# Patient Record
Sex: FEMALE | Race: WHITE | NOT HISPANIC OR LATINO | Employment: OTHER | URBAN - METROPOLITAN AREA
[De-identification: names, ages, dates, MRNs, and addresses within clinical notes are randomized per-mention and may not be internally consistent; named-entity substitution may affect disease eponyms.]

---

## 2017-01-16 ENCOUNTER — LAB VISIT (OUTPATIENT)
Dept: LAB | Facility: HOSPITAL | Age: 82
End: 2017-01-16
Attending: FAMILY MEDICINE
Payer: MEDICARE

## 2017-01-16 DIAGNOSIS — E78.5 HYPERLIPIDEMIA, UNSPECIFIED HYPERLIPIDEMIA TYPE: ICD-10-CM

## 2017-01-16 LAB
ALBUMIN SERPL BCP-MCNC: 3.2 G/DL
ALP SERPL-CCNC: 64 U/L
ALT SERPL W/O P-5'-P-CCNC: 10 U/L
ANION GAP SERPL CALC-SCNC: 5 MMOL/L
AST SERPL-CCNC: 20 U/L
BILIRUB SERPL-MCNC: 0.4 MG/DL
BUN SERPL-MCNC: 18 MG/DL
CALCIUM SERPL-MCNC: 8.3 MG/DL
CHLORIDE SERPL-SCNC: 108 MMOL/L
CHOLEST/HDLC SERPL: 3.4 {RATIO}
CO2 SERPL-SCNC: 28 MMOL/L
CREAT SERPL-MCNC: 1 MG/DL
EST. GFR  (AFRICAN AMERICAN): 58.5 ML/MIN/1.73 M^2
EST. GFR  (NON AFRICAN AMERICAN): 50.8 ML/MIN/1.73 M^2
GLUCOSE SERPL-MCNC: 92 MG/DL
HDL/CHOLESTEROL RATIO: 29.5 %
HDLC SERPL-MCNC: 193 MG/DL
HDLC SERPL-MCNC: 57 MG/DL
LDLC SERPL CALC-MCNC: 117.2 MG/DL
NONHDLC SERPL-MCNC: 136 MG/DL
POTASSIUM SERPL-SCNC: 4.2 MMOL/L
PROT SERPL-MCNC: 6.4 G/DL
SODIUM SERPL-SCNC: 141 MMOL/L
TRIGL SERPL-MCNC: 94 MG/DL
TSH SERPL DL<=0.005 MIU/L-ACNC: 2.26 UIU/ML

## 2017-01-16 PROCEDURE — 80061 LIPID PANEL: CPT

## 2017-01-16 PROCEDURE — 36415 COLL VENOUS BLD VENIPUNCTURE: CPT | Mod: PO

## 2017-01-16 PROCEDURE — 84443 ASSAY THYROID STIM HORMONE: CPT

## 2017-01-16 PROCEDURE — 80053 COMPREHEN METABOLIC PANEL: CPT

## 2017-01-23 ENCOUNTER — CLINICAL SUPPORT (OUTPATIENT)
Dept: CARDIOLOGY | Facility: CLINIC | Age: 82
End: 2017-01-23
Payer: MEDICARE

## 2017-01-23 ENCOUNTER — OFFICE VISIT (OUTPATIENT)
Dept: FAMILY MEDICINE | Facility: CLINIC | Age: 82
End: 2017-01-23
Payer: MEDICARE

## 2017-01-23 VITALS
BODY MASS INDEX: 26.61 KG/M2 | HEART RATE: 78 BPM | TEMPERATURE: 98 F | SYSTOLIC BLOOD PRESSURE: 160 MMHG | HEIGHT: 62 IN | WEIGHT: 144.63 LBS | DIASTOLIC BLOOD PRESSURE: 70 MMHG

## 2017-01-23 DIAGNOSIS — R01.1 HEART MURMUR: ICD-10-CM

## 2017-01-23 DIAGNOSIS — E03.4 HYPOTHYROIDISM DUE TO ACQUIRED ATROPHY OF THYROID: Primary | ICD-10-CM

## 2017-01-23 PROCEDURE — 1157F ADVNC CARE PLAN IN RCRD: CPT | Mod: S$GLB,,, | Performed by: FAMILY MEDICINE

## 2017-01-23 PROCEDURE — 90715 TDAP VACCINE 7 YRS/> IM: CPT | Mod: S$GLB,,, | Performed by: FAMILY MEDICINE

## 2017-01-23 PROCEDURE — 90471 IMMUNIZATION ADMIN: CPT | Mod: S$GLB,,, | Performed by: FAMILY MEDICINE

## 2017-01-23 PROCEDURE — 99499 UNLISTED E&M SERVICE: CPT | Mod: S$GLB,,, | Performed by: FAMILY MEDICINE

## 2017-01-23 PROCEDURE — 1160F RVW MEDS BY RX/DR IN RCRD: CPT | Mod: S$GLB,,, | Performed by: FAMILY MEDICINE

## 2017-01-23 PROCEDURE — 99213 OFFICE O/P EST LOW 20 MIN: CPT | Mod: S$GLB,,, | Performed by: FAMILY MEDICINE

## 2017-01-23 PROCEDURE — 1159F MED LIST DOCD IN RCRD: CPT | Mod: S$GLB,,, | Performed by: FAMILY MEDICINE

## 2017-01-23 PROCEDURE — 93306 TTE W/DOPPLER COMPLETE: CPT | Mod: S$GLB,,, | Performed by: INTERNAL MEDICINE

## 2017-01-23 PROCEDURE — 99999 PR PBB SHADOW E&M-EST. PATIENT-LVL III: CPT | Mod: PBBFAC,,, | Performed by: FAMILY MEDICINE

## 2017-01-23 PROCEDURE — 1125F AMNT PAIN NOTED PAIN PRSNT: CPT | Mod: S$GLB,,, | Performed by: FAMILY MEDICINE

## 2017-01-23 NOTE — PROGRESS NOTES
Subjective:       Patient ID: Michela Calvert is a 87 y.o. female.    Chief Complaint: Hyperlipidemia (Follow up with labs prior: T dap today) and Hypothyroidism    HPI Comments: Here to f/u on chronic health issues.  She is overall feeling well  Lumbar DDD- stable on gabapentin  Hypothyroidism - tolerating Synthroid 75mcg daily    Past Medical History:    DDD (degenerative disc disease), lumbar                       DDD (degenerative disc disease), lumbar                       HLD (hyperlipidemia)                                          Hypothyroidism                                                Lumbar spondylosis            Urinary incontinence - using pads to manage this                                    Past Surgical History:    HYSTERECTOMY                                                   No Known Allergies    Social History    Marital status:              Spouse name:                       Years of education:                 Number of children:               Social History Main Topics    Smoking status: Never Smoker                                                                Smokeless status: Never Used                        Alcohol use: Not on file     Drug use: Not on file     Sexual activity: Not on file          Current Outpatient Prescriptions on File Prior to Visit:  gabapentin (NEURONTIN) 300 MG capsule, Take one capsule (300mg) by mouth in the morning and afternoon, and two capsules (600mg) at night, Disp: 360 capsule, Rfl: 1  hydrocortisone 2.5 % cream, , Disp: , Rfl: 4  lactobacillus rhamnosus GG (CULTURELLE) 10 billion cell capsule, Take 1 capsule by mouth once daily., Disp: , Rfl:   levothyroxine (SYNTHROID) 75 MCG tablet, Take 1 tablet (75 mcg total) by mouth once daily., Disp: 90 tablet, Rfl: 3  multivitamin-minerals-lutein Tab, Take 1 tablet by mouth once daily.  , Disp: , Rfl:   polyethylene glycol (GLYCOLAX) 17 gram/dose powder, Take 17 g by mouth once daily., Disp: 1530 g,  "Rfl: 11  PRAMOXINE HCL/MENTHOL (GOLD BOND MEDICATED ANTI-ITCH TOP), Apply topically., Disp: , Rfl:   alprazolam (XANAX) 1 MG tablet, Take 1 tablet (1 mg total) by mouth On call Procedure for Anxiety. Take 30min prior to procedure, Disp: 1 tablet, Rfl: 0  NYSTOP powder, , Disp: , Rfl:   pneumoc 13-lexus conj-dip cr,PF, (PREVNAR 13, PF,) 0.5 mL Syrg, Inject 0.5 mLs into the muscle once daily., Disp: 1 Syringe, Rfl: 0    No current facility-administered medications on file prior to visit.     No family history on file.          Hyperlipidemia   Pertinent negatives include no chest pain or shortness of breath.     Review of Systems   Constitutional: Negative for appetite change, chills, fever and unexpected weight change.   HENT: Negative for sore throat and trouble swallowing.    Eyes: Negative for pain and visual disturbance.   Respiratory: Negative for cough, shortness of breath and wheezing.    Cardiovascular: Negative for chest pain and palpitations.   Gastrointestinal: Negative for abdominal pain, blood in stool, diarrhea, nausea and vomiting.   Genitourinary: Negative for difficulty urinating, dysuria and hematuria.   Musculoskeletal: Positive for back pain. Negative for arthralgias, gait problem and neck pain.   Skin: Negative for rash and wound.   Neurological: Negative for dizziness, weakness, numbness and headaches.   Hematological: Negative for adenopathy. Bruises/bleeds easily.   Psychiatric/Behavioral: Negative for dysphoric mood.       Objective:       Visit Vitals    BP (!) 160/70 (BP Location: Right arm, Patient Position: Sitting, BP Method: Manual)    Pulse 78    Temp 97.6 °F (36.4 °C) (Oral)    Ht 5' 2" (1.575 m)    Wt 65.6 kg (144 lb 10 oz)    BMI 26.45 kg/m2       Physical Exam   Constitutional: She is oriented to person, place, and time. She appears well-developed and well-nourished.   HENT:   Head: Normocephalic.   Mouth/Throat: Oropharynx is clear and moist. No oropharyngeal exudate or " posterior oropharyngeal erythema.   Eyes: Conjunctivae and EOM are normal. Pupils are equal, round, and reactive to light.   Neck: Normal range of motion. Neck supple. No thyromegaly present.   Cardiovascular: Normal rate, regular rhythm, S1 normal, S2 normal and intact distal pulses.  Exam reveals no gallop and no friction rub.    Murmur heard.   Systolic murmur is present with a grade of 3/6   Pulmonary/Chest: Effort normal and breath sounds normal. She has no wheezes. She has no rales.   Abdominal: Normal appearance.   Musculoskeletal:        Right lower leg: She exhibits no edema.        Left lower leg: She exhibits no edema.   Lymphadenopathy:     She has no cervical adenopathy.   Neurological: She is alert and oriented to person, place, and time. No cranial nerve deficit. Gait normal.   Skin: Skin is warm and intact. No rash noted.   Psychiatric: She has a normal mood and affect.       Results for orders placed or performed in visit on 01/16/17   Comprehensive metabolic panel   Result Value Ref Range    Sodium 141 136 - 145 mmol/L    Potassium 4.2 3.5 - 5.1 mmol/L    Chloride 108 95 - 110 mmol/L    CO2 28 23 - 29 mmol/L    Glucose 92 70 - 110 mg/dL    BUN, Bld 18 8 - 23 mg/dL    Creatinine 1.0 0.5 - 1.4 mg/dL    Calcium 8.3 (L) 8.7 - 10.5 mg/dL    Total Protein 6.4 6.0 - 8.4 g/dL    Albumin 3.2 (L) 3.5 - 5.2 g/dL    Total Bilirubin 0.4 0.1 - 1.0 mg/dL    Alkaline Phosphatase 64 55 - 135 U/L    AST 20 10 - 40 U/L    ALT 10 10 - 44 U/L    Anion Gap 5 (L) 8 - 16 mmol/L    eGFR if African American 58.5 (A) >60 mL/min/1.73 m^2    eGFR if non African American 50.8 (A) >60 mL/min/1.73 m^2   Lipid panel   Result Value Ref Range    Cholesterol 193 120 - 199 mg/dL    Triglycerides 94 30 - 150 mg/dL    HDL 57 40 - 75 mg/dL    LDL Cholesterol 117.2 63.0 - 159.0 mg/dL    HDL/Chol Ratio 29.5 20.0 - 50.0 %    Total Cholesterol/HDL Ratio 3.4 2.0 - 5.0    Non-HDL Cholesterol 136 mg/dL   TSH   Result Value Ref Range    TSH  2.260 0.400 - 4.000 uIU/mL     Assessment:       1. Hypothyroidism due to acquired atrophy of thyroid    2. Heart murmur        Plan:       Hypothyroidism due to acquired atrophy of thyroid  -     Tdap Vaccine (Adult)    Heart murmur  -     2D Echo w/ Color Flow Doppler; Future        Counseled on regular exercise, maintenance of a healthy weight, balanced diet rich in fruits/vegetables and lean protein, and avoidance of unhealthy habits like smoking and excessive alcohol intake.  Continue present meds  Will get echo due to heart murmur. If any signs of LVH may initiate low dose ace inhibitor.  F/u 1 year or PRN

## 2017-01-23 NOTE — MR AVS SNAPSHOT
[facility]  [address] [facility] [address]  [place] [address]  Phone: [phone]  Fax: [phone]                  Michela Calvert   2017 9:20 AM   Office Visit    Description:  Female : 11/3/1929   Provider:  Enzo Nieto MD   Department:  [facility]           Reason for Visit     Hyperlipidemia     Hypothyroidism           Diagnoses this Visit        Comments    Hypothyroidism due to acquired atrophy of thyroid    -  Primary     Heart murmur                To Do List           Future Appointments        Provider Department Dept Phone    2017 2:30 PM ECHO, [facility] [phone]      Goals (5 Years of Data)     None      Follow-Up and Disposition     Return in about 1 year (around 2018).      [facility] On Call     [facility] On Call Nurse [facility] Line - 24/7 Assistance  Registered nurses in the [facility] provide clinical advisement, health education, appointment booking, and other advisory services.  Call for this free service at [phone].             Medications           Message regarding Medications     Verify the changes and/or additions to your medication regime listed below are the same as discussed with your clinician today.  If any of these changes or additions are incorrect, please notify your healthcare provider.        STOP taking these medications     alprazolam (XANAX) 1 MG tablet Take 1 tablet (1 mg total) by mouth On call Procedure for Anxiety. Take 30min prior to procedure    cefUROXime (CEFTIN) 500 MG tablet Take 1 tablet (500 mg total) by mouth 2 (two) times daily.    FLUZONE HIGH-DOSE -, PF, 180 mcg/0.5 mL Syrg ADM 0.5ML IM UTD           Verify that the below list of medications is an accurate representation of the medications you are currently taking.  If none reported, the list may be blank. If incorrect, please contact your healthcare provider. Carry this list with you in case of emergency.     "       Current Medications     clobetasol (TEMOVATE) 0.05 % cream     gabapentin (NEURONTIN) 300 MG capsule TAKE ONE CAPSULE BY MOUTH EVERY MORNING AND EVERY AFTERNOON AND TWO CAPSULES BY MOUTH EVERY NIGHT    hydrocortisone 2.5 % cream     lactobacillus rhamnosus GG (CULTURELLE) 10 billion cell capsule Take 1 capsule by mouth once daily.    levothyroxine (SYNTHROID) 75 MCG tablet Take 1 tablet (75 mcg total) by mouth once daily.    multivitamin-minerals-lutein Tab Take 1 tablet by mouth once daily.      polyethylene glycol (GLYCOLAX) 17 gram/dose powder Take 17 g by mouth once daily.    NYSTOP powder     PRAMOXINE HCL/MENTHOL (GOLD BOND MEDICATED ANTI-ITCH TOP) Apply topically.           Clinical Reference Information           Vital Signs - Last Recorded  Most recent update: 1/23/2017  9:18 AM by Majo Larry MA    BP Pulse Temp Ht Wt BMI    (!) 160/70 (BP Location: Right arm, Patient Position: Sitting, BP Method: Manual) 78 97.6 °F (36.4 °C) (Oral) 5' 2" (1.575 m) 65.6 kg (144 lb 10 oz) 26.45 kg/m2      Blood Pressure          Most Recent Value    BP  (!)  160/70      Allergies as of 1/23/2017     No Known Allergies      Immunizations Administered on Date of Encounter - 1/23/2017     Name Date Dose VIS Date Route    TDAP 1/23/2017 0.5 mL 2/24/2015 Intramuscular      Orders Placed During Today's Visit      Normal Orders This Visit    Tdap Vaccine (Adult)     Future Labs/Procedures Expected by Expires    2D Echo w/ Color Flow Doppler  As directed 1/23/2018      [facility] Sign-Up     Activating your [facility] account is as easy as 1-2-3!     1) Visit [email or web address], select Sign Up Now, enter this activation code and your date of birth, then select Next.  [email or web address]  Expires: 3/9/2017  9:59 AM      2) Create a username and password to use when you visit [facility] in the future and select a security question in case you lose your password and select Next.    3) Enter your e-mail address and click Sign " Up!    Additional Information  If you have questions, please e-mail [email or web address] or call [phone] to talk to our [facility] staff. Remember, [facility] is NOT to be used for urgent needs. For medical emergencies, dial 911.

## 2017-01-24 LAB
DIASTOLIC DYSFUNCTION: YES
ESTIMATED PA SYSTOLIC PRESSURE: 38.52
MITRAL VALVE REGURGITATION: ABNORMAL
MITRAL VALVE STENOSIS: ABNORMAL
RETIRED EF AND QEF - SEE NOTES: 75 (ref 55–65)
TRICUSPID VALVE REGURGITATION: ABNORMAL

## 2017-01-25 ENCOUNTER — TELEPHONE (OUTPATIENT)
Dept: FAMILY MEDICINE | Facility: CLINIC | Age: 82
End: 2017-01-25

## 2017-01-25 DIAGNOSIS — I05.0 MITRAL VALVE STENOSIS, UNSPECIFIED ETIOLOGY: ICD-10-CM

## 2017-01-25 DIAGNOSIS — I51.7 ATRIAL ENLARGEMENT, LEFT: Primary | ICD-10-CM

## 2017-01-25 NOTE — TELEPHONE ENCOUNTER
Echo showed some valve stiffness in mitral valve and some left atrial chamber enlargement.  This may just be related to normal aging, but I do want her to see cardiology. referral entered

## 2017-01-25 NOTE — TELEPHONE ENCOUNTER
Patient advised, voices understanding, request appointment with Dr Bryant.  As I was in the process of scheduling consult with Dr Bryant, phone call was dropped/disconnected.  Attempted x2 to call pt back, no answer.

## 2017-01-25 NOTE — TELEPHONE ENCOUNTER
----- Message from RT Curt sent at 1/25/2017 10:12 AM CST -----  Contact: pt    pt ,requesting a call back soon with results from her Echo Cardiogram, thanks.

## 2017-01-26 ENCOUNTER — OFFICE VISIT (OUTPATIENT)
Dept: CARDIOLOGY | Facility: CLINIC | Age: 82
End: 2017-01-26
Payer: MEDICARE

## 2017-01-26 VITALS
SYSTOLIC BLOOD PRESSURE: 149 MMHG | DIASTOLIC BLOOD PRESSURE: 82 MMHG | BODY MASS INDEX: 26.94 KG/M2 | WEIGHT: 146.38 LBS | HEIGHT: 62 IN | HEART RATE: 66 BPM

## 2017-01-26 DIAGNOSIS — E78.5 HYPERLIPIDEMIA, UNSPECIFIED HYPERLIPIDEMIA TYPE: Primary | ICD-10-CM

## 2017-01-26 DIAGNOSIS — I34.0 CARDIAC MURMUR DUE TO MITRAL VALVE DISORDER: ICD-10-CM

## 2017-01-26 PROCEDURE — 99999 PR PBB SHADOW E&M-EST. PATIENT-LVL III: CPT | Mod: PBBFAC,,, | Performed by: INTERNAL MEDICINE

## 2017-01-26 PROCEDURE — 1159F MED LIST DOCD IN RCRD: CPT | Mod: S$GLB,,, | Performed by: INTERNAL MEDICINE

## 2017-01-26 PROCEDURE — 1157F ADVNC CARE PLAN IN RCRD: CPT | Mod: S$GLB,,, | Performed by: INTERNAL MEDICINE

## 2017-01-26 PROCEDURE — 1160F RVW MEDS BY RX/DR IN RCRD: CPT | Mod: S$GLB,,, | Performed by: INTERNAL MEDICINE

## 2017-01-26 PROCEDURE — 1126F AMNT PAIN NOTED NONE PRSNT: CPT | Mod: S$GLB,,, | Performed by: INTERNAL MEDICINE

## 2017-01-26 PROCEDURE — 99204 OFFICE O/P NEW MOD 45 MIN: CPT | Mod: S$GLB,,, | Performed by: INTERNAL MEDICINE

## 2017-01-26 PROCEDURE — 99499 UNLISTED E&M SERVICE: CPT | Mod: S$GLB,,, | Performed by: INTERNAL MEDICINE

## 2017-01-26 NOTE — PROGRESS NOTES
Subjective:    Patient ID:  Michela Calvert is a 87 y.o. female who presents for evaluation of Results (new pt- ref from Dr. Nieto) and Hyperlipidemia      HPI 88 yo WF asymptomatic with cardiac murmur and echo as below. Denies chest pain, SOB, or edema Denies palpitations, weak spells, and syncope Patient looks younger than stated age.    CONCLUSIONS     1 - Hyperdynamic left ventricular systolic function (EF >70%).     2 - Concentric hypertrophy.     3 - Severe left atrial enlargement.     4 - Left ventricular diastolic dysfunction.     5 - Mild mitral stenosis, MVA = 1.73 cm2.     6 - Mild mitral regurgitation.     7 - Mild tricuspid regurgitation.     8 - The estimated PA systolic pressure is 39 mmHg.             This document has been electronically    SIGNED BY: Sylvester Loredo MD On: 01/24/2017 14:17  Review of Systems   Constitution: Negative for decreased appetite, fever, weakness, malaise/fatigue, weight gain and weight loss.   HENT: Negative for headaches, hearing loss and nosebleeds.    Eyes: Negative for visual disturbance.   Cardiovascular: Negative for chest pain, claudication, cyanosis, dyspnea on exertion, irregular heartbeat, leg swelling, near-syncope, orthopnea, palpitations, paroxysmal nocturnal dyspnea and syncope.   Respiratory: Negative for cough, hemoptysis, shortness of breath, sleep disturbances due to breathing, snoring and wheezing.    Endocrine: Negative for cold intolerance, heat intolerance, polydipsia and polyuria.   Hematologic/Lymphatic: Negative for adenopathy and bleeding problem. Does not bruise/bleed easily.   Skin: Negative for color change, itching, poor wound healing, rash and suspicious lesions.   Musculoskeletal: Positive for arthritis, back pain and joint pain. Negative for falls, joint swelling, muscle cramps, muscle weakness and myalgias.   Gastrointestinal: Negative for bloating, abdominal pain, change in bowel habit, constipation, flatus, heartburn, hematemesis,  "hematochezia, hemorrhoids, jaundice, melena, nausea and vomiting.   Genitourinary: Negative for bladder incontinence, decreased libido, frequency, hematuria, hesitancy and urgency.   Neurological: Negative for brief paralysis, difficulty with concentration, excessive daytime sleepiness, dizziness, focal weakness, light-headedness, loss of balance, numbness and vertigo.   Psychiatric/Behavioral: Negative for altered mental status, depression and memory loss. The patient does not have insomnia and is not nervous/anxious.    Allergic/Immunologic: Negative for environmental allergies, hives and persistent infections.        Objective:    Physical Exam   Constitutional: She is oriented to person, place, and time. She appears well-developed and well-nourished.   BP (!) 149/82  Pulse 66  Ht 5' 2" (1.575 m)  Wt 66.4 kg (146 lb 6.2 oz)  BMI 26.77 kg/m2     HENT:   Head: Normocephalic and atraumatic.   Right Ear: External ear normal.   Left Ear: External ear normal.   Nose: Nose normal.   Mouth/Throat: Oropharynx is clear and moist.   Eyes: Conjunctivae, EOM and lids are normal. Pupils are equal, round, and reactive to light. Right eye exhibits no discharge. Left eye exhibits no discharge. Right conjunctiva has no hemorrhage. No scleral icterus.   Neck: Normal range of motion. Neck supple. No JVD present. No tracheal deviation present. No thyromegaly present.   Cardiovascular: Normal rate, regular rhythm, intact distal pulses and normal pulses.  Exam reveals no gallop and no friction rub.    Murmur heard.   Harsh early systolic murmur is present with a grade of 2/6   Pulmonary/Chest: Effort normal and breath sounds normal. No respiratory distress. She has no wheezes. She has no rales. She exhibits no tenderness. Breasts are symmetrical.   Abdominal: Soft. Bowel sounds are normal. She exhibits no distension and no mass. There is no hepatosplenomegaly or hepatomegaly. There is no tenderness. There is no rebound and no guarding. "   Musculoskeletal: Normal range of motion. She exhibits no edema or tenderness.   Lymphadenopathy:     She has no cervical adenopathy.   Neurological: She is alert and oriented to person, place, and time. She displays normal reflexes. No cranial nerve deficit. Coordination normal.   Skin: Skin is warm and dry. No rash noted. No erythema. No pallor.   Psychiatric: She has a normal mood and affect. Her behavior is normal. Judgment and thought content normal.   Nursing note and vitals reviewed.        Assessment:       1. Hyperlipidemia, unspecified hyperlipidemia type    2. Cardiac murmur due to mitral valve disorder         Plan:     Mild mitral valve stenosis/ Finding in 88 yo asymptomatic patient clinically insignificant      As long as asymptomatic no further testing indicated    No orders of the defined types were placed in this encounter.    Return if symptoms worsen or fail to improve.

## 2017-01-26 NOTE — LETTER
January 26, 2017      Enzo Nieto MD  [address] [facility] [address] [place] [address]           [facility]  [address] [facility] [address] [place] [address]  Phone: [phone]          Patient: Michela Calvert   MR Number: [id number]   YOB: 1929   Date of Visit: 1/26/2017       Dear Dr. Enzo Nieto:    Thank you for referring Michela Calvert to me for evaluation. Attached you will find relevant portions of my assessment and plan of care.    If you have questions, please do not hesitate to call me. I look forward to following Michela Calvert along with you.    Sincerely,    Bryon Puentes Jr., MD    Enclosure  CC:  No Recipients    If you would like to receive this communication electronically, please contact [email or web address] or [phone] to request more information on [employer] Link access.    For providers and/or their staff who would like to refer a patient to [facility], please contact us through our one-stop-shop provider referral line, [facility] [place], at [phone].    If you feel you have received this communication in error or would no longer like to receive these types of communications, please e-mail [email or web address]

## 2017-03-20 RX ORDER — GABAPENTIN 300 MG/1
CAPSULE ORAL
Qty: 360 CAPSULE | Refills: 0 | Status: SHIPPED | OUTPATIENT
Start: 2017-03-20 | End: 2017-06-19 | Stop reason: SDUPTHER

## 2017-04-05 ENCOUNTER — TELEPHONE (OUTPATIENT)
Dept: PAIN MEDICINE | Facility: CLINIC | Age: 82
End: 2017-04-05

## 2017-04-05 NOTE — TELEPHONE ENCOUNTER
----- Message from Janki Fortune sent at 4/5/2017  2:19 PM CDT -----  Patient is requesting a call back form [facility] regarding a possible prescription for a walkerty patient at [phone].    Thank you

## 2017-04-06 RX ORDER — POLYETHYLENE GLYCOL 3350 17 G/17G
POWDER, FOR SOLUTION ORAL
Qty: 1581 G | Refills: 3 | Status: SHIPPED | OUTPATIENT
Start: 2017-04-06 | End: 2018-05-02 | Stop reason: SDUPTHER

## 2017-04-06 NOTE — TELEPHONE ENCOUNTER
Spoke with the patient's  and was unable to get the walker through [employer] and was referred to a place in [place] and they did not deliver. The patient not able to drive that distance contacted [facility]# [phone] and [employer]# [phone]. After speaking with the company, they needed clinical notes why the patient needed the walker. The last office visit was 9/2016. Advised the  that we would need to see the patient to document the need for the walker and then send this over to [place]. Mr. Calvert stated that this would mean a co-pay. He did not want to do that at this time. Advised to contact the office back if he would like to schedule a follow up appointment.

## 2017-04-06 NOTE — TELEPHONE ENCOUNTER
----- Message from Tabitha Garcia RT sent at 4/6/2017 11:00 AM CDT -----  Contact: pt    pt , requesting a call back to, she would like to discuss getting a four marshall walker, thanks.

## 2017-04-19 ENCOUNTER — OFFICE VISIT (OUTPATIENT)
Dept: FAMILY MEDICINE | Facility: CLINIC | Age: 82
End: 2017-04-19
Payer: MEDICARE

## 2017-04-19 VITALS
WEIGHT: 144.38 LBS | SYSTOLIC BLOOD PRESSURE: 144 MMHG | RESPIRATION RATE: 18 BRPM | DIASTOLIC BLOOD PRESSURE: 82 MMHG | HEART RATE: 74 BPM | BODY MASS INDEX: 26.57 KG/M2 | HEIGHT: 62 IN | TEMPERATURE: 98 F | OXYGEN SATURATION: 100 %

## 2017-04-19 DIAGNOSIS — N39.0 URINARY TRACT INFECTION WITHOUT HEMATURIA, SITE UNSPECIFIED: Primary | ICD-10-CM

## 2017-04-19 DIAGNOSIS — R31.9 HEMATURIA: ICD-10-CM

## 2017-04-19 LAB
BACTERIA #/AREA URNS HPF: ABNORMAL /HPF
BILIRUB SERPL-MCNC: NEGATIVE MG/DL
BILIRUB UR QL STRIP: NEGATIVE
BLOOD URINE, POC: 250
CLARITY UR: CLEAR
COLOR UR: YELLOW
COLOR, POC UA: ABNORMAL
GLUCOSE UR QL STRIP: NEGATIVE
GLUCOSE UR QL STRIP: NORMAL
HGB UR QL STRIP: ABNORMAL
KETONES UR QL STRIP: NEGATIVE
KETONES UR QL STRIP: NEGATIVE
LEUKOCYTE ESTERASE UR QL STRIP: ABNORMAL
LEUKOCYTE ESTERASE URINE, POC: ABNORMAL
MICROSCOPIC COMMENT: ABNORMAL
NITRITE UR QL STRIP: NEGATIVE
NITRITE, POC UA: NEGATIVE
PH UR STRIP: 7 [PH] (ref 5–8)
PH, POC UA: 7
PROT UR QL STRIP: NEGATIVE
PROTEIN, POC: ABNORMAL
RBC #/AREA URNS HPF: 10 /HPF (ref 0–4)
SP GR UR STRIP: <=1.005 (ref 1–1.03)
SPECIFIC GRAVITY, POC UA: 1
URN SPEC COLLECT METH UR: ABNORMAL
UROBILINOGEN, POC UA: NORMAL
WBC #/AREA URNS HPF: >100 /HPF (ref 0–5)

## 2017-04-19 PROCEDURE — 1125F AMNT PAIN NOTED PAIN PRSNT: CPT | Mod: S$GLB,,, | Performed by: INTERNAL MEDICINE

## 2017-04-19 PROCEDURE — 99999 PR PBB SHADOW E&M-EST. PATIENT-LVL III: CPT | Mod: PBBFAC,,, | Performed by: INTERNAL MEDICINE

## 2017-04-19 PROCEDURE — 81000 URINALYSIS NONAUTO W/SCOPE: CPT | Mod: PO

## 2017-04-19 PROCEDURE — 1157F ADVNC CARE PLAN IN RCRD: CPT | Mod: S$GLB,,, | Performed by: INTERNAL MEDICINE

## 2017-04-19 PROCEDURE — 1159F MED LIST DOCD IN RCRD: CPT | Mod: S$GLB,,, | Performed by: INTERNAL MEDICINE

## 2017-04-19 PROCEDURE — 1160F RVW MEDS BY RX/DR IN RCRD: CPT | Mod: S$GLB,,, | Performed by: INTERNAL MEDICINE

## 2017-04-19 PROCEDURE — 81001 URINALYSIS AUTO W/SCOPE: CPT | Mod: S$GLB,,, | Performed by: INTERNAL MEDICINE

## 2017-04-19 PROCEDURE — 99214 OFFICE O/P EST MOD 30 MIN: CPT | Mod: 25,S$GLB,, | Performed by: INTERNAL MEDICINE

## 2017-04-19 RX ORDER — PHENAZOPYRIDINE HYDROCHLORIDE 200 MG/1
200 TABLET, FILM COATED ORAL 3 TIMES DAILY PRN
Qty: 9 TABLET | Refills: 0 | Status: SHIPPED | OUTPATIENT
Start: 2017-04-19 | End: 2017-04-22

## 2017-04-19 RX ORDER — FERROUS SULFATE, DRIED 160(50) MG
1 TABLET, EXTENDED RELEASE ORAL DAILY
COMMUNITY
End: 2018-07-19

## 2017-04-19 RX ORDER — SULFAMETHOXAZOLE AND TRIMETHOPRIM 800; 160 MG/1; MG/1
1 TABLET ORAL 2 TIMES DAILY
Qty: 14 TABLET | Refills: 0 | Status: SHIPPED | OUTPATIENT
Start: 2017-04-19 | End: 2017-04-26

## 2017-04-19 NOTE — MR AVS SNAPSHOT
[facility]  [address] [facility] [place] [address]  Phone: [phone]  Fax: [phone]                  Michela Calvert   2017 8:30 AM   Office Visit    Description:  Female : 11/3/1929   Provider:  Ruben Collazo MD   Department:  [facility]           Reason for Visit     Urinary Tract Infection     Abdominal Pain           Diagnoses this Visit        Comments    Urinary tract infection without hematuria, site unspecified    -  Primary     Hematuria                To Do List           Future Appointments        Provider Department Dept Phone    2017 10:50 AM LAB, [place] [facility] [phone]    2017 11:00 AM Bakari Muro MD [facility] [phone]      Goals (5 Years of Data)     None      Follow-Up and Disposition     Follow-up and Disposition History       These Medications        Disp Refills Start End    sulfamethoxazole-trimethoprim 800-160mg (BACTRIM DS) 800-160 mg Tab 14 tablet 0 2017    Take 1 tablet by mouth 2 (two) times daily. - Oral    Pharmacy: [facility] [place] - [address] [facility] Ph #: [phone]       phenazopyridine (PYRIDIUM) 200 MG tablet 9 tablet 0 2017    Take 1 tablet (200 mg total) by mouth 3 (three) times daily as needed for Pain. - Oral    Pharmacy: [facility] [place] - [place] - [address] [facility] Ph #: [phone]         [facility]     [facility] Nurse Care Line -  Assistance  Unless otherwise directed by your provider, please contact [facility], our nurse care line that is available for  assistance.     Registered nurses in the [facility] provide: appointment scheduling, clinical advisement, health education, and other advisory services.  Call: [phone] (toll free)               Medications           Message regarding Medications     Verify the changes and/or  additions to your medication regime listed below are the same as discussed with your clinician today.  If any of these changes or additions are incorrect, please notify your healthcare provider.        START taking these NEW medications        Refills    sulfamethoxazole-trimethoprim 800-160mg (BACTRIM DS) 800-160 mg Tab 0    Sig: Take 1 tablet by mouth 2 (two) times daily.    Class: Normal    Route: Oral    phenazopyridine (PYRIDIUM) 200 MG tablet 0    Sig: Take 1 tablet (200 mg total) by mouth 3 (three) times daily as needed for Pain.    Class: Normal    Route: Oral      STOP taking these medications     NYSTOP powder            Verify that the below list of medications is an accurate representation of the medications you are currently taking.  If none reported, the list may be blank. If incorrect, please contact your healthcare provider. Carry this list with you in case of emergency.           Current Medications     calcium-vitamin D3 (CALCIUM 500 + D) 500 mg(1,250mg) -200 unit per tablet Take 1 tablet by mouth 2 (two) times daily with meals.    clobetasol (TEMOVATE) 0.05 % cream     gabapentin (NEURONTIN) 300 MG capsule TAKE ONE CAPSULE BY MOUTH EVERY MORNING AND EVERY AFTERNOON AND TWO CAPSULES BY MOUTH EVERY NIGHT    hydrocortisone 2.5 % cream     lactobacillus rhamnosus GG (CULTURELLE) 10 billion cell capsule Take 1 capsule by mouth once daily.    levothyroxine (SYNTHROID) 75 MCG tablet Take 1 tablet (75 mcg total) by mouth once daily.    multivitamin-minerals-lutein Tab Take 1 tablet by mouth once daily.      polyethylene glycol (GLYCOLAX) 17 gram/dose powder MIX 17GRAMS AS DIRECTED AND TAKE BY MOUTH ONCE DAILY    PRAMOXINE HCL/MENTHOL (GOLD BOND MEDICATED ANTI-ITCH TOP) Apply topically.    phenazopyridine (PYRIDIUM) 200 MG tablet Take 1 tablet (200 mg total) by mouth 3 (three) times daily as needed for Pain.    sulfamethoxazole-trimethoprim 800-160mg (BACTRIM DS) 800-160 mg Tab Take 1 tablet by mouth 2  "(two) times daily.           Clinical Reference Information           Your Vitals Were     BP Pulse Temp Resp Height Weight    144/82 (BP Location: Right arm, Patient Position: Sitting, BP Method: Manual) 74 98 °F (36.7 °C) 18 5' 2" (1.575 m) 65.5 kg (144 lb 6.4 oz)    SpO2 BMI             100% 26.41 kg/m2         Blood Pressure          Most Recent Value    BP  (!)  144/82      Allergies as of 4/19/2017     Ciprofloxacin      Immunizations Administered on Date of Encounter - 4/19/2017     None      Orders Placed During Today's Visit      Normal Orders This Visit    POCT urinalysis, dipstick or tablet reag     Future Labs/Procedures Expected by Expires    Urinalysis  4/19/2017 7/18/2017    Urinalysis  4/19/2017 6/18/2018    Urinalysis  5/3/2017 6/18/2018      [facility] Sign-Up     Activating your [facility] account is as easy as 1-2-3!     1) Visit [email or web address], select Sign Up Now, enter this activation code and your date of birth, then select Next.  [email or web address]  Expires: 6/3/2017  8:45 AM      2) Create a username and password to use when you visit [facility] in the future and select a security question in case you lose your password and select Next.    3) Enter your e-mail address and click Sign Up!    Additional Information  If you have questions, please e-mail [email or web address] or call [phone] to talk to our [facility] staff. Remember, [facility] is NOT to be used for urgent needs. For medical emergencies, dial 911.         Language Assistance Services     ATTENTION: Language assistance services are available, free of charge. Please call [phone].      ATENCIÓN: Si habla español, tiene a delacruz disposición servicios gratuitos de asistencia lingüística. Llame al [phone].     CHÚ Ý: N?u b?n nói Ti?ng Vi?t, có các d?ch v? h? tr? ngôn ng? mi?n phí dành cho b?n. G?i s? [phone].         [facility] complies with applicable Federal civil rights laws and does not discriminate " on the basis of race, color, national origin, age, disability, or sex.

## 2017-04-19 NOTE — PROGRESS NOTES
"Subjective:       Patient ID: Michela Calvert is a 87 y.o. female.    Chief Complaint: Urinary Tract Infection and Abdominal Pain ("pressure")    HPI Comments: Complains of moderate burning with urination for 3 days associated with increased urinary frequency and lower abdominal pressure.  No fever, n/v.  She does feel run down.      Urinary Tract Infection    Associated symptoms include frequency and urgency. Pertinent negatives include no flank pain, nausea, vomiting or rash.   Abdominal Pain   Associated symptoms include dysuria and frequency. Pertinent negatives include no arthralgias, fever, nausea or vomiting.     Review of Systems   Constitutional: Negative for appetite change and fever.   HENT: Negative for nosebleeds and trouble swallowing.    Eyes: Negative for discharge and visual disturbance.   Respiratory: Negative for choking and shortness of breath.    Cardiovascular: Negative for chest pain and palpitations.   Gastrointestinal: Positive for abdominal pain. Negative for nausea and vomiting.   Genitourinary: Positive for difficulty urinating, dysuria, frequency and urgency. Negative for flank pain.   Musculoskeletal: Negative for arthralgias and joint swelling.   Skin: Negative for rash and wound.   Neurological: Negative for dizziness and syncope.   Psychiatric/Behavioral: Negative for dysphoric mood and sleep disturbance.       Objective:      Vitals:    04/19/17 0815   BP: (!) 144/82   Pulse: 74   Resp: 18   Temp: 98 °F (36.7 °C)     Physical Exam   Constitutional: She appears well-nourished.   Eyes: Conjunctivae and EOM are normal.   Neck: Normal range of motion.   Cardiovascular: Normal rate and regular rhythm.    Pulmonary/Chest: Effort normal and breath sounds normal.   Abdominal: Soft. Bowel sounds are normal. There is no tenderness.   Musculoskeletal:   Normal ROM bilateral    Neurological: No cranial nerve deficit (grossly intact).   Skin: Skin is warm and dry.   Psychiatric: She has a " "normal mood and affect.   Alert and orientated   Vitals reviewed.        Assessment:       1. Urinary tract infection without hematuria, site unspecified        Plan:       Urinary tract infection without hematuria, site unspecified  -     Urinalysis; Future; Expected date: 4/19/17  -     Urinalysis; Future; Expected date: 4/19/17  -     POCT urinalysis, dipstick or tablet reag  -     sulfamethoxazole-trimethoprim 800-160mg (BACTRIM DS) 800-160 mg Tab; Take 1 tablet by mouth 2 (two) times daily.  Dispense: 14 tablet; Refill: 0  -     phenazopyridine (PYRIDIUM) 200 MG tablet; Take 1 tablet (200 mg total) by mouth 3 (three) times daily as needed for Pain.  Dispense: 9 tablet; Refill: 0            Counseled on regular exercise, maintenance of a healthy weight, balanced diet rich in fruits/vegetables and lean protein, and avoidance of unhealthy habits like smoking and excessive alcohol intake.   Also, counseled on importance of being compliant with medication, health appointments, diet and exercise.     No Follow-up on file.    "This note will not be shared with the patient."  "

## 2017-05-01 ENCOUNTER — LAB VISIT (OUTPATIENT)
Dept: LAB | Facility: HOSPITAL | Age: 82
End: 2017-05-01
Attending: INTERNAL MEDICINE
Payer: MEDICARE

## 2017-05-01 DIAGNOSIS — R31.9 HEMATURIA: ICD-10-CM

## 2017-05-01 LAB
BILIRUB UR QL STRIP: NEGATIVE
CLARITY UR: CLEAR
COLOR UR: YELLOW
GLUCOSE UR QL STRIP: NEGATIVE
HGB UR QL STRIP: NEGATIVE
KETONES UR QL STRIP: NEGATIVE
LEUKOCYTE ESTERASE UR QL STRIP: NEGATIVE
NITRITE UR QL STRIP: NEGATIVE
PH UR STRIP: 6 [PH] (ref 5–8)
PROT UR QL STRIP: NEGATIVE
SP GR UR STRIP: 1.01 (ref 1–1.03)
URN SPEC COLLECT METH UR: NORMAL

## 2017-05-01 PROCEDURE — 81003 URINALYSIS AUTO W/O SCOPE: CPT | Mod: PO

## 2017-05-31 ENCOUNTER — TELEPHONE (OUTPATIENT)
Dept: FAMILY MEDICINE | Facility: CLINIC | Age: 82
End: 2017-05-31

## 2017-05-31 NOTE — TELEPHONE ENCOUNTER
Spoke with patient who complained of cough and chest congestion, denies fever. States she went to urgent care and got a steroid shot. States she doesn't feel as  Though she is improving, Advised patient to take mucinex to thin mucous and use the tessalon perles as needed for cough. Advised patient to monitor for fever and report with condition on Friday.

## 2017-06-05 ENCOUNTER — OFFICE VISIT (OUTPATIENT)
Dept: FAMILY MEDICINE | Facility: CLINIC | Age: 82
End: 2017-06-05
Payer: MEDICARE

## 2017-06-05 VITALS
HEIGHT: 62 IN | DIASTOLIC BLOOD PRESSURE: 82 MMHG | OXYGEN SATURATION: 97 % | RESPIRATION RATE: 18 BRPM | BODY MASS INDEX: 25.84 KG/M2 | HEART RATE: 70 BPM | WEIGHT: 140.44 LBS | SYSTOLIC BLOOD PRESSURE: 138 MMHG | TEMPERATURE: 98 F

## 2017-06-05 DIAGNOSIS — H61.23 IMPACTED CERUMEN OF BOTH EARS: ICD-10-CM

## 2017-06-05 DIAGNOSIS — J18.9 PNEUMONIA OF RIGHT LOWER LOBE DUE TO INFECTIOUS ORGANISM: Primary | ICD-10-CM

## 2017-06-05 PROCEDURE — 99999 PR PBB SHADOW E&M-EST. PATIENT-LVL III: CPT | Mod: PBBFAC,,, | Performed by: FAMILY MEDICINE

## 2017-06-05 PROCEDURE — 1126F AMNT PAIN NOTED NONE PRSNT: CPT | Mod: S$GLB,,, | Performed by: FAMILY MEDICINE

## 2017-06-05 PROCEDURE — 99213 OFFICE O/P EST LOW 20 MIN: CPT | Mod: S$GLB,,, | Performed by: FAMILY MEDICINE

## 2017-06-05 PROCEDURE — 1159F MED LIST DOCD IN RCRD: CPT | Mod: S$GLB,,, | Performed by: FAMILY MEDICINE

## 2017-06-05 RX ORDER — DOXYCYCLINE 100 MG/1
100 CAPSULE ORAL 2 TIMES DAILY
Qty: 20 CAPSULE | Refills: 0 | Status: SHIPPED | OUTPATIENT
Start: 2017-06-05 | End: 2017-06-06 | Stop reason: ALTCHOICE

## 2017-06-05 NOTE — PROGRESS NOTES
Subjective:       Patient ID: Michela Calvert is a 87 y.o. female    Chief Complaint: Bronchitis and Cough (previously treated with Tessalon 5/26/17. Taking OTC Mucinex )    Cough   This is a new problem. The current episode started 1 to 4 weeks ago. The problem has been unchanged. The problem occurs constantly. The cough is productive of sputum (clear sputum, heavy production). Associated symptoms include shortness of breath (mild). Pertinent negatives include no chest pain, fever, hemoptysis, nasal congestion or postnasal drip. She has tried OTC cough suppressant and prescription cough suppressant (received steroid injection at urgent care facility) for the symptoms. The treatment provided moderate relief.       Review of Systems   Constitutional: Negative for fever.   HENT: Negative for postnasal drip.    Respiratory: Positive for cough and shortness of breath (mild). Negative for hemoptysis.    Cardiovascular: Negative for chest pain.         Objective:   Physical Exam   Constitutional: She appears well-developed and well-nourished.   HENT:   Head: Normocephalic and atraumatic.   Bilateral impacted cerumen   Eyes: Conjunctivae and EOM are normal. Pupils are equal, round, and reactive to light. No scleral icterus.   Neck: Normal range of motion. Neck supple. No thyromegaly present.   Cardiovascular: Normal rate, regular rhythm and normal heart sounds.  Exam reveals no gallop and no friction rub.    No murmur heard.  Pulmonary/Chest: Effort normal. No respiratory distress. She has no wheezes. She has rales (RLL).   Lymphadenopathy:     She has no cervical adenopathy.   Vitals reviewed.        Assessment:       1. Pneumonia of right lower lobe due to infectious organism  doxycycline (VIBRAMYCIN) 100 MG Cap   2. Impacted cerumen of both ears  Ambulatory referral to ENT         Plan:       Pneumonia of right lower lobe due to infectious organism  -     doxycycline (VIBRAMYCIN) 100 MG Cap; Take 1 capsule (100 mg  total) by mouth 2 (two) times daily.  Dispense: 20 capsule; Refill: 0  - Continue Mucinex  - Return if persists    Impacted cerumen of both ears  -     Ambulatory referral to ENT

## 2017-06-06 ENCOUNTER — TELEPHONE (OUTPATIENT)
Dept: FAMILY MEDICINE | Facility: CLINIC | Age: 82
End: 2017-06-06

## 2017-06-06 RX ORDER — AZITHROMYCIN 250 MG/1
250 TABLET, FILM COATED ORAL DAILY
Qty: 10 TABLET | Refills: 0 | Status: SHIPPED | OUTPATIENT
Start: 2017-06-06 | End: 2017-06-16

## 2017-06-06 NOTE — TELEPHONE ENCOUNTER
----- Message from Hemalatha Harris sent at 6/6/2017  8:15 AM CDT -----  Patient is calling to speak to office because she came in yesterday but the medicine that office prescribed is too strong. It made her nauseous all night. She thinks it is from the medicine. Please call back for details at [phone].

## 2017-06-06 NOTE — TELEPHONE ENCOUNTER
----- Message from Lucia Zavaleta sent at 6/6/2017  2:39 PM CDT -----  Contact: self   Patient has left a previous message for Ce regarding a change in medication  The medication she is on that is making her ill      Please call her at  before at the end of day     Thanks

## 2017-06-06 NOTE — TELEPHONE ENCOUNTER
Called pt, she stated the antibiotic that was prescribed yesterday was too string and up set her stomach and she was nauseated all night last night. She is wanting to know if there is something else she can take that wont be so hard on her. Please advise.

## 2017-06-06 NOTE — TELEPHONE ENCOUNTER
----- Message from Hemalatha Harris sent at 6/6/2017 11:30 AM CDT -----  Patient just talked to Ce. She wanted her to know that patient is allergic to antibiotics. They make her nauseated. Please call patient to find out what she would like instead at [phone].

## 2017-06-19 RX ORDER — GABAPENTIN 300 MG/1
CAPSULE ORAL
Qty: 360 CAPSULE | Refills: 0 | Status: SHIPPED | OUTPATIENT
Start: 2017-06-19 | End: 2017-08-08 | Stop reason: SDUPTHER

## 2017-06-20 ENCOUNTER — CLINICAL SUPPORT (OUTPATIENT)
Dept: AUDIOLOGY | Facility: CLINIC | Age: 82
End: 2017-06-20
Payer: MEDICARE

## 2017-06-20 ENCOUNTER — OFFICE VISIT (OUTPATIENT)
Dept: OTOLARYNGOLOGY | Facility: CLINIC | Age: 82
End: 2017-06-20
Payer: MEDICARE

## 2017-06-20 VITALS
HEIGHT: 62 IN | DIASTOLIC BLOOD PRESSURE: 67 MMHG | BODY MASS INDEX: 26.25 KG/M2 | WEIGHT: 142.63 LBS | HEART RATE: 78 BPM | SYSTOLIC BLOOD PRESSURE: 128 MMHG

## 2017-06-20 DIAGNOSIS — H91.93 HEARING DIFFICULTY, BILATERAL: Primary | ICD-10-CM

## 2017-06-20 DIAGNOSIS — H90.3 BILATERAL SENSORINEURAL HEARING LOSS: Primary | ICD-10-CM

## 2017-06-20 DIAGNOSIS — Z71.89 ENCOUNTER FOR HEARING AID CONSULTATION: Primary | ICD-10-CM

## 2017-06-20 DIAGNOSIS — H90.3 SENSORINEURAL HEARING LOSS (SNHL) OF BOTH EARS: Primary | ICD-10-CM

## 2017-06-20 DIAGNOSIS — H93.293 IMPAIRED AUDITORY DISCRIMINATION, BILATERAL: ICD-10-CM

## 2017-06-20 PROCEDURE — 1159F MED LIST DOCD IN RCRD: CPT | Mod: S$GLB,,, | Performed by: NURSE PRACTITIONER

## 2017-06-20 PROCEDURE — 1126F AMNT PAIN NOTED NONE PRSNT: CPT | Mod: S$GLB,,, | Performed by: NURSE PRACTITIONER

## 2017-06-20 PROCEDURE — 99999 PR PBB SHADOW E&M-EST. PATIENT-LVL III: CPT | Mod: PBBFAC,,, | Performed by: NURSE PRACTITIONER

## 2017-06-20 PROCEDURE — 92567 TYMPANOMETRY: CPT | Mod: S$GLB,,, | Performed by: AUDIOLOGIST

## 2017-06-20 PROCEDURE — 92557 COMPREHENSIVE HEARING TEST: CPT | Mod: S$GLB,,, | Performed by: AUDIOLOGIST

## 2017-06-20 PROCEDURE — 99203 OFFICE O/P NEW LOW 30 MIN: CPT | Mod: S$GLB,,, | Performed by: NURSE PRACTITIONER

## 2017-06-20 NOTE — PROGRESS NOTES
Subjective:       Patient ID: Michela Calvert is a 87 y.o. female.    Chief Complaint: No chief complaint on file.    HPI   Patient states her hearing is declining. She misses words of conversations. She states she is not interested in hearing aids as her  has worn hearing aids for several years and has had been frustrated at times with them.     Review of Systems   Constitutional: Negative.    HENT: Positive for hearing loss.    Eyes: Negative.    Respiratory: Negative.    Cardiovascular: Negative.    Gastrointestinal: Negative.    Musculoskeletal: Negative.    Skin: Negative.    Neurological: Negative.    Hematological: Negative.    Psychiatric/Behavioral: Negative.        Objective:      Physical Exam   Constitutional: She is oriented to person, place, and time. Vital signs are normal. She appears well-developed and well-nourished. She is cooperative. She does not appear ill. No distress.   HENT:   Head: Normocephalic and atraumatic.   Right Ear: Hearing, tympanic membrane, external ear and ear canal normal. Tympanic membrane is not erythematous. No middle ear effusion.   Left Ear: Hearing, tympanic membrane, external ear and ear canal normal. Tympanic membrane is not erythematous.  No middle ear effusion.   Nose: Nose normal. No mucosal edema or rhinorrhea. Right sinus exhibits no maxillary sinus tenderness and no frontal sinus tenderness. Left sinus exhibits no maxillary sinus tenderness and no frontal sinus tenderness.   Mouth/Throat: Uvula is midline, oropharynx is clear and moist and mucous membranes are normal. Mucous membranes are not pale, not dry and not cyanotic. No oral lesions. No oropharyngeal exudate, posterior oropharyngeal edema or posterior oropharyngeal erythema.   Eyes: Conjunctivae, EOM and lids are normal. Pupils are equal, round, and reactive to light. Right eye exhibits no discharge. Left eye exhibits no discharge. No scleral icterus.   Neck: Trachea normal and normal range of  "motion. Neck supple. No tracheal deviation present. No thyroid mass and no thyromegaly present.   Cardiovascular: Normal rate.    Pulmonary/Chest: Effort normal. No stridor. No respiratory distress. She has no wheezes.   Musculoskeletal: Normal range of motion.   Lymphadenopathy:        Head (right side): No submental, no submandibular, no tonsillar, no preauricular and no posterior auricular adenopathy present.        Head (left side): No submental, no submandibular, no tonsillar, no preauricular and no posterior auricular adenopathy present.     She has no cervical adenopathy.        Right cervical: No superficial cervical and no posterior cervical adenopathy present.       Left cervical: No superficial cervical and no posterior cervical adenopathy present.   Neurological: She is alert and oriented to person, place, and time. She has normal strength. Coordination and gait normal.   Skin: Skin is warm, dry and intact. No lesion and no rash noted. She is not diaphoretic. No cyanosis. No pallor.   Psychiatric: She has a normal mood and affect. Her speech is normal and behavior is normal. Judgment and thought content normal. Cognition and memory are normal.   Nursing note and vitals reviewed.        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   Today's audiogram reveals the patient is a candidate for amplification. Audiogram is reviewed in detail with the patient. The audiologist's recommendation that the patient have amplification/hearing aids is discussed and questions answered. Patient has been given information by the audiologist on how to schedule a hearing aid consultation. Patient is encouraged to wear ear protection in loud noise and return annually for hearing test. " Return to clinic as needed for further ENT concerns.

## 2017-06-20 NOTE — LETTER
June 20, 2017      Rajat Reyes MD  [address] [facility] [place] [address]           [facility]  [address] [place] [address]  Phone: [phone]  Fax: [phone]          Patient: Michela Calvert   MR Number: [id number]   YOB: 1929   Date of Visit: 6/20/2017       Dear Dr. Rajat Reyes:    Thank you for referring Michela Calvert to me for evaluation. Attached you will find relevant portions of my assessment and plan of care.    If you have questions, please do not hesitate to call me. I look forward to following Michela Calvert along with you.    Sincerely,    Lynette Desai, NP    Enclosure  CC:  No Recipients    If you would like to receive this communication electronically, please contact [email or web address] or [phone] to request more information on [facility] Link access.    For providers and/or their staff who would like to refer a patient to [facility], please contact us through our one-stop-shop provider referral line, [facility], at [phone].    If you feel you have received this communication in error or would no longer like to receive these types of communications, please e-mail [email or web address]

## 2017-06-21 NOTE — PROGRESS NOTES
Michela Calvert was seen on 06/21/2017 for a hearing aid consult with her . She is unable to hear her  when she speaks with him. We discussed the different sizes and levels of technology and decided based on the patients lifestyle that the best choice would be Phonak Audeo B?-R in color P5 (champagne) with size 1xS receivers AU. The price was quoted for each tech level. She will talk it over with her  and make a decision about the purchase. They have [employer] insurance and will call to determine their HARRELL benefit. Pt was informed that the hearing test would be valid for 6 months for the purchase of hearing aids. Pt will call the clinic PRN.

## 2017-07-31 ENCOUNTER — TELEPHONE (OUTPATIENT)
Dept: PAIN MEDICINE | Facility: CLINIC | Age: 82
End: 2017-07-31

## 2017-07-31 NOTE — TELEPHONE ENCOUNTER
----- Message from Jamaal Gutierrez sent at 7/31/2017  8:30 AM CDT -----  Contact: pt  Pt is requesting a callback, she is still having a lot of pain  Call Back#[phone]  Thanks

## 2017-07-31 NOTE — TELEPHONE ENCOUNTER
Spoke with patient. She stated she is having leg pain and buttock. Patient is scheduled for a follow up on 8/8 at 2:30pm.

## 2017-08-01 ENCOUNTER — TELEPHONE (OUTPATIENT)
Dept: PAIN MEDICINE | Facility: CLINIC | Age: 82
End: 2017-08-01

## 2017-08-01 NOTE — TELEPHONE ENCOUNTER
----- Message from Cheri Rojoan sent at 7/31/2017  4:30 PM CDT -----  Patient states that she is in a lot of pain and need to speak to you as soon as possible.  Call [phone].

## 2017-08-01 NOTE — TELEPHONE ENCOUNTER
Spoke with patient. Yesterday she had severe pain in lower back/buttocks and back of thigh. She stated this has subsided today. Patient is asking if she can take Aleve in the morning to help with pain along with her regular scheduled Gabapentin. Patient is asking if it is safe to have a gin and tonic at 5:00 pm every night and still take her gabapentin at 9:00 pm.

## 2017-08-01 NOTE — TELEPHONE ENCOUNTER
Please let the patient know that it is not recommended to drink alcohol while taking gabapentin.  As far as the Aleve in the mornings, I would ask her to clear this with primary care due to her age.  NSAIDs have an increased risk of MI, CVA, gastric ulcer and renal insufficiency.  It looks like she has an appointment with me in a week and at that time, I can examine her and we can discuss other options to help with her pain if it is getting worse.

## 2017-08-06 DIAGNOSIS — E03.4 HYPOTHYROIDISM DUE TO ACQUIRED ATROPHY OF THYROID: ICD-10-CM

## 2017-08-06 RX ORDER — LEVOTHYROXINE SODIUM 75 UG/1
TABLET ORAL
Qty: 90 TABLET | Refills: 3 | Status: SHIPPED | OUTPATIENT
Start: 2017-08-06 | End: 2017-10-31 | Stop reason: SDUPTHER

## 2017-08-08 ENCOUNTER — OFFICE VISIT (OUTPATIENT)
Dept: PAIN MEDICINE | Facility: CLINIC | Age: 82
End: 2017-08-08
Payer: MEDICARE

## 2017-08-08 VITALS
SYSTOLIC BLOOD PRESSURE: 130 MMHG | BODY MASS INDEX: 26.39 KG/M2 | TEMPERATURE: 98 F | DIASTOLIC BLOOD PRESSURE: 70 MMHG | HEART RATE: 78 BPM | WEIGHT: 144.31 LBS | RESPIRATION RATE: 18 BRPM

## 2017-08-08 DIAGNOSIS — M54.16 LUMBAR RADICULOPATHY: ICD-10-CM

## 2017-08-08 DIAGNOSIS — M51.36 DDD (DEGENERATIVE DISC DISEASE), LUMBAR: ICD-10-CM

## 2017-08-08 DIAGNOSIS — M43.16 SPONDYLOLISTHESIS OF LUMBAR REGION: ICD-10-CM

## 2017-08-08 DIAGNOSIS — M48.061 LUMBAR STENOSIS: Primary | ICD-10-CM

## 2017-08-08 PROCEDURE — 99999 PR PBB SHADOW E&M-EST. PATIENT-LVL V: CPT | Mod: PBBFAC,,, | Performed by: PHYSICIAN ASSISTANT

## 2017-08-08 PROCEDURE — 99214 OFFICE O/P EST MOD 30 MIN: CPT | Mod: S$GLB,,, | Performed by: PHYSICIAN ASSISTANT

## 2017-08-08 PROCEDURE — 1125F AMNT PAIN NOTED PAIN PRSNT: CPT | Mod: S$GLB,,, | Performed by: PHYSICIAN ASSISTANT

## 2017-08-08 PROCEDURE — 1159F MED LIST DOCD IN RCRD: CPT | Mod: S$GLB,,, | Performed by: PHYSICIAN ASSISTANT

## 2017-08-08 RX ORDER — ALPRAZOLAM 0.5 MG/1
1 TABLET, ORALLY DISINTEGRATING ORAL ONCE AS NEEDED
Status: CANCELLED | OUTPATIENT
Start: 2017-08-30 | End: 2017-08-30

## 2017-08-08 RX ORDER — GABAPENTIN 300 MG/1
CAPSULE ORAL
Qty: 360 CAPSULE | Refills: 2 | Status: SHIPPED | OUTPATIENT
Start: 2017-08-08 | End: 2017-10-31 | Stop reason: SDUPTHER

## 2017-08-09 NOTE — PROGRESS NOTES
CC:Back pain    HPI: The patient is an 87-year-old woman with a history of lumbar degenerative disc disease and spondylosis who presents in self-referral for low back pain.  She returns in follow-up today with back and leg pain.  She states that her low back pain is mild but the most severe pain is in the lateral hips, lateral thighs and lateral shins.  She describes the pain as sharp, pins and needles, worse with standing and improved with sitting.  She reports intermittent numbness in her legs.  She denies weakness, bladder or bowel incontinence.  She states that she was interested in going to a chiropractor and [place] because of an add that she saw in the newspaper.  However, she reports she would have to pay out of pocket for this because her insurance will not cover the treatment.    Intervention history: She had undergone a couple epidural steroid injections 15 years ago which had given her relief until A few years ago.  By Dr De Jesus she underwent epidural steroid injections, facet injections, RFA is, SI joint injections without any major relief.    ROS:She reports itching, rhinorrhea, easy bruising and back pain.  Balance of review of systems is negative.    Medical, surgical, family and social history reviewed elsewhere in record.    Medications/Allergies: See med card    Vitals:    08/08/17 1419   BP: 130/70   Pulse: 78   Resp: 18   Temp: 98 °F (36.7 °C)   TempSrc: Oral   Weight: 65.5 kg (144 lb 4.8 oz)   PainSc:   8   PainLoc: Hip         Physical exam:  Gen: A and O x3, pleasant, well-groomed  Skin: No rashes or obvious lesions  HEENT: PERRLA, no obvious deformities on ears or in canals.   CVS: Regular rate and rhythm, normal S1 and S2, no murmurs.  Resp: Clear to auscultation bilaterally, no wheezes or rales.  Abdomen: Soft, NT/ND, normal bowel sounds present.  Musculoskeletal: Able to heel walk, toe walk. No antalgic gait.      Neuro:  Lower extremities: 5/5 strength bilaterally  Reflexes: Patellar  2+, Achilles 2+ bilaterally.  Sensory:  Intact and symmetrical to light touch and pinprick in L2-S1 dermatomes bilaterally.    Lumbar spine:  Lumbar spine: Range of motion is moderately reduced with extension with increased pain in the low back.  Bryon's test causes no increased pain on either side.    Supine straight leg raise is negative bilaterally.    Internal and external rotation of the hip causes no increased pain on either side.  Myofascial exam: No tenderness to palpation to the lumbar paraspinous muscles.    Imaging:  MRI lumbar spine 8/22/13  T11/12: There is annular disk bulging which combines with some degenerative facet changes to produce mild canal and foraminal distortion.  T12/L1: There is annular disk bulging and there is mild resultant central canal stenosis. Ligamentous hypertrophy is present.  L1/2: Annular disk bulge and osteophyte formation produce moderate canal stenosis. There is right left foraminal stenosis. Degenerative facet changes are noted bilaterally.  L2/3: Annular disk bulge and osteophyte formation combined with facet and ligamentous hypertrophic changes to produce moderate foraminal narrowing and mild central canal stenosis.  L3/4: There is annular disk bulging which combines with facet and ligamentous hypertrophy to produce moderate canal and foraminal stenosis.  L4/5: Prominent degenerative facet changes are present and there is a central disk extrusion which extends craniad to the disk level. This combines with facet and ligamentous hypertrophy and the spondylolisthesis to produce prominent canal stenosis. The central canal is narrowed to approximately 6 mm. There is prominent foraminal narrowing bilaterally as well.  L5/S1: Prominent degenerative facet changes are present and there is annular disk bulging with moderate canal and foraminal stenosis.     MRI lumbar spine 1/28/16  The lumbar vertebral bodies show no evidence of acute compression fracture or a pathologic marrow  replacement process. There is degenerative disc desiccation, disc space narrowing, and marginal osteophyte formation at every lumbar level with prominent degenerative endplate changes noted at T12-L1 through L3-L4. There is a grade I retrolisthesis of T12 on L1, L1 on L2, L2 on L3, and L3 on L4. There is a grade I anterolisthesis of L4 on L5 and L5 on S1. The conus medullaris terminates at L1. The visualized retroperitoneal/abdominal soft tissue structures reveal cholelithiasis (series 5 image 7).  T11-T12: There is a broad disc bulge with a minimal superimposed broad central disc protrusion.  T12-L1: There is a grade I retrolisthesis of T12 on L1. There is ligamentum flavum thickening and facet arthropathy. There is uncovering of the intervertebral disc and a superimposed disc bulge. No central canal or neuroforaminal stenosis. No disc protrusion or extrusion.  L1-L2: There is a grade I retrolisthesis of L1 on L2. There is associated uncovering of the intervertebral disc. There is ligamentum flavum thickening and facet arthropathy. There is moderate left and right neuroforaminal stenosis. No overall central canal stenosis.  L2-L3: There is a grade I retrolisthesis of L2 on L3 with uncovering of the intervertebral disc. There is a broad disc bulge which extends into both neural foramina there is ligamentum flavum thickening and facet arthropathy. There is moderate central canal stenosis. There is moderate bilateral neuroforaminal stenosis.  L3-L4: There is a grade I retrolisthesis of L3 on L4. There is uncovering of the intervertebral disc and a superimposed disc bulge. There is ligamentum flavum thickening and facet arthropathy. There is moderate-severe central canal stenosis. There is mild-moderate left and moderate right neuroforaminal stenosis.  L4-L5: There is a grade I anterolisthesis of L4 on L5 with associated uncovering of the intervertebral disc and a superimposed disc bulge which extends into both neural  foramina. There is left ligamentum flavum thickening and severe bilateral facet arthropathy with a left facet joint effusion appreciated. There is severe central canal stenosis, moderate left, and mild right neuroforaminal stenosis  L5-S1: There is a minimal grade I anterolisthesis of L5 on S1 with associated uncovering of the intervertebral disc. There is severe bilateral facet arthropathy and ligamentum flavum. There is moderate central canal stenosis. There is mild-moderate right neuroforaminal stenosis.      Assessment:  The patient is an 87-year-old woman with a history of lumbar degenerative disc disease and spondylosis who presents in self-referral for low back pain.     1. Lumbar stenosis  Ambulatory Referral to Physical/Occupational Therapy   2. Spondylolisthesis of lumbar region  Ambulatory Referral to Physical/Occupational Therapy   3. Lumbar radiculopathy  Ambulatory Referral to Physical/Occupational Therapy    Vital signs    Activity as tolerated    Verify informed consent    Notify physician     Notify physician     Notify physician (specify)    Diet NPO    Case Request Operating Room: INJECTION-STEROID-EPIDURAL-LUMBAR L5/S1    Place in Outpatient    alprazolam ODT dissolvable tablet 1 mg   4. DDD (degenerative disc disease), lumbar  Ambulatory Referral to Physical/Occupational Therapy     Plan:  1.  We discussed chiropractic care and I have suggested trying gentle physical therapy instead at [facility].  I completed orders for this.    2.  I will schedule an L5/S1 interlaminar epidural steroid injection.  She has canal stenosis at L2-3 through L5-S1.  3.  She will continue to take gabapentin 300 mg in the morning, 300 mg in the afternoon and 600 mg at night.  3.  Follow-up in 4 weeks post procedure sooner as needed.    Greater than 50% of this 25 minute visit was spent on counseling the patient.

## 2017-08-23 ENCOUNTER — TELEPHONE (OUTPATIENT)
Dept: PAIN MEDICINE | Facility: CLINIC | Age: 82
End: 2017-08-23

## 2017-08-23 DIAGNOSIS — M51.36 DDD (DEGENERATIVE DISC DISEASE), LUMBAR: Primary | ICD-10-CM

## 2017-08-23 NOTE — TELEPHONE ENCOUNTER
Spoke with patient. Patient is complaining of right hip pain in the morning for about 2 hours. Patient also stated she has pins and needles with numbness in lower legs. Patient is asking if this is normal. Please advise.

## 2017-08-23 NOTE — TELEPHONE ENCOUNTER
----- Message from Elmo Leong sent at 8/22/2017  9:00 AM CDT -----  Contact: pt   States she's calling rg having some questions and will be in for a procedure and can be reached at [phone]//[email or web address]   Is requesting the provider call back

## 2017-08-23 NOTE — TELEPHONE ENCOUNTER
Yes, this is normal with lumbar stenosis.  Hopefully the injection that she has scheduled on 8/30 will help with the symptoms.

## 2017-08-30 ENCOUNTER — SURGERY (OUTPATIENT)
Age: 82
End: 2017-08-30

## 2017-08-30 ENCOUNTER — HOSPITAL ENCOUNTER (OUTPATIENT)
Facility: HOSPITAL | Age: 82
Discharge: HOME | End: 2017-08-30
Attending: ANESTHESIOLOGY | Admitting: ANESTHESIOLOGY
Payer: MEDICARE

## 2017-08-30 ENCOUNTER — HOSPITAL ENCOUNTER (OUTPATIENT)
Dept: RADIOLOGY | Facility: HOSPITAL | Age: 82
Discharge: HOME | End: 2017-08-30
Attending: ANESTHESIOLOGY
Payer: MEDICARE

## 2017-08-30 DIAGNOSIS — M54.16 LUMBAR RADICULOPATHY: Primary | ICD-10-CM

## 2017-08-30 DIAGNOSIS — M51.36 DDD (DEGENERATIVE DISC DISEASE), LUMBAR: ICD-10-CM

## 2017-08-30 PROCEDURE — 25000003 PHARM REV CODE 250: Mod: PO | Performed by: ANESTHESIOLOGY

## 2017-08-30 PROCEDURE — 25500020 PHARM REV CODE 255: Mod: PO | Performed by: ANESTHESIOLOGY

## 2017-08-30 PROCEDURE — 62323 NJX INTERLAMINAR LMBR/SAC: CPT | Mod: PO | Performed by: ANESTHESIOLOGY

## 2017-08-30 PROCEDURE — A4216 STERILE WATER/SALINE, 10 ML: HCPCS | Mod: PO | Performed by: ANESTHESIOLOGY

## 2017-08-30 PROCEDURE — 62323 NJX INTERLAMINAR LMBR/SAC: CPT | Mod: ,,, | Performed by: ANESTHESIOLOGY

## 2017-08-30 PROCEDURE — 76000 FLUOROSCOPY <1 HR PHYS/QHP: CPT | Mod: TC,PO

## 2017-08-30 PROCEDURE — 63600175 PHARM REV CODE 636 W HCPCS: Mod: PO | Performed by: ANESTHESIOLOGY

## 2017-08-30 RX ORDER — ALPRAZOLAM 0.5 MG/1
1 TABLET, ORALLY DISINTEGRATING ORAL ONCE AS NEEDED
Status: COMPLETED | OUTPATIENT
Start: 2017-08-30 | End: 2017-08-30

## 2017-08-30 RX ORDER — SODIUM CHLORIDE 9 MG/ML
INJECTION, SOLUTION INTRAMUSCULAR; INTRAVENOUS; SUBCUTANEOUS
Status: DISCONTINUED | OUTPATIENT
Start: 2017-08-30 | End: 2017-08-30 | Stop reason: HOSPADM

## 2017-08-30 RX ORDER — LIDOCAINE HYDROCHLORIDE 10 MG/ML
INJECTION, SOLUTION EPIDURAL; INFILTRATION; INTRACAUDAL; PERINEURAL
Status: DISCONTINUED | OUTPATIENT
Start: 2017-08-30 | End: 2017-08-30 | Stop reason: HOSPADM

## 2017-08-30 RX ORDER — METHYLPREDNISOLONE ACETATE 80 MG/ML
INJECTION, SUSPENSION INTRA-ARTICULAR; INTRALESIONAL; INTRAMUSCULAR; SOFT TISSUE
Status: DISCONTINUED | OUTPATIENT
Start: 2017-08-30 | End: 2017-08-30 | Stop reason: HOSPADM

## 2017-08-30 RX ADMIN — SODIUM CHLORIDE 4 ML: 9 INJECTION INTRAMUSCULAR; INTRAVENOUS; SUBCUTANEOUS at 04:08

## 2017-08-30 RX ADMIN — IOHEXOL 3 ML: 300 INJECTION, SOLUTION INTRAVENOUS at 04:08

## 2017-08-30 RX ADMIN — LIDOCAINE HYDROCHLORIDE 10 ML: 10 INJECTION, SOLUTION EPIDURAL; INFILTRATION; INTRACAUDAL; PERINEURAL at 04:08

## 2017-08-30 RX ADMIN — ALPRAZOLAM 0.5 MG: 0.5 TABLET, ORALLY DISINTEGRATING ORAL at 02:08

## 2017-08-30 RX ADMIN — METHYLPREDNISOLONE ACETATE 80 MG: 80 INJECTION, SUSPENSION INTRA-ARTICULAR; INTRALESIONAL; INTRAMUSCULAR; SOFT TISSUE at 04:08

## 2017-08-30 NOTE — DISCHARGE SUMMARY
[facility]  Discharge Note  Short Stay    Admit Date: 8/30/2017    Discharge Date: 8/30/2017    Attending Physician: Derek Daily MD     Discharge Provider: Derek Daily    Diagnoses:  Active Hospital Problems    Diagnosis  POA    *Lumbar radiculopathy [M54.16]  Yes      Resolved Hospital Problems    Diagnosis Date Resolved POA   No resolved problems to display.       Discharged Condition: good    Final Diagnoses: Lumbar radiculopathy [M54.16]    Disposition: Home or Self Care    Hospital Course: no complications, uneventful    Outcome of Hospitalization, Treatment, Procedure, or Surgery:  Patient was admitted for outpatient procedure. The patient underwent procedure without complications and are discharged home    Follow up/Patient Instructions:  Follow up as scheduled/Patient has received instructions and follow up date    Medications:  Continue previous medications      Discharge Procedure Orders  Diet general     Activity as tolerated     Call MD for:  temperature >100.4     Call MD for:  severe uncontrolled pain     Call MD for:  redness, tenderness, or signs of infection (pain, swelling, redness, odor or green/yellow discharge around incision site)     Call MD for:  severe persistent headache     No dressing needed           Discharge Procedure Orders (must include Diet, Follow-up, Activity):    Discharge Procedure Orders (must include Diet, Follow-up, Activity)  Diet general     Activity as tolerated     Call MD for:  temperature >100.4     Call MD for:  severe uncontrolled pain     Call MD for:  redness, tenderness, or signs of infection (pain, swelling, redness, odor or green/yellow discharge around incision site)     Call MD for:  severe persistent headache     No dressing needed

## 2017-08-30 NOTE — H&P (VIEW-ONLY)
CC:Back pain    HPI: The patient is an 87-year-old woman with a history of lumbar degenerative disc disease and spondylosis who presents in self-referral for low back pain.  She returns in follow-up today with back and leg pain.  She states that her low back pain is mild but the most severe pain is in the lateral hips, lateral thighs and lateral shins.  She describes the pain as sharp, pins and needles, worse with standing and improved with sitting.  She reports intermittent numbness in her legs.  She denies weakness, bladder or bowel incontinence.  She states that she was interested in going to a chiropractor and [employer] because of an add that she saw in the newspaper.  However, she reports she would have to pay out of pocket for this because her insurance will not cover the treatment.    Intervention history: She had undergone a couple epidural steroid injections 15 years ago which had given her relief until A few years ago.  By Dr De Jesus she underwent epidural steroid injections, facet injections, RFA is, SI joint injections without any major relief.    ROS:She reports itching, rhinorrhea, easy bruising and back pain.  Balance of review of systems is negative.    Medical, surgical, family and social history reviewed elsewhere in record.    Medications/Allergies: See med card    Vitals:    08/08/17 1419   BP: 130/70   Pulse: 78   Resp: 18   Temp: 98 °F (36.7 °C)   TempSrc: Oral   Weight: 65.5 kg (144 lb 4.8 oz)   PainSc:   8   PainLoc: Hip         Physical exam:  Gen: A and O x3, pleasant, well-groomed  Skin: No rashes or obvious lesions  HEENT: PERRLA, no obvious deformities on ears or in canals.   CVS: Regular rate and rhythm, normal S1 and S2, no murmurs.  Resp: Clear to auscultation bilaterally, no wheezes or rales.  Abdomen: Soft, NT/ND, normal bowel sounds present.  Musculoskeletal: Able to heel walk, toe walk. No antalgic gait.      Neuro:  Lower extremities: 5/5 strength bilaterally  Reflexes: Patellar  2+, Achilles 2+ bilaterally.  Sensory:  Intact and symmetrical to light touch and pinprick in L2-S1 dermatomes bilaterally.    Lumbar spine:  Lumbar spine: Range of motion is moderately reduced with extension with increased pain in the low back.  Bryon's test causes no increased pain on either side.    Supine straight leg raise is negative bilaterally.    Internal and external rotation of the hip causes no increased pain on either side.  Myofascial exam: No tenderness to palpation to the lumbar paraspinous muscles.    Imaging:  MRI lumbar spine 8/22/13  T11/12: There is annular disk bulging which combines with some degenerative facet changes to produce mild canal and foraminal distortion.  T12/L1: There is annular disk bulging and there is mild resultant central canal stenosis. Ligamentous hypertrophy is present.  L1/2: Annular disk bulge and osteophyte formation produce moderate canal stenosis. There is right left foraminal stenosis. Degenerative facet changes are noted bilaterally.  L2/3: Annular disk bulge and osteophyte formation combined with facet and ligamentous hypertrophic changes to produce moderate foraminal narrowing and mild central canal stenosis.  L3/4: There is annular disk bulging which combines with facet and ligamentous hypertrophy to produce moderate canal and foraminal stenosis.  L4/5: Prominent degenerative facet changes are present and there is a central disk extrusion which extends craniad to the disk level. This combines with facet and ligamentous hypertrophy and the spondylolisthesis to produce prominent canal stenosis. The central canal is narrowed to approximately 6 mm. There is prominent foraminal narrowing bilaterally as well.  L5/S1: Prominent degenerative facet changes are present and there is annular disk bulging with moderate canal and foraminal stenosis.     MRI lumbar spine 1/28/16  The lumbar vertebral bodies show no evidence of acute compression fracture or a pathologic marrow  replacement process. There is degenerative disc desiccation, disc space narrowing, and marginal osteophyte formation at every lumbar level with prominent degenerative endplate changes noted at T12-L1 through L3-L4. There is a grade I retrolisthesis of T12 on L1, L1 on L2, L2 on L3, and L3 on L4. There is a grade I anterolisthesis of L4 on L5 and L5 on S1. The conus medullaris terminates at L1. The visualized retroperitoneal/abdominal soft tissue structures reveal cholelithiasis (series 5 image 7).  T11-T12: There is a broad disc bulge with a minimal superimposed broad central disc protrusion.  T12-L1: There is a grade I retrolisthesis of T12 on L1. There is ligamentum flavum thickening and facet arthropathy. There is uncovering of the intervertebral disc and a superimposed disc bulge. No central canal or neuroforaminal stenosis. No disc protrusion or extrusion.  L1-L2: There is a grade I retrolisthesis of L1 on L2. There is associated uncovering of the intervertebral disc. There is ligamentum flavum thickening and facet arthropathy. There is moderate left and right neuroforaminal stenosis. No overall central canal stenosis.  L2-L3: There is a grade I retrolisthesis of L2 on L3 with uncovering of the intervertebral disc. There is a broad disc bulge which extends into both neural foramina there is ligamentum flavum thickening and facet arthropathy. There is moderate central canal stenosis. There is moderate bilateral neuroforaminal stenosis.  L3-L4: There is a grade I retrolisthesis of L3 on L4. There is uncovering of the intervertebral disc and a superimposed disc bulge. There is ligamentum flavum thickening and facet arthropathy. There is moderate-severe central canal stenosis. There is mild-moderate left and moderate right neuroforaminal stenosis.  L4-L5: There is a grade I anterolisthesis of L4 on L5 with associated uncovering of the intervertebral disc and a superimposed disc bulge which extends into both neural  foramina. There is left ligamentum flavum thickening and severe bilateral facet arthropathy with a left facet joint effusion appreciated. There is severe central canal stenosis, moderate left, and mild right neuroforaminal stenosis  L5-S1: There is a minimal grade I anterolisthesis of L5 on S1 with associated uncovering of the intervertebral disc. There is severe bilateral facet arthropathy and ligamentum flavum. There is moderate central canal stenosis. There is mild-moderate right neuroforaminal stenosis.      Assessment:  The patient is an 87-year-old woman with a history of lumbar degenerative disc disease and spondylosis who presents in self-referral for low back pain.     1. Lumbar stenosis  Ambulatory Referral to Physical/Occupational Therapy   2. Spondylolisthesis of lumbar region  Ambulatory Referral to Physical/Occupational Therapy   3. Lumbar radiculopathy  Ambulatory Referral to Physical/Occupational Therapy    Vital signs    Activity as tolerated    Verify informed consent    Notify physician     Notify physician     Notify physician (specify)    Diet NPO    Case Request Operating Room: INJECTION-STEROID-EPIDURAL-LUMBAR L5/S1    Place in Outpatient    alprazolam ODT dissolvable tablet 1 mg   4. DDD (degenerative disc disease), lumbar  Ambulatory Referral to Physical/Occupational Therapy     Plan:  1.  We discussed chiropractic care and I have suggested trying gentle physical therapy instead at [facility].  I completed orders for this.    2.  I will schedule an L5/S1 interlaminar epidural steroid injection.  She has canal stenosis at L2-3 through L5-S1.  3.  She will continue to take gabapentin 300 mg in the morning, 300 mg in the afternoon and 600 mg at night.  3.  Follow-up in 4 weeks post procedure sooner as needed.    Greater than 50% of this 25 minute visit was spent on counseling the patient.

## 2017-08-30 NOTE — OP NOTE

## 2017-08-31 VITALS
BODY MASS INDEX: 26.5 KG/M2 | SYSTOLIC BLOOD PRESSURE: 150 MMHG | HEIGHT: 62 IN | WEIGHT: 144 LBS | RESPIRATION RATE: 18 BRPM | DIASTOLIC BLOOD PRESSURE: 78 MMHG | HEART RATE: 62 BPM | OXYGEN SATURATION: 98 % | TEMPERATURE: 97 F

## 2017-09-06 ENCOUNTER — TELEPHONE (OUTPATIENT)
Dept: PAIN MEDICINE | Facility: CLINIC | Age: 82
End: 2017-09-06

## 2017-09-06 NOTE — TELEPHONE ENCOUNTER
Patient stated she is taking the Gabapentin as noted in message. Patient advised to try tylenol in the morning and to call if symptoms are not improving.

## 2017-09-06 NOTE — TELEPHONE ENCOUNTER
Is she still taking gabapentin 300 mg in the morning, 300 mg in afternoon and 600 mg at night?  Has she tried taking Tylenol when she gets up in the morning?  Let her know that the injection that she had may take up to 2 weeks to take full effect.

## 2017-09-06 NOTE — TELEPHONE ENCOUNTER
Spoke with patient she is experiencing pain the in Buttocks and thighs. Patient stated the other day she had pins and needles with burning in her left leg. Patient is asking for something for her pain when she wakes up in the morning, when her pain in worse. Please advise.

## 2017-09-06 NOTE — TELEPHONE ENCOUNTER
----- Message from Jonathon Lloyd sent at 9/5/2017  2:18 PM CDT -----  Contact: self  [phone]   Patient received an injection for leg, back pain. Patient says she still have leg,back pain. Thanks!

## 2017-09-06 NOTE — TELEPHONE ENCOUNTER
----- Message from Charley Wei sent at 9/6/2017  9:40 AM CDT -----  Please call patient in regards to her pain being worse after injection & requesting a Rx, [phone] (home)

## 2017-09-11 ENCOUNTER — TELEPHONE (OUTPATIENT)
Dept: PAIN MEDICINE | Facility: CLINIC | Age: 82
End: 2017-09-11

## 2017-09-11 NOTE — TELEPHONE ENCOUNTER
----- Message from Lucia Zavaleta sent at 9/11/2017  8:35 AM CDT -----  Contact: self  Patient called stating she is still in pain  He back is still hurting   Where does she go from here?   Please call her at  to advise.    Thanks

## 2017-09-11 NOTE — TELEPHONE ENCOUNTER
Since one Tylenol is helping for a little while in the mornings, she can take this 3 times a day if she feels she needs some relief throughout the day as well.  Consideration can be made for spinal cord stimulation and this can be discussed at her visit on Wednesday.

## 2017-09-11 NOTE — TELEPHONE ENCOUNTER
Spoke with patient. Patient stated she is still having severe pain since injection. Patient stated she has been taking one tylenol when she wakes up in the morning and it seems to help for a little while. Please advise.  Follow up made with Dr. Daily 9/13 at 10:45.

## 2017-09-13 ENCOUNTER — OFFICE VISIT (OUTPATIENT)
Dept: PAIN MEDICINE | Facility: CLINIC | Age: 82
End: 2017-09-13
Payer: MEDICARE

## 2017-09-13 VITALS
WEIGHT: 143.06 LBS | SYSTOLIC BLOOD PRESSURE: 120 MMHG | BODY MASS INDEX: 26.17 KG/M2 | TEMPERATURE: 98 F | DIASTOLIC BLOOD PRESSURE: 70 MMHG | HEART RATE: 74 BPM | RESPIRATION RATE: 18 BRPM

## 2017-09-13 DIAGNOSIS — M51.36 DDD (DEGENERATIVE DISC DISEASE), LUMBAR: ICD-10-CM

## 2017-09-13 DIAGNOSIS — M48.061 LUMBAR STENOSIS: Primary | ICD-10-CM

## 2017-09-13 DIAGNOSIS — M43.16 SPONDYLOLISTHESIS OF LUMBAR REGION: ICD-10-CM

## 2017-09-13 DIAGNOSIS — M54.16 LUMBAR RADICULOPATHY: ICD-10-CM

## 2017-09-13 PROCEDURE — 1125F AMNT PAIN NOTED PAIN PRSNT: CPT | Mod: S$GLB,,, | Performed by: ANESTHESIOLOGY

## 2017-09-13 PROCEDURE — 1159F MED LIST DOCD IN RCRD: CPT | Mod: S$GLB,,, | Performed by: ANESTHESIOLOGY

## 2017-09-13 PROCEDURE — 99999 PR PBB SHADOW E&M-EST. PATIENT-LVL III: CPT | Mod: PBBFAC,,, | Performed by: ANESTHESIOLOGY

## 2017-09-13 PROCEDURE — 3008F BODY MASS INDEX DOCD: CPT | Mod: S$GLB,,, | Performed by: ANESTHESIOLOGY

## 2017-09-13 PROCEDURE — 99213 OFFICE O/P EST LOW 20 MIN: CPT | Mod: S$GLB,,, | Performed by: ANESTHESIOLOGY

## 2017-09-13 RX ORDER — GABAPENTIN 300 MG/1
600 CAPSULE ORAL 3 TIMES DAILY
Qty: 180 CAPSULE | Refills: 5 | Status: SHIPPED | OUTPATIENT
Start: 2017-09-13 | End: 2018-03-05 | Stop reason: SDUPTHER

## 2017-09-13 NOTE — PROGRESS NOTES
CC:Back pain    HPI: The patient is an 87-year-old woman with a history of lumbar degenerative disc disease and spondylosis who presents in self-referral for low back pain.  The patient is status post L5/S1 TIFFANIE on 8/30/17 with no relief.  She reports that her pain is present when she gets out of bed in the morning, worse with standing and walking, totally improved with sitting down.  She feels it in the bilateral buttocks, posterior thighs and lower legs with numbness and tingling, denies any weakness, no bowel or bladder incontinence.  She has been taking gabapentin 300 mg in the morning, 300 at noon and 600 mg at night and it provides mild relief.  She occasionally takes Aleve with mild relief.    Intervention history: She had undergone a couple epidural steroid injections 15 years ago which had given her relief until A few years ago.  By Dr De Jesus she underwent epidural steroid injections, facet injections, RFA is, SI joint injections without any major relief.  The patient is status post L5/S1 TIFFANIE on 8/30/17 with no relief.    ROS:She reports itching, rhinorrhea, easy bruising and back pain.  Balance of review of systems is negative.    Medical, surgical, family and social history reviewed elsewhere in record.    Medications/Allergies: See med card    Vitals:    09/13/17 1108   BP: 120/70   Pulse: 74   Resp: 18   Temp: 98.2 °F (36.8 °C)   TempSrc: Oral   Weight: 64.9 kg (143 lb 1.3 oz)   PainSc:   6   PainLoc: Buttocks         Physical exam:  Gen: A and O x3, pleasant, well-groomed  Skin: No rashes or obvious lesions  HEENT: PERRLA, no obvious deformities on ears or in canals.   CVS: Regular rate and rhythm, normal S1 and S2, no murmurs.  Resp: Clear to auscultation bilaterally, no wheezes or rales.  Abdomen: Soft, NT/ND, normal bowel sounds present.  Musculoskeletal: Able to heel walk, toe walk. No antalgic gait.      Neuro:  Lower extremities: 5/5 strength bilaterally  Reflexes: Patellar 2+, Achilles 2+  bilaterally.  Sensory:  Intact and symmetrical to light touch and pinprick in L2-S1 dermatomes bilaterally.    Lumbar spine:  Lumbar spine: Range of motion is moderately reduced with extension with increased pain in the low back.  Bryon's test causes no increased pain on either side.    Supine straight leg raise is negative bilaterally.    Internal and external rotation of the hip causes no increased pain on either side.  Myofascial exam: No tenderness to palpation to the lumbar paraspinous muscles.    Imaging:  MRI lumbar spine 8/22/13  T11/12: There is annular disk bulging which combines with some degenerative facet changes to produce mild canal and foraminal distortion.  T12/L1: There is annular disk bulging and there is mild resultant central canal stenosis. Ligamentous hypertrophy is present.  L1/2: Annular disk bulge and osteophyte formation produce moderate canal stenosis. There is right left foraminal stenosis. Degenerative facet changes are noted bilaterally.  L2/3: Annular disk bulge and osteophyte formation combined with facet and ligamentous hypertrophic changes to produce moderate foraminal narrowing and mild central canal stenosis.  L3/4: There is annular disk bulging which combines with facet and ligamentous hypertrophy to produce moderate canal and foraminal stenosis.  L4/5: Prominent degenerative facet changes are present and there is a central disk extrusion which extends craniad to the disk level. This combines with facet and ligamentous hypertrophy and the spondylolisthesis to produce prominent canal stenosis. The central canal is narrowed to approximately 6 mm. There is prominent foraminal narrowing bilaterally as well.  L5/S1: Prominent degenerative facet changes are present and there is annular disk bulging with moderate canal and foraminal stenosis.     MRI lumbar spine 1/28/16  The lumbar vertebral bodies show no evidence of acute compression fracture or a pathologic marrow replacement  process. There is degenerative disc desiccation, disc space narrowing, and marginal osteophyte formation at every lumbar level with prominent degenerative endplate changes noted at T12-L1 through L3-L4. There is a grade I retrolisthesis of T12 on L1, L1 on L2, L2 on L3, and L3 on L4. There is a grade I anterolisthesis of L4 on L5 and L5 on S1. The conus medullaris terminates at L1. The visualized retroperitoneal/abdominal soft tissue structures reveal cholelithiasis (series 5 image 7).  T11-T12: There is a broad disc bulge with a minimal superimposed broad central disc protrusion.  T12-L1: There is a grade I retrolisthesis of T12 on L1. There is ligamentum flavum thickening and facet arthropathy. There is uncovering of the intervertebral disc and a superimposed disc bulge. No central canal or neuroforaminal stenosis. No disc protrusion or extrusion.  L1-L2: There is a grade I retrolisthesis of L1 on L2. There is associated uncovering of the intervertebral disc. There is ligamentum flavum thickening and facet arthropathy. There is moderate left and right neuroforaminal stenosis. No overall central canal stenosis.  L2-L3: There is a grade I retrolisthesis of L2 on L3 with uncovering of the intervertebral disc. There is a broad disc bulge which extends into both neural foramina there is ligamentum flavum thickening and facet arthropathy. There is moderate central canal stenosis. There is moderate bilateral neuroforaminal stenosis.  L3-L4: There is a grade I retrolisthesis of L3 on L4. There is uncovering of the intervertebral disc and a superimposed disc bulge. There is ligamentum flavum thickening and facet arthropathy. There is moderate-severe central canal stenosis. There is mild-moderate left and moderate right neuroforaminal stenosis.  L4-L5: There is a grade I anterolisthesis of L4 on L5 with associated uncovering of the intervertebral disc and a superimposed disc bulge which extends into both neural foramina.  There is left ligamentum flavum thickening and severe bilateral facet arthropathy with a left facet joint effusion appreciated. There is severe central canal stenosis, moderate left, and mild right neuroforaminal stenosis  L5-S1: There is a minimal grade I anterolisthesis of L5 on S1 with associated uncovering of the intervertebral disc. There is severe bilateral facet arthropathy and ligamentum flavum. There is moderate central canal stenosis. There is mild-moderate right neuroforaminal stenosis.      Assessment:  The patient is an 87-year-old woman with a history of lumbar degenerative disc disease and spondylosis who presents in self-referral for low back pain.     1. Lumbar stenosis     2. Spondylolisthesis of lumbar region     3. DDD (degenerative disc disease), lumbar     4. Lumbar radiculopathy       Plan:  1.  We discussed that unfortunately I would not recommend any further interventional procedures for her lumbar stenosis and radicular pain, we discussed working with medications.  I will have her increase her gabapentin up to 600 mg 3 times a day as tolerated.  She asked about taking tumeric I told her to try this if she would like.  2.  I will have her follow-up in several months or sooner as needed.

## 2017-09-21 ENCOUNTER — TELEPHONE (OUTPATIENT)
Dept: PAIN MEDICINE | Facility: CLINIC | Age: 82
End: 2017-09-21

## 2017-09-21 DIAGNOSIS — M51.36 DDD (DEGENERATIVE DISC DISEASE), LUMBAR: ICD-10-CM

## 2017-09-21 DIAGNOSIS — M48.061 LUMBAR STENOSIS: Primary | ICD-10-CM

## 2017-09-21 DIAGNOSIS — R26.81 GAIT INSTABILITY: ICD-10-CM

## 2017-09-21 NOTE — TELEPHONE ENCOUNTER
----- Message from Lori Morales sent at 9/21/2017 10:15 AM CDT -----  Contact: granddaughter  Pt granddaughter-Angella Sagastume states got pt with a different physical therapist and would like for the office to give her a call back at [phone]

## 2017-09-21 NOTE — TELEPHONE ENCOUNTER
Spoke with patient's grand daughter. She stated patient would like to go to [facility] and she will need new orders dated for today for insurance purposes. Please advise.

## 2017-09-29 ENCOUNTER — TELEPHONE (OUTPATIENT)
Dept: FAMILY MEDICINE | Facility: CLINIC | Age: 82
End: 2017-09-29

## 2017-09-29 DIAGNOSIS — E03.4 HYPOTHYROIDISM DUE TO ACQUIRED ATROPHY OF THYROID: Primary | ICD-10-CM

## 2017-09-29 NOTE — TELEPHONE ENCOUNTER
----- Message from Janki Fortune sent at 9/29/2017  3:39 PM CDT -----  Patient is schedule for a physical on 10/04/17, she is requesting labs prior, contact patient at [phone].    Thank you

## 2017-10-03 NOTE — TELEPHONE ENCOUNTER
----- Message from Charley Wei sent at 10/3/2017  2:51 PM CDT -----  Please call patient in regards to a missed call for earlier appt [phone] (home)

## 2017-10-09 ENCOUNTER — LAB VISIT (OUTPATIENT)
Dept: LAB | Facility: HOSPITAL | Age: 82
End: 2017-10-09
Attending: FAMILY MEDICINE
Payer: MEDICARE

## 2017-10-09 DIAGNOSIS — E03.4 HYPOTHYROIDISM DUE TO ACQUIRED ATROPHY OF THYROID: ICD-10-CM

## 2017-10-09 LAB
ALBUMIN SERPL BCP-MCNC: 3.4 G/DL
ALP SERPL-CCNC: 67 U/L
ALT SERPL W/O P-5'-P-CCNC: 15 U/L
ANION GAP SERPL CALC-SCNC: 5 MMOL/L
AST SERPL-CCNC: 25 U/L
BILIRUB SERPL-MCNC: 0.4 MG/DL
BUN SERPL-MCNC: 22 MG/DL
CALCIUM SERPL-MCNC: 8.7 MG/DL
CHLORIDE SERPL-SCNC: 107 MMOL/L
CO2 SERPL-SCNC: 29 MMOL/L
CREAT SERPL-MCNC: 0.9 MG/DL
EST. GFR  (AFRICAN AMERICAN): >60 ML/MIN/1.73 M^2
EST. GFR  (NON AFRICAN AMERICAN): 57.7 ML/MIN/1.73 M^2
GLUCOSE SERPL-MCNC: 92 MG/DL
POTASSIUM SERPL-SCNC: 4.1 MMOL/L
PROT SERPL-MCNC: 6.4 G/DL
SODIUM SERPL-SCNC: 141 MMOL/L
TSH SERPL DL<=0.005 MIU/L-ACNC: 1.8 UIU/ML

## 2017-10-09 PROCEDURE — 80053 COMPREHEN METABOLIC PANEL: CPT

## 2017-10-09 PROCEDURE — 36415 COLL VENOUS BLD VENIPUNCTURE: CPT | Mod: PO

## 2017-10-09 PROCEDURE — 84443 ASSAY THYROID STIM HORMONE: CPT

## 2017-10-31 ENCOUNTER — OFFICE VISIT (OUTPATIENT)
Dept: FAMILY MEDICINE | Facility: CLINIC | Age: 82
End: 2017-10-31
Payer: MEDICARE

## 2017-10-31 VITALS
OXYGEN SATURATION: 96 % | HEIGHT: 62 IN | WEIGHT: 142.19 LBS | SYSTOLIC BLOOD PRESSURE: 122 MMHG | HEART RATE: 84 BPM | DIASTOLIC BLOOD PRESSURE: 80 MMHG | BODY MASS INDEX: 26.17 KG/M2

## 2017-10-31 DIAGNOSIS — Z00.00 PREVENTATIVE HEALTH CARE: Primary | ICD-10-CM

## 2017-10-31 DIAGNOSIS — E03.4 HYPOTHYROIDISM DUE TO ACQUIRED ATROPHY OF THYROID: ICD-10-CM

## 2017-10-31 PROCEDURE — 99397 PER PM REEVAL EST PAT 65+ YR: CPT | Mod: S$GLB,,, | Performed by: FAMILY MEDICINE

## 2017-10-31 PROCEDURE — 99999 PR PBB SHADOW E&M-EST. PATIENT-LVL III: CPT | Mod: PBBFAC,,, | Performed by: FAMILY MEDICINE

## 2017-10-31 PROCEDURE — 86580 TB INTRADERMAL TEST: CPT | Mod: S$GLB,,, | Performed by: FAMILY MEDICINE

## 2017-10-31 PROCEDURE — 99499 UNLISTED E&M SERVICE: CPT | Mod: S$GLB,,, | Performed by: FAMILY MEDICINE

## 2017-10-31 RX ORDER — LEVOTHYROXINE SODIUM 75 UG/1
75 TABLET ORAL DAILY
Qty: 90 TABLET | Refills: 3 | Status: SHIPPED | OUTPATIENT
Start: 2017-10-31 | End: 2018-08-16

## 2017-10-31 NOTE — PROGRESS NOTES
Subjective:       Patient ID: Michela Calvert is a 87 y.o. female.    Chief Complaint: Preventative health care    Here to f/u on chronic health issues.  She is overall feeling well  Hypothyroidism - tolerating Synthroid 75mcg daily  Lumbar DDD - taking gabapentin and following with pain management and has a f/u appt  She is currently applying to Assisted living at the [facility].    Past Medical History:    DDD (degenerative disc disease), lumbar                       DDD (degenerative disc disease), lumbar                       HLD (hyperlipidemia)                                          Hypothyroidism                                                Lumbar spondylosis            Urinary incontinence - using pads to manage this                                    Past Surgical History:    HYSTERECTOMY                                                   No Known Allergies    Social History    Marital status:              Spouse name:                       Years of education:                 Number of children:               Social History Main Topics    Smoking status: Never Smoker                                                                Smokeless status: Never Used                        Alcohol use: Not on file     Drug use: Not on file     Sexual activity: Not on file          Current Outpatient Prescriptions on File Prior to Visit:  calcium-vitamin D3 (CALCIUM 500 + D) 500 mg(1,250mg) -200 unit per tablet, Take 1 tablet by mouth 2 (two) times daily with meals., Disp: , Rfl:   clobetasol (TEMOVATE) 0.05 % cream, , Disp: , Rfl:   gabapentin (NEURONTIN) 300 MG capsule, Take 2 capsules (600 mg total) by mouth 3 (three) times daily., Disp: 180 capsule, Rfl: 5  hydrocortisone 2.5 % cream, , Disp: , Rfl: 4  lactobacillus rhamnosus GG (CULTURELLE) 10 billion cell capsule, Take 1 capsule by mouth once daily., Disp: , Rfl:   levothyroxine (SYNTHROID) 75 MCG tablet, Take 1 tablet (75 mcg total) by mouth once  "daily., Disp: 90 tablet, Rfl: 3  multivitamin-minerals-lutein Tab, Take 1 tablet by mouth once daily.  , Disp: , Rfl:   polyethylene glycol (GLYCOLAX) 17 gram/dose powder, MIX 17GRAMS AS DIRECTED AND TAKE BY MOUTH ONCE DAILY, Disp: 1581 g, Rfl: 3  PRAMOXINE HCL/MENTHOL (GOLD BOND MEDICATED ANTI-ITCH TOP), Apply topically., Disp: , Rfl:   (DISCONTINUED) gabapentin (NEURONTIN) 300 MG capsule, TAKE ONE CAPSULE BY MOUTH EVERY MORNING AND EVERY AFTERNOON AND TWO CAPSULES BY MOUTH EVERY NIGHT, Disp: 360 capsule, Rfl: 2  (DISCONTINUED) levothyroxine (SYNTHROID) 75 MCG tablet, TAKE ONE TABLET BY MOUTH DAILY, Disp: 90 tablet, Rfl: 3    No current facility-administered medications on file prior to visit.               Hyperlipidemia   Pertinent negatives include no chest pain or shortness of breath.     Review of Systems   Constitutional: Negative for appetite change, chills, fever and unexpected weight change.   HENT: Negative for sore throat and trouble swallowing.    Eyes: Negative for pain and visual disturbance.   Respiratory: Negative for cough, shortness of breath and wheezing.    Cardiovascular: Negative for chest pain and palpitations.   Gastrointestinal: Negative for abdominal pain, blood in stool, diarrhea, nausea and vomiting.   Genitourinary: Negative for difficulty urinating, dysuria and hematuria.   Musculoskeletal: Positive for back pain. Negative for arthralgias, gait problem and neck pain.   Skin: Negative for rash and wound.   Neurological: Negative for dizziness, weakness, numbness and headaches.   Hematological: Negative for adenopathy. Bruises/bleeds easily.   Psychiatric/Behavioral: Negative for dysphoric mood.       Objective:       /80   Pulse 84   Ht 5' 2" (1.575 m)   Wt 64.5 kg (142 lb 3.2 oz)   SpO2 96%   BMI 26.01 kg/m²     Physical Exam   Constitutional: She is oriented to person, place, and time. She appears well-developed and well-nourished.   HENT:   Head: Normocephalic. "   Mouth/Throat: Oropharynx is clear and moist. No oropharyngeal exudate or posterior oropharyngeal erythema.   Eyes: Conjunctivae and EOM are normal. Pupils are equal, round, and reactive to light.   Neck: Normal range of motion. Neck supple. No thyromegaly present.   Cardiovascular: Normal rate, regular rhythm, S1 normal, S2 normal and intact distal pulses.  Exam reveals no gallop and no friction rub.    Murmur heard.   Systolic murmur is present with a grade of 3/6   Pulmonary/Chest: Effort normal and breath sounds normal. She has no wheezes. She has no rales.   Abdominal: Normal appearance.   Musculoskeletal:        Right lower leg: She exhibits no edema.        Left lower leg: She exhibits no edema.   Lymphadenopathy:     She has no cervical adenopathy.   Neurological: She is alert and oriented to person, place, and time. No cranial nerve deficit. Gait normal.   Skin: Skin is warm and intact. No rash noted.   Psychiatric: She has a normal mood and affect.       Results for orders placed or performed in visit on 10/09/17   TSH   Result Value Ref Range    TSH 1.805 0.400 - 4.000 uIU/mL   Comprehensive metabolic panel   Result Value Ref Range    Sodium 141 136 - 145 mmol/L    Potassium 4.1 3.5 - 5.1 mmol/L    Chloride 107 95 - 110 mmol/L    CO2 29 23 - 29 mmol/L    Glucose 92 70 - 110 mg/dL    BUN, Bld 22 8 - 23 mg/dL    Creatinine 0.9 0.5 - 1.4 mg/dL    Calcium 8.7 8.7 - 10.5 mg/dL    Total Protein 6.4 6.0 - 8.4 g/dL    Albumin 3.4 (L) 3.5 - 5.2 g/dL    Total Bilirubin 0.4 0.1 - 1.0 mg/dL    Alkaline Phosphatase 67 55 - 135 U/L    AST 25 10 - 40 U/L    ALT 15 10 - 44 U/L    Anion Gap 5 (L) 8 - 16 mmol/L    eGFR if African American >60.0 >60 mL/min/1.73 m^2    eGFR if non  57.7 (A) >60 mL/min/1.73 m^2     Assessment:       1. Preventative health care    2. Hypothyroidism due to acquired atrophy of thyroid        Plan:       Preventative health care  -     POCT TB Skin Test    Hypothyroidism due to  acquired atrophy of thyroid  -     levothyroxine (SYNTHROID) 75 MCG tablet; Take 1 tablet (75 mcg total) by mouth once daily.  Dispense: 90 tablet; Refill: 3          Will complete forms for admission to assisted living center  Counseled on regular exercise, maintenance of a healthy weight, balanced diet rich in fruits/vegetables and lean protein, and avoidance of unhealthy habits like smoking and excessive alcohol intake.  Continue present meds  F/u 1 year or PRN

## 2017-11-02 ENCOUNTER — CLINICAL SUPPORT (OUTPATIENT)
Dept: FAMILY MEDICINE | Facility: CLINIC | Age: 82
End: 2017-11-02
Payer: MEDICARE

## 2017-11-02 DIAGNOSIS — Z11.1 ENCOUNTER FOR PPD SKIN TEST READING: Primary | ICD-10-CM

## 2017-11-02 LAB
TB INDURATION - 48 HR READ: 0 MM
TB INDURATION - 72 HR READ: NORMAL MM
TB SKIN TEST - 48 HR READ: NEGATIVE
TB SKIN TEST - 72 HR READ: NORMAL

## 2017-11-02 PROCEDURE — 99499 UNLISTED E&M SERVICE: CPT | Mod: S$GLB,,, | Performed by: FAMILY MEDICINE

## 2017-11-02 NOTE — PROGRESS NOTES
Patient presented to clinic for ppd skin test reading. Injection site was clear. The final reading was negative. Informed patient she will be called when the paperwork is ready for pickup so that she and her  can go into an assisted living facility.

## 2017-11-30 ENCOUNTER — TELEPHONE (OUTPATIENT)
Dept: NEUROSURGERY | Facility: CLINIC | Age: 82
End: 2017-11-30

## 2017-11-30 DIAGNOSIS — M51.36 DDD (DEGENERATIVE DISC DISEASE), LUMBAR: Primary | ICD-10-CM

## 2017-11-30 DIAGNOSIS — M47.816 LUMBAR SPONDYLOSIS: ICD-10-CM

## 2017-11-30 DIAGNOSIS — M54.16 LUMBAR RADICULOPATHY: ICD-10-CM

## 2017-11-30 NOTE — TELEPHONE ENCOUNTER
Pt's son is requesting to have referral sent over for pt to see Dr. Rosado for low back pain as seen in ED. Also requesting that MRI be scheduled for pt as well. Please call son and schedule with him for pt, May at [phone]. Thank you!

## 2017-11-30 NOTE — TELEPHONE ENCOUNTER
----- Message from Hemalatha Harris sent at 11/29/2017  3:26 PM CST -----  Son/May is calling to see about scheduling an appointment for patient. He states that office should have received the referral from Dr. Angel Pimentel this morning. He says that his mother is in a lot of pain. Please call back to schedule at [phone].

## 2017-11-30 NOTE — TELEPHONE ENCOUNTER
Referral entered to Dr. Rosado. She has MRI already arrange by her pain management physician, Dr. Pimentel.

## 2017-11-30 NOTE — TELEPHONE ENCOUNTER
Spoke to May, patient's son. Informed him the referral has been entered. He will call to schedule.

## 2017-11-30 NOTE — TELEPHONE ENCOUNTER
Called patient's son May to schedule MRI. HE wants patient to see Dr. Rosado because patient is having terrible pain at this time and seeing pain management is not helping. Patient went to ER for pain and was recommended to see a neurosurgeon. pended referral.

## 2017-12-01 ENCOUNTER — TELEPHONE (OUTPATIENT)
Dept: NEUROSURGERY | Facility: CLINIC | Age: 82
End: 2017-12-01

## 2017-12-01 NOTE — TELEPHONE ENCOUNTER
----- Message from Tabitha Garcia RT sent at 11/30/2017  1:41 PM CST -----  Contact: May (Son) [phone]   May (Son) [phone], requesting an appt for the pt much sooner than the first available of 02/2018, referral is under tab, thanks.

## 2017-12-01 NOTE — TELEPHONE ENCOUNTER
Spoke with son May and set up appt for pt with Dr. Rosado from referral. Pt has MRI scheduled. Date, time, and location verified. Son verbalized understanding.

## 2018-01-03 ENCOUNTER — TELEPHONE (OUTPATIENT)
Dept: NEUROSURGERY | Facility: CLINIC | Age: 83
End: 2018-01-03

## 2018-01-03 NOTE — TELEPHONE ENCOUNTER
----- Message from Bridget Paredes sent at 1/3/2018  8:16 AM CST -----  Contact: Son Radha   Son states patient is in a lot of pain and wants to see if there is any opening sooner than the 10th of Jan for her appt     Please call back [phone]

## 2018-01-03 NOTE — TELEPHONE ENCOUNTER
Attempted to call sabas Olvera back to inform that no sooner appts were available. Unable to leave voice message.

## 2018-01-09 ENCOUNTER — TELEPHONE (OUTPATIENT)
Dept: FAMILY MEDICINE | Facility: CLINIC | Age: 83
End: 2018-01-09

## 2018-01-09 RX ORDER — ONDANSETRON 4 MG/1
4 TABLET, FILM COATED ORAL EVERY 6 HOURS
Qty: 30 TABLET | Refills: 0 | Status: SHIPPED | OUTPATIENT
Start: 2018-01-09 | End: 2018-02-01

## 2018-01-09 RX ORDER — OSELTAMIVIR PHOSPHATE 75 MG/1
75 CAPSULE ORAL 2 TIMES DAILY
Qty: 10 CAPSULE | Refills: 0 | Status: SHIPPED | OUTPATIENT
Start: 2018-01-09 | End: 2018-01-15

## 2018-01-09 NOTE — TELEPHONE ENCOUNTER
----- Message from RT Curt sent at 1/9/2018 12:10 PM CST -----  Contact: gilberto (son) [phone]   gilberto (son) [phone], requesting prescription for nicolás flu (pt was at [facility] on 01/08/2018, diagnosed with the flu, and needs medication refill on Zofran, please call to confirm, thanks.

## 2018-01-09 NOTE — TELEPHONE ENCOUNTER
Patient son calling patient was in ER diagnosed with Flu no meds were given  patient is also having some Vomiting please advise.

## 2018-01-10 ENCOUNTER — TELEPHONE (OUTPATIENT)
Dept: NEUROSURGERY | Facility: CLINIC | Age: 83
End: 2018-01-10

## 2018-01-10 ENCOUNTER — TELEPHONE (OUTPATIENT)
Dept: FAMILY MEDICINE | Facility: CLINIC | Age: 83
End: 2018-01-10

## 2018-01-10 PROBLEM — I48.91 ATRIAL FIBRILLATION WITH RVR: Status: ACTIVE | Noted: 2018-01-10

## 2018-01-10 PROBLEM — J10.1 INFLUENZA A: Status: ACTIVE | Noted: 2018-01-10

## 2018-01-10 NOTE — TELEPHONE ENCOUNTER
Spoke with son and he stated that his mom is not doing good. Stated that she is lethargic, not eating and just in general not doing good. Advised to take patient back to ER. Son verbalized understanding.

## 2018-01-10 NOTE — TELEPHONE ENCOUNTER
----- Message from Jonathon Lloyd sent at 1/10/2018  8:39 AM CST -----  Contact: Son- May [phone]  Patient is sick, the son asking to reschedule appointment. Thanks!

## 2018-01-10 NOTE — TELEPHONE ENCOUNTER
----- Message from Charley Calhoun sent at 1/10/2018  9:56 AM CST -----  Contact: May Calvert   Son called regarding the patient just diagnosed with the flu on Monday at the ER . Wanted to bring patient in for an appt but wanted to discuss first. Please contact [phone]

## 2018-01-10 NOTE — TELEPHONE ENCOUNTER
Spoke with son, May, who stated pt has the flu. Rescheduled appt for next Thursday, 1/18/18. He stated he will let us know how she is feeling by then to know if we need to keep that appt or reschedule at that time. Date, location, and time verified. Son verbalized understanding.

## 2018-01-15 ENCOUNTER — OFFICE VISIT (OUTPATIENT)
Dept: FAMILY MEDICINE | Facility: CLINIC | Age: 83
End: 2018-01-15
Payer: MEDICARE

## 2018-01-15 VITALS
HEART RATE: 129 BPM | OXYGEN SATURATION: 95 % | HEIGHT: 62 IN | SYSTOLIC BLOOD PRESSURE: 118 MMHG | RESPIRATION RATE: 18 BRPM | BODY MASS INDEX: 24.54 KG/M2 | TEMPERATURE: 98 F | WEIGHT: 133.38 LBS | DIASTOLIC BLOOD PRESSURE: 70 MMHG

## 2018-01-15 DIAGNOSIS — M48.061 SPINAL STENOSIS OF LUMBAR REGION, UNSPECIFIED WHETHER NEUROGENIC CLAUDICATION PRESENT: ICD-10-CM

## 2018-01-15 DIAGNOSIS — E03.4 HYPOTHYROIDISM DUE TO ACQUIRED ATROPHY OF THYROID: ICD-10-CM

## 2018-01-15 DIAGNOSIS — I48.91 ATRIAL FIBRILLATION WITH RVR: Primary | ICD-10-CM

## 2018-01-15 DIAGNOSIS — Z74.09 MOBILITY IMPAIRED: ICD-10-CM

## 2018-01-15 DIAGNOSIS — R53.1 WEAKNESS: ICD-10-CM

## 2018-01-15 DIAGNOSIS — J10.1 INFLUENZA A: ICD-10-CM

## 2018-01-15 DIAGNOSIS — R26.89 IMBALANCE: ICD-10-CM

## 2018-01-15 DIAGNOSIS — J18.9 PNEUMONIA OF RIGHT LOWER LOBE DUE TO INFECTIOUS ORGANISM: ICD-10-CM

## 2018-01-15 PROCEDURE — 93005 ELECTROCARDIOGRAM TRACING: CPT | Mod: S$GLB,,, | Performed by: FAMILY MEDICINE

## 2018-01-15 PROCEDURE — 99999 PR PBB SHADOW E&M-EST. PATIENT-LVL III: CPT | Mod: PBBFAC,,, | Performed by: FAMILY MEDICINE

## 2018-01-15 PROCEDURE — 99499 UNLISTED E&M SERVICE: CPT | Mod: S$GLB,,, | Performed by: FAMILY MEDICINE

## 2018-01-15 PROCEDURE — 93010 ELECTROCARDIOGRAM REPORT: CPT | Mod: S$GLB,,, | Performed by: INTERNAL MEDICINE

## 2018-01-15 PROCEDURE — 99214 OFFICE O/P EST MOD 30 MIN: CPT | Mod: S$GLB,,, | Performed by: FAMILY MEDICINE

## 2018-01-15 RX ORDER — DILTIAZEM HYDROCHLORIDE 120 MG/1
240 CAPSULE, COATED, EXTENDED RELEASE ORAL DAILY
Qty: 90 CAPSULE | Refills: 0 | Status: ON HOLD
Start: 2018-01-15 | End: 2018-03-01 | Stop reason: HOSPADM

## 2018-01-15 RX ORDER — CHOLECALCIFEROL (VITAMIN D3) 25 MCG
1000 TABLET ORAL DAILY
COMMUNITY
End: 2018-07-19

## 2018-01-15 NOTE — PROGRESS NOTES
Subjective:       Patient ID: Michela Calvert is a 88 y.o. female.    Chief Complaint: Follow-up (Hospital ) and Fatigue (still not feeling well, cough, cant get around like she used to)    Admission Date: 1/10/2018  Hospital Length of Stay: 0 days  Discharge Date and Time:  01/12/2018 1:09 PM  Attending Physician: Tasneem att. providers found   Discharging Provider: Kathleen Gutierrez MD  Primary Care Provider: Enzo Nieto MD        HPI:   Patient is an 88 year old female with past medical history of hypothyroidism, dyslipidemia presents with complaints of generalized weakness. Patient was seen in the ED a couple of days prior to admission where she was diagnosed with Influenza A. She was discharged home and was started on Tamiflu. Patient states that she has had a nonproductive cough. She states that she does not have any chest pain or shortness of breath. She denies any fever or chills. Patient states that she does not have any palpitations. Patient presents to the free standing ED in [place] where she was founds to be in atrial fibrillation with RVR. Patient was given cardizem. She was transferred to the hospital medicine service as a direct admit.      * No surgery found *       Hospital Course:   The patient was admitted to the hospital medicine service with atrial fibrillation with RVR. Patient was started on a cardizem drip for rate controll. Cardiology was consulted and appreciate Dr. Meyers and associates assistance. The patient was also found to be FLU A positive and she was maintained on tamiflu and droplet precautions. She was initially started on full dose lovenox given her newly dx Afib. The patient has a hx of mechanical falls at home. Cardiology and [facility] discussed the benefits vs risks of home po full dose anticoagulation with the patient and she was able to articulate understanding of the risk/benefit to such medicaitons. She is not interested in anticoagulation at this time. The patient was able  to be weaned off of the cardizem gtt and has been placed on po cardizem with continued adequate rate control. She has been started on an aspirin as well. She had a TTE done which showed mild AV stenosis and a preserved Ef. From a cardiac and pulmonary perspective the patient is stable for discharge to home.      She is back at the [place].  Still feels weak.   Using walker and wheelchair for ambulation,  Son is staying to help her presently.  Sitter is being arranged.  Appetite down.  No Bm in the last week.  Still with cough which seems to be improving.  She did have temp last night.  She has appt with cardiology on 1/17.    Hypothyroidism - tolerating Synthroid 75mcg daily  Lumbar DDD - taking gabapentin and following with pain management and has a f/u appt               Past Medical History:   Diagnosis Date    Cataracts, bilateral     DDD (degenerative disc disease), lumbar     DDD (degenerative disc disease), lumbar     Heart murmur 01/2017    HLD (hyperlipidemia)     no medication taken    Hypothyroidism     Lichen sclerosus et atrophicus     Rectal/Vaginal areas    Lumbar spinal stenosis     Lumbar spondylosis     Mitral valve disorder 01/2017    Asymptomatic    Osteopenia        Past Surgical History:   Procedure Laterality Date    CATARACT EXTRACTION EXTRACAPSULAR W/ INTRAOCULAR LENS IMPLANTATION Bilateral     HYSTERECTOMY      spinal ablation  12/2017    TONSILLECTOMY         Review of patient's allergies indicates:   Allergen Reactions    Nitrofuran analogues Nausea Only    Ciprofloxacin Nausea And Vomiting       Social History     Social History    Marital status:      Spouse name: N/A    Number of children: N/A    Years of education: N/A     Occupational History    Not on file.     Social History Main Topics    Smoking status: Never Smoker    Smokeless tobacco: Never Used    Alcohol use Yes      Comment: 1 gin & tonic daily @ 5pm    Drug use: No    Sexual activity: No      Other Topics Concern    Not on file     Social History Narrative    No narrative on file       Current Outpatient Prescriptions on File Prior to Visit   Medication Sig Dispense Refill    calcium-vitamin D3 (CALCIUM 500 + D) 500 mg(1,250mg) -200 unit per tablet Take 1 tablet by mouth once daily.       clobetasol (TEMOVATE) 0.05 % cream Apply topically daily as needed. To vaginal and anal areas      gabapentin (NEURONTIN) 300 MG capsule Take 2 capsules (600 mg total) by mouth 3 (three) times daily. (Patient taking differently: Take 300 mg by mouth 3 (three) times daily. ) 180 capsule 5    hydrocortisone 2.5 % cream Apply 1 application topically daily as needed. To vaginal and anal areas for itching  4    lactobacillus rhamnosus GG (CULTURELLE) 10 billion cell capsule Take 1 capsule by mouth once daily.      levothyroxine (SYNTHROID) 75 MCG tablet Take 1 tablet (75 mcg total) by mouth once daily. 90 tablet 3    meloxicam (MOBIC) 7.5 MG tablet Take 1 tablet (7.5 mg total) by mouth once daily. 30 tablet 0    multivitamin-minerals-lutein Tab Take 1 tablet by mouth once daily.        polyethylene glycol (GLYCOLAX) 17 gram/dose powder MIX 17GRAMS AS DIRECTED AND TAKE BY MOUTH ONCE DAILY (Patient taking differently: MIX 17GRAMS AS DIRECTED AND TAKE BY MOUTH ONCE DAILY PRN) 1581 g 3     No current facility-administered medications on file prior to visit.        No family history on file.    Review of Systems   Constitutional: Positive for fatigue. Negative for appetite change, chills, fever and unexpected weight change.   HENT: Negative for sore throat and trouble swallowing.    Eyes: Negative for pain and visual disturbance.   Respiratory: Positive for cough. Negative for shortness of breath and wheezing.    Cardiovascular: Positive for palpitations. Negative for chest pain.   Gastrointestinal: Negative for abdominal pain, blood in stool, diarrhea, nausea and vomiting.   Genitourinary: Negative for difficulty  "urinating, dysuria and hematuria.   Musculoskeletal: Positive for gait problem. Negative for arthralgias and neck pain.   Skin: Negative for rash and wound.   Neurological: Positive for weakness. Negative for dizziness, numbness and headaches.   Hematological: Negative for adenopathy.   Psychiatric/Behavioral: Negative for dysphoric mood.       Objective:      /70   Pulse (!) 129   Temp 98.3 °F (36.8 °C) (Oral)   Resp 18   Ht 5' 2" (1.575 m)   Wt 60.5 kg (133 lb 6.1 oz)   SpO2 95%   BMI 24.40 kg/m²   Physical Exam   Constitutional: She is oriented to person, place, and time. She appears well-developed and well-nourished.   HENT:   Head: Normocephalic.   Mouth/Throat: Oropharynx is clear and moist. No oropharyngeal exudate or posterior oropharyngeal erythema.   Eyes: Conjunctivae and EOM are normal. Pupils are equal, round, and reactive to light.   Neck: Normal range of motion. Neck supple. No thyromegaly present.   Cardiovascular: Normal rate, regular rhythm, S1 normal, S2 normal, normal heart sounds and intact distal pulses.  Exam reveals no gallop and no friction rub.    No murmur heard.  Pulmonary/Chest: Effort normal and breath sounds normal. She has no wheezes. She has no rales.   Abdominal: Normal appearance.   Musculoskeletal:        Right lower leg: She exhibits no edema.        Left lower leg: She exhibits no edema.   Lymphadenopathy:     She has no cervical adenopathy.   Neurological: She is alert and oriented to person, place, and time. No cranial nerve deficit. Gait normal.   Skin: Skin is warm and intact. No rash noted.   Psychiatric: She has a normal mood and affect.       Hospital records reviewed  EKG: afib at 104 bpm    Assessment:       1. Atrial fibrillation with RVR    2. Influenza A    3. Hypothyroidism due to acquired atrophy of thyroid    4. Pneumonia of right lower lobe due to infectious organism    5. Mobility impaired    6. Spinal stenosis of lumbar region, unspecified whether " neurogenic claudication present    7. Weakness    8. Imbalance        Plan:       Atrial fibrillation with RVR  -     IN OFFICE EKG 12-LEAD (to [facility])    Influenza A    Hypothyroidism due to acquired atrophy of thyroid    Pneumonia of right lower lobe due to infectious organism    Mobility impaired    Spinal stenosis of lumbar region, unspecified whether neurogenic claudication present    Weakness    Imbalance    Other orders  -     diltiaZEM (CARDIZEM CD) 120 MG Cp24; Take 2 capsules (240 mg total) by mouth once daily. (Patient taking differently: Take 240 mg by mouth 2 (two) times daily. )  Dispense: 90 capsule; Refill: 0        Pneumonia and flu resolved  Some residual post-viral cough  HH as planned with Gustavo  Increase diltiazem to 240mg daily  Continue other present medications  F/u with cardiology in 2 days  ER precautions  Rest and only essential activities advised. Hold PT in HH until afib stabilized    Mobility Evaluation:  Patient with spinal stenosis with weakness and paresthesia in distal bilateral LE.  This is exacerbated with ambulation and increases fall risk with walking with walker.  She cannot self-propel a wheelchair also due to her LE symptoms.  Increased weakness and fall risk warrants the use PMD.  She can safely transfer and can operated a [employer] steering system.  Her mental and physical capabilities are sufficient for safe mobility using POV in the home.  Patient's home provides adequate access for the POV.  Using the POV will signficantly improve the patient's ability to participate in MRADLs.  PT evaluation reviewed and I agree with the PT findings.    7 element order completed for POV/scooter.

## 2018-01-18 ENCOUNTER — OFFICE VISIT (OUTPATIENT)
Dept: SPINE | Facility: CLINIC | Age: 83
End: 2018-01-18
Payer: MEDICARE

## 2018-01-18 ENCOUNTER — TELEPHONE (OUTPATIENT)
Dept: CARDIOLOGY | Facility: CLINIC | Age: 83
End: 2018-01-18

## 2018-01-18 ENCOUNTER — INITIAL CONSULT (OUTPATIENT)
Dept: NEUROSURGERY | Facility: CLINIC | Age: 83
End: 2018-01-18
Payer: MEDICARE

## 2018-01-18 ENCOUNTER — OFFICE VISIT (OUTPATIENT)
Dept: CARDIOLOGY | Facility: CLINIC | Age: 83
End: 2018-01-18
Payer: MEDICARE

## 2018-01-18 VITALS
BODY MASS INDEX: 24.34 KG/M2 | HEIGHT: 62 IN | TEMPERATURE: 98 F | WEIGHT: 132.25 LBS | HEART RATE: 100 BPM | SYSTOLIC BLOOD PRESSURE: 141 MMHG | DIASTOLIC BLOOD PRESSURE: 81 MMHG

## 2018-01-18 VITALS
DIASTOLIC BLOOD PRESSURE: 81 MMHG | HEIGHT: 62 IN | BODY MASS INDEX: 24.34 KG/M2 | HEART RATE: 100 BPM | SYSTOLIC BLOOD PRESSURE: 141 MMHG | WEIGHT: 132.25 LBS

## 2018-01-18 VITALS — SYSTOLIC BLOOD PRESSURE: 137 MMHG | HEIGHT: 63 IN | HEART RATE: 114 BPM | DIASTOLIC BLOOD PRESSURE: 79 MMHG

## 2018-01-18 DIAGNOSIS — Z53.20 PROCEDURE NOT CARRIED OUT BECAUSE OF PATIENT'S DECISION: Primary | ICD-10-CM

## 2018-01-18 DIAGNOSIS — M47.816 LUMBAR SPONDYLOSIS: ICD-10-CM

## 2018-01-18 DIAGNOSIS — M51.36 DDD (DEGENERATIVE DISC DISEASE), LUMBAR: Primary | ICD-10-CM

## 2018-01-18 DIAGNOSIS — I48.0 PAF (PAROXYSMAL ATRIAL FIBRILLATION): Primary | ICD-10-CM

## 2018-01-18 DIAGNOSIS — M48.061 SPINAL STENOSIS OF LUMBAR REGION, UNSPECIFIED WHETHER NEUROGENIC CLAUDICATION PRESENT: ICD-10-CM

## 2018-01-18 PROCEDURE — 99499 UNLISTED E&M SERVICE: CPT | Mod: S$GLB,,, | Performed by: PHYSICIAN ASSISTANT

## 2018-01-18 PROCEDURE — 99999 PR PBB SHADOW E&M-EST. PATIENT-LVL IV: CPT | Mod: PBBFAC,,, | Performed by: NEUROLOGICAL SURGERY

## 2018-01-18 PROCEDURE — 99499 UNLISTED E&M SERVICE: CPT | Mod: S$GLB,,, | Performed by: NEUROLOGICAL SURGERY

## 2018-01-18 PROCEDURE — 99999 PR PBB SHADOW E&M-EST. PATIENT-LVL III: CPT | Mod: PBBFAC,,, | Performed by: PHYSICIAN ASSISTANT

## 2018-01-18 PROCEDURE — 99499 UNLISTED E&M SERVICE: CPT | Mod: S$GLB,,, | Performed by: INTERNAL MEDICINE

## 2018-01-18 PROCEDURE — 99204 OFFICE O/P NEW MOD 45 MIN: CPT | Mod: S$GLB,,, | Performed by: PHYSICIAN ASSISTANT

## 2018-01-18 PROCEDURE — 99214 OFFICE O/P EST MOD 30 MIN: CPT | Mod: S$GLB,,, | Performed by: INTERNAL MEDICINE

## 2018-01-18 PROCEDURE — 99999 PR PBB SHADOW E&M-EST. PATIENT-LVL II: CPT | Mod: PBBFAC,,, | Performed by: INTERNAL MEDICINE

## 2018-01-18 NOTE — LETTER
January 20, 2018      Enzo Nieto MD  [address] [facility] [address] [place] [address]           [facility]  [address] [facility] [address] [place] [address]  Phone: [phone]  Fax: [phone]          Patient: Michela Calvert   MR Number: [id number]   YOB: 1929   Date of Visit: 1/18/2018       Dear Dr. Enzo Nieto:    Thank you for referring Michela Calvert to me for evaluation. Attached you will find relevant portions of my assessment and plan of care.    If you have questions, please do not hesitate to call me. I look forward to following Michela Calvert along with you.    Sincerely,    Kushal Rosado MD    Enclosure  CC:  No Recipients    If you would like to receive this communication electronically, please contact [email or web address] or [phone] to request more information on [facility] access.    For providers and/or their staff who would like to refer a patient to [facility], please contact us through our one-stop-shop provider referral line, [facility], [facility], at [phone].    If you feel you have received this communication in error or would no longer like to receive these types of communications, please e-mail [email or web address]

## 2018-01-18 NOTE — PROGRESS NOTES
Subjective:    Patient ID:  Michela Calvert is a 88 y.o. female who presents for follow-up of atrial fibrillation    HPI  Admitted to [facility] last week with the flu, found to be in atrial fibrillation with rvr.  cardizem started, eliquis recommended (not on it yet)  Saw dr Nieto, increased cardizem  Coming for f/u with persistent cough, no syncope, no chest pain  Last fall > 1 yr ago    Review of Systems   Constitution: Negative for decreased appetite, weakness, malaise/fatigue, weight gain and weight loss.   Cardiovascular: Negative for chest pain, dyspnea on exertion, leg swelling, palpitations and syncope.   Respiratory: Negative for cough and shortness of breath.    Gastrointestinal: Negative.    All other systems reviewed and are negative.       Objective:    Physical Exam   Constitutional: She is oriented to person, place, and time. She appears well-developed and well-nourished.   HENT:   Head: Normocephalic.   Eyes: Pupils are equal, round, and reactive to light.   Neck: Normal range of motion. Neck supple. No JVD present. Carotid bruit is not present. No thyromegaly present.   Cardiovascular: Normal rate, regular rhythm, normal heart sounds, intact distal pulses and normal pulses.  PMI is not displaced.  Exam reveals no gallop.    No murmur heard.  Pulmonary/Chest: Effort normal and breath sounds normal.   Abdominal: Soft. Normal appearance. She exhibits no mass. There is no hepatosplenomegaly. There is no tenderness.   Musculoskeletal: Normal range of motion. She exhibits no edema.   Neurological: She is alert and oriented to person, place, and time. She has normal strength and normal reflexes. No sensory deficit.   Skin: Skin is warm and intact.   Psychiatric: She has a normal mood and affect.   Nursing note and vitals reviewed.        Assessment:       1. PAF (paroxysmal atrial fibrillation)         Plan:   Add eliquis 2.5 bid  Stop ASA  Continue cardizem  2 week f/u with holter

## 2018-01-18 NOTE — PROGRESS NOTES
Neurosurgery History & Physical    Patient ID: Michela Calvert is a 88 y.o. female.    Chief Complaint   Patient presents with    Lumbar Spine Pain (L-Spine)       Review of Systems   Constitutional: Negative for activity change, appetite change, chills, fever and unexpected weight change.   HENT: Negative for tinnitus, trouble swallowing and voice change.    Respiratory: Negative for apnea, cough, chest tightness and shortness of breath.    Cardiovascular: Negative for chest pain and palpitations.   Gastrointestinal: Negative for constipation, diarrhea, nausea and vomiting.   Genitourinary: Negative for difficulty urinating, dysuria, frequency and urgency.   Musculoskeletal: Positive for back pain. Negative for gait problem, neck pain and neck stiffness.   Skin: Negative for wound.   Neurological: Positive for numbness. Negative for dizziness, tremors, seizures, facial asymmetry, speech difficulty, weakness, light-headedness and headaches.   Psychiatric/Behavioral: Negative for confusion and decreased concentration.       Past Medical History:   Diagnosis Date    Cataracts, bilateral     DDD (degenerative disc disease), lumbar     DDD (degenerative disc disease), lumbar     Heart murmur 01/2017    HLD (hyperlipidemia)     no medication taken    Hypothyroidism     Lichen sclerosus et atrophicus     Rectal/Vaginal areas    Lumbar spondylosis     Mitral valve disorder 01/2017    Asymptomatic    Osteopenia      Social History     Social History    Marital status:      Spouse name: N/A    Number of children: N/A    Years of education: N/A     Occupational History    Not on file.     Social History Main Topics    Smoking status: Never Smoker    Smokeless tobacco: Never Used    Alcohol use Yes      Comment: 1 gin & tonic daily @ 5pm    Drug use: No    Sexual activity: No     Other Topics Concern    Not on file     Social History Narrative    No narrative on file     History reviewed. No  pertinent family history.  Review of patient's allergies indicates:   Allergen Reactions    Nitrofuran analogues Nausea Only    Ciprofloxacin Nausea And Vomiting       Current Outpatient Prescriptions:     ANTIOX #8/OM3/DHA/EPA/LUT/ZEAX (PRESERVISION AREDS 2, OMEGA-3, ORAL), Take by mouth., Disp: , Rfl:     apixaban (ELIQUIS) 2.5 mg Tab, Take 2.5 mg by mouth 2 (two) times daily., Disp: , Rfl:     apixaban 2.5 mg Tab, Take 1 tablet (2.5 mg total) by mouth 2 (two) times daily., Disp: 62 tablet, Rfl: 5    aspirin (ECOTRIN) 81 MG EC tablet, Take 1 tablet (81 mg total) by mouth once daily., Disp: 30 tablet, Rfl: 0    calcium-vitamin D3 (CALCIUM 500 + D) 500 mg(1,250mg) -200 unit per tablet, Take 1 tablet by mouth once daily. , Disp: , Rfl:     clobetasol (TEMOVATE) 0.05 % cream, Apply topically daily as needed. To vaginal and anal areas, Disp: , Rfl:     diltiaZEM (CARDIZEM CD) 120 MG Cp24, Take 2 capsules (240 mg total) by mouth once daily. (Patient taking differently: Take 240 mg by mouth 2 (two) times daily. ), Disp: 90 capsule, Rfl: 0    gabapentin (NEURONTIN) 300 MG capsule, Take 2 capsules (600 mg total) by mouth 3 (three) times daily. (Patient taking differently: Take 300 mg by mouth 3 (three) times daily. ), Disp: 180 capsule, Rfl: 5    guaiFENesin (MUCINEX) 600 mg 12 hr tablet, Take 1 tablet (600 mg total) by mouth 2 (two) times daily., Disp: 10 tablet, Rfl: 0    hydrocortisone 2.5 % cream, Apply 1 application topically daily as needed. To vaginal and anal areas for itching, Disp: , Rfl: 4    lactobacillus rhamnosus GG (CULTURELLE) 10 billion cell capsule, Take 1 capsule by mouth once daily., Disp: , Rfl:     levothyroxine (SYNTHROID) 75 MCG tablet, Take 1 tablet (75 mcg total) by mouth once daily., Disp: 90 tablet, Rfl: 3    meloxicam (MOBIC) 7.5 MG tablet, Take 1 tablet (7.5 mg total) by mouth once daily., Disp: 30 tablet, Rfl: 0    multivitamin-minerals-lutein Tab, Take 1 tablet by mouth once  "daily.  , Disp: , Rfl:     ondansetron (ZOFRAN) 4 MG tablet, Take 1 tablet (4 mg total) by mouth every 6 (six) hours. Take 30 minutes prior to taking Norco, Disp: 30 tablet, Rfl: 0    polyethylene glycol (GLYCOLAX) 17 gram/dose powder, MIX 17GRAMS AS DIRECTED AND TAKE BY MOUTH ONCE DAILY (Patient taking differently: MIX 17GRAMS AS DIRECTED AND TAKE BY MOUTH ONCE DAILY PRN), Disp: 1581 g, Rfl: 3    traMADol (ULTRAM) 50 mg tablet, Take 1 tablet (50 mg total) by mouth every 6 (six) hours as needed for Pain., Disp: 12 tablet, Rfl: 0    vitamin D (VITAMIN D3) 1000 units Tab, Take 1,000 Units by mouth once daily., Disp: , Rfl:     Vitals:    01/18/18 1135 01/18/18 1258   BP:  (!) 141/81   Pulse:  100   Temp: 98 °F (36.7 °C)    Weight:  60 kg (132 lb 4.4 oz)   Height:  5' 2" (1.575 m)       Physical Exam   Constitutional: She is oriented to person, place, and time. She appears well-developed and well-nourished.   HENT:   Head: Normocephalic and atraumatic.   Eyes: Pupils are equal, round, and reactive to light.   Neck: Normal range of motion. Neck supple.   Cardiovascular: Normal rate.    Pulmonary/Chest: Effort normal.   Musculoskeletal: Normal range of motion. She exhibits no edema.   Neurological: She is alert and oriented to person, place, and time. She has a normal Finger-Nose-Finger Test, a normal Heel to Shin Test, a normal Romberg Test and a normal Tandem Gait Test. Gait normal.   Reflex Scores:       Tricep reflexes are 0 on the right side and 0 on the left side.       Bicep reflexes are 0 on the right side and 0 on the left side.       Brachioradialis reflexes are 0 on the right side and 0 on the left side.       Patellar reflexes are 0 on the right side and 0 on the left side.       Achilles reflexes are 0 on the right side and 0 on the left side.  Skin: Skin is warm, dry and intact.   Psychiatric: She has a normal mood and affect. Her speech is normal and behavior is normal. Judgment and thought content " normal.   Nursing note and vitals reviewed.      Neurologic Exam     Mental Status   Oriented to person, place, and time.   Oriented to person.   Oriented to place.   Oriented to time.   Follows 3 step commands.   Attention: normal. Concentration: normal.   Speech: speech is normal   Level of consciousness: alert  Knowledge: consistent with education.   Able to name object. Able to read. Able to repeat. Able to write. Normal comprehension.     Cranial Nerves     CN II   Visual acuity: normal  Right visual field deficit: none  Left visual field deficit: none     CN III, IV, VI   Pupils are equal, round, and reactive to light.  Right pupil: Size: 3 mm. Shape: regular. Reactivity: brisk. Consensual response: intact.   Left pupil: Size: 3 mm. Shape: regular. Reactivity: brisk. Consensual response: intact.   CN III: no CN III palsy  CN VI: no CN VI palsy  Nystagmus: none   Diplopia: none  Ophthalmoparesis: none  Conjugate gaze: present    CN V   Right facial sensation deficit: none  Left facial sensation deficit: none    CN VII   Right facial weakness: none  Left facial weakness: none    CN VIII   Hearing: intact    CN IX, X   CN IX normal.   CN X normal.     CN XI   Right sternocleidomastoid strength: normal  Left sternocleidomastoid strength: normal  Right trapezius strength: normal  Left trapezius strength: normal    CN XII   Fasciculations: absent  Tongue deviation: none    Motor Exam   Muscle bulk: normal  Overall muscle tone: normal  Right arm pronator drift: absent  Left arm pronator drift: absent    Strength   Right neck flexion: 5/5  Left neck flexion: 5/5  Right neck extension: 5/5  Left neck extension: 5/5  Right deltoid: 5/5  Left deltoid: 5/5  Right biceps: 5/5  Left biceps: 5/5  Right triceps: 5/5  Left triceps: 5/5  Right wrist flexion: 5/5  Left wrist flexion: 5/5  Right wrist extension: 5/5  Left wrist extension: 5/5  Right interossei: 5/5  Left interossei: 5/5  Right abdominals: 5/5  Left abdominals:  5/5  Right iliopsoas: 5/5  Left iliopsoas: 5/5  Right quadriceps: 5/5  Left quadriceps: 5/5  Right hamstrin/5  Left hamstrin/5  Right glutei: 5/5  Left glutei: 5/5  Right anterior tibial: 5/5  Left anterior tibial: 5/5  Right posterior tibial: 5/5  Left posterior tibial: 5/5  Right peroneal: 5/5  Left peroneal: 5/5  Right gastroc: 5/5  Left gastroc: 5/5    Sensory Exam   Right arm light touch: normal  Left arm light touch: normal  Right leg light touch: normal  Left leg light touch: normal  Right arm vibration: normal  Left arm vibration: normal  Right arm pinprick: normal  Left arm pinprick: normal    Gait, Coordination, and Reflexes     Gait  Gait: normal    Coordination   Romberg: negative  Finger to nose coordination: normal  Heel to shin coordination: normal  Tandem walking coordination: normal    Tremor   Resting tremor: absent  Intention tremor: absent  Action tremor: absent    Reflexes   Right brachioradialis: 0  Left brachioradialis: 0  Right biceps: 0  Left biceps: 0  Right triceps: 0  Left triceps: 0  Right patellar: 0  Left patellar: 0  Right achilles: 0  Left achilles: 0  Right Perdomo: absent  Left Perdomo: absent  Right ankle clonus: absent  Left ankle clonus: absent      Provider dictation:  88 year old  female is referred by Dr. Nieto for evaluation of back pain.  She has had chronic intermittent back pain that has actually resolved since she was initially referred after resting due to influenza.  She was also recently diagnosed with atrial fibrillation.  She currently has pain, numbness and tingling in the left greater than right leg from the knee to the foot.  She denies any back pain and thigh pain at this time.  She is taking gabapenting and acetaminophen for pain.  She has tried PT, TIFFANIE and RFA in the past.  Oswestry score: 34%.  PHQ:  3.    On exam, she is seated in a wheelchair, but is able to walk on her own with minimal assistance.  She has 5/5 strength and no sensory  deficits.  She does have trace reflexes in the bilateral upper and lower extremities.    I have reviewed an MRI lumbar spine with Dr. Rosado from 12/4/17 demonstrating multi level lumbar spondylosis and stenosis from L1 to S1.  The greatest change is a tL4/5 with L4 anterolisthesis on L5 and complete occlusion of the thecal sac.    At this time she has resolved back pain and no true radiculopathy.  Leg numbness and tinling may be related to lumbar stenosis at L4/5, but could also be peripheral neuropathy.  At 88 years old and a recent diagnosis of atrial fibrillation, the risks outweigh the benefits of a major fusion surgery to help with her pain and surgical intervention is not recommended.  We recommend she continue with pain management procedures/ medications to control pain/ numbness/ tingling.  If back pain recurs, we could order a back brace and obtain lumbar/ scoliosis xrays to further evaluate.  At this time, she is content with her back pain resolving recently.  Follow up in clinic as needed.    Visit Diagnosis:  DDD (degenerative disc disease), lumbar    Lumbar spondylosis    Spinal stenosis of lumbar region, unspecified whether neurogenic claudication present        Total time spent counseling greater than fifty percent of total visit time.  Counseling included discussion regarding imaging findings, diagnosis possibilities, treatment options, risks and benefits.   The patient had many questions regarding the options and long-term effects.

## 2018-01-18 NOTE — LETTER
January 18, 2018      Enzo Nieto MD  [address] [facility] [address] [place] [address]           [facility]  [address] [facility]  [facility] [address]  Phone: [phone]  Fax: [phone]          Patient: Michela Calvert   MR Number: [id number]   YOB: 1929   Date of Visit: 1/18/2018       Dear Dr. Enzo Nieto:    Thank you for referring Michela Calvert to me for evaluation. Attached you will find relevant portions of my assessment and plan of care.    If you have questions, please do not hesitate to call me. I look forward to following Michela Calvert along with you.    Sincerely,    MADELYN Lei  CC:  No Recipients    If you would like to receive this communication electronically, please contact [email or web address] or [phone] to request more information on [facility] management Link access.    For providers and/or their staff who would like to refer a patient to [facility], please contact us through our one-stop-shop provider referral line, [facility], at [phone].    If you feel you have received this communication in error or would no longer like to receive these types of communications, please e-mail [email or web address]

## 2018-01-19 RX ORDER — ASPIRIN 81 MG/1
81 TABLET ORAL WEEKLY
Qty: 4 TABLET | Refills: 0 | Status: ON HOLD
Start: 2018-01-19 | End: 2018-03-01 | Stop reason: HOSPADM

## 2018-01-19 NOTE — TELEPHONE ENCOUNTER
Spoke to patient son, informed him of ASA change of directions as per Dr. Meyers. Take ASA ounce a week along with the Eliquis. Pt. Son verbalized understanding.

## 2018-01-19 NOTE — TELEPHONE ENCOUNTER
----- Message from Janki Fortune sent at 1/19/2018  9:39 AM CST -----  Contact: aMy Calvert - son  Contact patients son regarding previous message about patient medication at [phone].    Thank you

## 2018-01-25 ENCOUNTER — CLINICAL SUPPORT (OUTPATIENT)
Dept: CARDIOLOGY | Facility: CLINIC | Age: 83
End: 2018-01-25
Attending: INTERNAL MEDICINE
Payer: MEDICARE

## 2018-01-25 DIAGNOSIS — I48.0 PAF (PAROXYSMAL ATRIAL FIBRILLATION): ICD-10-CM

## 2018-01-25 PROCEDURE — 93224 XTRNL ECG REC UP TO 48 HRS: CPT | Mod: S$GLB,,, | Performed by: INTERNAL MEDICINE

## 2018-02-01 ENCOUNTER — OFFICE VISIT (OUTPATIENT)
Dept: CARDIOLOGY | Facility: CLINIC | Age: 83
End: 2018-02-01
Payer: MEDICARE

## 2018-02-01 ENCOUNTER — TELEPHONE (OUTPATIENT)
Dept: FAMILY MEDICINE | Facility: CLINIC | Age: 83
End: 2018-02-01

## 2018-02-01 VITALS
BODY MASS INDEX: 23.75 KG/M2 | WEIGHT: 134.06 LBS | HEIGHT: 63 IN | HEART RATE: 99 BPM | DIASTOLIC BLOOD PRESSURE: 83 MMHG | SYSTOLIC BLOOD PRESSURE: 143 MMHG

## 2018-02-01 DIAGNOSIS — I48.91 ATRIAL FIBRILLATION WITH RVR: Primary | ICD-10-CM

## 2018-02-01 DIAGNOSIS — I34.0 CARDIAC MURMUR DUE TO MITRAL VALVE DISORDER: ICD-10-CM

## 2018-02-01 PROCEDURE — 99999 PR PBB SHADOW E&M-EST. PATIENT-LVL II: CPT | Mod: PBBFAC,,, | Performed by: INTERNAL MEDICINE

## 2018-02-01 PROCEDURE — 1125F AMNT PAIN NOTED PAIN PRSNT: CPT | Mod: S$GLB,,, | Performed by: INTERNAL MEDICINE

## 2018-02-01 PROCEDURE — 3008F BODY MASS INDEX DOCD: CPT | Mod: S$GLB,,, | Performed by: INTERNAL MEDICINE

## 2018-02-01 PROCEDURE — 99499 UNLISTED E&M SERVICE: CPT | Mod: S$GLB,,, | Performed by: INTERNAL MEDICINE

## 2018-02-01 PROCEDURE — 1159F MED LIST DOCD IN RCRD: CPT | Mod: S$GLB,,, | Performed by: INTERNAL MEDICINE

## 2018-02-01 PROCEDURE — 99214 OFFICE O/P EST MOD 30 MIN: CPT | Mod: S$GLB,,, | Performed by: INTERNAL MEDICINE

## 2018-02-01 NOTE — PROGRESS NOTES
Subjective:    Patient ID:  Michela Calvert is a 88 y.o. female who presents for follow-up of PAF    HPI  She comes with no complaints, no chest pain, no shortness of breath      Review of Systems   Constitution: Negative for decreased appetite, weakness, malaise/fatigue, weight gain and weight loss.   Cardiovascular: Negative for chest pain, dyspnea on exertion, leg swelling, palpitations and syncope.   Respiratory: Negative for cough and shortness of breath.    Gastrointestinal: Negative.    All other systems reviewed and are negative.       Objective:    Physical Exam   Constitutional: She is oriented to person, place, and time. She appears well-developed and well-nourished.   HENT:   Head: Normocephalic.   Eyes: Pupils are equal, round, and reactive to light.   Neck: Normal range of motion. Neck supple. No JVD present. Carotid bruit is not present. No thyromegaly present.   Cardiovascular: Normal rate, regular rhythm, normal heart sounds, intact distal pulses and normal pulses.  PMI is not displaced.  Exam reveals no gallop.    No murmur heard.  Pulmonary/Chest: Effort normal and breath sounds normal.   Abdominal: Soft. Normal appearance. She exhibits no mass. There is no hepatosplenomegaly. There is no tenderness.   Musculoskeletal: Normal range of motion. She exhibits no edema.   Neurological: She is alert and oriented to person, place, and time. She has normal strength and normal reflexes. No sensory deficit.   Skin: Skin is warm and intact.   Psychiatric: She has a normal mood and affect.   Nursing note and vitals reviewed.    holter reviewed      Assessment:       1. Atrial fibrillation with RVR    2. Cardiac murmur due to mitral valve disorder         Plan:     Continue all cardiac medications  Regular exercise program  6 m f/u

## 2018-02-01 NOTE — TELEPHONE ENCOUNTER
Spoke to Marilia . She states patient was evaluated by Angella [facility] for [facility]. She would to know if you could addend note from 1/15/2018 to discuss patient's mobility. She will fax guide. Please advise.

## 2018-02-01 NOTE — TELEPHONE ENCOUNTER
----- Message from RT Curt sent at 2/1/2018  2:17 PM CST -----  Contact: Amanda,[phone] [address], [facility]  Amanda,[phone] [address], [facility], requesting clinical notes of pt's appt 01/15/2018, please fax to , thanks

## 2018-02-06 ENCOUNTER — TELEPHONE (OUTPATIENT)
Dept: PAIN MEDICINE | Facility: CLINIC | Age: 83
End: 2018-02-06

## 2018-02-06 NOTE — TELEPHONE ENCOUNTER
----- Message from Bijal Ventura sent at 2/6/2018 10:31 AM CST -----  Contact: [phone]  Patient is requesting a call back from the nurse in regards to medication.  Please call the patient upon request at phone number [phone].

## 2018-02-06 NOTE — TELEPHONE ENCOUNTER
----- Message from Soha Trivedi sent at 2/6/2018 10:35 AM CST -----  Contact: patient  Patient calling to leave the correct phone number to reach her. Please advise.  Call back   Thanks!

## 2018-02-26 ENCOUNTER — TELEPHONE (OUTPATIENT)
Dept: CARDIOLOGY | Facility: CLINIC | Age: 83
End: 2018-02-26

## 2018-02-26 NOTE — TELEPHONE ENCOUNTER
----- Message from Jamaal Gutierrez sent at 2/26/2018  8:43 AM CST -----  Contact: pt  Pt is calling to speak with a nurse, having problems with her breathing(transferred pt to on call and the nurse transferred her back to me)  Call Back#[phone]  Thanks

## 2018-02-28 ENCOUNTER — OFFICE VISIT (OUTPATIENT)
Dept: FAMILY MEDICINE | Facility: CLINIC | Age: 83
End: 2018-02-28
Payer: MEDICARE

## 2018-02-28 ENCOUNTER — TELEPHONE (OUTPATIENT)
Dept: FAMILY MEDICINE | Facility: CLINIC | Age: 83
End: 2018-02-28

## 2018-02-28 VITALS
TEMPERATURE: 98 F | OXYGEN SATURATION: 96 % | DIASTOLIC BLOOD PRESSURE: 86 MMHG | SYSTOLIC BLOOD PRESSURE: 124 MMHG | BODY MASS INDEX: 23.68 KG/M2 | WEIGHT: 133.63 LBS | HEIGHT: 63 IN | HEART RATE: 124 BPM | RESPIRATION RATE: 16 BRPM

## 2018-02-28 DIAGNOSIS — R41.82 ALTERED MENTAL STATUS, UNSPECIFIED ALTERED MENTAL STATUS TYPE: ICD-10-CM

## 2018-02-28 DIAGNOSIS — R00.0 TACHYCARDIA: ICD-10-CM

## 2018-02-28 DIAGNOSIS — R06.02 SOB (SHORTNESS OF BREATH): ICD-10-CM

## 2018-02-28 DIAGNOSIS — I48.19 PERSISTENT ATRIAL FIBRILLATION: Primary | ICD-10-CM

## 2018-02-28 DIAGNOSIS — R05.9 COUGH: ICD-10-CM

## 2018-02-28 DIAGNOSIS — E03.4 HYPOTHYROIDISM DUE TO ACQUIRED ATROPHY OF THYROID: ICD-10-CM

## 2018-02-28 DIAGNOSIS — I48.91 ATRIAL FIBRILLATION WITH RVR: ICD-10-CM

## 2018-02-28 PROBLEM — J90 PLEURAL EFFUSION: Status: ACTIVE | Noted: 2018-02-28

## 2018-02-28 PROBLEM — J10.1 INFLUENZA A: Status: RESOLVED | Noted: 2018-01-10 | Resolved: 2018-02-28

## 2018-02-28 PROCEDURE — 99214 OFFICE O/P EST MOD 30 MIN: CPT | Mod: S$GLB,,, | Performed by: NURSE PRACTITIONER

## 2018-02-28 PROCEDURE — 99499 UNLISTED E&M SERVICE: CPT | Mod: S$GLB,,, | Performed by: NURSE PRACTITIONER

## 2018-02-28 PROCEDURE — 93005 ELECTROCARDIOGRAM TRACING: CPT | Mod: S$GLB,,, | Performed by: NURSE PRACTITIONER

## 2018-02-28 PROCEDURE — 99999 PR PBB SHADOW E&M-EST. PATIENT-LVL IV: CPT | Mod: PBBFAC,,, | Performed by: NURSE PRACTITIONER

## 2018-02-28 PROCEDURE — 93010 ELECTROCARDIOGRAM REPORT: CPT | Mod: S$GLB,,, | Performed by: INTERNAL MEDICINE

## 2018-02-28 NOTE — TELEPHONE ENCOUNTER
----- Message from Jessica Ford sent at 2/28/2018  7:49 AM CST -----  Contact: self  Patient called asking for advice about shallow breathing, mucus. Patient would like to have a chest xray.  Please call patient at [phone]. Thanks!

## 2018-02-28 NOTE — PROGRESS NOTES
Subjective:       Patient ID: Michela Calvert is a 88 y.o. female.    Chief Complaint: Cough and Shortness of Breath (states that she has some shallowing breathing )    Here today to be seen about cough and SOB  She was seen [facility] 15 by Dr Nieto after  Presenting  to the free standing ED in [place] where she was founds to be in atrial fibrillation with RVR. Patient was given cardizem. She was transferred to the hospital medicine service as a direct admit and she has had holter and followed with Dr Meyers - He placed her on eliquis 2.5 mg tablet  And she was supposed to be on  currently cardizem 240 mg for A fib -- but her  states that she isnt taking it         Shortness of Breath   This is a new problem. The current episode started 1 to 4 weeks ago (2 weeks ). The problem occurs daily. The problem has been gradually worsening. Pertinent negatives include no abdominal pain, chest pain, claudication, coryza, ear pain, fever, headaches, hemoptysis, leg pain, leg swelling, neck pain, orthopnea, PND, rash, rhinorrhea, sore throat, sputum production, swollen glands, syncope, vomiting or wheezing. She has tried nothing for the symptoms. The treatment provided no relief.     Vitals:    02/28/18 1336   BP: 124/86   Pulse: (!) 124   Resp: 16   Temp: 98.2 °F (36.8 °C)     Review of Systems   Constitutional: Positive for activity change. Negative for diaphoresis, fatigue and fever.   HENT: Negative.  Negative for ear pain, rhinorrhea and sore throat.    Eyes: Negative.    Respiratory: Positive for cough and shortness of breath. Negative for hemoptysis, sputum production and wheezing.    Cardiovascular: Negative.  Negative for chest pain, orthopnea, claudication, leg swelling, syncope and PND.   Gastrointestinal: Negative.  Negative for abdominal pain, diarrhea, nausea and vomiting.   Endocrine: Negative.    Genitourinary: Negative.  Negative for dysuria and hematuria.   Musculoskeletal: Negative.  Negative for neck  pain.   Skin: Negative for color change and rash.   Allergic/Immunologic: Negative.    Neurological: Negative.  Negative for speech difficulty, numbness and headaches.   Hematological: Negative.    Psychiatric/Behavioral: Negative.        Past Medical History:   Diagnosis Date    Cataracts, bilateral     DDD (degenerative disc disease), lumbar     DDD (degenerative disc disease), lumbar     Heart murmur 01/2017    HLD (hyperlipidemia)     no medication taken    Hypothyroidism     Lichen sclerosus et atrophicus     Rectal/Vaginal areas    Lumbar spinal stenosis     Lumbar spondylosis     Mitral valve disorder 01/2017    Asymptomatic    Osteopenia      Objective:      Physical Exam   Constitutional: She is oriented to person, place, and time. She appears well-developed and well-nourished.   HENT:   Head: Normocephalic and atraumatic.   Mouth/Throat: Oropharynx is clear and moist.   Eyes: Conjunctivae and EOM are normal. Pupils are equal, round, and reactive to light.   Neck: Neck supple.   Cardiovascular: Normal heart sounds and intact distal pulses.  An irregularly irregular rhythm present. Tachycardia present.    Pulmonary/Chest: Effort normal and breath sounds normal.   Abdominal: Soft.   Musculoskeletal: Normal range of motion.   Neurological: She is alert and oriented to person, place, and time.   Skin: Skin is warm and dry.   Nursing note and vitals reviewed.      Assessment:       1. Persistent atrial fibrillation    2. Hypothyroidism due to acquired atrophy of thyroid    3. Cough    4. SOB (shortness of breath)    5. Tachycardia    6. Atrial fibrillation with RVR    7. Altered mental status, unspecified altered mental status type        Plan:       Persistent atrial fibrillation  -     IN OFFICE EKG 12-LEAD (to [facility])    Hypothyroidism due to acquired atrophy of thyroid  On meds     Cough  SOB (shortness of breath)  Related to her AFIB RVR     Tachycardia  -     IN OFFICE EKG 12-LEAD (to  [facility])    Atrial fibrillation with RVR    Altered mental status, unspecified altered mental status type  Stable         Discussed EKG with Dr Nieto and he concurs that she needs to go to hospital for eval with symptoms and RVR   Patient's son transported   I called reported to [facility] ER -  Spoke to Alexandru JOYA

## 2018-03-01 PROBLEM — I35.0 NONRHEUMATIC AORTIC VALVE STENOSIS: Status: ACTIVE | Noted: 2018-03-01

## 2018-03-05 RX ORDER — GABAPENTIN 300 MG/1
600 CAPSULE ORAL 3 TIMES DAILY
Qty: 180 CAPSULE | Refills: 5 | Status: SHIPPED | OUTPATIENT
Start: 2018-03-05 | End: 2018-07-31

## 2018-03-05 NOTE — TELEPHONE ENCOUNTER
----- Message from Janki Fortune sent at 3/5/2018  8:13 AM CST -----  Patient is requesting to speak to nurse regarding her medication contact her at [phone].    Thank you

## 2018-03-06 ENCOUNTER — OFFICE VISIT (OUTPATIENT)
Dept: CARDIOLOGY | Facility: CLINIC | Age: 83
End: 2018-03-06
Payer: MEDICARE

## 2018-03-06 VITALS
SYSTOLIC BLOOD PRESSURE: 116 MMHG | DIASTOLIC BLOOD PRESSURE: 71 MMHG | BODY MASS INDEX: 25.4 KG/M2 | HEART RATE: 97 BPM | WEIGHT: 138 LBS | HEIGHT: 62 IN

## 2018-03-06 DIAGNOSIS — R00.2 PALPITATIONS: ICD-10-CM

## 2018-03-06 DIAGNOSIS — I48.20 CHRONIC ATRIAL FIBRILLATION: ICD-10-CM

## 2018-03-06 DIAGNOSIS — I35.0 NONRHEUMATIC AORTIC VALVE STENOSIS: Primary | ICD-10-CM

## 2018-03-06 PROBLEM — I48.91 ATRIAL FIBRILLATION WITH RAPID VENTRICULAR RESPONSE: Status: RESOLVED | Noted: 2018-02-28 | Resolved: 2018-03-06

## 2018-03-06 PROCEDURE — 93000 ELECTROCARDIOGRAM COMPLETE: CPT | Mod: S$GLB,,, | Performed by: INTERNAL MEDICINE

## 2018-03-06 PROCEDURE — 99499 UNLISTED E&M SERVICE: CPT | Mod: S$GLB,,, | Performed by: INTERNAL MEDICINE

## 2018-03-06 PROCEDURE — 99999 PR PBB SHADOW E&M-EST. PATIENT-LVL III: CPT | Mod: PBBFAC,,, | Performed by: INTERNAL MEDICINE

## 2018-03-06 PROCEDURE — 99214 OFFICE O/P EST MOD 30 MIN: CPT | Mod: S$GLB,,, | Performed by: INTERNAL MEDICINE

## 2018-03-06 RX ORDER — MELOXICAM 7.5 MG/1
7.5 TABLET ORAL DAILY
COMMUNITY
End: 2018-06-07

## 2018-03-06 RX ORDER — METOPROLOL SUCCINATE 50 MG/1
50 TABLET, EXTENDED RELEASE ORAL DAILY
Qty: 90 TABLET | Refills: 3 | Status: ON HOLD | OUTPATIENT
Start: 2018-03-06 | End: 2019-02-15 | Stop reason: HOSPADM

## 2018-03-06 NOTE — PROGRESS NOTES
Subjective:    Patient ID:  Michela Calvert is a 88 y.o. female who presents for evaluation of Hospital Follow Up (hospital follow up ) and Atrial Fibrillation      HPI 89 yo WF with recent episode of AF with associated SOB admitted to [facility] and converted on medication. Seen today and back in AF. She is relatively asymptomatic. Does not feel any palpitations but has some SOB that is a combination of age, back issues and limited mobility.    CONCLUSIONS     1 - Normal left ventricular systolic function (EF 55-60%).     2 - Normal right ventricular systolic function .     3 - Biatrial enlargement.     4 - Mild aortic stenosis, ZABRINA = 1.31 cm2, AVAi = 0.81 cm2/m2, peak velocity = 2.17 m/s, mean gradient = 10 mmHg.     5 - Mild mitral regurgitation.             This document has been electronically    SIGNED BY: Gilson Crenshaw MD On: 01/11/2018 14:10    Review of Systems   Constitution: Negative for decreased appetite, fever, weakness, malaise/fatigue, weight gain and weight loss.   HENT: Negative for hearing loss and nosebleeds.    Eyes: Negative for visual disturbance.   Cardiovascular: Positive for dyspnea on exertion. Negative for chest pain, claudication, cyanosis, irregular heartbeat, leg swelling, near-syncope, orthopnea, palpitations, paroxysmal nocturnal dyspnea and syncope.   Respiratory: Positive for shortness of breath. Negative for cough, hemoptysis, sleep disturbances due to breathing, snoring and wheezing.    Endocrine: Negative for cold intolerance, heat intolerance, polydipsia and polyuria.   Hematologic/Lymphatic: Negative for adenopathy and bleeding problem. Does not bruise/bleed easily.   Skin: Negative for color change, itching, poor wound healing, rash and suspicious lesions.   Musculoskeletal: Positive for back pain. Negative for arthritis, falls, joint pain, joint swelling, muscle cramps, muscle weakness and myalgias.   Gastrointestinal: Negative for bloating, abdominal pain, change in bowel  "habit, constipation, flatus, heartburn, hematemesis, hematochezia, hemorrhoids, jaundice, melena, nausea and vomiting.   Genitourinary: Negative for bladder incontinence, decreased libido, frequency, hematuria, hesitancy and urgency.   Neurological: Negative for brief paralysis, difficulty with concentration, excessive daytime sleepiness, dizziness, focal weakness, headaches, light-headedness, loss of balance, numbness and vertigo.   Psychiatric/Behavioral: Negative for altered mental status, depression and memory loss. The patient does not have insomnia and is not nervous/anxious.    Allergic/Immunologic: Negative for environmental allergies, hives and persistent infections.        Objective:    Physical Exam   Constitutional: She is oriented to person, place, and time. She appears well-developed and well-nourished.   /71 (BP Location: Left arm, Patient Position: Sitting, BP Method: Medium (Automatic))   Pulse 97   Ht 5' 2" (1.575 m)   Wt 62.6 kg (138 lb 0.1 oz)   BMI 25.24 kg/m²      HENT:   Head: Normocephalic and atraumatic.   Right Ear: External ear normal.   Left Ear: External ear normal.   Nose: Nose normal.   Mouth/Throat: Oropharynx is clear and moist.   Eyes: Conjunctivae, EOM and lids are normal. Pupils are equal, round, and reactive to light. Right eye exhibits no discharge. Left eye exhibits no discharge. Right conjunctiva has no hemorrhage. No scleral icterus.   Neck: Normal range of motion. Neck supple. No JVD present. No tracheal deviation present. No thyromegaly present.   Cardiovascular: Normal rate, normal heart sounds and intact distal pulses.  An irregularly irregular rhythm present. Exam reveals no gallop and no friction rub.    No murmur heard.  Pulmonary/Chest: Effort normal and breath sounds normal. No respiratory distress. She has no wheezes. She has no rales. She exhibits no tenderness. Breasts are symmetrical.   Abdominal: Soft. Bowel sounds are normal. She exhibits no " distension and no mass. There is no hepatosplenomegaly or hepatomegaly. There is no tenderness. There is no rebound and no guarding.   Musculoskeletal: Normal range of motion. She exhibits no edema or tenderness.   Lymphadenopathy:     She has no cervical adenopathy.   Neurological: She is alert and oriented to person, place, and time. She displays normal reflexes. No cranial nerve deficit. Coordination normal.   Skin: Skin is warm and dry. No rash noted. No erythema. No pallor.   Psychiatric: She has a normal mood and affect. Her behavior is normal. Judgment and thought content normal.   Nursing note and vitals reviewed.        Assessment:       1. Nonrheumatic aortic valve stenosis    2. Chronic atrial fibrillation         Plan:     Risk of stroke from atrial fib has been discussed as well as bleeding risk from alternative anticoagulation regiments as well as risk and benefits of rate control vs cardioversion   Stop amiodarone and cardizem   Place on metoprolol   Will try strategy of rate control    No orders of the defined types were placed in this encounter.    Follow-up in about 6 weeks (around 4/17/2018).

## 2018-03-07 DIAGNOSIS — R00.2 PALPITATIONS: Primary | ICD-10-CM

## 2018-03-07 PROBLEM — R00.0 TACHYCARDIA: Status: ACTIVE | Noted: 2018-03-07

## 2018-03-08 ENCOUNTER — TELEPHONE (OUTPATIENT)
Dept: FAMILY MEDICINE | Facility: CLINIC | Age: 83
End: 2018-03-08

## 2018-03-08 DIAGNOSIS — R00.2 PALPITATIONS: Primary | ICD-10-CM

## 2018-03-08 NOTE — TELEPHONE ENCOUNTER
Spoke to patient. Patient says her last BM was five days away. It was very hard and like pellets. Patient takes 1 capful of Myralax daily. She says she rarely drinks water. She drinks diet coke, milk, and coffee. Patient wants to know if there is anything else she can do to soften her stool. She says she does not have abdominal pain she is mostly concerned because the stool is so hard and it has been 5 days since last BM. Please advise.

## 2018-03-08 NOTE — TELEPHONE ENCOUNTER
Notified patient of Dr Nieto recommendations. Had patient write down and read back. Patient verbalized understanding and will call back with any problems.

## 2018-03-08 NOTE — TELEPHONE ENCOUNTER
I would recommend she add a Fibercon tablet daily to her regimen, eat 2-3 dried prunes daily and increase by 1 glass of water daily.

## 2018-04-19 ENCOUNTER — OFFICE VISIT (OUTPATIENT)
Dept: CARDIOLOGY | Facility: CLINIC | Age: 83
End: 2018-04-19
Payer: MEDICARE

## 2018-04-19 VITALS
HEART RATE: 67 BPM | SYSTOLIC BLOOD PRESSURE: 147 MMHG | DIASTOLIC BLOOD PRESSURE: 85 MMHG | WEIGHT: 137.13 LBS | HEIGHT: 62 IN | BODY MASS INDEX: 25.23 KG/M2

## 2018-04-19 DIAGNOSIS — I48.19 PERSISTENT ATRIAL FIBRILLATION: ICD-10-CM

## 2018-04-19 DIAGNOSIS — I35.0 NONRHEUMATIC AORTIC VALVE STENOSIS: Primary | ICD-10-CM

## 2018-04-19 DIAGNOSIS — R00.2 PALPITATIONS: ICD-10-CM

## 2018-04-19 PROBLEM — I48.20 CHRONIC ATRIAL FIBRILLATION: Status: RESOLVED | Noted: 2018-03-06 | Resolved: 2018-04-19

## 2018-04-19 PROCEDURE — 99499 UNLISTED E&M SERVICE: CPT | Mod: S$GLB,,, | Performed by: INTERNAL MEDICINE

## 2018-04-19 PROCEDURE — 99999 PR PBB SHADOW E&M-EST. PATIENT-LVL III: CPT | Mod: PBBFAC,,, | Performed by: INTERNAL MEDICINE

## 2018-04-19 PROCEDURE — 99214 OFFICE O/P EST MOD 30 MIN: CPT | Mod: S$GLB,,, | Performed by: INTERNAL MEDICINE

## 2018-04-19 PROCEDURE — 93000 ELECTROCARDIOGRAM COMPLETE: CPT | Mod: S$GLB,,, | Performed by: INTERNAL MEDICINE

## 2018-04-19 NOTE — PROGRESS NOTES
Subjective:    Patient ID:  Michela Calvert is a 88 y.o. female who presents for evaluation of Follow-up (follow up )      HPI88 yo WF with PAF. Last visit was in AF and was doing OK so we decided to try rate control strategy. She returns today and feels good.Repeat EKG shows she is in sinus rhythm    Review of Systems   Constitution: Negative for decreased appetite, fever, weakness, malaise/fatigue, weight gain and weight loss.   HENT: Negative for hearing loss and nosebleeds.    Eyes: Negative for visual disturbance.   Cardiovascular: Negative for chest pain, claudication, cyanosis, dyspnea on exertion, irregular heartbeat, leg swelling, near-syncope, orthopnea, palpitations, paroxysmal nocturnal dyspnea and syncope.   Respiratory: Negative for cough, hemoptysis, shortness of breath, sleep disturbances due to breathing, snoring and wheezing.    Endocrine: Negative for cold intolerance, heat intolerance, polydipsia and polyuria.   Hematologic/Lymphatic: Negative for adenopathy and bleeding problem. Does not bruise/bleed easily.   Skin: Negative for color change, itching, poor wound healing, rash and suspicious lesions.   Musculoskeletal: Positive for arthritis and joint pain. Negative for back pain, falls, joint swelling, muscle cramps, muscle weakness and myalgias.   Gastrointestinal: Negative for bloating, abdominal pain, change in bowel habit, constipation, flatus, heartburn, hematemesis, hematochezia, hemorrhoids, jaundice, melena, nausea and vomiting.   Genitourinary: Negative for bladder incontinence, decreased libido, frequency, hematuria, hesitancy and urgency.   Neurological: Negative for brief paralysis, difficulty with concentration, excessive daytime sleepiness, dizziness, focal weakness, headaches, light-headedness, loss of balance, numbness and vertigo.   Psychiatric/Behavioral: Negative for altered mental status, depression and memory loss. The patient does not have insomnia and is not  "nervous/anxious.    Allergic/Immunologic: Negative for environmental allergies, hives and persistent infections.        Objective:    Physical Exam   Constitutional: She is oriented to person, place, and time. She appears well-developed and well-nourished.   BP (!) 147/85 (BP Location: Left arm, Patient Position: Sitting, BP Method: Medium (Automatic))   Pulse 67   Ht 5' 2" (1.575 m)   Wt 62.2 kg (137 lb 2 oz)   BMI 25.08 kg/m²      HENT:   Head: Normocephalic and atraumatic.   Right Ear: External ear normal.   Left Ear: External ear normal.   Nose: Nose normal.   Mouth/Throat: Oropharynx is clear and moist.   Eyes: Conjunctivae, EOM and lids are normal. Pupils are equal, round, and reactive to light. Right eye exhibits no discharge. Left eye exhibits no discharge. Right conjunctiva has no hemorrhage. No scleral icterus.   Neck: Normal range of motion. Neck supple. No JVD present. No tracheal deviation present. No thyromegaly present.   Cardiovascular: Normal rate and intact distal pulses.  A regularly irregular rhythm present.  Occasional extrasystoles are present. Exam reveals no gallop and no friction rub.    Murmur heard.   Harsh midsystolic murmur is present with a grade of 2/6  at the upper right sternal border radiating to the neck  Pulmonary/Chest: Effort normal and breath sounds normal. No respiratory distress. She has no wheezes. She has no rales. She exhibits no tenderness. Breasts are symmetrical.   Abdominal: Soft. Bowel sounds are normal. She exhibits no distension and no mass. There is no hepatosplenomegaly or hepatomegaly. There is no tenderness. There is no rebound and no guarding.   Musculoskeletal: Normal range of motion. She exhibits no edema or tenderness.   Lymphadenopathy:     She has no cervical adenopathy.   Neurological: She is alert and oriented to person, place, and time. She displays normal reflexes. No cranial nerve deficit. Coordination normal.   Skin: Skin is warm and dry. No rash " noted. No erythema. No pallor.   Psychiatric: She has a normal mood and affect. Her behavior is normal. Judgment and thought content normal.   Nursing note and vitals reviewed.        Assessment:       1. Nonrheumatic aortic valve stenosis    2. Persistent atrial fibrillation         Plan:     Continue metoprolol and apixaban    Patient advised to limit exposure to caffeine, stimulants, and alcohol    No orders of the defined types were placed in this encounter.    Follow-up in about 3 months (around 7/19/2018).

## 2018-04-20 DIAGNOSIS — R00.2 PALPITATIONS: Primary | ICD-10-CM

## 2018-05-02 RX ORDER — POLYETHYLENE GLYCOL 3350 17 G/17G
POWDER, FOR SOLUTION ORAL
Qty: 1581 G | Refills: 3 | Status: ON HOLD | OUTPATIENT
Start: 2018-05-02 | End: 2018-12-31 | Stop reason: HOSPADM

## 2018-06-06 ENCOUNTER — TELEPHONE (OUTPATIENT)
Dept: PAIN MEDICINE | Facility: CLINIC | Age: 83
End: 2018-06-06

## 2018-06-06 NOTE — TELEPHONE ENCOUNTER
----- Message from Darnell Lerner sent at 6/6/2018  8:12 AM CDT -----  Contact: Patient  Type:  Sooner Apoointment Request    Caller is requesting a sooner appointment.  Caller declined first available appointment listed below.  Caller will not accept being placed on the waitlist and is requesting a message be sent to doctor.    Name of Caller:  Patient  When is the first available appointment?  07/16  Symptoms:  Pain in legs and hips  Best Call Back Number:  [phone]  Additional Information:  Requesting a call back

## 2018-06-07 ENCOUNTER — OFFICE VISIT (OUTPATIENT)
Dept: PAIN MEDICINE | Facility: CLINIC | Age: 83
End: 2018-06-07
Payer: MEDICARE

## 2018-06-07 ENCOUNTER — TELEPHONE (OUTPATIENT)
Dept: PAIN MEDICINE | Facility: CLINIC | Age: 83
End: 2018-06-07

## 2018-06-07 VITALS
DIASTOLIC BLOOD PRESSURE: 79 MMHG | TEMPERATURE: 97 F | HEART RATE: 68 BPM | RESPIRATION RATE: 20 BRPM | SYSTOLIC BLOOD PRESSURE: 173 MMHG | OXYGEN SATURATION: 97 %

## 2018-06-07 DIAGNOSIS — M54.16 LUMBAR RADICULITIS: ICD-10-CM

## 2018-06-07 DIAGNOSIS — Z11.9 SCREENING EXAMINATION FOR INFECTIOUS DISEASE: ICD-10-CM

## 2018-06-07 DIAGNOSIS — G89.4 CHRONIC PAIN SYNDROME: Primary | ICD-10-CM

## 2018-06-07 DIAGNOSIS — M48.062 SPINAL STENOSIS OF LUMBAR REGION WITH NEUROGENIC CLAUDICATION: ICD-10-CM

## 2018-06-07 PROCEDURE — 99999 PR PBB SHADOW E&M-EST. PATIENT-LVL V: CPT | Mod: PBBFAC,,, | Performed by: PHYSICIAN ASSISTANT

## 2018-06-07 PROCEDURE — 87081 CULTURE SCREEN ONLY: CPT

## 2018-06-07 PROCEDURE — 99214 OFFICE O/P EST MOD 30 MIN: CPT | Mod: S$GLB,,, | Performed by: PHYSICIAN ASSISTANT

## 2018-06-07 RX ORDER — TRAMADOL HYDROCHLORIDE 50 MG/1
50 TABLET ORAL 2 TIMES DAILY PRN
Qty: 40 TABLET | Refills: 1 | Status: SHIPPED | OUTPATIENT
Start: 2018-06-07 | End: 2018-07-02 | Stop reason: SDUPTHER

## 2018-06-07 RX ORDER — SODIUM CHLORIDE, SODIUM LACTATE, POTASSIUM CHLORIDE, CALCIUM CHLORIDE 600; 310; 30; 20 MG/100ML; MG/100ML; MG/100ML; MG/100ML
INJECTION, SOLUTION INTRAVENOUS CONTINUOUS
Status: CANCELLED | OUTPATIENT
Start: 2018-07-25

## 2018-06-07 NOTE — PROGRESS NOTES
CC:Back pain    HPI: The patient is an 88-year-old woman with a history of lumbar degenerative disc disease and spondylosis who presents in self-referral for low back pain.  She returns in follow-up today with worsening back and leg pain.  The patient complains of bilateral low back pain radiating to the buttocks, posterior thighs, calves and feet.  This is significantly worse with standing and walking, mildly improved with sitting and gabapentin.  She is taking 600 mg 3 times a day but this does not seem to be providing sufficient relief.  She denies weakness in her legs but reports numbness and pins and needles in her legs.  She denies bladder or bowel incontinence.    Intervention history: She had undergone a couple epidural steroid injections 15 years ago which had given her relief until A few years ago.  By Dr De Jesus she underwent epidural steroid injections, facet injections, RFA is, SI joint injections without any major relief.  The patient is status post L5/S1 TIFFANIE on 8/30/17 with no relief.    ROS:She reports itching, rhinorrhea, easy bruising and back pain.  Balance of review of systems is negative.    Medical, surgical, family and social history reviewed elsewhere in record.    Medications/Allergies: See med card    Vitals:    06/07/18 0837   BP: (!) 173/79   Pulse: 68   Resp: 20   Temp: 96.7 °F (35.9 °C)   TempSrc: Oral   SpO2: 97%   PainSc:   6   PainLoc: Hip         Physical exam:  Gen: A and O x3, pleasant, well-groomed  Skin: No rashes or obvious lesions  HEENT: PERRLA, no obvious deformities on ears or in canals.   CVS: Regular rate and rhythm, normal S1 and S2, no murmurs.  Resp: Clear to auscultation bilaterally, no wheezes or rales.  Abdomen: Soft, NT/ND, normal bowel sounds present.  Musculoskeletal: Presents in a wheelchair, uses a walker to ambulate.    Neuro:  Lower extremities: 5/5 strength bilaterally  Reflexes: Patellar 2+, Achilles 2+ bilaterally.  Sensory:  Intact and symmetrical to light  touch and pinprick in L2-S1 dermatomes bilaterally.    Lumbar spine:  Lumbar spine: Range of motion is severely reduced with extension with increased low back and bilateral buttock pain.  Range of motion is moderately reduced with flexion without increased pain.  Bryon's test causes no increased pain on either side.    Supine straight leg raise is negative bilaterally.    Internal and external rotation of the hip causes no increased pain on either side.  Myofascial exam: No tenderness to palpation to the lumbar paraspinous muscles.    Imaging:  MRI lumbar spine 8/22/13  T11/12: There is annular disk bulging which combines with some degenerative facet changes to produce mild canal and foraminal distortion.  T12/L1: There is annular disk bulging and there is mild resultant central canal stenosis. Ligamentous hypertrophy is present.  L1/2: Annular disk bulge and osteophyte formation produce moderate canal stenosis. There is right left foraminal stenosis. Degenerative facet changes are noted bilaterally.  L2/3: Annular disk bulge and osteophyte formation combined with facet and ligamentous hypertrophic changes to produce moderate foraminal narrowing and mild central canal stenosis.  L3/4: There is annular disk bulging which combines with facet and ligamentous hypertrophy to produce moderate canal and foraminal stenosis.  L4/5: Prominent degenerative facet changes are present and there is a central disk extrusion which extends craniad to the disk level. This combines with facet and ligamentous hypertrophy and the spondylolisthesis to produce prominent canal stenosis. The central canal is narrowed to approximately 6 mm. There is prominent foraminal narrowing bilaterally as well.  L5/S1: Prominent degenerative facet changes are present and there is annular disk bulging with moderate canal and foraminal stenosis.     MRI lumbar spine 1/28/16  The lumbar vertebral bodies show no evidence of acute compression fracture or a  pathologic marrow replacement process. There is degenerative disc desiccation, disc space narrowing, and marginal osteophyte formation at every lumbar level with prominent degenerative endplate changes noted at T12-L1 through L3-L4. There is a grade I retrolisthesis of T12 on L1, L1 on L2, L2 on L3, and L3 on L4. There is a grade I anterolisthesis of L4 on L5 and L5 on S1. The conus medullaris terminates at L1. The visualized retroperitoneal/abdominal soft tissue structures reveal cholelithiasis (series 5 image 7).  T11-T12: There is a broad disc bulge with a minimal superimposed broad central disc protrusion.  T12-L1: There is a grade I retrolisthesis of T12 on L1. There is ligamentum flavum thickening and facet arthropathy. There is uncovering of the intervertebral disc and a superimposed disc bulge. No central canal or neuroforaminal stenosis. No disc protrusion or extrusion.  L1-L2: There is a grade I retrolisthesis of L1 on L2. There is associated uncovering of the intervertebral disc. There is ligamentum flavum thickening and facet arthropathy. There is moderate left and right neuroforaminal stenosis. No overall central canal stenosis.  L2-L3: There is a grade I retrolisthesis of L2 on L3 with uncovering of the intervertebral disc. There is a broad disc bulge which extends into both neural foramina there is ligamentum flavum thickening and facet arthropathy. There is moderate central canal stenosis. There is moderate bilateral neuroforaminal stenosis.  L3-L4: There is a grade I retrolisthesis of L3 on L4. There is uncovering of the intervertebral disc and a superimposed disc bulge. There is ligamentum flavum thickening and facet arthropathy. There is moderate-severe central canal stenosis. There is mild-moderate left and moderate right neuroforaminal stenosis.  L4-L5: There is a grade I anterolisthesis of L4 on L5 with associated uncovering of the intervertebral disc and a superimposed disc bulge which extends  into both neural foramina. There is left ligamentum flavum thickening and severe bilateral facet arthropathy with a left facet joint effusion appreciated. There is severe central canal stenosis, moderate left, and mild right neuroforaminal stenosis  L5-S1: There is a minimal grade I anterolisthesis of L5 on S1 with associated uncovering of the intervertebral disc. There is severe bilateral facet arthropathy and ligamentum flavum. There is moderate central canal stenosis. There is mild-moderate right neuroforaminal stenosis.      Assessment:  The patient is an 88-year-old woman with a history of lumbar degenerative disc disease and spondylosis who presents in self-referral for low back pain.     1. Chronic pain syndrome  Vital signs    Activity as tolerated    Place 18-22 gaua peripheral IV     Verify informed consent    Notify physician     Notify physician     Notify physician (specify)    Diet NPO    Case Request Operating Room: TRIAL, NEUROSTIMULATOR, SPINAL CORD    Place in Outpatient    lactated ringers infusion    ceFAZolin (ANCEF) 2 g in dextrose 5 % 50 mL IVPB   2. Lumbar radiculitis     3. Spinal stenosis of lumbar region with neurogenic claudication     4. Screening examination for infectious disease  Culture, MRSA     Plan:  1.  We had lengthy discussion regarding spinal cord stimulation today.  She has seen neurosurgery and an operation was not recommended.  I will schedule her for a similar trial with [employer].  We discussed the risks involved and I have ordered a back brace with lateral support.  If successful, we will proceed with an implant.  2.  Dr. Daily provided a prescription for tramadol 50 mg #40.  She'll continue to take gabapentin 600 mg 3 times a day.  3.  Follow-up 5 days postop or sooner as needed.    Greater than 50% of this 30 minute visit was spent counseling the patient.

## 2018-06-07 NOTE — TELEPHONE ENCOUNTER
Patient was instructed to wear this about an hour a day a few days prior to her procedure to get used to it. Then she will wear it following her trial until her post op appointment.

## 2018-06-07 NOTE — TELEPHONE ENCOUNTER
----- Message from Reji Knapp sent at 6/7/2018  1:26 PM CDT -----  Contact: same  Patient called in and stated she saw Bradly this morning and forgot to ask him a question.  Patient wants to know when she is supposed to start wearing her brace?  Is it now or after her procedure?  Patient stated a message can be left on her voice mail if she does not answer.    Patient call back number is [phone]

## 2018-06-08 ENCOUNTER — TELEPHONE (OUTPATIENT)
Dept: PAIN MEDICINE | Facility: CLINIC | Age: 83
End: 2018-06-08

## 2018-06-08 NOTE — TELEPHONE ENCOUNTER
----- Message from Janki Nayak sent at 6/8/2018 10:16 AM CDT -----  Type: Needs Medical Advice    Who Called:  Patient   Best Call Back Number: [phone]  Additional Information: Patient stated she was ordered medication yesterday for hip and leg pain/stated medication is not helping/needs advice/please call back to advise.

## 2018-06-08 NOTE — TELEPHONE ENCOUNTER
Spoke with the patient regarding the medication that she was prescribed. She has only taken 1 tramadol and seemed to help only a little. Advised to continue as prescribed and try heating pad on a low setting to see if she can get some relief until the stimulator trial.

## 2018-06-09 LAB — MRSA SPEC QL CULT: NORMAL

## 2018-06-12 ENCOUNTER — TELEPHONE (OUTPATIENT)
Dept: PAIN MEDICINE | Facility: CLINIC | Age: 83
End: 2018-06-12

## 2018-06-12 NOTE — TELEPHONE ENCOUNTER
----- Message from Bridget Blair sent at 6/12/2018  2:08 PM CDT -----  Contact: Patient's , John  Patient's  would like to know the time of patient's procedure on 7/25 so that they can make transportation plans.  Please call to discuss because they need to know ahead of time.  Call Back#[phone]  Thanks

## 2018-06-14 ENCOUNTER — TELEPHONE (OUTPATIENT)
Dept: FAMILY MEDICINE | Facility: CLINIC | Age: 83
End: 2018-06-14

## 2018-06-14 NOTE — TELEPHONE ENCOUNTER
Spoke to May. He states Dr. Nieto needs to sign a form for [employer] so patient can operate the motorized scooter. Placed in box for signature.

## 2018-06-14 NOTE — TELEPHONE ENCOUNTER
----- Message from Marianne Benito sent at 6/14/2018  1:07 PM CDT -----  Contact: May Olvera [phone] is calling to discuss the disability form that is going to be faxed to Dr Nieto/please call

## 2018-06-15 ENCOUNTER — TELEPHONE (OUTPATIENT)
Dept: FAMILY MEDICINE | Facility: CLINIC | Age: 83
End: 2018-06-15

## 2018-06-15 NOTE — TELEPHONE ENCOUNTER
----- Message from Bridget Blair sent at 6/15/2018  1:14 PM CDT -----  Contact: Patient's sonMay  Type:  Sooner Apoointment Request    Caller is requesting a sooner appointment.  Caller declined first available appointment listed below.  Caller will not accept being placed on the waitlist and is requesting a message be sent to doctor.    Name of Caller:  Patient's son  When is the first available appointment?  8/15  Symptoms:  Patient changing a little mentally & severe back pain  Best Call Back Number:    Additional Information:  Patient's son was asking if patient could be seen at the same time as her , next Tuesday 6/19 at 1:40.

## 2018-06-15 NOTE — TELEPHONE ENCOUNTER
Spoke with son May and he states that patient has chronic pain and signs of early dementia. Asking if she can be seen with spouse on 6/19 at 1:40. Please advise

## 2018-06-19 ENCOUNTER — OFFICE VISIT (OUTPATIENT)
Dept: FAMILY MEDICINE | Facility: CLINIC | Age: 83
End: 2018-06-19
Payer: MEDICARE

## 2018-06-19 VITALS
HEART RATE: 70 BPM | HEIGHT: 62 IN | RESPIRATION RATE: 16 BRPM | OXYGEN SATURATION: 96 % | DIASTOLIC BLOOD PRESSURE: 72 MMHG | BODY MASS INDEX: 23.77 KG/M2 | WEIGHT: 129.19 LBS | SYSTOLIC BLOOD PRESSURE: 130 MMHG

## 2018-06-19 DIAGNOSIS — M48.061 SPINAL STENOSIS OF LUMBAR REGION, UNSPECIFIED WHETHER NEUROGENIC CLAUDICATION PRESENT: Primary | ICD-10-CM

## 2018-06-19 DIAGNOSIS — R41.3 SHORT-TERM MEMORY LOSS: ICD-10-CM

## 2018-06-19 PROCEDURE — 99999 PR PBB SHADOW E&M-EST. PATIENT-LVL III: CPT | Mod: PBBFAC,,, | Performed by: FAMILY MEDICINE

## 2018-06-19 PROCEDURE — 99499 UNLISTED E&M SERVICE: CPT | Mod: S$GLB,,, | Performed by: FAMILY MEDICINE

## 2018-06-19 PROCEDURE — 99213 OFFICE O/P EST LOW 20 MIN: CPT | Mod: S$GLB,,, | Performed by: FAMILY MEDICINE

## 2018-06-19 NOTE — PROGRESS NOTES
Subjective:       Patient ID: Michela Calvert is a 88 y.o. female.    Chief Complaint: Follow-up and Hip Pain (leg and feet pain)       She is living at the [facility].  Using walker and more recently wheelchair for ambulation.  Appetite down.    Lumbar spinal stenosis - c/o progressive back and leg pains  MRI lumbar spine showed:  1.  Multilevel spondylotic changes throughout the lumbar spine as above.  Severe central canal spinal stenosis L4-L5, L3-L4 disc spaces.  Moderate to severe L2-L3 and moderate L1-L2 spinal stenosis as above.  2.  Since the previous study there is a severe left L4-L5 foraminal stenosis centered to a foraminal disc herniation as above.  Following with Dr. Daily and considering a spinal stimulator. Currently trying tramadol.  She is contemplating CBD oil.  Hypothyroidism - tolerating Synthroid 75mcg daily  Lumbar DDD - taking gabapentin and following with pain management and has a f/u appt    Here with son who reports short-term memory loss.                 Past Medical History:   Diagnosis Date    Cataracts, bilateral     DDD (degenerative disc disease), lumbar     DDD (degenerative disc disease), lumbar     Heart murmur 01/2017    HLD (hyperlipidemia)     no medication taken    Hypothyroidism     Lichen sclerosus et atrophicus     Rectal/Vaginal areas    Lumbar spinal stenosis     Lumbar spondylosis     Mitral valve disorder 01/2017    Asymptomatic    Osteopenia        Past Surgical History:   Procedure Laterality Date    CATARACT EXTRACTION EXTRACAPSULAR W/ INTRAOCULAR LENS IMPLANTATION Bilateral     HYSTERECTOMY      spinal ablation  12/2017    TONSILLECTOMY         Review of patient's allergies indicates:   Allergen Reactions    Nitrofuran analogues Nausea Only    Ciprofloxacin Nausea And Vomiting       Social History     Social History    Marital status:      Spouse name: N/A    Number of children: N/A    Years of education: N/A     Occupational History     Not on file.     Social History Main Topics    Smoking status: Never Smoker    Smokeless tobacco: Never Used    Alcohol use Yes      Comment: 1 gin & tonic daily @ 5pm    Drug use: No    Sexual activity: No     Other Topics Concern    Not on file     Social History Narrative    No narrative on file       Current Outpatient Prescriptions on File Prior to Visit   Medication Sig Dispense Refill    acetaminophen (TYLENOL) 650 MG TbSR Take 650 mg by mouth every 8 (eight) hours as needed.      ANTIOX #8/OM3/DHA/EPA/LUT/ZEAX (PRESERVISION AREDS 2, OMEGA-3, ORAL) Take 1 tablet by mouth once daily.       apixaban 2.5 mg Tab Take 1 tablet (2.5 mg total) by mouth 2 (two) times daily. 62 tablet 5    calcium-vitamin D3 (CALCIUM 500 + D) 500 mg(1,250mg) -200 unit per tablet Take 1 tablet by mouth once daily.       gabapentin (NEURONTIN) 300 MG capsule Take 2 capsules (600 mg total) by mouth 3 (three) times daily. 180 capsule 5    hydrocortisone 2.5 % cream Apply 1 application topically daily as needed. To vaginal and anal areas for itching  4    hydrocortisone 2.5 % cream APPLY TO AFFECTED AREA(S) AS NEEDED 30 g 1    lactobacillus rhamnosus GG (CULTURELLE) 10 billion cell capsule Take 1 capsule by mouth once daily.      levothyroxine (SYNTHROID) 75 MCG tablet Take 1 tablet (75 mcg total) by mouth once daily. 90 tablet 3    metoprolol succinate (TOPROL-XL) 50 MG 24 hr tablet Take 1 tablet (50 mg total) by mouth once daily. 90 tablet 3    polyethylene glycol (GLYCOLAX) 17 gram/dose powder MIX 17GRAMS AS DIRECTED AND TAKE BY MOUTH ONCE DAILY 1581 g 3    traMADol (ULTRAM) 50 mg tablet Take 1 tablet (50 mg total) by mouth 2 (two) times daily as needed for Pain. 40 tablet 1    vitamin D (VITAMIN D3) 1000 units Tab Take 1,000 Units by mouth once daily.       No current facility-administered medications on file prior to visit.        No family history on file.    Review of Systems   Constitutional: Positive for  "fatigue. Negative for appetite change, chills, fever and unexpected weight change.   HENT: Negative for sore throat and trouble swallowing.    Eyes: Negative for pain and visual disturbance.   Respiratory: Negative for cough, shortness of breath and wheezing.    Cardiovascular: Positive for palpitations. Negative for chest pain.   Gastrointestinal: Negative for abdominal pain, blood in stool, diarrhea, nausea and vomiting.   Genitourinary: Negative for difficulty urinating, dysuria and hematuria.   Musculoskeletal: Positive for gait problem. Negative for arthralgias and neck pain.   Skin: Negative for rash and wound.   Neurological: Positive for weakness. Negative for dizziness, numbness and headaches.   Hematological: Negative for adenopathy.   Psychiatric/Behavioral: Positive for confusion. Negative for dysphoric mood.       Objective:      /72   Pulse 70   Resp 16   Ht 5' 2" (1.575 m)   Wt 58.6 kg (129 lb 3 oz)   SpO2 96%   BMI 23.63 kg/m²   Physical Exam   Constitutional: She is oriented to person, place, and time. She appears well-developed and well-nourished.   HENT:   Head: Normocephalic.   Mouth/Throat: Oropharynx is clear and moist. No oropharyngeal exudate or posterior oropharyngeal erythema.   Eyes: Conjunctivae and EOM are normal. Pupils are equal, round, and reactive to light.   Neck: Normal range of motion. Neck supple. No thyromegaly present.   Cardiovascular: Normal rate, regular rhythm, S1 normal, S2 normal, normal heart sounds and intact distal pulses.  Exam reveals no gallop and no friction rub.    No murmur heard.  Pulmonary/Chest: Effort normal and breath sounds normal. She has no wheezes. She has no rales.   Abdominal: Normal appearance.   Musculoskeletal:        Right lower leg: She exhibits no edema.        Left lower leg: She exhibits no edema.   Lymphadenopathy:     She has no cervical adenopathy.   Neurological: She is alert and oriented to person, place, and time. No cranial " nerve deficit. Gait normal.   Skin: Skin is warm and intact. No rash noted.   Psychiatric: She has a normal mood and affect.       MMSE: only with short term recall mistake (2/3 words recalled); otherwise normal    Assessment:       1. Spinal stenosis of lumbar region, unspecified whether neurogenic claudication present    2. Short-term memory loss        Plan:       Spinal stenosis of lumbar region, unspecified whether neurogenic claudication present    Short-term memory loss         OK to try CBD oil  Continue other present medications  Reassurance regarding normal age-related memory loss

## 2018-06-29 ENCOUNTER — TELEPHONE (OUTPATIENT)
Dept: PAIN MEDICINE | Facility: CLINIC | Age: 83
End: 2018-06-29

## 2018-06-29 NOTE — TELEPHONE ENCOUNTER
Spoke with patient. She is in severe pain. She stated she has stopped taking the Tramadol and Gabapentin we prescribed her and starting taking CBD oil her her pain. She stated the CDB oil is not working and stated that the gabapentin and tramadol is not working either however her  thinks she needs to get back on the tramadol and gabapentin. Please advise. Patient was insistent that you call her. Please advise.

## 2018-06-29 NOTE — TELEPHONE ENCOUNTER
----- Message from Vinita Teran sent at 6/29/2018  3:02 PM CDT -----  Contact: patient  Patient requesting a stronger pain medication.  Please call [phone]

## 2018-06-29 NOTE — TELEPHONE ENCOUNTER
Spoke with patient and her .  She has not stopped gabapentin, continues to take this 3 times a day.  She is taking Tylenol twice a day.  She has not taken any tramadol since 6/19.  She will take tramadol 3 times a day as needed for pain, already has a prescription from Dr. Daily.  She will call for refill if necessary.  If pain is unbearable, she will go to urgent care or emergency department.  She is scheduled for a spinal cord stimulator trial.

## 2018-07-02 ENCOUNTER — TELEPHONE (OUTPATIENT)
Dept: PAIN MEDICINE | Facility: CLINIC | Age: 83
End: 2018-07-02

## 2018-07-02 RX ORDER — TRAMADOL HYDROCHLORIDE 50 MG/1
50 TABLET ORAL 3 TIMES DAILY PRN
Qty: 90 TABLET | Refills: 0 | Status: SHIPPED | OUTPATIENT
Start: 2018-07-02 | End: 2018-09-06 | Stop reason: SDUPTHER

## 2018-07-02 NOTE — TELEPHONE ENCOUNTER
Stated that his mom was instructed to  take the Tramadol 3 per day and needs a new RX. Pharmacy has been verified. Please advise.

## 2018-07-02 NOTE — TELEPHONE ENCOUNTER
----- Message from Janki Fortune sent at 7/2/2018 10:27 AM CDT -----  Type: Needs Medical Advice    Who Called: Maddy Calvert - son  Symptoms (please be specific):  n/a  How long has patient had these symptoms:  Na/  Pharmacy name and phone #:  n/a  Best Call Back Number: [phone]  Additional Information: needs to discuss medication

## 2018-07-06 ENCOUNTER — TELEPHONE (OUTPATIENT)
Dept: PAIN MEDICINE | Facility: CLINIC | Age: 83
End: 2018-07-06

## 2018-07-06 NOTE — TELEPHONE ENCOUNTER
----- Message from Jessica Ford sent at 7/5/2018  3:31 PM CDT -----  Contact: son, Bola Calvert  Patient has an appointment for tomorrow at 10:00 with a non [facility] doctor and son Bola Calvert needs a list of his mother's medications. Please Email to [email or web address]. Please call son at [phone]. Thanks!

## 2018-07-11 ENCOUNTER — TELEPHONE (OUTPATIENT)
Dept: PAIN MEDICINE | Facility: CLINIC | Age: 83
End: 2018-07-11

## 2018-07-11 NOTE — TELEPHONE ENCOUNTER
Informed patient's son the the brace is to be worn during the week of the trial when doing activities and is also to be worn during the healing process during the implant.

## 2018-07-11 NOTE — TELEPHONE ENCOUNTER
----- Message from Charley Calhoun sent at 7/9/2018  3:18 PM CDT -----  Type: Needs Medical Advice    Who Called:  Son- May Calvert   Symptoms (please be specific):  Na  How long has patient had these symptoms:  [facility]  Pharmacy name and phone #:  [facility]  Best Call Back Number: [phone]    Additional Information: Patient was given a brace and wanted to discuss if to be worn prior to procedure

## 2018-07-19 ENCOUNTER — OFFICE VISIT (OUTPATIENT)
Dept: CARDIOLOGY | Facility: CLINIC | Age: 83
End: 2018-07-19
Payer: MEDICARE

## 2018-07-19 VITALS
DIASTOLIC BLOOD PRESSURE: 76 MMHG | SYSTOLIC BLOOD PRESSURE: 125 MMHG | HEIGHT: 62 IN | WEIGHT: 129.19 LBS | HEART RATE: 68 BPM | BODY MASS INDEX: 23.77 KG/M2

## 2018-07-19 DIAGNOSIS — I35.0 NONRHEUMATIC AORTIC VALVE STENOSIS: ICD-10-CM

## 2018-07-19 DIAGNOSIS — I48.19 PERSISTENT ATRIAL FIBRILLATION: Primary | ICD-10-CM

## 2018-07-19 PROCEDURE — 99999 PR PBB SHADOW E&M-EST. PATIENT-LVL II: CPT | Mod: PBBFAC,,, | Performed by: INTERNAL MEDICINE

## 2018-07-19 PROCEDURE — 99214 OFFICE O/P EST MOD 30 MIN: CPT | Mod: S$GLB,,, | Performed by: INTERNAL MEDICINE

## 2018-07-19 RX ORDER — LEVOTHYROXINE SODIUM 75 UG/1
1 TABLET ORAL
COMMUNITY
End: 2018-07-19

## 2018-07-19 RX ORDER — ACETAMINOPHEN 325 MG/1
2 TABLET ORAL
COMMUNITY
Start: 2017-11-27 | End: 2018-07-19

## 2018-07-19 RX ORDER — NAPROXEN SODIUM 220 MG
1 TABLET ORAL
COMMUNITY
End: 2018-09-06

## 2018-07-19 RX ORDER — CALCIUM CARBONATE 500(1250)
TABLET ORAL
COMMUNITY
End: 2018-07-19

## 2018-07-19 RX ORDER — GABAPENTIN 300 MG/1
2 CAPSULE ORAL
COMMUNITY
End: 2018-07-19

## 2018-07-19 RX ORDER — FERROUS SULFATE, DRIED 160(50) MG
1 TABLET, EXTENDED RELEASE ORAL 2 TIMES DAILY WITH MEALS
COMMUNITY
End: 2019-03-08

## 2018-07-19 RX ORDER — CALCIUM CARBONATE 500(1250)
1 TABLET ORAL DAILY
COMMUNITY
End: 2019-03-08

## 2018-07-19 NOTE — PROGRESS NOTES
Subjective:    Patient ID:  Michela Calvert is a 88 y.o. female who presents for evaluation of Follow-up      HPI 87 yo WF with PAF. Last visit was in AF and was doing OK so we decided to try rate control strategy. Also has mild AS.Today she is back in sinus. She is unaware of any changes.Denies palpitations, weak spells, and syncope    Review of Systems   Constitution: Negative for decreased appetite, fever, weakness, malaise/fatigue, weight gain and weight loss.   HENT: Negative for hearing loss and nosebleeds.    Eyes: Negative for visual disturbance.   Cardiovascular: Negative for chest pain, claudication, cyanosis, dyspnea on exertion, irregular heartbeat, leg swelling, near-syncope, orthopnea, palpitations, paroxysmal nocturnal dyspnea and syncope.   Respiratory: Negative for cough, hemoptysis, shortness of breath, sleep disturbances due to breathing, snoring and wheezing.    Endocrine: Negative for cold intolerance, heat intolerance, polydipsia and polyuria.   Hematologic/Lymphatic: Negative for adenopathy and bleeding problem. Does not bruise/bleed easily.   Skin: Negative for color change, itching, poor wound healing, rash and suspicious lesions.   Musculoskeletal: Positive for arthritis and back pain. Negative for falls, joint pain, joint swelling, muscle cramps, muscle weakness and myalgias.   Gastrointestinal: Negative for bloating, abdominal pain, change in bowel habit, constipation, flatus, heartburn, hematemesis, hematochezia, hemorrhoids, jaundice, melena, nausea and vomiting.   Genitourinary: Negative for bladder incontinence, decreased libido, frequency, hematuria, hesitancy and urgency.   Neurological: Negative for brief paralysis, difficulty with concentration, excessive daytime sleepiness, dizziness, focal weakness, headaches, light-headedness, loss of balance, numbness and vertigo.   Psychiatric/Behavioral: Negative for altered mental status, depression and memory loss. The patient does not  "have insomnia and is not nervous/anxious.    Allergic/Immunologic: Negative for environmental allergies, hives and persistent infections.        Objective:    Physical Exam   Constitutional: She is oriented to person, place, and time. She appears well-developed and well-nourished.   /76   Pulse 68   Ht 5' 2" (1.575 m)   Wt 58.6 kg (129 lb 3 oz)   BMI 23.63 kg/m²      HENT:   Head: Normocephalic and atraumatic.   Right Ear: External ear normal.   Left Ear: External ear normal.   Nose: Nose normal.   Mouth/Throat: Oropharynx is clear and moist.   Eyes: Conjunctivae, EOM and lids are normal. Pupils are equal, round, and reactive to light. Right eye exhibits no discharge. Left eye exhibits no discharge. Right conjunctiva has no hemorrhage. No scleral icterus.   Neck: Normal range of motion. Neck supple. No JVD present. No tracheal deviation present. No thyromegaly present.   Cardiovascular: Normal rate, regular rhythm, normal heart sounds and intact distal pulses.  Exam reveals no gallop and no friction rub.    No murmur heard.  Pulmonary/Chest: Effort normal and breath sounds normal. No respiratory distress. She has no wheezes. She has no rales. She exhibits no tenderness. Breasts are symmetrical.   Abdominal: Soft. Bowel sounds are normal. She exhibits no distension and no mass. There is no hepatosplenomegaly or hepatomegaly. There is no tenderness. There is no rebound and no guarding.   Musculoskeletal: Normal range of motion. She exhibits no edema or tenderness.   Lymphadenopathy:     She has no cervical adenopathy.   Neurological: She is alert and oriented to person, place, and time. She displays normal reflexes. No cranial nerve deficit. Coordination normal.   Skin: Skin is warm and dry. No rash noted. No erythema. No pallor.   Psychiatric: She has a normal mood and affect. Her behavior is normal. Judgment and thought content normal.   Nursing note and vitals reviewed.        Assessment:       1. " Persistent atrial fibrillation    2. Nonrheumatic aortic valve stenosis         Plan:     The current medical regimen is effective;  continue present plan and medications.     Patient advised to limit exposure to caffeine, stimulants, and alcohol     Risk of stroke from atrial fib has been discussed as well as bleeding risk from alternative anticoagulation regiments as well as risk and benefits of rate control vs cardioversion    No orders of the defined types were placed in this encounter.    Follow-up in about 6 months (around 1/19/2019).

## 2018-07-23 ENCOUNTER — TELEPHONE (OUTPATIENT)
Dept: PAIN MEDICINE | Facility: CLINIC | Age: 83
End: 2018-07-23

## 2018-07-23 NOTE — TELEPHONE ENCOUNTER
Spoke to patient and gave an estimated time to arrive and explained that she should hear from the pre-op department tomorrow.

## 2018-07-23 NOTE — TELEPHONE ENCOUNTER
----- Message from Ambrocio Tejeda sent at 7/23/2018  4:08 PM CDT -----  Contact: self   Patient is scheduled for a procedure on 7/25 and need to know what time to be there for. Please call back at [phone] (home)

## 2018-07-24 ENCOUNTER — TELEPHONE (OUTPATIENT)
Dept: PAIN MEDICINE | Facility: CLINIC | Age: 83
End: 2018-07-24

## 2018-07-24 ENCOUNTER — ANESTHESIA EVENT (OUTPATIENT)
Dept: SURGERY | Facility: HOSPITAL | Age: 83
End: 2018-07-24
Payer: MEDICARE

## 2018-07-24 DIAGNOSIS — M51.36 DDD (DEGENERATIVE DISC DISEASE), LUMBAR: Primary | ICD-10-CM

## 2018-07-24 NOTE — TELEPHONE ENCOUNTER
----- Message from Janki Fortune sent at 7/24/2018  3:20 PM CDT -----  Type: Needs Medical Advice    Who Called:  Patient   Best Call Back Number: [phone]  Additional Information: Patient has questions regarding what medications she can take for pain prior to her procedure     Thank you

## 2018-07-24 NOTE — TELEPHONE ENCOUNTER
----- Message from RT Curt sent at 7/24/2018 11:14 AM CDT -----  Contact: pt    pt , requesting a call back soon questions concerning her upcoming Sx, thanks.

## 2018-07-24 NOTE — TELEPHONE ENCOUNTER
----- Message from Marianela Salvador sent at 7/24/2018  2:28 PM CDT -----  Type:  Patient Returning Call    Who Called:  self  Who Left Message for Patient:  Edie  Does the patient know what this is regarding?:     Best Call Back Number:  [phone]    Additional Information:

## 2018-07-24 NOTE — TELEPHONE ENCOUNTER
Spoke with the patient and  regarding the medications to take the morning of the procedure. Was concerned about not taking the tylenol, gabapentin and tramadol. Advised to take what the pre-op nurses instructed. Understood and no further questions.

## 2018-07-25 ENCOUNTER — ANESTHESIA (OUTPATIENT)
Dept: SURGERY | Facility: HOSPITAL | Age: 83
End: 2018-07-25
Payer: MEDICARE

## 2018-07-25 ENCOUNTER — HOSPITAL ENCOUNTER (OUTPATIENT)
Dept: RADIOLOGY | Facility: HOSPITAL | Age: 83
Discharge: HOME | End: 2018-07-25
Attending: ANESTHESIOLOGY | Admitting: ANESTHESIOLOGY
Payer: MEDICARE

## 2018-07-25 ENCOUNTER — HOSPITAL ENCOUNTER (OUTPATIENT)
Facility: HOSPITAL | Age: 83
Discharge: HOME | End: 2018-07-25
Attending: ANESTHESIOLOGY | Admitting: ANESTHESIOLOGY
Payer: MEDICARE

## 2018-07-25 ENCOUNTER — SURGERY (OUTPATIENT)
Age: 83
End: 2018-07-25

## 2018-07-25 VITALS
WEIGHT: 125 LBS | OXYGEN SATURATION: 100 % | SYSTOLIC BLOOD PRESSURE: 152 MMHG | HEIGHT: 62 IN | DIASTOLIC BLOOD PRESSURE: 70 MMHG | RESPIRATION RATE: 14 BRPM | TEMPERATURE: 98 F | BODY MASS INDEX: 23 KG/M2 | HEART RATE: 67 BPM

## 2018-07-25 DIAGNOSIS — M51.36 DDD (DEGENERATIVE DISC DISEASE), LUMBAR: ICD-10-CM

## 2018-07-25 DIAGNOSIS — G89.4 CHRONIC PAIN SYNDROME: Primary | ICD-10-CM

## 2018-07-25 PROCEDURE — 63650 IMPLANT NEUROELECTRODES: CPT | Mod: 51,,, | Performed by: ANESTHESIOLOGY

## 2018-07-25 PROCEDURE — 95972 ALYS CPLX SP/PN NPGT W/PRGRM: CPT | Mod: ,,, | Performed by: ANESTHESIOLOGY

## 2018-07-25 PROCEDURE — 63600175 PHARM REV CODE 636 W HCPCS: Mod: PO | Performed by: ANESTHESIOLOGY

## 2018-07-25 PROCEDURE — 76000 FLUOROSCOPY <1 HR PHYS/QHP: CPT | Mod: TC,PO

## 2018-07-25 PROCEDURE — 36000706: Mod: PO | Performed by: ANESTHESIOLOGY

## 2018-07-25 PROCEDURE — 37000009 HC ANESTHESIA EA ADD 15 MINS: Mod: PO | Performed by: ANESTHESIOLOGY

## 2018-07-25 PROCEDURE — 25000003 PHARM REV CODE 250: Mod: PO | Performed by: ANESTHESIOLOGY

## 2018-07-25 PROCEDURE — D9220A PRA ANESTHESIA: Mod: CRNA,,, | Performed by: NURSE ANESTHETIST, CERTIFIED REGISTERED

## 2018-07-25 PROCEDURE — C1897 LEAD, NEUROSTIM TEST KIT: HCPCS | Mod: PO | Performed by: ANESTHESIOLOGY

## 2018-07-25 PROCEDURE — 36000707: Mod: PO | Performed by: ANESTHESIOLOGY

## 2018-07-25 PROCEDURE — 63600175 PHARM REV CODE 636 W HCPCS: Mod: PO | Performed by: NURSE ANESTHETIST, CERTIFIED REGISTERED

## 2018-07-25 PROCEDURE — 37000008 HC ANESTHESIA 1ST 15 MINUTES: Mod: PO | Performed by: ANESTHESIOLOGY

## 2018-07-25 PROCEDURE — D9220A PRA ANESTHESIA: Mod: ANES,,, | Performed by: ANESTHESIOLOGY

## 2018-07-25 PROCEDURE — 71000033 HC RECOVERY, INTIAL HOUR: Mod: PO | Performed by: ANESTHESIOLOGY

## 2018-07-25 DEVICE — KIT LEAD TRIAL OCTRODE 60CM: Type: IMPLANTABLE DEVICE | Site: BACK | Status: FUNCTIONAL

## 2018-07-25 RX ORDER — LIDOCAINE HYDROCHLORIDE 10 MG/ML
1 INJECTION, SOLUTION EPIDURAL; INFILTRATION; INTRACAUDAL; PERINEURAL ONCE
Status: DISCONTINUED | OUTPATIENT
Start: 2018-07-25 | End: 2018-07-25 | Stop reason: HOSPADM

## 2018-07-25 RX ORDER — SODIUM CHLORIDE, SODIUM LACTATE, POTASSIUM CHLORIDE, CALCIUM CHLORIDE 600; 310; 30; 20 MG/100ML; MG/100ML; MG/100ML; MG/100ML
INJECTION, SOLUTION INTRAVENOUS CONTINUOUS
Status: DISCONTINUED | OUTPATIENT
Start: 2018-07-25 | End: 2018-07-25 | Stop reason: HOSPADM

## 2018-07-25 RX ORDER — CEFAZOLIN SODIUM 1 G/3ML
2 INJECTION, POWDER, FOR SOLUTION INTRAMUSCULAR; INTRAVENOUS
Status: COMPLETED | OUTPATIENT
Start: 2018-07-25 | End: 2018-07-25

## 2018-07-25 RX ORDER — LIDOCAINE HCL/PF 100 MG/5ML
SYRINGE (ML) INTRAVENOUS
Status: DISCONTINUED | OUTPATIENT
Start: 2018-07-25 | End: 2018-07-25

## 2018-07-25 RX ORDER — PROPOFOL 10 MG/ML
VIAL (ML) INTRAVENOUS
Status: DISCONTINUED | OUTPATIENT
Start: 2018-07-25 | End: 2018-07-25

## 2018-07-25 RX ORDER — PROPOFOL 10 MG/ML
VIAL (ML) INTRAVENOUS CONTINUOUS PRN
Status: DISCONTINUED | OUTPATIENT
Start: 2018-07-25 | End: 2018-07-25

## 2018-07-25 RX ORDER — LIDOCAINE HYDROCHLORIDE 10 MG/ML
INJECTION, SOLUTION EPIDURAL; INFILTRATION; INTRACAUDAL; PERINEURAL
Status: DISCONTINUED | OUTPATIENT
Start: 2018-07-25 | End: 2018-07-25 | Stop reason: HOSPADM

## 2018-07-25 RX ORDER — FENTANYL CITRATE 50 UG/ML
INJECTION, SOLUTION INTRAMUSCULAR; INTRAVENOUS
Status: DISCONTINUED | OUTPATIENT
Start: 2018-07-25 | End: 2018-07-25

## 2018-07-25 RX ADMIN — FENTANYL CITRATE 25 MCG: 50 INJECTION, SOLUTION INTRAMUSCULAR; INTRAVENOUS at 01:07

## 2018-07-25 RX ADMIN — FENTANYL CITRATE 25 MCG: 50 INJECTION, SOLUTION INTRAMUSCULAR; INTRAVENOUS at 12:07

## 2018-07-25 RX ADMIN — LIDOCAINE HYDROCHLORIDE 75 MG: 20 INJECTION PARENTERAL at 12:07

## 2018-07-25 RX ADMIN — SODIUM CHLORIDE, SODIUM LACTATE, POTASSIUM CHLORIDE, AND CALCIUM CHLORIDE: .6; .31; .03; .02 INJECTION, SOLUTION INTRAVENOUS at 11:07

## 2018-07-25 RX ADMIN — LIDOCAINE HYDROCHLORIDE 10 ML: 10 INJECTION, SOLUTION EPIDURAL; INFILTRATION; INTRACAUDAL; PERINEURAL at 12:07

## 2018-07-25 RX ADMIN — CEFAZOLIN 2 G: 1 INJECTION, POWDER, FOR SOLUTION INTRAVENOUS at 12:07

## 2018-07-25 RX ADMIN — PROPOFOL 50 MCG/KG/MIN: 10 INJECTION, EMULSION INTRAVENOUS at 12:07

## 2018-07-25 RX ADMIN — PROPOFOL 50 MG: 10 INJECTION, EMULSION INTRAVENOUS at 12:07

## 2018-07-25 NOTE — ANESTHESIA PREPROCEDURE EVALUATION
07/25/2018  Michela Calvert is a 88 y.o., female.    Anesthesia Evaluation    I have reviewed the Patient Summary Reports.    I have reviewed the Nursing Notes.   I have reviewed the Medications.     Review of Systems  Anesthesia Hx:  No problems with previous Anesthesia    Social:  Non-Smoker    Cardiovascular:   Valvular problems/Murmurs (EF 55-60% Jan 2018), MR, AS Dysrhythmias (Pt currently out of AF and off Eliquis x3Days) atrial fibrillation hyperlipidemia    Pulmonary:  Pulmonary Normal    Renal/:  Renal/ Normal     Musculoskeletal:   Arthritis     Neurological:   Neuromuscular Disease,    Endocrine:   Hypothyroidism        Physical Exam  General:  Well nourished    Airway/Jaw/Neck:  Airway Findings: Mouth Opening: Normal Tongue: Normal  General Airway Assessment: Adult  Oropharynx Findings:  Mallampati: II  Jaw/Neck Findings:  Neck ROM: Normal ROM     Eyes/Ears/Nose:  Eyes/Ears/Nose Findings:    Dental:  Dental Findings:   Chest/Lungs:  Chest/Lungs Findings: Normal Respiratory Rate     Heart/Vascular:  Heart Findings: Rate: Normal  Rhythm: Regular Rhythm        Mental Status:  Mental Status Findings:  Cooperative, Alert and Oriented         Anesthesia Plan  Type of Anesthesia, risks & benefits discussed:  Anesthesia Type:  general, MAC  Patient's Preference:   Intra-op Monitoring Plan:   Intra-op Monitoring Plan Comments:   Post Op Pain Control Plan: multimodal analgesia  Post Op Pain Control Plan Comments:   Induction:   IV  Beta Blocker:  Patient is on a Beta-Blocker and has received one dose within the past 24 hours (No further documentation required).       Informed Consent: Patient understands risks and agrees with Anesthesia plan.  Questions answered. Anesthesia consent signed with patient.  ASA Score: 4     Day of Surgery Review of History & Physical:  There are no significant changes.    H&P completed by Anesthesiologist.       Ready For Surgery From Anesthesia Perspective.

## 2018-07-25 NOTE — PLAN OF CARE
BEDSIDE REPORT REC'D FROM EMERY TRIMBLE RN.  SPINAL CORD STIM REP AT BEDSIDE FOR TROUBLESHOOTING OF DEVICE

## 2018-07-25 NOTE — DISCHARGE SUMMARY
[facility]  Discharge Note  Short Stay    Admit Date: 7/25/2018    Discharge Date: 7/25/2018    Attending Physician: Derek Daily MD     Discharge Provider: Derek Daily    Diagnoses:  Active Hospital Problems    Diagnosis  POA    *Chronic pain syndrome [G89.4]  Yes      Resolved Hospital Problems    Diagnosis Date Resolved POA   No resolved problems to display.       Discharged Condition: good    Final Diagnoses: Chronic pain syndrome [G89.4]    Disposition: Home or Self Care    Hospital Course: no complications, uneventful    Outcome of Hospitalization, Treatment, Procedure, or Surgery:  Patient was admitted for outpatient procedure. The patient underwent procedure without complications and are discharged home    Follow up/Patient Instructions:  Follow up as scheduled in Pain Management clinic in 3-4 weeks/Patient has received instructions and follow up date and time    Medications:  Continue previous medications      Discharge Procedure Orders  Call MD for:  temperature >100.4     Call MD for:  severe uncontrolled pain     Call MD for:  redness, tenderness, or signs of infection (pain, swelling, redness, odor or green/yellow discharge around incision site)     Call MD for:  severe persistent headache     No dressing needed           Discharge Procedure Orders (must include Diet, Follow-up, Activity):    Discharge Procedure Orders (must include Diet, Follow-up, Activity)  Call MD for:  temperature >100.4     Call MD for:  severe uncontrolled pain     Call MD for:  redness, tenderness, or signs of infection (pain, swelling, redness, odor or green/yellow discharge around incision site)     Call MD for:  severe persistent headache     No dressing needed

## 2018-07-25 NOTE — ANESTHESIA POSTPROCEDURE EVALUATION
"Anesthesia Post Evaluation    Patient: Michela Calvert    Procedure(s) Performed: Procedure(s) (LRB):  INSERTION, DORSAL COLUMN NEUROSTIMULATOR, FOR TRIAL (N/A)    Final Anesthesia Type: MAC  Patient location during evaluation: PACU  Patient participation: Yes- Able to Participate  Level of consciousness: awake and alert and oriented  Post-procedure vital signs: reviewed and stable  Pain management: adequate  Airway patency: patent  PONV status at discharge: No PONV  Anesthetic complications: no      Cardiovascular status: blood pressure returned to baseline and stable  Respiratory status: unassisted and spontaneous ventilation  Hydration status: euvolemic  Follow-up not needed.        Visit Vitals  /70 (BP Location: Left arm, Patient Position: Lying)   Pulse 67   Temp 36.7 °C (98 °F) (Skin)   Resp 15   Ht 5' 2" (1.575 m)   Wt 56.7 kg (125 lb)   SpO2 98%   Breastfeeding? No   BMI 22.86 kg/m²       Pain/Paula Score: Pain Assessment Performed: Yes (7/25/2018 11:37 AM)  Presence of Pain: denies (7/25/2018 11:37 AM)      "

## 2018-07-25 NOTE — OP NOTE
PROCEDURE DATE: 7/25/2018    Procedure  1. Percutaneous insertion of spinal cord stimulator leads x 2. Total of 16 contacts.   2. Fluoroscopic needle guidance.   3. Intraoperative complex programming of Spinal Cord Stimulator, >30mins.     Pre-op Diagnosis: : Chronic pain syndrome, lumbar radiculitis    Post-op Diagnosis: Same    Surgeon: Derek Daily M.D.    Assistant: none    Anesthesia: MAC per Anesthesia Provider    Blood Loss: nil    Complications: none    PROCEDURE NOTE: After obtaining written informed consent patient was taken to the procedure room. Pre-procedure blood pressure and pulse were stable and recorded in patients clinic chart. Pre-op IV antibiotics were started by the Anesthesia provider.    The patient was taken to the operating room and placed in prone position. Routine monitors were applied and IV anesthesia was titrated to effect by the Anesthesia provider. The patient's back and buttocks were prepped and draped in sterile fashion using betadine and then DuraPrep solution and sterile drapes were applied. C-arm fluoroscopy was used to identify the T12/L1 interspace. Through a 1% local lidocaine skin wheal, a small stab incision was made with a #11 blade scapel. Through this, a 14-gauge Tuohy needle was advanced until contact was made with the right-sided L1 lamina. It was then walked off in a cephalad medial direction using loss of resistance to technique entering into the epidural space. Once within the epidural space, an epidural spinal cord stimulator lead was advanced until the tip of the lead rested at the top of the  T8 vertebral body. Following this, another percutaneous lead was placed with another Tuohy needle on the left side following the same procedure as described for the other side. Once all the leads were in place, stimulation testing was carried out by the device representative under my guidance. Once the leads were noted to be within proper position with patient reporting  stimulation in the painful areas, the needle was removed. The leads were secured to the patient's back using 0-0 silk. Skin was cleaned, bacitracin applied and a sterile dressing was applied.    Following the procedure the patient's vital signs were stable. The patient was taken to the recovery room in good condition with no known complication. The patient was allowed to fully awaken from anesthesia and final programming of the device was done by the device representative under my guidance. The patient was discharged home in good condition after being given discharge instructions.

## 2018-07-25 NOTE — TRANSFER OF CARE
"Anesthesia Transfer of Care Note    Patient: Michela Calvert    Procedure(s) Performed: Procedure(s) (LRB):  INSERTION, DORSAL COLUMN NEUROSTIMULATOR, FOR TRIAL (N/A)    Patient location: PACU    Anesthesia Type: MAC    Transport from OR: Transported from OR on room air with adequate spontaneous ventilation    Post pain: adequate analgesia    Post assessment: no apparent anesthetic complications and tolerated procedure well    Post vital signs: stable    Level of consciousness: awake and alert    Nausea/Vomiting: no nausea/vomiting    Complications: none    Transfer of care protocol was followed      Last vitals:   Visit Vitals  /66 (BP Location: Right arm, Patient Position: Lying)   Pulse 70   Temp 36.5 °C (97.7 °F) (Skin)   Resp 16   Ht 5' 2" (1.575 m)   Wt 56.7 kg (125 lb)   SpO2 99%   Breastfeeding? No   BMI 22.86 kg/m²     "

## 2018-07-25 NOTE — DISCHARGE INSTRUCTIONS
SPINAL CORD STIMULATOR TRIAL    Please follow all of the instructions that were given to you and demonstrated by your [employer] Representative about the use and care of your remote and equipment.    A member from the [employer] team will call you each afternoon to remind you of restrictions and to offer any support you may need on the care and operation of your spinal cord stimulator device.     After your procedure:    -Sponge baths only.  -Keep your bandage and all of the external components clean and dry.  -Please limit any necessary bending, lifting or twisting to slow and careful movements.  -Avoid overhead work. Keep your elbows below your shoulders.  -Avoid motions that cause pulling on your dressing or wires.  -Turn off your stimulator when driving. (Do not drive for the first 24 hours after your procedure)  -Wear your brace when you are walking or active. You do not have to wear the brace while you are in bed.    -You may resume your home medications as prescribed.  -You may resume your normal diet as tolerated.  -Follow up as scheduled with Dr. Daily.       For programming or any questions you may have about your spinal cord stimulator, please call your [employer] Representative:    ___ [place] [phone]  ___ Lorenzo [phone]  ___ Alvaro [phone]  ___ [place] [phone]  ___ [place] [phone]  ___ Paul [phone]  ___ [place]arc[place] [phone]    Recovery After Procedural Sedation (Adult)  You have been given medicine by vein to make you sleep during your surgery. This may have included both a pain medicine and sleeping medicine. Most of the effects have worn off. But you may still have some drowsiness for the next 6 to 8 hours.  Home care  Follow these guidelines when you get home:  · For the next 8 hours, you should be watched by a responsible adult. This person should make sure your condition is not getting worse.  · Don't drink any alcohol for the next 24 hours.  · Don't  drive, operate dangerous machinery, or make important business or personal decisions during the next 24 hours.  Note: Your healthcare provider may tell you not to take any medicine by mouth for pain or sleep in the next 4 hours. These medicines may react with the medicines you were given in the hospital. This could cause a much stronger response than usual.  Follow-up care  Follow up with your healthcare provider if you are not alert and back to your usual level of activity within 12 hours.  When to seek medical advice  Call your healthcare provider right away if any of these occur:  · Drowsiness gets worse  · Weakness or dizziness gets worse  · Repeated vomiting  · You can't be awakened   Date Last Reviewed: 10/18/2016  © 0613-0532 [employer]. [address] [place] [address], [place] [address]. All rights reserved. This information is not intended as a substitute for professional medical care. Always follow your healthcare professional's instructions.

## 2018-07-25 NOTE — H&P
CC: Neck pain    HPI: The patient is a 89yo woman with a history of chronic pain syndrome, lumbar radiculitis here for SCS trial. There are no major changes in history and physical from 6/7/18.    Past Medical History:   Diagnosis Date    Cataracts, bilateral     DDD (degenerative disc disease), lumbar     DDD (degenerative disc disease), lumbar     Heart murmur 01/2017    HLD (hyperlipidemia)     no medication taken    Hypothyroidism     Lichen sclerosus et atrophicus     Rectal/Vaginal areas    Lumbar spinal stenosis     Lumbar spondylosis     Mitral valve disorder 01/2017    Asymptomatic    Osteopenia        Past Surgical History:   Procedure Laterality Date    CATARACT EXTRACTION EXTRACAPSULAR W/ INTRAOCULAR LENS IMPLANTATION Bilateral     HYSTERECTOMY      spinal ablation  12/2017    TONSILLECTOMY         History reviewed. No pertinent family history.    Social History     Social History    Marital status:      Spouse name: N/A    Number of children: N/A    Years of education: N/A     Social History Main Topics    Smoking status: Never Smoker    Smokeless tobacco: Never Used    Alcohol use Yes      Comment: 1 gin & tonic daily @ 5pm    Drug use: No    Sexual activity: No     Other Topics Concern    None     Social History Narrative    None       Current Facility-Administered Medications   Medication Dose Route Frequency Provider Last Rate Last Dose    ceFAZolin injection 2 g  2 g Intravenous On Call Procedure Derek Daily MD        lactated ringers infusion   Intravenous Continuous Amor Ewing MD        lactated ringers infusion   Intravenous Continuous Derek Daily MD        lidocaine (PF) 10 mg/ml (1%) injection 10 mg  1 mL Intradermal Once Amor Ewing MD           Review of patient's allergies indicates:   Allergen Reactions    Nitrofuran analogues Nausea Only    Ciprofloxacin Nausea And Vomiting       Vitals:    07/24/18 0957 07/25/18 1139   BP:   "136/66   Pulse:  70   Resp:  16   Temp:  97.7 °F (36.5 °C)   TempSrc:  Skin   SpO2:  99%   Weight: 57.6 kg (127 lb) 56.7 kg (125 lb)   Height: 5' 2" (1.575 m) 5' 2" (1.575 m)       REVIEW OF SYSTEMS:     GENERAL: No weight loss, malaise or fevers.  HEENT:  No recent changes in vision or hearing  NECK: Negative for lumps, no difficulty with swallowing.  RESPIRATORY: Negative for cough, wheezing or shortness of breath, patient denies any recent URI.  CARDIOVASCULAR: Negative for chest pain, leg swelling or palpitations.  GI: Negative for abdominal discomfort, blood in stools or black stools or change in bowel habits.  MUSCULOSKELETAL: See HPI.  SKIN: Negative for lesions, rash, and itching.  PSYCH: No suicidal or homicidal ideations, no current mood disturbances.  HEMATOLOGY/LYMPHOLOGY: Negative for prolonged bleeding, bruising easily or swollen nodes. Patient is not currently taking any anti-coagulants  ENDO: No history of diabetes or thyroid dysfunction  NEURO: No history of syncope, paralysis, seizures or tremors.All other reviewed and negative other than HPI.    Physical exam:  Gen: A and O x3, pleasant, well-groomed  Skin: No rashes or obvious lesions  HEENT: PERRLA, no obvious deformities on ears or in canals. No thyroid masses, trachea midline, no palpable lymph nodes in neck, axilla.  CVS: Regular rate and rhythm, normal S1 and S2, no murmurs.  Resp: Clear to auscultation bilaterally.  Abdomen: Soft, NT/ND, normal bowel sounds present.  Musculoskeletal/Neuro: Moving all extremities    Assessment:  Chronic pain syndrome  -     Case Request Operating Room: TRIAL, NEUROSTIMULATOR, SPINAL CORD  -     Activity as tolerated; Standing  -     Place in Outpatient; Standing  -     Diet NPO; Standing  -     lactated ringers infusion; Inject into the vein continuous.  -     ceFAZolin injection 2 g; Inject 2,000 mg (2 g total) into the vein On call Procedure (Surgery).  -     Notify physician ; Standing  -     Notify " physician ; Standing  -     Notify physician (specify); Standing  -     Place 18-22 gauage peripheral IV ; Standing  -     Verify informed consent; Standing  -     Vital signs; Standing    Other orders  -     Vital signs; Standing  -     Insert peripheral IV; Standing  -     Use 1% lidocaine at IV site; Standing  -     Notify Physician - Potential Need of Opioid Reversal; Standing  -     Cancel: Diet NPO Except for: Medication; Standing  -     lactated ringers infusion; Inject into the vein continuous.  -     lidocaine (PF) 10 mg/ml (1%) injection 10 mg; Inject 1 mL (10 mg total) into the skin once.  -     Notify Anesthesiologist if Patient on Home Insulin Pump; Standing  -     Pulse Oximetry Q4H; Standing

## 2018-07-30 ENCOUNTER — HOSPITAL ENCOUNTER (OUTPATIENT)
Dept: RADIOLOGY | Facility: HOSPITAL | Age: 83
Discharge: HOME | End: 2018-07-30
Attending: ANESTHESIOLOGY
Payer: MEDICARE

## 2018-07-30 ENCOUNTER — OFFICE VISIT (OUTPATIENT)
Dept: PAIN MEDICINE | Facility: CLINIC | Age: 83
End: 2018-07-30
Payer: MEDICARE

## 2018-07-30 VITALS
SYSTOLIC BLOOD PRESSURE: 162 MMHG | DIASTOLIC BLOOD PRESSURE: 77 MMHG | HEART RATE: 72 BPM | OXYGEN SATURATION: 95 % | RESPIRATION RATE: 20 BRPM | TEMPERATURE: 97 F

## 2018-07-30 DIAGNOSIS — M54.9 BACK PAIN, UNSPECIFIED BACK LOCATION, UNSPECIFIED BACK PAIN LATERALITY, UNSPECIFIED CHRONICITY: ICD-10-CM

## 2018-07-30 DIAGNOSIS — M54.16 LUMBAR RADICULITIS: ICD-10-CM

## 2018-07-30 DIAGNOSIS — M54.9 BACK PAIN, UNSPECIFIED BACK LOCATION, UNSPECIFIED BACK PAIN LATERALITY, UNSPECIFIED CHRONICITY: Primary | ICD-10-CM

## 2018-07-30 DIAGNOSIS — M48.062 SPINAL STENOSIS OF LUMBAR REGION WITH NEUROGENIC CLAUDICATION: ICD-10-CM

## 2018-07-30 DIAGNOSIS — G89.4 CHRONIC PAIN SYNDROME: ICD-10-CM

## 2018-07-30 DIAGNOSIS — R26.81 GAIT INSTABILITY: ICD-10-CM

## 2018-07-30 PROCEDURE — 99024 POSTOP FOLLOW-UP VISIT: CPT | Mod: S$GLB,,, | Performed by: ANESTHESIOLOGY

## 2018-07-30 PROCEDURE — 99499 UNLISTED E&M SERVICE: CPT | Mod: S$GLB,,, | Performed by: ANESTHESIOLOGY

## 2018-07-30 PROCEDURE — 99999 PR PBB SHADOW E&M-EST. PATIENT-LVL III: CPT | Mod: PBBFAC,,, | Performed by: ANESTHESIOLOGY

## 2018-07-30 PROCEDURE — 72080 X-RAY EXAM THORACOLMB 2/> VW: CPT | Mod: 26,,, | Performed by: RADIOLOGY

## 2018-07-30 PROCEDURE — 72080 X-RAY EXAM THORACOLMB 2/> VW: CPT | Mod: TC,PO

## 2018-07-31 ENCOUNTER — TELEPHONE (OUTPATIENT)
Dept: PHARMACY | Facility: CLINIC | Age: 83
End: 2018-07-31

## 2018-07-31 RX ORDER — GABAPENTIN 300 MG/1
600 CAPSULE ORAL 3 TIMES DAILY
Qty: 180 CAPSULE | Refills: 5 | Status: SHIPPED | OUTPATIENT
Start: 2018-07-31 | End: 2019-01-22 | Stop reason: SDUPTHER

## 2018-08-13 ENCOUNTER — TELEPHONE (OUTPATIENT)
Dept: PAIN MEDICINE | Facility: CLINIC | Age: 83
End: 2018-08-13

## 2018-08-13 NOTE — TELEPHONE ENCOUNTER
----- Message from Jessica Ford sent at 8/13/2018  3:43 PM CDT -----  Contact: self  Type: Needs Medical Advice    Who Called:  self  Symptoms (please be specific):  Back pain  How long has patient had these symptoms:  2 days  Pharmacy name and phone #:  [facility]   Best Call Back Number: [phone]  Additional Information: patient made an appointment for tomorrow morning but states she needs something for the pain tonight.  Patient would like a call back. Thanks!    [facility] [employer] [facility] [address] [place] [address] AT SEC of [facility] & [address] [address]  Phone: [phone] Fax: [phone]

## 2018-08-14 ENCOUNTER — OFFICE VISIT (OUTPATIENT)
Dept: PAIN MEDICINE | Facility: CLINIC | Age: 83
End: 2018-08-14
Payer: MEDICARE

## 2018-08-14 VITALS
RESPIRATION RATE: 18 BRPM | BODY MASS INDEX: 22.86 KG/M2 | WEIGHT: 125 LBS | TEMPERATURE: 98 F | DIASTOLIC BLOOD PRESSURE: 71 MMHG | HEART RATE: 68 BPM | OXYGEN SATURATION: 95 % | SYSTOLIC BLOOD PRESSURE: 141 MMHG

## 2018-08-14 DIAGNOSIS — M48.062 SPINAL STENOSIS OF LUMBAR REGION WITH NEUROGENIC CLAUDICATION: Primary | ICD-10-CM

## 2018-08-14 PROBLEM — G89.4 CHRONIC PAIN SYNDROME: Status: RESOLVED | Noted: 2018-07-25 | Resolved: 2018-08-14

## 2018-08-14 PROCEDURE — 99213 OFFICE O/P EST LOW 20 MIN: CPT | Mod: S$GLB,,, | Performed by: ANESTHESIOLOGY

## 2018-08-14 PROCEDURE — 99999 PR PBB SHADOW E&M-EST. PATIENT-LVL IV: CPT | Mod: PBBFAC,,, | Performed by: ANESTHESIOLOGY

## 2018-08-14 RX ORDER — TRAMADOL HYDROCHLORIDE 50 MG/1
TABLET ORAL
Qty: 120 TABLET | Refills: 2 | Status: SHIPPED | OUTPATIENT
Start: 2018-08-14 | End: 2018-10-16 | Stop reason: SDUPTHER

## 2018-08-14 NOTE — TELEPHONE ENCOUNTER
----- Message from Tree Taveras sent at 8/13/2018  4:27 PM CDT -----  Type: Needs Medical Advice    Who Called:  Juan José/Matt  Best Call Back Number: [phone]  Additional Information: Please call son in regards to previous message.

## 2018-08-14 NOTE — PROGRESS NOTES
CC:Back pain    HPI: The patient is an 88-year-old woman with a history of lumbar degenerative disc disease and spondylosis who presents in self-referral for low back pain.  She returns in follow-up today for back pain radiating into her hips and legs.  We had tried a spinal cord stimulator trial but she did not get any relief from this.  I had given her a prescription for tramadol 50 mg to take up to 3 times a day as needed and she reports some relief but it seems fairly minimal.  She states that the relief may last for an hour or 2.  She usually only takes this twice a day because her  was concerned about it.  She also states that her  has been trying to get her to walk more to strengthen her legs but in turn, this has caused increased pain in the back and hips.  She denies any new weakness, no new falls.    Intervention history: She had undergone a couple epidural steroid injections 15 years ago which had given her relief until A few years ago.  By Dr De Jesus she underwent epidural steroid injections, facet injections, RFA is, SI joint injections without any major relief.  The patient is status post L5/S1 TIFFANIE on 8/30/17 with no relief.    ROS:She reports itching, rhinorrhea, easy bruising and back pain.  Balance of review of systems is negative.    Medical, surgical, family and social history reviewed elsewhere in record.    Medications/Allergies: See med card    Vitals:    08/14/18 1120   BP: (!) 141/71   Pulse: 68   Resp: 18   Temp: 97.5 °F (36.4 °C)   TempSrc: Oral   SpO2: 95%   Weight: 56.7 kg (125 lb 0 oz)   PainSc:   6   PainLoc: Buttocks         Physical exam:  Gen: A and O x3, pleasant, well-groomed  Skin: No rashes or obvious lesions  HEENT: PERRLA, no obvious deformities on ears or in canals.   CVS: Regular rate and rhythm, normal S1 and S2, no murmurs.  Resp: Clear to auscultation bilaterally, no wheezes or rales.  Abdomen: Soft, NT/ND, normal bowel sounds present.  Musculoskeletal:  Presents in a wheelchair, uses a walker to ambulate.    Neuro:  Lower extremities: 5/5 strength bilaterally  Reflexes: Patellar 2+, Achilles 2+ bilaterally.  Sensory:  Intact and symmetrical to light touch and pinprick in L2-S1 dermatomes bilaterally.    Lumbar spine exam:  Range of motion is decreased with flexion and extension with increased pain on both maneuvers, especially with extension.  Straight leg raise negative bilaterally.  No major tenderness to palpation over the lumbar muscles.    Imaging:  MRI lumbar spine 8/22/13  T11/12: There is annular disk bulging which combines with some degenerative facet changes to produce mild canal and foraminal distortion.  T12/L1: There is annular disk bulging and there is mild resultant central canal stenosis. Ligamentous hypertrophy is present.  L1/2: Annular disk bulge and osteophyte formation produce moderate canal stenosis. There is right left foraminal stenosis. Degenerative facet changes are noted bilaterally.  L2/3: Annular disk bulge and osteophyte formation combined with facet and ligamentous hypertrophic changes to produce moderate foraminal narrowing and mild central canal stenosis.  L3/4: There is annular disk bulging which combines with facet and ligamentous hypertrophy to produce moderate canal and foraminal stenosis.  L4/5: Prominent degenerative facet changes are present and there is a central disk extrusion which extends craniad to the disk level. This combines with facet and ligamentous hypertrophy and the spondylolisthesis to produce prominent canal stenosis. The central canal is narrowed to approximately 6 mm. There is prominent foraminal narrowing bilaterally as well.  L5/S1: Prominent degenerative facet changes are present and there is annular disk bulging with moderate canal and foraminal stenosis.     MRI lumbar spine 1/28/16  The lumbar vertebral bodies show no evidence of acute compression fracture or a pathologic marrow replacement process.  There is degenerative disc desiccation, disc space narrowing, and marginal osteophyte formation at every lumbar level with prominent degenerative endplate changes noted at T12-L1 through L3-L4. There is a grade I retrolisthesis of T12 on L1, L1 on L2, L2 on L3, and L3 on L4. There is a grade I anterolisthesis of L4 on L5 and L5 on S1. The conus medullaris terminates at L1. The visualized retroperitoneal/abdominal soft tissue structures reveal cholelithiasis (series 5 image 7).  T11-T12: There is a broad disc bulge with a minimal superimposed broad central disc protrusion.  T12-L1: There is a grade I retrolisthesis of T12 on L1. There is ligamentum flavum thickening and facet arthropathy. There is uncovering of the intervertebral disc and a superimposed disc bulge. No central canal or neuroforaminal stenosis. No disc protrusion or extrusion.  L1-L2: There is a grade I retrolisthesis of L1 on L2. There is associated uncovering of the intervertebral disc. There is ligamentum flavum thickening and facet arthropathy. There is moderate left and right neuroforaminal stenosis. No overall central canal stenosis.  L2-L3: There is a grade I retrolisthesis of L2 on L3 with uncovering of the intervertebral disc. There is a broad disc bulge which extends into both neural foramina there is ligamentum flavum thickening and facet arthropathy. There is moderate central canal stenosis. There is moderate bilateral neuroforaminal stenosis.  L3-L4: There is a grade I retrolisthesis of L3 on L4. There is uncovering of the intervertebral disc and a superimposed disc bulge. There is ligamentum flavum thickening and facet arthropathy. There is moderate-severe central canal stenosis. There is mild-moderate left and moderate right neuroforaminal stenosis.  L4-L5: There is a grade I anterolisthesis of L4 on L5 with associated uncovering of the intervertebral disc and a superimposed disc bulge which extends into both neural foramina. There is  left ligamentum flavum thickening and severe bilateral facet arthropathy with a left facet joint effusion appreciated. There is severe central canal stenosis, moderate left, and mild right neuroforaminal stenosis  L5-S1: There is a minimal grade I anterolisthesis of L5 on S1 with associated uncovering of the intervertebral disc. There is severe bilateral facet arthropathy and ligamentum flavum. There is moderate central canal stenosis. There is mild-moderate right neuroforaminal stenosis.      Assessment:  The patient is an 88-year-old woman with a history of lumbar degenerative disc disease and spondylosis who presents in self-referral for low back pain.     1. Spinal stenosis of lumbar region with neurogenic claudication       Plan:  1.  Since she has had some mild relief with the tramadol and no side effects, I am going to have her try taking a slight increase in dose.  I will have her take 2 tablets at bedtime and 1 in the morning and afternoon.  I would like her to contact me over the next couple weeks let me know if this was helpful or not.  We may increase the daytime doses to 2 tablets as well or we could consider a long-acting tramadol.  Otherwise, we could consider Tylenol No.  3 or 4.

## 2018-08-16 DIAGNOSIS — E03.4 HYPOTHYROIDISM DUE TO ACQUIRED ATROPHY OF THYROID: ICD-10-CM

## 2018-08-16 RX ORDER — HYDROCORTISONE 25 MG/G
CREAM TOPICAL
Qty: 30 G | Refills: 1 | Status: CANCELLED | OUTPATIENT
Start: 2018-08-16 | End: 2019-08-16

## 2018-08-16 RX ORDER — LEVOTHYROXINE SODIUM 75 UG/1
75 TABLET ORAL DAILY
Qty: 90 TABLET | Refills: 3 | Status: SHIPPED | OUTPATIENT
Start: 2018-08-16 | End: 2019-03-29 | Stop reason: SDUPTHER

## 2018-08-27 ENCOUNTER — TELEPHONE (OUTPATIENT)
Dept: FAMILY MEDICINE | Facility: CLINIC | Age: 83
End: 2018-08-27

## 2018-08-27 DIAGNOSIS — I48.19 PERSISTENT ATRIAL FIBRILLATION: Primary | ICD-10-CM

## 2018-08-27 DIAGNOSIS — E03.4 HYPOTHYROIDISM DUE TO ACQUIRED ATROPHY OF THYROID: ICD-10-CM

## 2018-09-06 ENCOUNTER — OFFICE VISIT (OUTPATIENT)
Dept: FAMILY MEDICINE | Facility: CLINIC | Age: 83
End: 2018-09-06
Payer: MEDICARE

## 2018-09-06 VITALS
HEART RATE: 73 BPM | SYSTOLIC BLOOD PRESSURE: 144 MMHG | HEIGHT: 62 IN | DIASTOLIC BLOOD PRESSURE: 80 MMHG | WEIGHT: 129.19 LBS | BODY MASS INDEX: 23.77 KG/M2 | OXYGEN SATURATION: 96 % | TEMPERATURE: 98 F

## 2018-09-06 DIAGNOSIS — E03.4 HYPOTHYROIDISM DUE TO ACQUIRED ATROPHY OF THYROID: ICD-10-CM

## 2018-09-06 DIAGNOSIS — M48.061 SPINAL STENOSIS OF LUMBAR REGION, UNSPECIFIED WHETHER NEUROGENIC CLAUDICATION PRESENT: ICD-10-CM

## 2018-09-06 DIAGNOSIS — N30.00 ACUTE CYSTITIS WITHOUT HEMATURIA: Primary | ICD-10-CM

## 2018-09-06 DIAGNOSIS — I48.19 PERSISTENT ATRIAL FIBRILLATION: ICD-10-CM

## 2018-09-06 DIAGNOSIS — R35.0 FREQUENCY OF URINATION: ICD-10-CM

## 2018-09-06 LAB
BILIRUB SERPL-MCNC: NEGATIVE MG/DL
BLOOD URINE, POC: NORMAL
COLOR, POC UA: NORMAL
GLUCOSE UR QL STRIP: NORMAL
KETONES UR QL STRIP: NEGATIVE
LEUKOCYTE ESTERASE URINE, POC: NORMAL
NITRITE, POC UA: NEGATIVE
PH, POC UA: 5
PROTEIN, POC: NORMAL
SPECIFIC GRAVITY, POC UA: 1.02
UROBILINOGEN, POC UA: NORMAL

## 2018-09-06 PROCEDURE — 99214 OFFICE O/P EST MOD 30 MIN: CPT | Mod: PBBFAC,PO | Performed by: NURSE PRACTITIONER

## 2018-09-06 PROCEDURE — 81002 URINALYSIS NONAUTO W/O SCOPE: CPT | Mod: PBBFAC,PO | Performed by: NURSE PRACTITIONER

## 2018-09-06 PROCEDURE — 99214 OFFICE O/P EST MOD 30 MIN: CPT | Mod: S$PBB,,, | Performed by: NURSE PRACTITIONER

## 2018-09-06 PROCEDURE — 1101F PT FALLS ASSESS-DOCD LE1/YR: CPT | Mod: CPTII,,, | Performed by: NURSE PRACTITIONER

## 2018-09-06 PROCEDURE — 99999 PR PBB SHADOW E&M-EST. PATIENT-LVL IV: CPT | Mod: PBBFAC,,, | Performed by: NURSE PRACTITIONER

## 2018-09-06 PROCEDURE — 87086 URINE CULTURE/COLONY COUNT: CPT

## 2018-09-06 RX ORDER — SULFAMETHOXAZOLE AND TRIMETHOPRIM 800; 160 MG/1; MG/1
1 TABLET ORAL 2 TIMES DAILY
Qty: 10 TABLET | Refills: 0 | Status: SHIPPED | OUTPATIENT
Start: 2018-09-06 | End: 2018-09-17

## 2018-09-06 NOTE — PROGRESS NOTES
Subjective:       Patient ID: Michela Calvert is a 88 y.o. female.    Chief Complaint: Urinary Frequency and Bladder pressure    Urinary Tract Infection    This is a new problem. The current episode started yesterday. The problem occurs intermittently. The problem has been gradually worsening. The quality of the pain is described as aching. The pain is at a severity of 3/10. The pain is mild. There has been no fever. She is not sexually active. There is no history of pyelonephritis. Associated symptoms include frequency. Pertinent negatives include no behavior changes, chills, discharge, flank pain, hematuria, hesitancy, nausea, possible pregnancy, sweats, urgency, vomiting, weight loss, bubble bath use, constipation, rash or withholding. Associated symptoms comments: Urinary pressure . She has tried increased fluids for the symptoms. Her past medical history is significant for recurrent UTIs.     Vitals:    09/06/18 1341   BP: (!) 144/80   Pulse: 73   Temp: 98.1 °F (36.7 °C)     Review of Systems   Constitutional: Negative.  Negative for activity change, chills, diaphoresis, fatigue, fever and weight loss.   HENT: Negative.    Eyes: Negative.    Respiratory: Negative.  Negative for cough, shortness of breath and wheezing.    Cardiovascular: Negative.  Negative for chest pain.   Gastrointestinal: Negative.  Negative for abdominal pain, constipation, diarrhea, nausea and vomiting.   Endocrine: Negative.    Genitourinary: Positive for dysuria and frequency. Negative for flank pain, hematuria, hesitancy and urgency.   Musculoskeletal: Positive for arthralgias, back pain and gait problem. Negative for joint swelling.   Skin: Negative.  Negative for color change and rash.   Allergic/Immunologic: Negative.    Neurological: Negative for speech difficulty and numbness.   Hematological: Negative.    Psychiatric/Behavioral: Negative.        Past Medical History:   Diagnosis Date    Atrial fibrillation     Cataracts,  bilateral     Chronic pain syndrome 7/25/2018    DDD (degenerative disc disease), lumbar     DDD (degenerative disc disease), lumbar     Heart murmur 01/2017    HLD (hyperlipidemia)     no medication taken    Hypothyroidism     Lichen sclerosus et atrophicus     Rectal/Vaginal areas    Lumbar spinal stenosis     Lumbar spondylosis     Mitral valve disorder 01/2017    Asymptomatic    Osteopenia      Objective:      Physical Exam   Constitutional: She is oriented to person, place, and time. She appears well-developed and well-nourished.   HENT:   Head: Normocephalic and atraumatic.   Mouth/Throat: Oropharynx is clear and moist.   Eyes: Conjunctivae and EOM are normal. Pupils are equal, round, and reactive to light.   Neck: Neck supple.   Cardiovascular: Normal rate, regular rhythm, normal heart sounds and intact distal pulses.   Pulmonary/Chest: Effort normal and breath sounds normal. No stridor. No respiratory distress.   Abdominal: Soft. Bowel sounds are normal. There is no tenderness. There is no CVA tenderness.   Neurological: She is alert and oriented to person, place, and time.   Skin: Skin is warm and dry.   Psychiatric: She has a normal mood and affect. Her behavior is normal. Judgment and thought content normal.   Nursing note and vitals reviewed.      Assessment:       1. Acute cystitis without hematuria    2. Frequency of urination    3. Persistent atrial fibrillation    4. Hypothyroidism due to acquired atrophy of thyroid    5. Spinal stenosis of lumbar region, unspecified whether neurogenic claudication present        Plan:       Acute cystitis without hematuria  -     sulfamethoxazole-trimethoprim 800-160mg (BACTRIM DS) 800-160 mg Tab; Take 1 tablet by mouth 2 (two) times daily.  Dispense: 10 tablet; Refill: 0    Frequency of urination  -     POCT URINE DIPSTICK WITHOUT MICROSCOPE  -     Urine culture  -     sulfamethoxazole-trimethoprim 800-160mg (BACTRIM DS) 800-160 mg Tab; Take 1 tablet by  mouth 2 (two) times daily.  Dispense: 10 tablet; Refill: 0    Persistent atrial fibrillation  On eliquis   Stable     Hypothyroidism due to acquired atrophy of thyroid  On meds     Spinal stenosis of lumbar region, unspecified whether neurogenic claudication present  On meds - following with pain mgt     Will call with culture

## 2018-09-08 LAB
BACTERIA UR CULT: NORMAL
BACTERIA UR CULT: NORMAL

## 2018-09-11 ENCOUNTER — LAB VISIT (OUTPATIENT)
Dept: LAB | Facility: HOSPITAL | Age: 83
End: 2018-09-11
Attending: NURSE PRACTITIONER
Payer: MEDICARE

## 2018-09-11 ENCOUNTER — TELEPHONE (OUTPATIENT)
Dept: FAMILY MEDICINE | Facility: CLINIC | Age: 83
End: 2018-09-11

## 2018-09-11 DIAGNOSIS — N30.00 ACUTE CYSTITIS WITHOUT HEMATURIA: ICD-10-CM

## 2018-09-11 DIAGNOSIS — N30.00 ACUTE CYSTITIS WITHOUT HEMATURIA: Primary | ICD-10-CM

## 2018-09-11 PROCEDURE — 87086 URINE CULTURE/COLONY COUNT: CPT

## 2018-09-11 RX ORDER — CEFUROXIME AXETIL 500 MG/1
500 TABLET ORAL 2 TIMES DAILY
Qty: 14 TABLET | Refills: 0 | Status: SHIPPED | OUTPATIENT
Start: 2018-09-11 | End: 2018-10-01

## 2018-09-11 NOTE — TELEPHONE ENCOUNTER
----- Message from Reji ROBLEDO Ra sent at 9/11/2018  8:28 AM CDT -----  Contact: same  Patient called in and wanted [place] to know that her bladder infection came back & wanted to see if a Rx could be called in for her?    [facility] [address] [place] [address]  Phone: [phone] Fax: [phone]    Patient call back number is [phone]

## 2018-09-11 NOTE — TELEPHONE ENCOUNTER
----- Message from Hemalatha Harris sent at 9/11/2018  9:46 AM CDT -----  Type:  Patient Returning Call    Who Called:  Son/Peggy Calvert  Who Left Message for Patient:  ELEANN HORNER  Does the patient know what this is regarding?:  Come over now for a urine test  Best Call Back Number:  [phone]  Additional Information:  Wants to come over now to do the urine test. [facility]. Please call back as soon possible.

## 2018-09-11 NOTE — TELEPHONE ENCOUNTER
She needs to have urine culture - it is put in for lab collect- please come by and do that so we know what is going on - then I sent her ceftin to the pharmacy - new med - please tell her it is important for her to come by to do that

## 2018-09-11 NOTE — TELEPHONE ENCOUNTER
Patient states her bladder infection has come back she is experiencing frequency and burning and she is about to go by her son can she have another Rx please advise.

## 2018-09-11 NOTE — TELEPHONE ENCOUNTER
----- Message from Danette Aldrich sent at 9/11/2018 10:00 AM CDT -----  Contact: son- Mr Calvert  Son calling back, returning Jackie's call reagrding his mother, Michela.    Please call back [phone]

## 2018-09-13 LAB — BACTERIA UR CULT: NO GROWTH

## 2018-09-17 ENCOUNTER — OFFICE VISIT (OUTPATIENT)
Dept: FAMILY MEDICINE | Facility: CLINIC | Age: 83
End: 2018-09-17
Payer: MEDICARE

## 2018-09-17 VITALS
DIASTOLIC BLOOD PRESSURE: 80 MMHG | TEMPERATURE: 98 F | BODY MASS INDEX: 23 KG/M2 | HEART RATE: 64 BPM | WEIGHT: 125 LBS | OXYGEN SATURATION: 97 % | SYSTOLIC BLOOD PRESSURE: 142 MMHG | RESPIRATION RATE: 18 BRPM | HEIGHT: 62 IN

## 2018-09-17 DIAGNOSIS — Z48.02 VISIT FOR SUTURE REMOVAL: ICD-10-CM
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
## 2018-09-17 DIAGNOSIS — I48.19 PERSISTENT ATRIAL FIBRILLATION: ICD-10-CM

## 2018-09-17 DIAGNOSIS — Z74.09 MOBILITY IMPAIRED: ICD-10-CM

## 2018-09-17 DIAGNOSIS — S41.111D LACERATION OF RIGHT UPPER EXTREMITY WITH COMPLICATION, SUBSEQUENT ENCOUNTER: Primary | ICD-10-CM

## 2018-09-17 PROCEDURE — 99214 OFFICE O/P EST MOD 30 MIN: CPT | Mod: S$PBB,,, | Performed by: NURSE PRACTITIONER

## 2018-09-17 PROCEDURE — 1101F PT FALLS ASSESS-DOCD LE1/YR: CPT | Mod: CPTII,,, | Performed by: NURSE PRACTITIONER

## 2018-09-17 PROCEDURE — 99215 OFFICE O/P EST HI 40 MIN: CPT | Mod: PBBFAC,PO | Performed by: NURSE PRACTITIONER

## 2018-09-17 PROCEDURE — 99999 PR PBB SHADOW E&M-EST. PATIENT-LVL V: CPT | Mod: PBBFAC,,, | Performed by: NURSE PRACTITIONER

## 2018-09-17 NOTE — PROGRESS NOTES
Subjective:       Patient ID: Michela Calvert is a 88 y.o. female.    Chief Complaint: Suture / Staple Removal (stitches placed 10 days ago, from fall at home)    Here today to follow up on ER stay from 9/8/18 - per ER note : Ms. Michela Calvert an 88-year-old  female who presents for evaluation of laceration involving the dorsal aspect of her right forearm.  She reports that she became entangled in the socket she was wearing, tripped and fell, and struck the dorsal aspect of her right forearm on the sharp corner of the desk. There was significant tissue injury at the time; and, her  obtained hemostasis by placing a pressure dressing to the wound/injury and having the patient  She had penrose drain placed      Arm Injury    The incident occurred more than 1 week ago. The incident occurred at home. The injury mechanism was a fall. The pain is present in the right forearm. The pain does not radiate. The pain is at a severity of 1/10. The pain is mild. Pertinent negatives include no chest pain, muscle weakness, numbness or tingling.     Vitals:    09/17/18 0903   BP: (!) 142/80   Pulse: 64   Resp: 18   Temp: 98.2 °F (36.8 °C)     Review of Systems   Constitutional: Negative for diaphoresis, fatigue and fever.   HENT: Negative.    Eyes: Negative.    Respiratory: Negative for cough, shortness of breath and wheezing.    Cardiovascular: Negative for chest pain.   Gastrointestinal: Negative for abdominal pain, diarrhea and nausea.   Endocrine: Negative.    Genitourinary: Negative for dysuria and hematuria.   Musculoskeletal: Negative.    Skin: Negative for color change and rash.   Allergic/Immunologic: Negative.    Neurological: Negative for tingling, speech difficulty and numbness.   Hematological: Negative.    Psychiatric/Behavioral: Negative.        Past Medical History:   Diagnosis Date    Atrial fibrillation     Cataracts, bilateral     Chronic pain syndrome 7/25/2018    DDD (degenerative disc  disease), lumbar     DDD (degenerative disc disease), lumbar     Heart murmur 01/2017    HLD (hyperlipidemia)     no medication taken    Hypothyroidism     Lichen sclerosus et atrophicus     Rectal/Vaginal areas    Lumbar spinal stenosis     Lumbar spondylosis     Mitral valve disorder 01/2017    Asymptomatic    Osteopenia      Objective:      Physical Exam   Constitutional: She is oriented to person, place, and time. She appears well-developed and well-nourished.   HENT:   Head: Normocephalic and atraumatic.   Mouth/Throat: Oropharynx is clear and moist.   Eyes: Conjunctivae and EOM are normal. Pupils are equal, round, and reactive to light.   Neck: Neck supple.   Cardiovascular: Normal rate, regular rhythm, normal heart sounds and intact distal pulses.   Pulmonary/Chest: Effort normal and breath sounds normal.   Abdominal: Soft. Bowel sounds are normal.   Musculoskeletal: Normal range of motion.   Neurological: She is alert and oriented to person, place, and time.   Skin: Skin is warm and dry.   Psychiatric: She has a normal mood and affect. Her behavior is normal.   Nursing note and vitals reviewed.                      Assessment:       1. Laceration of right upper extremity with complication, subsequent encounter    2. Fall, subsequent encounter    3. Persistent atrial fibrillation    4. Mobility impaired    5. Visit for suture removal        Plan:       Laceration of right upper extremity with complication, subsequent encounter  Fall, subsequent encounter  Fell back at home against desk and cut arm     Persistent atrial fibrillation  On meds     Mobility impaired  Using walker at home.     Visit for suture removal  Removed sutures from ER   Patient tolerate procedure well   Steri strips placed -   [facility] to follow    Will follow - follow up if not better

## 2018-09-27 ENCOUNTER — TELEPHONE (OUTPATIENT)
Dept: FAMILY MEDICINE | Facility: CLINIC | Age: 83
End: 2018-09-27

## 2018-09-27 NOTE — TELEPHONE ENCOUNTER
Spoke with geoff( nurse). He states that pt has not had BM in 3 days and he is requesting order for suppository and/or fleet enema. Pt told him that she took 3 pills to help her go. Nurse was unsure of pills, he stated he would call back with name of pills shortly.

## 2018-09-27 NOTE — TELEPHONE ENCOUNTER
Spoke with Michael, he stated that the pt took ducolax. Pt told him that she took 3 on the 24th, 2 on the 25th and 2 on the 26th with no results, none was taken today per Michael. He states that the pt is passing gas but has not had a BM in 3 days. He is requesting an order for a suppository and/or fleet enema. Please advise.

## 2018-09-27 NOTE — TELEPHONE ENCOUNTER
----- Message from Lis Hodges sent at 9/27/2018  3:04 PM CDT -----  Contact: [facility], Michael Rodriguez want to speak with a nurse regarding patient not having a bowel movement, if possible to get a suppository please call back at [phone]

## 2018-09-28 ENCOUNTER — TELEPHONE (OUTPATIENT)
Dept: FAMILY MEDICINE | Facility: CLINIC | Age: 83
End: 2018-09-28

## 2018-09-28 NOTE — TELEPHONE ENCOUNTER
----- Message from Soha Trivedi sent at 9/28/2018  9:47 AM CDT -----  Contact: Michael emanuel/ [facility]  Type: Needs Medical Advice    Who Called: Michael emanuel/ [facility]  Symptoms (please be specific):  Patient complaining of constipation yesterday and last night  How long has patient had these symptoms:  radha  Pharmacy name and phone #:  [facility]  Best Call Back Number: Michael   Additional Information: call to pod. Call connected to pod. Warm transferred.

## 2018-09-28 NOTE — TELEPHONE ENCOUNTER
Attempted to call [facility] in regards to fleets enema x1. Please advise

## 2018-10-01 ENCOUNTER — OFFICE VISIT (OUTPATIENT)
Dept: FAMILY MEDICINE | Facility: CLINIC | Age: 83
End: 2018-10-01
Payer: MEDICARE

## 2018-10-01 VITALS
HEART RATE: 74 BPM | HEIGHT: 62 IN | TEMPERATURE: 98 F | SYSTOLIC BLOOD PRESSURE: 122 MMHG | DIASTOLIC BLOOD PRESSURE: 80 MMHG | RESPIRATION RATE: 18 BRPM | OXYGEN SATURATION: 96 % | WEIGHT: 127 LBS | BODY MASS INDEX: 23.37 KG/M2

## 2018-10-01 DIAGNOSIS — S41.111D LACERATION OF RIGHT UPPER EXTREMITY WITH COMPLICATION, SUBSEQUENT ENCOUNTER: Primary | ICD-10-CM
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
## 2018-10-01 DIAGNOSIS — Z74.09 MOBILITY IMPAIRED: ICD-10-CM

## 2018-10-01 DIAGNOSIS — Z79.01 ON ANTICOAGULANT THERAPY: ICD-10-CM

## 2018-10-01 DIAGNOSIS — I48.19 PERSISTENT ATRIAL FIBRILLATION: ICD-10-CM

## 2018-10-01 PROCEDURE — 99214 OFFICE O/P EST MOD 30 MIN: CPT | Mod: S$PBB,,, | Performed by: NURSE PRACTITIONER

## 2018-10-01 PROCEDURE — 99215 OFFICE O/P EST HI 40 MIN: CPT | Mod: PBBFAC,PO | Performed by: NURSE PRACTITIONER

## 2018-10-01 PROCEDURE — 1101F PT FALLS ASSESS-DOCD LE1/YR: CPT | Mod: CPTII,,, | Performed by: NURSE PRACTITIONER

## 2018-10-01 PROCEDURE — 99999 PR PBB SHADOW E&M-EST. PATIENT-LVL V: CPT | Mod: PBBFAC,,, | Performed by: NURSE PRACTITIONER

## 2018-10-01 RX ORDER — SYRING-NEEDL,DISP,INSUL,0.3 ML 29 G X1/2"
SYRINGE, EMPTY DISPOSABLE MISCELLANEOUS
Refills: 0 | COMMUNITY
Start: 2018-09-27 | End: 2018-12-27

## 2018-10-01 RX ORDER — MUPIROCIN 20 MG/G
OINTMENT TOPICAL 3 TIMES DAILY
Qty: 22 G | Refills: 0 | Status: SHIPPED | OUTPATIENT
Start: 2018-10-01 | End: 2018-10-15

## 2018-10-01 NOTE — PROGRESS NOTES
Subjective:       Patient ID: Michela Calvert is a 88 y.o. female.    Chief Complaint: Follow-up    Here today to follow up on wound from fall - I removed stitches 2 weeks ago  -   Laceration to upper arm from fall   Has been following with [facility] and dressing at home     Wound Check   She was originally treated more than 14 days ago. Previous treatment included wound cleansing or irrigation. Her temperature was unmeasured prior to arrival. The temperature was taken using an axillary reading. There has been no drainage from the wound. There is no redness present. There is no swelling present. There is no pain present.     Vitals:    10/01/18 0856   BP: 122/80   Pulse: 74   Resp: 18   Temp: 98.4 °F (36.9 °C)     Review of Systems   Constitutional: Negative for diaphoresis, fatigue and fever.   HENT: Negative.    Eyes: Negative.    Respiratory: Negative for cough, shortness of breath and wheezing.    Cardiovascular: Negative.  Negative for chest pain.   Gastrointestinal: Negative.  Negative for abdominal pain, diarrhea and nausea.   Endocrine: Negative.    Genitourinary: Negative for dysuria and hematuria.   Musculoskeletal: Negative.    Skin: Positive for wound. Negative for color change and rash.   Allergic/Immunologic: Negative.    Neurological: Negative for speech difficulty and numbness.   Hematological: Negative.    Psychiatric/Behavioral: Negative.        Past Medical History:   Diagnosis Date    Atrial fibrillation     Cataracts, bilateral     Chronic pain syndrome 7/25/2018    DDD (degenerative disc disease), lumbar     DDD (degenerative disc disease), lumbar     Heart murmur 01/2017    HLD (hyperlipidemia)     no medication taken    Hypothyroidism     Lichen sclerosus et atrophicus     Rectal/Vaginal areas    Lumbar spinal stenosis     Lumbar spondylosis     Mitral valve disorder 01/2017    Asymptomatic    Osteopenia      Objective:      Physical Exam   Constitutional: She is oriented to person,  place, and time. She appears well-developed and well-nourished.   HENT:   Head: Normocephalic and atraumatic.   Mouth/Throat: Oropharynx is clear and moist.   Eyes: Conjunctivae and EOM are normal. Pupils are equal, round, and reactive to light.   Neck: Neck supple.   Cardiovascular: Normal rate, regular rhythm, normal heart sounds and intact distal pulses.   Pulmonary/Chest: Effort normal and breath sounds normal.   Abdominal: Soft. Bowel sounds are normal.   Musculoskeletal: Normal range of motion.   Neurological: She is alert and oriented to person, place, and time.   Skin: Skin is warm and dry.   See pictures    Psychiatric: She has a normal mood and affect. Her behavior is normal.   Nursing note and vitals reviewed.              Assessment:       1. Laceration of right upper extremity with complication, subsequent encounter    2. Fall, subsequent encounter    3. Persistent atrial fibrillation    4. Mobility impaired    5. On anticoagulant therapy        Plan:       Laceration of right upper extremity with complication, subsequent encounter       mupirocin (BACTROBAN) 2 % ointment; Apply topically 3 (three) times daily.  Dispense: 22 g; Refill: 0  Improving   Reviewed that some eschar areas on end   Keep open wound dressed- otherwise other areas to elbow can be left open     Fall, subsequent encounter    Persistent atrial fibrillation    Mobility impaired  Walker at home     On anticoagulant therapy  On eliquis       FU in 2 weeks with me for update

## 2018-10-15 ENCOUNTER — OFFICE VISIT (OUTPATIENT)
Dept: FAMILY MEDICINE | Facility: CLINIC | Age: 83
End: 2018-10-15
Payer: MEDICARE

## 2018-10-15 VITALS
OXYGEN SATURATION: 97 % | TEMPERATURE: 98 F | SYSTOLIC BLOOD PRESSURE: 150 MMHG | WEIGHT: 136.44 LBS | HEIGHT: 62 IN | BODY MASS INDEX: 25.11 KG/M2 | DIASTOLIC BLOOD PRESSURE: 72 MMHG | HEART RATE: 63 BPM

## 2018-10-15 DIAGNOSIS — S41.111D LACERATION OF RIGHT UPPER EXTREMITY WITH COMPLICATION, SUBSEQUENT ENCOUNTER: Primary | ICD-10-CM
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
## 2018-10-15 DIAGNOSIS — I48.19 PERSISTENT ATRIAL FIBRILLATION: ICD-10-CM

## 2018-10-15 PROCEDURE — 99213 OFFICE O/P EST LOW 20 MIN: CPT | Mod: S$PBB,,, | Performed by: NURSE PRACTITIONER

## 2018-10-15 PROCEDURE — 99215 OFFICE O/P EST HI 40 MIN: CPT | Mod: PBBFAC,PO | Performed by: NURSE PRACTITIONER

## 2018-10-15 PROCEDURE — 1101F PT FALLS ASSESS-DOCD LE1/YR: CPT | Mod: CPTII,,, | Performed by: NURSE PRACTITIONER

## 2018-10-15 PROCEDURE — 99999 PR PBB SHADOW E&M-EST. PATIENT-LVL V: CPT | Mod: PBBFAC,,, | Performed by: NURSE PRACTITIONER

## 2018-10-15 RX ORDER — MUPIROCIN 20 MG/G
OINTMENT TOPICAL 3 TIMES DAILY
Qty: 22 G | Refills: 0 | Status: SHIPPED | OUTPATIENT
Start: 2018-10-15 | End: 2019-04-11

## 2018-10-15 NOTE — PROGRESS NOTES
Subjective:       Patient ID: Michela Calvert is a 88 y.o. female.    Chief Complaint: Follow-up (2 wk)    Wound Check   She was originally treated more than 14 days ago. Previous treatment included laceration repair. There has been no drainage from the wound. There is no redness present. There is no swelling present. There is no pain present.     Vitals:    10/15/18 0947   BP: (!) 150/72   Pulse: 63   Temp: 98.3 °F (36.8 °C)     Review of Systems   Constitutional: Negative for diaphoresis, fatigue and fever.   HENT: Negative.    Eyes: Negative.    Respiratory: Negative for cough, shortness of breath and wheezing.    Cardiovascular: Negative for chest pain.   Gastrointestinal: Negative for abdominal pain, diarrhea and nausea.   Endocrine: Negative.    Genitourinary: Negative for dysuria and hematuria.   Musculoskeletal: Negative.    Skin: Negative for color change and rash.   Allergic/Immunologic: Negative.    Neurological: Negative for speech difficulty and numbness.   Hematological: Negative.    Psychiatric/Behavioral: Negative.        Past Medical History:   Diagnosis Date    Atrial fibrillation     Cataracts, bilateral     Chronic pain syndrome 7/25/2018    DDD (degenerative disc disease), lumbar     DDD (degenerative disc disease), lumbar     Heart murmur 01/2017    HLD (hyperlipidemia)     no medication taken    Hypothyroidism     Lichen sclerosus et atrophicus     Rectal/Vaginal areas    Lumbar spinal stenosis     Lumbar spondylosis     Mitral valve disorder 01/2017    Asymptomatic    Osteopenia      Objective:      Physical Exam   Constitutional: She is oriented to person, place, and time. She appears well-developed and well-nourished.   HENT:   Head: Normocephalic and atraumatic.   Mouth/Throat: Oropharynx is clear and moist.   Eyes: Conjunctivae and EOM are normal. Pupils are equal, round, and reactive to light.   Neck: Neck supple.   Cardiovascular: Normal rate, regular rhythm, normal heart  sounds and intact distal pulses. Exam reveals no friction rub.   No murmur heard.  Pulmonary/Chest: Effort normal and breath sounds normal.   Abdominal: Soft. Bowel sounds are normal.   Musculoskeletal: Normal range of motion.   Neurological: She is alert and oriented to person, place, and time.   Skin: Skin is warm and dry.   Psychiatric: She has a normal mood and affect. Her behavior is normal.   Nursing note and vitals reviewed.              Assessment:       1. Laceration of right upper extremity with complication, subsequent encounter    2. Fall, subsequent encounter    3. Persistent atrial fibrillation        Plan:       Laceration of right upper extremity with complication, subsequent encounter  Fall, subsequent encounter  Keep dressing wound - much improved   Leave areas of eschar alone until it falls off     Persistent atrial fibrillation  On meds- followed with cardiology         doing better- improving   Continue to dress area with bactroban     Call if any question   Keep appt with Dr Nieto 12/20/18

## 2018-10-16 ENCOUNTER — OFFICE VISIT (OUTPATIENT)
Dept: PAIN MEDICINE | Facility: CLINIC | Age: 83
End: 2018-10-16
Payer: MEDICARE

## 2018-10-16 ENCOUNTER — OUTPATIENT CASE MANAGEMENT (OUTPATIENT)
Dept: ADMINISTRATIVE | Facility: OTHER | Age: 83
End: 2018-10-16

## 2018-10-16 VITALS
WEIGHT: 134.5 LBS | BODY MASS INDEX: 24.6 KG/M2 | SYSTOLIC BLOOD PRESSURE: 141 MMHG | RESPIRATION RATE: 20 BRPM | HEART RATE: 64 BPM | OXYGEN SATURATION: 94 % | TEMPERATURE: 98 F | DIASTOLIC BLOOD PRESSURE: 65 MMHG

## 2018-10-16 DIAGNOSIS — R26.81 GAIT INSTABILITY: ICD-10-CM

## 2018-10-16 DIAGNOSIS — M48.062 SPINAL STENOSIS OF LUMBAR REGION WITH NEUROGENIC CLAUDICATION: Primary | ICD-10-CM

## 2018-10-16 PROCEDURE — 99215 OFFICE O/P EST HI 40 MIN: CPT | Mod: PBBFAC,PN | Performed by: ANESTHESIOLOGY

## 2018-10-16 PROCEDURE — 99213 OFFICE O/P EST LOW 20 MIN: CPT | Mod: S$PBB,,, | Performed by: ANESTHESIOLOGY

## 2018-10-16 PROCEDURE — 99999 PR PBB SHADOW E&M-EST. PATIENT-LVL V: CPT | Mod: PBBFAC,,, | Performed by: ANESTHESIOLOGY

## 2018-10-16 PROCEDURE — 1101F PT FALLS ASSESS-DOCD LE1/YR: CPT | Mod: CPTII,,, | Performed by: ANESTHESIOLOGY

## 2018-10-16 RX ORDER — TRAMADOL HYDROCHLORIDE 50 MG/1
TABLET ORAL
Qty: 360 TABLET | Refills: 0 | Status: SHIPPED | OUTPATIENT
Start: 2018-11-15 | End: 2018-11-11 | Stop reason: SDUPTHER

## 2018-10-16 RX ORDER — HYDROCORTISONE 25 MG/G
CREAM TOPICAL
Qty: 30 G | Refills: 1 | Status: CANCELLED | OUTPATIENT
Start: 2018-10-16 | End: 2019-10-16

## 2018-10-16 NOTE — PROGRESS NOTES
CC:Back pain    HPI: The patient is an 88-year-old woman with a history of lumbar degenerative disc disease and spondylosis who presents in self-referral for low back pain.  She returns in follow-up today for back and leg pain.  During the last visit we had optimized her tramadol and had her taking this on a more regular basis and she is now taking 1 in the morning, 1 at noon and 2 in the evening.  The son and  are present at the visit today and reports that she has not had any side effects from the medication and she appears to be doing very well in terms of pain. She states that today she has 0 pain whatsoever and they report that she has done well over the last month in terms of increasing activity and socializing.  She continues to take gabapentin 600 mg 3 times daily without side effects.  She did have a fall this last month but states that she tripped on a sock and it was not related to any side effects from medication.    Intervention history: She had undergone a couple epidural steroid injections 15 years ago which had given her relief until A few years ago.  By Dr De Jesus she underwent epidural steroid injections, facet injections, RFA is, SI joint injections without any major relief.  The patient is status post L5/S1 TIFFANIE on 8/30/17 with no relief.    ROS:She reports itching, rhinorrhea, easy bruising and back pain.  Balance of review of systems is negative.    Medical, surgical, family and social history reviewed elsewhere in record.    Medications/Allergies: See med card    Vitals:    10/16/18 1011   BP: (!) 141/65   Pulse: 64   Resp: 20   Temp: 97.8 °F (36.6 °C)   TempSrc: Oral   SpO2: (!) 94%   Weight: 61 kg (134 lb 7.7 oz)   PainSc:   4   PainLoc: Back         Physical exam:  Gen: A and O x3, pleasant, well-groomed  Skin: No rashes or obvious lesions  HEENT: PERRLA, no obvious deformities on ears or in canals.   CVS: Regular rate and rhythm, normal S1 and S2, no murmurs.  Resp: Clear to  auscultation bilaterally, no wheezes or rales.  Abdomen: Soft, NT/ND, normal bowel sounds present.  Musculoskeletal: Presents in a wheelchair, uses a walker to ambulate.    Neuro:  Lower extremities: 5/5 strength bilaterally  Reflexes: Patellar 2+, Achilles 2+ bilaterally.  Sensory:  Intact and symmetrical to light touch and pinprick in L2-S1 dermatomes bilaterally.    Lumbar spine exam:  Range of motion is decreased with flexion and extension with no major increased pain.  Straight leg raise negative bilaterally.  No major tenderness to palpation over the lumbar muscles.    Imaging:  MRI lumbar spine 8/22/13  T11/12: There is annular disk bulging which combines with some degenerative facet changes to produce mild canal and foraminal distortion.  T12/L1: There is annular disk bulging and there is mild resultant central canal stenosis. Ligamentous hypertrophy is present.  L1/2: Annular disk bulge and osteophyte formation produce moderate canal stenosis. There is right left foraminal stenosis. Degenerative facet changes are noted bilaterally.  L2/3: Annular disk bulge and osteophyte formation combined with facet and ligamentous hypertrophic changes to produce moderate foraminal narrowing and mild central canal stenosis.  L3/4: There is annular disk bulging which combines with facet and ligamentous hypertrophy to produce moderate canal and foraminal stenosis.  L4/5: Prominent degenerative facet changes are present and there is a central disk extrusion which extends craniad to the disk level. This combines with facet and ligamentous hypertrophy and the spondylolisthesis to produce prominent canal stenosis. The central canal is narrowed to approximately 6 mm. There is prominent foraminal narrowing bilaterally as well.  L5/S1: Prominent degenerative facet changes are present and there is annular disk bulging with moderate canal and foraminal stenosis.     MRI lumbar spine 1/28/16  The lumbar vertebral bodies show no  evidence of acute compression fracture or a pathologic marrow replacement process. There is degenerative disc desiccation, disc space narrowing, and marginal osteophyte formation at every lumbar level with prominent degenerative endplate changes noted at T12-L1 through L3-L4. There is a grade I retrolisthesis of T12 on L1, L1 on L2, L2 on L3, and L3 on L4. There is a grade I anterolisthesis of L4 on L5 and L5 on S1. The conus medullaris terminates at L1. The visualized retroperitoneal/abdominal soft tissue structures reveal cholelithiasis (series 5 image 7).  T11-T12: There is a broad disc bulge with a minimal superimposed broad central disc protrusion.  T12-L1: There is a grade I retrolisthesis of T12 on L1. There is ligamentum flavum thickening and facet arthropathy. There is uncovering of the intervertebral disc and a superimposed disc bulge. No central canal or neuroforaminal stenosis. No disc protrusion or extrusion.  L1-L2: There is a grade I retrolisthesis of L1 on L2. There is associated uncovering of the intervertebral disc. There is ligamentum flavum thickening and facet arthropathy. There is moderate left and right neuroforaminal stenosis. No overall central canal stenosis.  L2-L3: There is a grade I retrolisthesis of L2 on L3 with uncovering of the intervertebral disc. There is a broad disc bulge which extends into both neural foramina there is ligamentum flavum thickening and facet arthropathy. There is moderate central canal stenosis. There is moderate bilateral neuroforaminal stenosis.  L3-L4: There is a grade I retrolisthesis of L3 on L4. There is uncovering of the intervertebral disc and a superimposed disc bulge. There is ligamentum flavum thickening and facet arthropathy. There is moderate-severe central canal stenosis. There is mild-moderate left and moderate right neuroforaminal stenosis.  L4-L5: There is a grade I anterolisthesis of L4 on L5 with associated uncovering of the intervertebral disc  and a superimposed disc bulge which extends into both neural foramina. There is left ligamentum flavum thickening and severe bilateral facet arthropathy with a left facet joint effusion appreciated. There is severe central canal stenosis, moderate left, and mild right neuroforaminal stenosis  L5-S1: There is a minimal grade I anterolisthesis of L5 on S1 with associated uncovering of the intervertebral disc. There is severe bilateral facet arthropathy and ligamentum flavum. There is moderate central canal stenosis. There is mild-moderate right neuroforaminal stenosis.      Assessment:  The patient is an 88-year-old woman with a history of lumbar degenerative disc disease and spondylosis who presents in self-referral for low back pain.     1. Spinal stenosis of lumbar region with neurogenic claudication     2. Gait instability       Plan:  1.  She is doing well with her current Tramadol regimen, they asked if she could maybe get a 90 day prescription and I have sent this to the pharmacy.  She does not need a refill it until 11/15/2018.  2.  I will have her follow up in 3 months or sooner as needed.

## 2018-10-16 NOTE — PROGRESS NOTES
The following patient has been assigned to Hodan Medley RN with Outpatient Complex Care Management for high risk screening.    Reason: High Risk Recently Seen in Clinic    Please contact OPCM at [phone] with any questions.    Thank you,    Chrissy Ortiz    Outpatient Case Management

## 2018-10-17 ENCOUNTER — TELEPHONE (OUTPATIENT)
Dept: ADMINISTRATIVE | Facility: CLINIC | Age: 83
End: 2018-10-17

## 2018-10-17 NOTE — TELEPHONE ENCOUNTER
Home Health SOC 09/09/2018 - 11/07/2018 with [facility] - Dr. Enzo Nieto. Patient received SN and HHA services.

## 2018-10-18 ENCOUNTER — OUTPATIENT CASE MANAGEMENT (OUTPATIENT)
Dept: ADMINISTRATIVE | Facility: OTHER | Age: 83
End: 2018-10-18

## 2018-10-18 NOTE — PROGRESS NOTES
10/18/18  Summary:  RN-CM called and spoke with Mrs. Calvert in attempt to screen for OPCM. RN-CM explained to patient purpose of OPCM. Declined need for assistance at this time. RN will send letter to patient with contact information for OOC and OPCM. Encouraged patient to contact OPCM in the event that needs develop. Verbalized understanding. Will close case at this time. - RAHEEL Bradford, RN-OPCM     Interventions:  - Mailed patient contact information for OOC and OPCM.   - Encouraged patient to contact OPCM in the event that needs develop.

## 2018-10-18 NOTE — LETTER
October 18, 2018    Michela Calvert  [address]  [place] [address]             [facility]  [address] [place] [address] Dear Mrs. Calvert,      I work with [facility] Outpatient Case Management Department. We received a referral to call you to discuss your medical history. These services are free of charge and are offered to [facility] patients who have recently been discharged from any of our facilities or who have complex medical conditions that may require the skill of a nurse to assist with management.     I am a Registered Nurse who specializes in connecting patients with available medical and financial resources as well as addressing any educational needs that may be indicated. We also have a  on our team of providers that is available to assist with any social or mental health needs that may be indicated. Again, these services are free of charge.     I spoke with you briefly this morning regarding the program and have enclosed a brochure about the OPCM services. I work closely with the [facility]. Please review the information and if you would like to enroll in the program, just give me a call at the contact number below. This is a voluntary program and once enrolled, you may dis-enroll at any time. If you decide to enroll, we will go over some questions with you regarding your health in order to determine how we can best assist you with your health care needs.     I can be reached directly at [phone] from 8:00 AM to 4:30 PM, Monday through Friday. [facility] also has a program where a nurse is available 24/7 to answer questions or provide medical advice. That number is [phone]. Please feel free to contact us for any questions or concerns.     Kindest Regards,           Hodan Bradford, RN  Outpatient Case Management  [phone]

## 2018-11-02 RX ORDER — HYDROCORTISONE 25 MG/G
CREAM TOPICAL
Qty: 30 G | Refills: 1 | Status: CANCELLED | OUTPATIENT
Start: 2018-10-16 | End: 2019-10-16

## 2018-11-12 RX ORDER — TRAMADOL HYDROCHLORIDE 50 MG/1
TABLET ORAL
Qty: 120 TABLET | Refills: 0 | Status: SHIPPED | OUTPATIENT
Start: 2018-11-12 | End: 2018-11-20 | Stop reason: SDUPTHER

## 2018-11-21 RX ORDER — DEXTROMETHORPHAN HYDROBROMIDE, GUAIFENESIN 5; 100 MG/5ML; MG/5ML
LIQUID ORAL
Qty: 60 TABLET | Refills: 5 | Status: ON HOLD | OUTPATIENT
Start: 2018-11-21 | End: 2018-12-18 | Stop reason: HOSPADM

## 2018-11-21 RX ORDER — MAG HYDROX/ALUMINUM HYD/SIMETH 400-400-40
SUSPENSION, ORAL (FINAL DOSE FORM) ORAL
Qty: 180 CAPSULE | Refills: 3 | Status: ON HOLD | OUTPATIENT
Start: 2018-11-21 | End: 2018-12-31 | Stop reason: HOSPADM

## 2018-11-21 RX ORDER — VIT C/E/ZN/COPPR/LUTEIN/ZEAXAN 250MG-90MG
CAPSULE ORAL
Qty: 180 CAPSULE | Refills: 3 | Status: ON HOLD | OUTPATIENT
Start: 2018-11-21 | End: 2018-12-18 | Stop reason: HOSPADM

## 2018-11-21 RX ORDER — TRAMADOL HYDROCHLORIDE 50 MG/1
TABLET ORAL
Qty: 360 TABLET | Refills: 1 | Status: SHIPPED | OUTPATIENT
Start: 2018-11-21 | End: 2018-12-06 | Stop reason: SDUPTHER

## 2018-12-04 ENCOUNTER — TELEPHONE (OUTPATIENT)
Dept: PAIN MEDICINE | Facility: CLINIC | Age: 83
End: 2018-12-04

## 2018-12-04 NOTE — TELEPHONE ENCOUNTER
----- Message from Cipriano Muro sent at 12/3/2018  8:05 AM CST -----  Contact: Patient  Patient called to advise she is having leg pain since Saturday.  Please call asap [phone] (home)

## 2018-12-05 NOTE — TELEPHONE ENCOUNTER
----- Message from RT Curt sent at 12/5/2018  8:53 AM CST -----  Contact: pt    pt , requesting an appt to be worked in today for her leg pain issues, been waiting on a response since yesterday, thanks.

## 2018-12-06 ENCOUNTER — OFFICE VISIT (OUTPATIENT)
Dept: PAIN MEDICINE | Facility: CLINIC | Age: 83
End: 2018-12-06
Payer: MEDICARE

## 2018-12-06 ENCOUNTER — TELEPHONE (OUTPATIENT)
Dept: PAIN MEDICINE | Facility: CLINIC | Age: 83
End: 2018-12-06

## 2018-12-06 VITALS — DIASTOLIC BLOOD PRESSURE: 78 MMHG | SYSTOLIC BLOOD PRESSURE: 191 MMHG | HEART RATE: 75 BPM | TEMPERATURE: 99 F

## 2018-12-06 DIAGNOSIS — R26.81 GAIT INSTABILITY: ICD-10-CM

## 2018-12-06 DIAGNOSIS — M43.16 SPONDYLOLISTHESIS OF LUMBAR REGION: ICD-10-CM

## 2018-12-06 DIAGNOSIS — M48.062 SPINAL STENOSIS OF LUMBAR REGION WITH NEUROGENIC CLAUDICATION: Primary | ICD-10-CM

## 2018-12-06 DIAGNOSIS — M51.36 DDD (DEGENERATIVE DISC DISEASE), LUMBAR: ICD-10-CM

## 2018-12-06 PROCEDURE — 3288F FALL RISK ASSESSMENT DOCD: CPT | Mod: CPTII,HCWC,S$GLB, | Performed by: PHYSICIAN ASSISTANT

## 2018-12-06 PROCEDURE — 99999 PR PBB SHADOW E&M-EST. PATIENT-LVL IV: CPT | Mod: PBBFAC,HCWC,, | Performed by: PHYSICIAN ASSISTANT

## 2018-12-06 PROCEDURE — 1100F PTFALLS ASSESS-DOCD GE2>/YR: CPT | Mod: CPTII,HCWC,S$GLB, | Performed by: PHYSICIAN ASSISTANT

## 2018-12-06 PROCEDURE — 99214 OFFICE O/P EST MOD 30 MIN: CPT | Mod: HCWC,S$GLB,, | Performed by: PHYSICIAN ASSISTANT

## 2018-12-06 RX ORDER — TRAMADOL HYDROCHLORIDE 50 MG/1
100 TABLET ORAL 3 TIMES DAILY PRN
Qty: 540 TABLET | Refills: 0 | Status: ON HOLD | OUTPATIENT
Start: 2018-12-06 | End: 2018-12-18 | Stop reason: SDUPTHER

## 2018-12-06 NOTE — TELEPHONE ENCOUNTER
Patient came in for visit in Pain Management and upon taking vitals the patient's BP was 191/78 patient was not in any distress but had noticed that the BP has been elevated for a couple of days. She states that she isn't on any blood pressure medication. Before patient left we retook her pressure and it was 180/90 Bradly LORD just wanted Dr. Nieto to know. If any instructions please call patient's son.

## 2018-12-07 NOTE — TELEPHONE ENCOUNTER
Will see on 12/20 as planned and make any necessary BP changes. Her BP has fluctuated here in the office in the past.

## 2018-12-08 NOTE — PROGRESS NOTES
CC:Back pain    HPI: The patient is an 89-year-old woman with a history of lumbar degenerative disc disease and spondylosis who presents in self-referral for low back pain.  She returns in follow-up today with worsening low back pain since Monday.  She denies having any falls, no trauma.  Her son is present with her.  She complains of pain mainly in the left greater than right buttocks radiating to the posterior thighs and calves.  Her pain is significantly worse with standing and walking, improved mildly with tramadol and with sitting.  She only reports about 0.5 hr of relief when she takes her medication.  She also continues to take gabapentin with mild relief.  She continues to ambulate with a walker but presents in a wheelchair today.  She complains of left leg tingling and left leg weakness.  She denies numbness or incontinence.    Intervention history: She had undergone a couple epidural steroid injections 15 years ago which had given her relief until A few years ago.  By Dr De Jesus she underwent epidural steroid injections, facet injections, RFA is, SI joint injections without any major relief.  The patient is status post L5/S1 TIFFANIE on 8/30/17 with no relief.  She has taken hydrocodone in the past but this made her nauseous.    ROS:She reports itching, rhinorrhea, easy bruising and back pain.  Balance of review of systems is negative.    Medical, surgical, family and social history reviewed elsewhere in record.    Medications/Allergies: See med card    Vitals:    12/06/18 1503   BP: (!) 191/78   Pulse: 75   Temp: 98.5 °F (36.9 °C)   TempSrc: Oral   PainSc:   8   PainLoc: Back         Physical exam:  Gen: A and O x3, pleasant, well-groomed  Skin: No rashes or obvious lesions  HEENT: PERRLA, no obvious deformities on ears or in canals.   CVS: Regular rate and rhythm, normal S1 and S2, no murmurs.  Resp: Clear to auscultation bilaterally, no wheezes or rales.  Abdomen: Soft, NT/ND, normal bowel sounds  present.  Musculoskeletal: Presents in a wheelchair, uses a walker to ambulate.    Neuro:  Lower extremities: 5/5 strength bilaterally  Reflexes: Patellar 2+, Achilles 2+ bilaterally.  Sensory:  Intact and symmetrical to light touch and pinprick in L2-S1 dermatomes bilaterally.    Lumbar spine exam:  Range of motion is moderately decreased with flexion and extension with no major increased pain. Pain in her buttocks and legs intensifies upon standing.  Straight leg raise causes left greater than right leg pain.  No major tenderness to palpation over the lumbar muscles.    Imaging:  MRI lumbar spine 8/22/13  T11/12: There is annular disk bulging which combines with some degenerative facet changes to produce mild canal and foraminal distortion.  T12/L1: There is annular disk bulging and there is mild resultant central canal stenosis. Ligamentous hypertrophy is present.  L1/2: Annular disk bulge and osteophyte formation produce moderate canal stenosis. There is right left foraminal stenosis. Degenerative facet changes are noted bilaterally.  L2/3: Annular disk bulge and osteophyte formation combined with facet and ligamentous hypertrophic changes to produce moderate foraminal narrowing and mild central canal stenosis.  L3/4: There is annular disk bulging which combines with facet and ligamentous hypertrophy to produce moderate canal and foraminal stenosis.  L4/5: Prominent degenerative facet changes are present and there is a central disk extrusion which extends craniad to the disk level. This combines with facet and ligamentous hypertrophy and the spondylolisthesis to produce prominent canal stenosis. The central canal is narrowed to approximately 6 mm. There is prominent foraminal narrowing bilaterally as well.  L5/S1: Prominent degenerative facet changes are present and there is annular disk bulging with moderate canal and foraminal stenosis.     MRI lumbar spine 1/28/16  The lumbar vertebral bodies show no evidence  of acute compression fracture or a pathologic marrow replacement process. There is degenerative disc desiccation, disc space narrowing, and marginal osteophyte formation at every lumbar level with prominent degenerative endplate changes noted at T12-L1 through L3-L4. There is a grade I retrolisthesis of T12 on L1, L1 on L2, L2 on L3, and L3 on L4. There is a grade I anterolisthesis of L4 on L5 and L5 on S1. The conus medullaris terminates at L1. The visualized retroperitoneal/abdominal soft tissue structures reveal cholelithiasis (series 5 image 7).  T11-T12: There is a broad disc bulge with a minimal superimposed broad central disc protrusion.  T12-L1: There is a grade I retrolisthesis of T12 on L1. There is ligamentum flavum thickening and facet arthropathy. There is uncovering of the intervertebral disc and a superimposed disc bulge. No central canal or neuroforaminal stenosis. No disc protrusion or extrusion.  L1-L2: There is a grade I retrolisthesis of L1 on L2. There is associated uncovering of the intervertebral disc. There is ligamentum flavum thickening and facet arthropathy. There is moderate left and right neuroforaminal stenosis. No overall central canal stenosis.  L2-L3: There is a grade I retrolisthesis of L2 on L3 with uncovering of the intervertebral disc. There is a broad disc bulge which extends into both neural foramina there is ligamentum flavum thickening and facet arthropathy. There is moderate central canal stenosis. There is moderate bilateral neuroforaminal stenosis.  L3-L4: There is a grade I retrolisthesis of L3 on L4. There is uncovering of the intervertebral disc and a superimposed disc bulge. There is ligamentum flavum thickening and facet arthropathy. There is moderate-severe central canal stenosis. There is mild-moderate left and moderate right neuroforaminal stenosis.  L4-L5: There is a grade I anterolisthesis of L4 on L5 with associated uncovering of the intervertebral disc and a  superimposed disc bulge which extends into both neural foramina. There is left ligamentum flavum thickening and severe bilateral facet arthropathy with a left facet joint effusion appreciated. There is severe central canal stenosis, moderate left, and mild right neuroforaminal stenosis  L5-S1: There is a minimal grade I anterolisthesis of L5 on S1 with associated uncovering of the intervertebral disc. There is severe bilateral facet arthropathy and ligamentum flavum. There is moderate central canal stenosis. There is mild-moderate right neuroforaminal stenosis.      Assessment:  The patient is an 89-year-old woman with a history of lumbar degenerative disc disease and spondylosis who presents in self-referral for low back pain.     1. Spinal stenosis of lumbar region with neurogenic claudication     2. DDD (degenerative disc disease), lumbar     3. Spondylolisthesis of lumbar region     4. Gait instability       Plan:  1.  I reviewed her case with Dr. Daily and he will increase her medication to 100 mg of tramadol 3 times a day as needed for pain.  If she does not have relief with this, we may consider an extended release tramadol verses 5 mg hydrocodone.  2.  Follow-up in 1 month or sooner as needed.    Greater than 50% of this 25 min visit was spent on counseling the patient.

## 2018-12-11 ENCOUNTER — TELEPHONE (OUTPATIENT)
Dept: FAMILY MEDICINE | Facility: CLINIC | Age: 83
End: 2018-12-11

## 2018-12-12 ENCOUNTER — TELEPHONE (OUTPATIENT)
Dept: ADMINISTRATIVE | Facility: OTHER | Age: 83
End: 2018-12-12

## 2018-12-13 PROBLEM — R55 SYNCOPE: Status: ACTIVE | Noted: 2018-12-13

## 2018-12-13 PROBLEM — N39.0 UTI (URINARY TRACT INFECTION): Status: ACTIVE | Noted: 2018-12-13

## 2018-12-13 PROBLEM — E86.1 INTRAVASCULAR VOLUME DEPLETION: Status: ACTIVE | Noted: 2018-12-13

## 2018-12-13 PROBLEM — N30.00 ACUTE CYSTITIS WITHOUT HEMATURIA: Status: ACTIVE | Noted: 2018-12-13

## 2018-12-13 NOTE — TELEPHONE ENCOUNTER
----- Message from Hemalatha Harris sent at 12/12/2018  4:00 PM CST -----  Type: Needs Medical Advice    Who Called:  Granddaughter/Emma Post  Best Call Back Number: [phone]  Additional Information: Patient fell and is in [facility]. They are wondering if she has a spinal cord injury. They have been waiting two days for a neursurgeon and none has come yet. They need to ask office some questions. Please call to advise and maybe point them in the right direction.

## 2018-12-13 NOTE — TELEPHONE ENCOUNTER
Neurosurgeon came and saw patient at hospital, she was discharged home yesterday. Mri was performed in ER at [facility] and they will bring the disc to the appointment on 1/3.

## 2018-12-15 PROBLEM — R60.0 EDEMA OF RIGHT LOWER EXTREMITY: Status: ACTIVE | Noted: 2018-12-15

## 2018-12-15 PROBLEM — D64.9 ANEMIA DUE TO UNKNOWN MECHANISM: Status: ACTIVE | Noted: 2018-12-15

## 2018-12-17 PROBLEM — Z75.8 DISCHARGE PLANNING ISSUES: Status: ACTIVE | Noted: 2018-12-17

## 2018-12-17 PROBLEM — Z02.9 DISCHARGE PLANNING ISSUES: Status: ACTIVE | Noted: 2018-12-17

## 2018-12-18 PROBLEM — D62 ACUTE BLOOD LOSS ANEMIA: Status: ACTIVE | Noted: 2018-12-15

## 2018-12-24 ENCOUNTER — TELEPHONE (OUTPATIENT)
Dept: CARDIOLOGY | Facility: CLINIC | Age: 83
End: 2018-12-24

## 2018-12-24 NOTE — TELEPHONE ENCOUNTER
----- Message from Angela Salas sent at 12/24/2018 11:03 AM CST -----  Contact: May (son)  Type: Needs Medical Advice    Who Called:  May Gutierrez    Best Call Back Number: [phone]  Additional Information: son wants to know if she should continue Eloquis at this point? She has currently been off of medication for about a week now-- Son would like to speak with Dr. Puentes to discuss this--please advise--thank you

## 2018-12-26 ENCOUNTER — TELEPHONE (OUTPATIENT)
Dept: CARDIOLOGY | Facility: CLINIC | Age: 83
End: 2018-12-26

## 2018-12-26 NOTE — TELEPHONE ENCOUNTER
Alyse I will need to see her before deciding if we should continue eliquis. Let family know I reviewed chart and we can discuss it after she is evaluated.

## 2018-12-27 ENCOUNTER — PATIENT MESSAGE (OUTPATIENT)
Dept: ADMINISTRATIVE | Facility: OTHER | Age: 83
End: 2018-12-27

## 2018-12-27 PROBLEM — I48.0 PAROXYSMAL ATRIAL FIBRILLATION: Status: ACTIVE | Noted: 2018-04-19

## 2018-12-27 PROBLEM — R19.7 DIARRHEA: Status: ACTIVE | Noted: 2018-12-27

## 2018-12-27 PROBLEM — R53.1 GENERALIZED WEAKNESS: Status: ACTIVE | Noted: 2018-12-27

## 2018-12-27 PROBLEM — S80.11XS LEG HEMATOMA, RIGHT, SEQUELA: Status: ACTIVE | Noted: 2018-12-27

## 2018-12-27 PROBLEM — I10 HYPERTENSION: Status: ACTIVE | Noted: 2018-12-27

## 2018-12-27 NOTE — TELEPHONE ENCOUNTER
Returned pt's sons callMay in regards to message. Instructed May that the CBC is ordered and that will tell them if she is still loosing blood. May states that when his mother had the last fall and had to be hospitalized she had to have 2 units of blood and is concerned that she may still be loosing blood. Instructed May that the CBC would let them know how her blood count was. May voiced understanding. CLC

## 2018-12-27 NOTE — TELEPHONE ENCOUNTER
----- Message from RT Curt sent at 12/27/2018  9:02 AM CST -----  Contact: May,Son,[phone]   May,Son,[phone], requesting a call back soon to discuss adding on another lab test,due on 01/07/2019,  thanks.

## 2018-12-29 PROBLEM — E87.6 HYPOKALEMIA: Status: ACTIVE | Noted: 2018-12-29

## 2018-12-31 ENCOUNTER — TELEPHONE (OUTPATIENT)
Dept: PAIN MEDICINE | Facility: CLINIC | Age: 83
End: 2018-12-31

## 2018-12-31 NOTE — TELEPHONE ENCOUNTER
Son just want you to know that his mother is out of hospital and now her pain is back due to her activity since she got home. He will continue her Tramadol and keep appointment on Thursday.

## 2018-12-31 NOTE — TELEPHONE ENCOUNTER
----- Message from Bridget Blair sent at 12/31/2018 11:52 AM CST -----  Contact: Patient's son, May  Patient's son is calling to speak with someone regarding the patient.  The patient is in the hospital with a UTI and she has not had any pain medication while she was there and the patient's son would like to speak with someone regarding this for when the patient is going home.  Call Back#[phone]  Thanks

## 2019-01-02 RX ORDER — TRAMADOL HYDROCHLORIDE 50 MG/1
100 TABLET ORAL 3 TIMES DAILY PRN
Qty: 180 TABLET | Refills: 2 | Status: SHIPPED | OUTPATIENT
Start: 2019-01-02 | End: 2019-02-01

## 2019-01-03 ENCOUNTER — OFFICE VISIT (OUTPATIENT)
Dept: CARDIOLOGY | Facility: CLINIC | Age: 84
End: 2019-01-03
Payer: MEDICARE

## 2019-01-03 ENCOUNTER — OFFICE VISIT (OUTPATIENT)
Dept: PAIN MEDICINE | Facility: CLINIC | Age: 84
End: 2019-01-03
Payer: MEDICARE

## 2019-01-03 VITALS
TEMPERATURE: 98 F | RESPIRATION RATE: 20 BRPM | SYSTOLIC BLOOD PRESSURE: 102 MMHG | HEART RATE: 65 BPM | DIASTOLIC BLOOD PRESSURE: 56 MMHG

## 2019-01-03 VITALS
HEIGHT: 62 IN | DIASTOLIC BLOOD PRESSURE: 72 MMHG | BODY MASS INDEX: 20.24 KG/M2 | HEART RATE: 64 BPM | WEIGHT: 110 LBS | SYSTOLIC BLOOD PRESSURE: 102 MMHG

## 2019-01-03 DIAGNOSIS — S80.11XS LEG HEMATOMA, RIGHT, SEQUELA: ICD-10-CM

## 2019-01-03 DIAGNOSIS — M51.36 DDD (DEGENERATIVE DISC DISEASE), LUMBAR: Primary | ICD-10-CM

## 2019-01-03 DIAGNOSIS — R26.81 GAIT INSTABILITY: ICD-10-CM

## 2019-01-03 DIAGNOSIS — I48.0 PAROXYSMAL ATRIAL FIBRILLATION: ICD-10-CM

## 2019-01-03 DIAGNOSIS — M48.062 SPINAL STENOSIS OF LUMBAR REGION WITH NEUROGENIC CLAUDICATION: ICD-10-CM

## 2019-01-03 DIAGNOSIS — I10 HYPERTENSION, UNSPECIFIED TYPE: Primary | ICD-10-CM

## 2019-01-03 PROCEDURE — 1101F PR PT FALLS ASSESS DOC 0-1 FALLS W/OUT INJ PAST YR: ICD-10-PCS | Mod: CPTII,S$GLB,, | Performed by: ANESTHESIOLOGY

## 2019-01-03 PROCEDURE — 99999 PR PBB SHADOW E&M-EST. PATIENT-LVL III: ICD-10-PCS | Mod: PBBFAC,,, | Performed by: ANESTHESIOLOGY

## 2019-01-03 PROCEDURE — 1101F PT FALLS ASSESS-DOCD LE1/YR: CPT | Mod: CPTII,S$GLB,, | Performed by: ANESTHESIOLOGY

## 2019-01-03 PROCEDURE — 99213 OFFICE O/P EST LOW 20 MIN: CPT | Mod: S$GLB,,, | Performed by: ANESTHESIOLOGY

## 2019-01-03 PROCEDURE — 99999 PR PBB SHADOW E&M-EST. PATIENT-LVL III: CPT | Mod: PBBFAC,,, | Performed by: INTERNAL MEDICINE

## 2019-01-03 PROCEDURE — 99214 PR OFFICE/OUTPT VISIT, EST, LEVL IV, 30-39 MIN: ICD-10-PCS | Mod: S$GLB,,, | Performed by: INTERNAL MEDICINE

## 2019-01-03 PROCEDURE — 99213 PR OFFICE/OUTPT VISIT, EST, LEVL III, 20-29 MIN: ICD-10-PCS | Mod: S$GLB,,, | Performed by: ANESTHESIOLOGY

## 2019-01-03 PROCEDURE — 99999 PR PBB SHADOW E&M-EST. PATIENT-LVL III: CPT | Mod: PBBFAC,,, | Performed by: ANESTHESIOLOGY

## 2019-01-03 PROCEDURE — 1101F PT FALLS ASSESS-DOCD LE1/YR: CPT | Mod: CPTII,S$GLB,, | Performed by: INTERNAL MEDICINE

## 2019-01-03 PROCEDURE — 99214 OFFICE O/P EST MOD 30 MIN: CPT | Mod: S$GLB,,, | Performed by: INTERNAL MEDICINE

## 2019-01-03 PROCEDURE — 1101F PR PT FALLS ASSESS DOC 0-1 FALLS W/OUT INJ PAST YR: ICD-10-PCS | Mod: CPTII,S$GLB,, | Performed by: INTERNAL MEDICINE

## 2019-01-03 PROCEDURE — 99999 PR PBB SHADOW E&M-EST. PATIENT-LVL III: ICD-10-PCS | Mod: PBBFAC,,, | Performed by: INTERNAL MEDICINE

## 2019-01-03 NOTE — PROGRESS NOTES
CC:Back pain    HPI: The patient is an 89-year-old woman with a history of lumbar degenerative disc disease and spondylosis who presents in self-referral for low back pain.  She returns in follow-up today for bilateral low back pain, bilateral hip pain. During the last visit she had complained of bilateral low back pain that was worsening, asking what else we could do to alleviate the pain.  She was taking tramadol 50 mg several times daily but reported that she did not get much benefit from it, only lasted about half an hour.  However, in the last month she has had several emergency room visits one due to a fall and another due to a urinary tract infection.  Apparently she has been more cognitively impaired when taking the tramadol according to her family and they have discontinued this over the last several days.  The patient actually reports that she has very little pain in her back and legs, only painful when standing and walking.  When she sits down she feels better.    Intervention history: She had undergone a couple epidural steroid injections 15 years ago which had given her relief until A few years ago.  By Dr De Jesus she underwent epidural steroid injections, facet injections, RFA is, SI joint injections without any major relief.  The patient is status post L5/S1 TIFFANIE on 8/30/17 with no relief.  She has taken hydrocodone in the past but this made her nauseous.    ROS:She reports itching, rhinorrhea, easy bruising and back pain.  Balance of review of systems is negative.    Medical, surgical, family and social history reviewed elsewhere in record.    Medications/Allergies: See med card    Vitals:    01/03/19 1117   BP: (!) 102/56   Pulse: 65   Resp: 20   Temp: 98.3 °F (36.8 °C)   TempSrc: Oral   PainSc:   4   PainLoc: Hip         Physical exam:  Gen: A and O x3, pleasant, well-groomed  Skin: No rashes or obvious lesions  HEENT: PERRLA, no obvious deformities on ears or in canals.   CVS: Regular rate and  rhythm, normal S1 and S2, no murmurs.  Resp: Clear to auscultation bilaterally, no wheezes or rales.  Abdomen: Soft, NT/ND, normal bowel sounds present.  Musculoskeletal: Presents in a wheelchair.    Neuro:  Lower extremities: 5/5 strength bilaterally  Reflexes: Patellar 2+, Achilles 2+ bilaterally.  Sensory:  Intact and symmetrical to light touch and pinprick in L2-S1 dermatomes bilaterally.    Lumbar spine exam:  Range of motion is moderately decreased with flexion and extension with no major increased pain. Pain in her buttocks and legs intensifies upon standing.  Straight leg raise causes left greater than right leg pain.  No major tenderness to palpation over the lumbar muscles.    Imaging:  MRI lumbar spine 8/22/13  T11/12: There is annular disk bulging which combines with some degenerative facet changes to produce mild canal and foraminal distortion.  T12/L1: There is annular disk bulging and there is mild resultant central canal stenosis. Ligamentous hypertrophy is present.  L1/2: Annular disk bulge and osteophyte formation produce moderate canal stenosis. There is right left foraminal stenosis. Degenerative facet changes are noted bilaterally.  L2/3: Annular disk bulge and osteophyte formation combined with facet and ligamentous hypertrophic changes to produce moderate foraminal narrowing and mild central canal stenosis.  L3/4: There is annular disk bulging which combines with facet and ligamentous hypertrophy to produce moderate canal and foraminal stenosis.  L4/5: Prominent degenerative facet changes are present and there is a central disk extrusion which extends craniad to the disk level. This combines with facet and ligamentous hypertrophy and the spondylolisthesis to produce prominent canal stenosis. The central canal is narrowed to approximately 6 mm. There is prominent foraminal narrowing bilaterally as well.  L5/S1: Prominent degenerative facet changes are present and there is annular disk bulging  with moderate canal and foraminal stenosis.     MRI lumbar spine 1/28/16  The lumbar vertebral bodies show no evidence of acute compression fracture or a pathologic marrow replacement process. There is degenerative disc desiccation, disc space narrowing, and marginal osteophyte formation at every lumbar level with prominent degenerative endplate changes noted at T12-L1 through L3-L4. There is a grade I retrolisthesis of T12 on L1, L1 on L2, L2 on L3, and L3 on L4. There is a grade I anterolisthesis of L4 on L5 and L5 on S1. The conus medullaris terminates at L1. The visualized retroperitoneal/abdominal soft tissue structures reveal cholelithiasis (series 5 image 7).  T11-T12: There is a broad disc bulge with a minimal superimposed broad central disc protrusion.  T12-L1: There is a grade I retrolisthesis of T12 on L1. There is ligamentum flavum thickening and facet arthropathy. There is uncovering of the intervertebral disc and a superimposed disc bulge. No central canal or neuroforaminal stenosis. No disc protrusion or extrusion.  L1-L2: There is a grade I retrolisthesis of L1 on L2. There is associated uncovering of the intervertebral disc. There is ligamentum flavum thickening and facet arthropathy. There is moderate left and right neuroforaminal stenosis. No overall central canal stenosis.  L2-L3: There is a grade I retrolisthesis of L2 on L3 with uncovering of the intervertebral disc. There is a broad disc bulge which extends into both neural foramina there is ligamentum flavum thickening and facet arthropathy. There is moderate central canal stenosis. There is moderate bilateral neuroforaminal stenosis.  L3-L4: There is a grade I retrolisthesis of L3 on L4. There is uncovering of the intervertebral disc and a superimposed disc bulge. There is ligamentum flavum thickening and facet arthropathy. There is moderate-severe central canal stenosis. There is mild-moderate left and moderate right neuroforaminal  stenosis.  L4-L5: There is a grade I anterolisthesis of L4 on L5 with associated uncovering of the intervertebral disc and a superimposed disc bulge which extends into both neural foramina. There is left ligamentum flavum thickening and severe bilateral facet arthropathy with a left facet joint effusion appreciated. There is severe central canal stenosis, moderate left, and mild right neuroforaminal stenosis  L5-S1: There is a minimal grade I anterolisthesis of L5 on S1 with associated uncovering of the intervertebral disc. There is severe bilateral facet arthropathy and ligamentum flavum. There is moderate central canal stenosis. There is mild-moderate right neuroforaminal stenosis.      Assessment:  The patient is an 89-year-old woman with a history of lumbar degenerative disc disease and spondylosis who presents in self-referral for low back pain.     1. DDD (degenerative disc disease), lumbar     2. Spinal stenosis of lumbar region with neurogenic claudication     3. Gait instability       Plan:  1.  I discussed with her son that since her pain is not severe currently, it would be best to avoid any other pain medications.  I explained that she was having effects with tramadol and if even this was causing issues, she is not going to tolerate any further opioid medication.  Furthermore, she is already taking gabapentin 600 mg 3 times daily in addition to Tylenol, I would not recommend adding any NSAIDs due to her risk of GI bleed and kidney effects.  For now, I asked them to contact me if her pain worsens at all and we can always repeat injections to see if this is mildly beneficial.  Otherwise she needs to continue tolerating the pain, and I suspect this is mostly going to be only with ambulation.

## 2019-01-03 NOTE — PROGRESS NOTES
Subjective:    Patient ID:  Michela Calvert is a 89 y.o. female who presents for follow-up of Follow-up (follow up )      HPI89 yo WF with PAF who does not have symptoms when in AF.Had recent fall with hematoma right leg. Off anticoagulation. Has remained in sinus. Family and patient want to stay of anticoagulation because of fall risk.    Review of Systems   Constitution: Negative for decreased appetite, fever, weakness, malaise/fatigue, weight gain and weight loss.   HENT: Negative for hearing loss and nosebleeds.    Eyes: Negative for visual disturbance.   Cardiovascular: Negative for chest pain, claudication, cyanosis, dyspnea on exertion, irregular heartbeat, leg swelling, near-syncope, orthopnea, palpitations, paroxysmal nocturnal dyspnea and syncope.   Respiratory: Negative for cough, hemoptysis, shortness of breath, sleep disturbances due to breathing, snoring and wheezing.    Endocrine: Negative for cold intolerance, heat intolerance, polydipsia and polyuria.   Hematologic/Lymphatic: Negative for adenopathy and bleeding problem. Does not bruise/bleed easily.   Skin: Negative for color change, itching, poor wound healing, rash and suspicious lesions.   Musculoskeletal: Positive for arthritis and back pain. Negative for falls, joint pain, joint swelling, muscle cramps, muscle weakness and myalgias.   Gastrointestinal: Negative for bloating, abdominal pain, change in bowel habit, constipation, flatus, heartburn, hematemesis, hematochezia, hemorrhoids, jaundice, melena, nausea and vomiting.   Genitourinary: Negative for bladder incontinence, decreased libido, frequency, hematuria, hesitancy and urgency.   Neurological: Negative for brief paralysis, difficulty with concentration, excessive daytime sleepiness, dizziness, focal weakness, headaches, light-headedness, loss of balance, numbness and vertigo.   Psychiatric/Behavioral: Negative for altered mental status, depression and memory loss. The patient does not  "have insomnia and is not nervous/anxious.    Allergic/Immunologic: Negative for environmental allergies, hives and persistent infections.        Objective:       Physical Exam   Constitutional: She is oriented to person, place, and time. She appears well-developed and well-nourished.   /76   Pulse 68   Ht 5' 2" (1.575 m)   Wt 58.6 kg (129 lb 3 oz)   BMI 23.63 kg/m²      HENT:   Head: Normocephalic and atraumatic.   Right Ear: External ear normal.   Left Ear: External ear normal.   Nose: Nose normal.   Mouth/Throat: Oropharynx is clear and moist.   Eyes: Conjunctivae, EOM and lids are normal. Pupils are equal, round, and reactive to light. Right eye exhibits no discharge. Left eye exhibits no discharge. Right conjunctiva has no hemorrhage. No scleral icterus.   Neck: Normal range of motion. Neck supple. No JVD present. No tracheal deviation present. No thyromegaly present.   Cardiovascular: Normal rate, regular rhythm, normal heart sounds and intact distal pulses.  Exam reveals no gallop and no friction rub.    No murmur heard.  Pulmonary/Chest: Effort normal and breath sounds normal. No respiratory distress. She has no wheezes. She has no rales. She exhibits no tenderness. Breasts are symmetrical.   Abdominal: Soft. Bowel sounds are normal. She exhibits no distension and no mass. There is no hepatosplenomegaly or hepatomegaly. There is no tenderness. There is no rebound and no guarding.   Musculoskeletal: Normal range of motion. She exhibits no edema or tenderness. Ecchymosis right calf  Lymphadenopathy:     She has no cervical adenopathy.   Neurological: She is alert and oriented to person, place, and time. She displays normal reflexes. No cranial nerve deficit. Coordination normal.   Skin: Skin is warm and dry. No rash noted. No erythema. No pallor.   Psychiatric: She has a normal mood and affect. Her behavior is normal. Judgment and thought content normal.   Assessment:       1. Hypertension, " unspecified type    2. Paroxysmal atrial fibrillation    3. Right leg hematoma         Plan:     After discussion with family with relative who is a doctor will treat with ASA    Continue metoprolol    No orders of the defined types were placed in this encounter.    Follow-up in about 6 months (around 7/3/2019).

## 2019-01-07 ENCOUNTER — LAB VISIT (OUTPATIENT)
Dept: LAB | Facility: HOSPITAL | Age: 84
End: 2019-01-07
Attending: FAMILY MEDICINE
Payer: MEDICARE

## 2019-01-07 DIAGNOSIS — I48.19 PERSISTENT ATRIAL FIBRILLATION: ICD-10-CM

## 2019-01-07 DIAGNOSIS — E03.4 HYPOTHYROIDISM DUE TO ACQUIRED ATROPHY OF THYROID: ICD-10-CM

## 2019-01-07 LAB
ALBUMIN SERPL BCP-MCNC: 3.1 G/DL
ALP SERPL-CCNC: 56 U/L
ALT SERPL W/O P-5'-P-CCNC: 31 U/L
ANION GAP SERPL CALC-SCNC: 8 MMOL/L
AST SERPL-CCNC: 32 U/L
BASOPHILS # BLD AUTO: 0.04 K/UL
BASOPHILS NFR BLD: 0.5 %
BILIRUB SERPL-MCNC: 0.4 MG/DL
BUN SERPL-MCNC: 18 MG/DL
CALCIUM SERPL-MCNC: 9.1 MG/DL
CHLORIDE SERPL-SCNC: 104 MMOL/L
CHOLEST SERPL-MCNC: 199 MG/DL
CHOLEST/HDLC SERPL: 4.9 {RATIO}
CO2 SERPL-SCNC: 27 MMOL/L
CREAT SERPL-MCNC: 0.8 MG/DL
DIFFERENTIAL METHOD: ABNORMAL
EOSINOPHIL # BLD AUTO: 0.2 K/UL
EOSINOPHIL NFR BLD: 2.7 %
ERYTHROCYTE [DISTWIDTH] IN BLOOD BY AUTOMATED COUNT: 15.7 %
EST. GFR  (AFRICAN AMERICAN): >60 ML/MIN/1.73 M^2
EST. GFR  (NON AFRICAN AMERICAN): >60 ML/MIN/1.73 M^2
GLUCOSE SERPL-MCNC: 92 MG/DL
HCT VFR BLD AUTO: 38.6 %
HDLC SERPL-MCNC: 41 MG/DL
HDLC SERPL: 20.6 %
HGB BLD-MCNC: 11.7 G/DL
IMM GRANULOCYTES # BLD AUTO: 0.05 K/UL
IMM GRANULOCYTES NFR BLD AUTO: 0.6 %
LDLC SERPL CALC-MCNC: 129 MG/DL
LYMPHOCYTES # BLD AUTO: 2 K/UL
LYMPHOCYTES NFR BLD: 25.5 %
MCH RBC QN AUTO: 29.8 PG
MCHC RBC AUTO-ENTMCNC: 30.3 G/DL
MCV RBC AUTO: 99 FL
MONOCYTES # BLD AUTO: 0.7 K/UL
MONOCYTES NFR BLD: 8.3 %
NEUTROPHILS # BLD AUTO: 4.9 K/UL
NEUTROPHILS NFR BLD: 62.4 %
NONHDLC SERPL-MCNC: 158 MG/DL
NRBC BLD-RTO: 0 /100 WBC
PLATELET # BLD AUTO: 253 K/UL
PMV BLD AUTO: 11.9 FL
POTASSIUM SERPL-SCNC: 4.8 MMOL/L
PROT SERPL-MCNC: 6 G/DL
RBC # BLD AUTO: 3.92 M/UL
SODIUM SERPL-SCNC: 139 MMOL/L
TRIGL SERPL-MCNC: 145 MG/DL
TSH SERPL DL<=0.005 MIU/L-ACNC: 1.65 UIU/ML
WBC # BLD AUTO: 7.8 K/UL

## 2019-01-07 PROCEDURE — 85025 COMPLETE CBC W/AUTO DIFF WBC: CPT

## 2019-01-07 PROCEDURE — 36415 COLL VENOUS BLD VENIPUNCTURE: CPT | Mod: PN

## 2019-01-07 PROCEDURE — 80061 LIPID PANEL: CPT

## 2019-01-07 PROCEDURE — 84443 ASSAY THYROID STIM HORMONE: CPT

## 2019-01-07 PROCEDURE — 80053 COMPREHEN METABOLIC PANEL: CPT

## 2019-01-09 RX ORDER — TRAMADOL HYDROCHLORIDE 50 MG/1
TABLET ORAL
Qty: 90 TABLET | Refills: 0 | Status: SHIPPED | OUTPATIENT
Start: 2019-01-09 | End: 2019-02-11 | Stop reason: SDUPTHER

## 2019-01-14 ENCOUNTER — TELEPHONE (OUTPATIENT)
Dept: ADMINISTRATIVE | Facility: CLINIC | Age: 84
End: 2019-01-14

## 2019-01-14 NOTE — TELEPHONE ENCOUNTER
Home Health SOC 12/26/2018 to 02/23/2019 with [facility] Maloney [facility], Dr Enzo Nieto. SN PT services.

## 2019-01-17 ENCOUNTER — OFFICE VISIT (OUTPATIENT)
Dept: FAMILY MEDICINE | Facility: CLINIC | Age: 84
End: 2019-01-17
Payer: MEDICARE

## 2019-01-17 VITALS
HEIGHT: 62 IN | RESPIRATION RATE: 18 BRPM | SYSTOLIC BLOOD PRESSURE: 124 MMHG | BODY MASS INDEX: 22.39 KG/M2 | DIASTOLIC BLOOD PRESSURE: 82 MMHG | HEART RATE: 60 BPM | WEIGHT: 121.69 LBS

## 2019-01-17 DIAGNOSIS — I10 HYPERTENSION, UNSPECIFIED TYPE: ICD-10-CM

## 2019-01-17 DIAGNOSIS — I48.0 PAROXYSMAL ATRIAL FIBRILLATION: ICD-10-CM

## 2019-01-17 DIAGNOSIS — M48.062 SPINAL STENOSIS OF LUMBAR REGION WITH NEUROGENIC CLAUDICATION: ICD-10-CM

## 2019-01-17 DIAGNOSIS — E03.4 HYPOTHYROIDISM DUE TO ACQUIRED ATROPHY OF THYROID: Primary | ICD-10-CM

## 2019-01-17 PROBLEM — N30.00 ACUTE CYSTITIS WITHOUT HEMATURIA: Status: RESOLVED | Noted: 2018-12-13 | Resolved: 2019-01-17

## 2019-01-17 PROBLEM — N39.0 UTI (URINARY TRACT INFECTION): Status: RESOLVED | Noted: 2018-12-13 | Resolved: 2019-01-17

## 2019-01-17 PROBLEM — R00.0 TACHYCARDIA: Status: RESOLVED | Noted: 2018-03-07 | Resolved: 2019-01-17

## 2019-01-17 PROBLEM — R55 SYNCOPE: Status: RESOLVED | Noted: 2018-12-13 | Resolved: 2019-01-17

## 2019-01-17 PROBLEM — J90 PLEURAL EFFUSION: Status: RESOLVED | Noted: 2018-02-28 | Resolved: 2019-01-17

## 2019-01-17 PROBLEM — R19.7 DIARRHEA: Status: RESOLVED | Noted: 2018-12-27 | Resolved: 2019-01-17

## 2019-01-17 PROBLEM — D62 ACUTE BLOOD LOSS ANEMIA: Status: RESOLVED | Noted: 2018-12-15 | Resolved: 2019-01-17

## 2019-01-17 PROBLEM — Z02.9 DISCHARGE PLANNING ISSUES: Status: RESOLVED | Noted: 2018-12-17 | Resolved: 2019-01-17

## 2019-01-17 PROBLEM — S80.11XS LEG HEMATOMA, RIGHT, SEQUELA: Status: RESOLVED | Noted: 2018-12-27 | Resolved: 2019-01-17

## 2019-01-17 PROBLEM — R60.0 EDEMA OF RIGHT LOWER EXTREMITY: Status: RESOLVED | Noted: 2018-12-15 | Resolved: 2019-01-17

## 2019-01-17 PROBLEM — Z75.8 DISCHARGE PLANNING ISSUES: Status: RESOLVED | Noted: 2018-12-17 | Resolved: 2019-01-17

## 2019-01-17 PROBLEM — E87.6 HYPOKALEMIA: Status: RESOLVED | Noted: 2018-12-29 | Resolved: 2019-01-17

## 2019-01-17 PROCEDURE — 99214 OFFICE O/P EST MOD 30 MIN: CPT | Mod: S$GLB,,, | Performed by: FAMILY MEDICINE

## 2019-01-17 PROCEDURE — 99999 PR PBB SHADOW E&M-EST. PATIENT-LVL IV: CPT | Mod: PBBFAC,,, | Performed by: FAMILY MEDICINE

## 2019-01-17 PROCEDURE — 99214 PR OFFICE/OUTPT VISIT, EST, LEVL IV, 30-39 MIN: ICD-10-PCS | Mod: S$GLB,,, | Performed by: FAMILY MEDICINE

## 2019-01-17 PROCEDURE — 1101F PR PT FALLS ASSESS DOC 0-1 FALLS W/OUT INJ PAST YR: ICD-10-PCS | Mod: CPTII,S$GLB,, | Performed by: FAMILY MEDICINE

## 2019-01-17 PROCEDURE — 99999 PR PBB SHADOW E&M-EST. PATIENT-LVL IV: ICD-10-PCS | Mod: PBBFAC,,, | Performed by: FAMILY MEDICINE

## 2019-01-17 PROCEDURE — 1101F PT FALLS ASSESS-DOCD LE1/YR: CPT | Mod: CPTII,S$GLB,, | Performed by: FAMILY MEDICINE

## 2019-01-17 RX ORDER — VIT C/E/ZN/COPPR/LUTEIN/ZEAXAN 250MG-90MG
CAPSULE ORAL
COMMUNITY
Start: 2019-01-16 | End: 2019-01-17 | Stop reason: SDUPTHER

## 2019-01-17 NOTE — PROGRESS NOTES
Subjective:       Patient ID: Michela Calvert is a 89 y.o. female.    Chief Complaint: Follow-up    Here for f/u on chronic health issues.      living at the [facility].  Using walker and wheelchair for ambulation    afib - rate- controlled on toprol XL; taking asa 81mg daily  Hypothyroidism - tolerating Synthroid 75mcg daily  Lumbar DDD - taking gabapentin and following with pain management; using tramadol as needed    BMs controlled on Miralax daily           Past Medical History:   Diagnosis Date    Atrial fibrillation     Cataracts, bilateral     Chronic pain syndrome 7/25/2018    DDD (degenerative disc disease), lumbar     DDD (degenerative disc disease), lumbar     Heart murmur 01/2017    HLD (hyperlipidemia)     no medication taken    Hypertension 12/27/2018    Hypothyroidism     Lichen sclerosus et atrophicus     Rectal/Vaginal areas    Lumbar spinal stenosis     Lumbar spondylosis     Mitral valve disorder 01/2017    Asymptomatic    Osteopenia     Urinary incontinence        Past Surgical History:   Procedure Laterality Date    CATARACT EXTRACTION EXTRACAPSULAR W/ INTRAOCULAR LENS IMPLANTATION Bilateral     HYSTERECTOMY      INJECTION-STEROID-EPIDURAL-LUMBAR L5/S1 N/A 8/30/2017    Performed by [facility]    INSERTION, DORSAL COLUMN NEUROSTIMULATOR, FOR TRIAL N/A 7/25/2018    [facility]    spinal ablation  12/2017    TONSILLECTOMY         Review of patient's allergies indicates:   Allergen Reactions    Nitrofuran analogues Nausea Only    Ciprofloxacin Nausea And Vomiting       Social History     Socioeconomic History    Marital status:      Spouse name: Not on file    Number of children: Not on file    Years of education: Not on file    Highest education level: Not on file   Social Needs    Financial resource strain: Not on file    Food insecurity - worry: Not on file    Food insecurity - inability: Not on file     Transportation needs - medical: Not on file    Transportation needs - non-medical: Not on file   Occupational History    Not on file   Tobacco Use    Smoking status: Never Smoker    Smokeless tobacco: Never Used   Substance and Sexual Activity    Alcohol use: Yes     Comment: 1 gin & tonic daily @ 5pm    Drug use: No    Sexual activity: No   Other Topics Concern    Not on file   Social History Narrative    Not on file       Current Outpatient Medications on File Prior to Visit   Medication Sig Dispense Refill    acetaminophen (TYLENOL) 650 MG TbSR Take 650 mg by mouth every 8 (eight) hours as needed.      ANTIOX #8/OM3/DHA/EPA/LUT/ZEAX (PRESERVISION AREDS 2, OMEGA-3, ORAL) Take 1 tablet by mouth once daily.       calcium carbonate (OS-ARCHIE) 500 mg calcium (1,250 mg) tablet Take 1 tablet by mouth once daily.      calcium-vitamin D3 500 mg(1,250mg) -200 unit per tablet Take 1 tablet by mouth 2 (two) times daily with meals.      gabapentin (NEURONTIN) 300 MG capsule Take 2 capsules (600 mg total) by mouth 3 (three) times daily. 180 capsule 5    hydrocortisone 2.5 % cream Apply to affected areas twice daily. 20 g 6    lactobacillus rhamnosus GG (CULTURELLE) 10 billion cell capsule Take 1 capsule by mouth once daily.      levothyroxine (SYNTHROID) 75 MCG tablet Take 1 tablet (75 mcg total) by mouth once daily. 90 tablet 3    lisinopril (PRINIVIL,ZESTRIL) 5 MG tablet Take 1 tablet (5 mg total) by mouth once daily. 30 tablet 1    metoprolol succinate (TOPROL-XL) 50 MG 24 hr tablet Take 1 tablet (50 mg total) by mouth once daily. 90 tablet 3    mupirocin (BACTROBAN) 2 % ointment Apply topically 3 (three) times daily. 22 g 0    traMADol (ULTRAM) 50 mg tablet Take 2 tablets (100 mg total) by mouth 3 (three) times daily as needed for Pain. 180 tablet 2    traMADol (ULTRAM) 50 mg tablet TAKE ONE TABLET (50MG TOTAL) BY MOUTH THREE TIMES DAILY 90 tablet 0    vitamins  A,C,E-zinc-copper 7,[id number]  "unit-mg-unit Tab        No current facility-administered medications on file prior to visit.        No family history on file.    Review of Systems   Constitutional: Positive for fatigue. Negative for appetite change, chills, fever and unexpected weight change.   HENT: Negative for sore throat and trouble swallowing.    Eyes: Negative for pain and visual disturbance.   Respiratory: Positive for cough. Negative for shortness of breath and wheezing.    Cardiovascular: Positive for palpitations. Negative for chest pain.   Gastrointestinal: Negative for abdominal pain, blood in stool, diarrhea, nausea and vomiting.   Genitourinary: Negative for difficulty urinating, dysuria and hematuria.   Musculoskeletal: Positive for gait problem. Negative for arthralgias and neck pain.   Skin: Negative for rash and wound.   Neurological: Positive for weakness. Negative for dizziness, numbness and headaches.   Hematological: Negative for adenopathy.   Psychiatric/Behavioral: Negative for dysphoric mood.       Objective:      /82   Pulse 60   Resp 18   Ht 5' 2" (1.575 m)   Wt 55.2 kg (121 lb 11.1 oz)   BMI 22.26 kg/m²   Physical Exam   Constitutional: She is oriented to person, place, and time. She appears well-developed and well-nourished.   HENT:   Head: Normocephalic.   Mouth/Throat: Oropharynx is clear and moist. No oropharyngeal exudate or posterior oropharyngeal erythema.   Eyes: Conjunctivae and EOM are normal. Pupils are equal, round, and reactive to light.   Neck: Normal range of motion. Neck supple. No thyromegaly present.   Cardiovascular: Normal rate, regular rhythm, S1 normal, S2 normal, normal heart sounds and intact distal pulses. Exam reveals no gallop and no friction rub.   No murmur heard.  Pulmonary/Chest: Effort normal and breath sounds normal. She has no wheezes. She has no rales.   Abdominal: Normal appearance.   Musculoskeletal:        Right lower leg: She exhibits no edema.        Left lower leg: She " exhibits no edema.   Lymphadenopathy:     She has no cervical adenopathy.   Neurological: She is alert and oriented to person, place, and time. No cranial nerve deficit. Gait normal.   Skin: Skin is warm and intact. No rash noted.   Psychiatric: She has a normal mood and affect.       Results for orders placed or performed in visit on 01/07/19   Comprehensive metabolic panel   Result Value Ref Range    Sodium 139 136 - 145 mmol/L    Potassium 4.8 3.5 - 5.1 mmol/L    Chloride 104 95 - 110 mmol/L    CO2 27 23 - 29 mmol/L    Glucose 92 70 - 110 mg/dL    BUN, Bld 18 8 - 23 mg/dL    Creatinine 0.8 0.5 - 1.4 mg/dL    Calcium 9.1 8.7 - 10.5 mg/dL    Total Protein 6.0 6.0 - 8.4 g/dL    Albumin 3.1 (L) 3.5 - 5.2 g/dL    Total Bilirubin 0.4 0.1 - 1.0 mg/dL    Alkaline Phosphatase 56 55 - 135 U/L    AST 32 10 - 40 U/L    ALT 31 10 - 44 U/L    Anion Gap 8 8 - 16 mmol/L    eGFR if African American >60.0 >60 mL/min/1.73 m^2    eGFR if non African American >60.0 >60 mL/min/1.73 m^2   Lipid panel   Result Value Ref Range    Cholesterol 199 120 - 199 mg/dL    Triglycerides 145 30 - 150 mg/dL    HDL 41 40 - 75 mg/dL    LDL Cholesterol 129.0 63.0 - 159.0 mg/dL    HDL/Chol Ratio 20.6 20.0 - 50.0 %    Total Cholesterol/HDL Ratio 4.9 2.0 - 5.0    Non-HDL Cholesterol 158 mg/dL   TSH   Result Value Ref Range    TSH 1.654 0.400 - 4.000 uIU/mL   CBC auto differential   Result Value Ref Range    WBC 7.80 3.90 - 12.70 K/uL    RBC 3.92 (L) 4.00 - 5.40 M/uL    Hemoglobin 11.7 (L) 12.0 - 16.0 g/dL    Hematocrit 38.6 37.0 - 48.5 %    MCV 99 (H) 82 - 98 fL    MCH 29.8 27.0 - 31.0 pg    MCHC 30.3 (L) 32.0 - 36.0 g/dL    RDW 15.7 (H) 11.5 - 14.5 %    Platelets 253 150 - 350 K/uL    MPV 11.9 9.2 - 12.9 fL    Immature Granulocytes 0.6 (H) 0.0 - 0.5 %    Gran # (ANC) 4.9 1.8 - 7.7 K/uL    Immature Grans (Abs) 0.05 (H) 0.00 - 0.04 K/uL    Lymph # 2.0 1.0 - 4.8 K/uL    Mono # 0.7 0.3 - 1.0 K/uL    Eos # 0.2 0.0 - 0.5 K/uL    Baso # 0.04 0.00 - 0.20 K/uL     nRBC 0 0 /100 WBC    Gran% 62.4 38.0 - 73.0 %    Lymph% 25.5 18.0 - 48.0 %    Mono% 8.3 4.0 - 15.0 %    Eosinophil% 2.7 0.0 - 8.0 %    Basophil% 0.5 0.0 - 1.9 %    Differential Method Automated        Assessment:       1. Hypothyroidism due to acquired atrophy of thyroid    2. Paroxysmal atrial fibrillation    3. Spinal stenosis of lumbar region with neurogenic claudication    4. Hypertension, unspecified type        Plan:       Hypothyroidism due to acquired atrophy of thyroid    Paroxysmal atrial fibrillation    Spinal stenosis of lumbar region with neurogenic claudication    Hypertension, unspecified type        Overall stable  Continue present medications  F/u with specialists as planned  Counseled on regular exercise, maintenance of a healthy weight, balanced diet rich in fruits/vegetables and lean protein, and avoidance of unhealthy habits like smoking and excessive alcohol intake.

## 2019-01-22 RX ORDER — GABAPENTIN 300 MG/1
600 CAPSULE ORAL 3 TIMES DAILY
Qty: 180 CAPSULE | Refills: 5 | Status: CANCELLED | OUTPATIENT
Start: 2019-01-22 | End: 2020-01-22

## 2019-01-22 RX ORDER — GABAPENTIN 300 MG/1
600 CAPSULE ORAL 3 TIMES DAILY
Qty: 180 CAPSULE | Refills: 5 | Status: ON HOLD | OUTPATIENT
Start: 2019-01-22 | End: 2019-03-12 | Stop reason: SDUPTHER

## 2019-02-11 ENCOUNTER — HOSPITAL ENCOUNTER (OUTPATIENT)
Dept: RADIOLOGY | Facility: HOSPITAL | Age: 84
Discharge: HOME | End: 2019-02-11
Attending: FAMILY MEDICINE
Payer: MEDICARE

## 2019-02-11 ENCOUNTER — TELEPHONE (OUTPATIENT)
Dept: FAMILY MEDICINE | Facility: CLINIC | Age: 84
End: 2019-02-11

## 2019-02-11 ENCOUNTER — OFFICE VISIT (OUTPATIENT)
Dept: URGENT CARE | Facility: CLINIC | Age: 84
End: 2019-02-11
Payer: MEDICARE

## 2019-02-11 VITALS
RESPIRATION RATE: 16 BRPM | OXYGEN SATURATION: 94 % | DIASTOLIC BLOOD PRESSURE: 82 MMHG | SYSTOLIC BLOOD PRESSURE: 142 MMHG | HEIGHT: 62 IN | TEMPERATURE: 99 F | WEIGHT: 121 LBS | BODY MASS INDEX: 22.26 KG/M2 | HEART RATE: 93 BPM

## 2019-02-11 DIAGNOSIS — M25.552 LEFT HIP PAIN: ICD-10-CM

## 2019-02-11 DIAGNOSIS — S72.002A CLOSED FRACTURE OF LEFT HIP, INITIAL ENCOUNTER: Primary | ICD-10-CM

## 2019-02-11 DIAGNOSIS — R53.1 WEAKNESS: Primary | ICD-10-CM
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
## 2019-02-11 DIAGNOSIS — M48.00 SPINAL STENOSIS, UNSPECIFIED SPINAL REGION: ICD-10-CM

## 2019-02-11 LAB
BILIRUB UR QL STRIP: NEGATIVE
GLUCOSE UR QL STRIP: NEGATIVE
KETONES UR QL STRIP: NEGATIVE
LEUKOCYTE ESTERASE UR QL STRIP: NEGATIVE
PH, POC UA: 7 (ref 5–8)
POC BLOOD, URINE: NEGATIVE
POC NITRATES, URINE: NEGATIVE
PROT UR QL STRIP: NEGATIVE
SP GR UR STRIP: 1.01 (ref 1–1.03)
UROBILINOGEN UR STRIP-ACNC: NORMAL (ref 0.1–1.1)

## 2019-02-11 PROCEDURE — 73502 X-RAY EXAM HIP UNI 2-3 VIEWS: CPT | Mod: LT,S$GLB,, | Performed by: RADIOLOGY

## 2019-02-11 PROCEDURE — 81003 URINALYSIS AUTO W/O SCOPE: CPT | Mod: QW,S$GLB,, | Performed by: PHYSICIAN ASSISTANT

## 2019-02-11 PROCEDURE — 81003 POCT URINALYSIS, DIPSTICK, AUTOMATED, W/O SCOPE: ICD-10-PCS | Mod: QW,S$GLB,, | Performed by: PHYSICIAN ASSISTANT

## 2019-02-11 PROCEDURE — 99214 PR OFFICE/OUTPT VISIT, EST, LEVL IV, 30-39 MIN: ICD-10-PCS | Mod: 25,S$GLB,, | Performed by: PHYSICIAN ASSISTANT

## 2019-02-11 PROCEDURE — 1101F PT FALLS ASSESS-DOCD LE1/YR: CPT | Mod: CPTII,S$GLB,, | Performed by: PHYSICIAN ASSISTANT

## 2019-02-11 PROCEDURE — 73502 X-RAY EXAM HIP UNI 2-3 VIEWS: CPT | Mod: TC,HCNC,FY,PO,LT

## 2019-02-11 PROCEDURE — 1101F PR PT FALLS ASSESS DOC 0-1 FALLS W/OUT INJ PAST YR: ICD-10-PCS | Mod: CPTII,S$GLB,, | Performed by: PHYSICIAN ASSISTANT

## 2019-02-11 PROCEDURE — 73502 X-RAY EXAM HIP UNI 2-3 VIEWS: CPT | Mod: 26,HCNC,LT, | Performed by: RADIOLOGY

## 2019-02-11 PROCEDURE — 73502 XR HIP 2 VIEW LEFT: ICD-10-PCS | Mod: 26,HCNC,LT, | Performed by: RADIOLOGY

## 2019-02-11 PROCEDURE — 73502 XR HIP 2 VIEW LEFT: ICD-10-PCS | Mod: LT,S$GLB,, | Performed by: RADIOLOGY

## 2019-02-11 PROCEDURE — 99214 OFFICE O/P EST MOD 30 MIN: CPT | Mod: 25,S$GLB,, | Performed by: PHYSICIAN ASSISTANT

## 2019-02-11 NOTE — PROGRESS NOTES
"Subjective:       Patient ID: Michela Calvert is a 89 y.o. female.    Vitals:  height is 5' 2" (1.575 m) and weight is 54.9 kg (121 lb). Her temperature is 98.5 °F (36.9 °C). Her blood pressure is 142/82 (abnormal) and her pulse is 93. Her respiration is 16 and oxygen saturation is 94 (abnormal)%.     Chief Complaint: Weakness    Pt has spinal stenosis and has trouble standing.      Fatigue   This is a new problem. The current episode started yesterday. The problem has been unchanged. Associated symptoms include fatigue. Pertinent negatives include no arthralgias, chest pain, chills, congestion, coughing, fever, headaches, joint swelling, myalgias, nausea, rash, sore throat, vertigo, vomiting or weakness.       Constitution: Positive for fatigue. Negative for chills and fever.   HENT: Negative for congestion and sore throat.    Neck: Negative for painful lymph nodes.   Cardiovascular: Negative for chest pain and leg swelling.   Eyes: Negative for double vision and blurred vision.   Respiratory: Negative for cough and shortness of breath.    Gastrointestinal: Negative for nausea, vomiting and diarrhea.   Genitourinary: Negative for dysuria, frequency, urgency and history of kidney stones.   Musculoskeletal: Negative for joint pain, joint swelling, muscle cramps and muscle ache.   Skin: Negative for color change, pale, rash and bruising.   Allergic/Immunologic: Negative for seasonal allergies.   Neurological: Negative for dizziness, history of vertigo, light-headedness, passing out and headaches.   Hematologic/Lymphatic: Negative for swollen lymph nodes.   Psychiatric/Behavioral: Negative for nervous/anxious, sleep disturbance and depression. The patient is not nervous/anxious.        Objective:      Physical Exam   Constitutional: She is oriented to person, place, and time. Vital signs are normal. She appears well-developed and well-nourished. She is active and cooperative. No distress.   HENT:   Head: Normocephalic " and atraumatic.   Right Ear: External ear normal.   Left Ear: External ear normal.   Nose: Nose normal.   Mouth/Throat: Mucous membranes are normal.   Eyes: Conjunctivae and lids are normal.   Neck: Trachea normal, normal range of motion, full passive range of motion without pain and phonation normal. Neck supple.   Cardiovascular: Normal rate, regular rhythm, normal heart sounds, intact distal pulses and normal pulses.   Pulmonary/Chest: Effort normal and breath sounds normal.   Abdominal: Soft. Normal appearance and bowel sounds are normal. She exhibits no abdominal bruit, no pulsatile midline mass and no mass.   Musculoskeletal: She exhibits no edema or deformity.        Left hip: She exhibits tenderness and bony tenderness.   Leg length on extension equal bilaterally. Patient has pain with flexion and internal rotation of the left hip.   Neurological: She is alert and oriented to person, place, and time. She has normal strength and normal reflexes. No sensory deficit.   Skin: Skin is warm, dry and intact. She is not diaphoretic.   Psychiatric: She has a normal mood and affect. Her speech is normal and behavior is normal. Judgment and thought content normal. Cognition and memory are normal.   Nursing note and vitals reviewed.      Assessment:       1. Weakness    2. Left hip pain    3. Spinal stenosis, unspecified spinal region        Plan:         Weakness  -     POCT Urinalysis, Dipstick, Automated, W/O Scope  -     XR HIP 2 VIEW LEFT; Future; Expected date: 02/11/2019  -     Culture, Urine    Left hip pain    Spinal stenosis, unspecified spinal region     Patient with known history of spinal stenosis presents with weakness, left hip pain and worsening leg pain. Patient states she did fall last night.  Patient's son who is with her is requesting urinalysis as well as left hip x-ray.  I explained that left hip x-ray cannot rule out fracture.  I explained different imaging would be needed to rule this out.   Patient was able to give urine sample and no evidence of infection seen.  Will send for culture.  I reviewed patient's imaging.  She has degenerative disease with spondylolisthesis .  Explained to son that any fall can cause further harm.  I would follow up with her ortho doctor.  Encouraged further eval in ED.    You must understand that you've received an Urgent Care treatment only and that you may be released before all your medical problems are known or treated. You, the patient, will arrange for follow up care as instructed.  Follow up with your PCP or specialty clinic as directed in the next 1-2 weeks if not improved or as needed.  You can call [phone] to schedule an appointment with the appropriate provider.  If your condition worsens we recommend that you receive another evaluation at the emergency room immediately or contact your primary medical clinics after hours call service to discuss your concerns.  Please return here or go to the Emergency Department for any concerns or worsening of condition.

## 2019-02-11 NOTE — PATIENT INSTRUCTIONS

## 2019-02-11 NOTE — TELEPHONE ENCOUNTER
Per Son Pt had a fall last night went to [facility] urgent [facility] per clinic was unable to read was told to contact PCP for another order(Left hip ) also pt is having trouble with getting around since fall can she have an order for Home health [place] please advise

## 2019-02-11 NOTE — TELEPHONE ENCOUNTER
----- Message from Jessica Ford sent at 2/11/2019  1:01 PM CST -----  Contact: sonFrancisco J  Type: Needs Medical Advice    Who Called:  sonFrancisco J  Symptoms (please be specific):  Patient fell last night and hurt her left hip  How long has patient had these symptoms:    Best Call Back Number:[phone]  Additional Information: patient 's son left previous message would like to get an order for a Xray or to be seen today. Please call patient. Thanks!

## 2019-02-11 NOTE — TELEPHONE ENCOUNTER
Mr Olvera states that pt still has home health a nurse still comes see her but pt will need help to bathe can they have an aid come please advise.

## 2019-02-11 NOTE — PROGRESS NOTES
Refill Authorization Note     is requesting a refill authorization.    Brief assessment and rationale for refill: ROUTE: op  Name and strength of medication: traMADol (ULTRAM) 50 mg tablet            Medication reconciliation completed: No                         Comments: Appointments (past 12 m or future 3m authorizing provider)  LAST VISIT DATE  Enzo Nieto MD 1/17/2019         NEXT VISIT DATE  Enzo Nieto MD n/a

## 2019-02-12 ENCOUNTER — OFFICE VISIT (OUTPATIENT)
Dept: ORTHOPEDICS | Facility: CLINIC | Age: 84
End: 2019-02-12
Payer: MEDICARE

## 2019-02-12 VITALS
WEIGHT: 121 LBS | SYSTOLIC BLOOD PRESSURE: 87 MMHG | HEIGHT: 62 IN | BODY MASS INDEX: 22.26 KG/M2 | DIASTOLIC BLOOD PRESSURE: 46 MMHG | HEART RATE: 78 BPM

## 2019-02-12 DIAGNOSIS — M25.552 LEFT HIP PAIN: Primary | ICD-10-CM

## 2019-02-12 PROBLEM — N17.9 AKI (ACUTE KIDNEY INJURY): Status: ACTIVE | Noted: 2019-02-12

## 2019-02-12 PROBLEM — G45.9 TIA (TRANSIENT ISCHEMIC ATTACK): Status: ACTIVE | Noted: 2019-02-12

## 2019-02-12 PROBLEM — I95.9 HYPOTENSION: Status: ACTIVE | Noted: 2019-02-12

## 2019-02-12 PROCEDURE — 99999 PR PBB SHADOW E&M-EST. PATIENT-LVL III: ICD-10-PCS | Mod: PBBFAC,HCNC,, | Performed by: NURSE PRACTITIONER

## 2019-02-12 PROCEDURE — 99999 PR PBB SHADOW E&M-EST. PATIENT-LVL III: CPT | Mod: PBBFAC,HCNC,, | Performed by: NURSE PRACTITIONER

## 2019-02-12 PROCEDURE — 99203 PR OFFICE/OUTPT VISIT, NEW, LEVL III, 30-44 MIN: ICD-10-PCS | Mod: HCNC,S$GLB,, | Performed by: NURSE PRACTITIONER

## 2019-02-12 PROCEDURE — 99203 OFFICE O/P NEW LOW 30 MIN: CPT | Mod: HCNC,S$GLB,, | Performed by: NURSE PRACTITIONER

## 2019-02-12 RX ORDER — TRAMADOL HYDROCHLORIDE 50 MG/1
TABLET ORAL
Qty: 90 TABLET | Refills: 0 | Status: ON HOLD | OUTPATIENT
Start: 2019-02-12 | End: 2019-02-15 | Stop reason: SDUPTHER

## 2019-02-12 RX ORDER — L.ACID/L.CASEI/B.BIF/B.LON/FOS 2B CELL-50
CAPSULE ORAL
Refills: 3 | Status: ON HOLD | COMMUNITY
Start: 2019-01-15 | End: 2019-02-15 | Stop reason: HOSPADM

## 2019-02-12 NOTE — TELEPHONE ENCOUNTER
----- Message from Lis Hodges sent at 2/12/2019  9:51 AM CST -----  Contact: [facility], Angella Mendez call to pod, want to speak with a nurse regarding if patient should go to her  appointment today or not please call back at [phone]

## 2019-02-12 NOTE — LETTER
February 12, 2019      Enzo Nieto MD  [address] [facility] [address] [place] [address]           [facility]  [address] [facility] [address] [place] [address]  Phone: [phone]          Patient: Michela Calvert   MR Number: [id number]   YOB: 1929   Date of Visit: 2/12/2019       Dear Dr. Enzo Nieto:    Thank you for referring Michela Calvert to me for evaluation. Attached you will find relevant portions of my assessment and plan of care.    If you have questions, please do not hesitate to call me. I look forward to following Michela Calvert along with you.    Sincerely,    Francoise García, APRN    Enclosure  CC:  No Recipients    If you would like to receive this communication electronically, please contact [email or web address] or [phone] to request more information on [facility] access.    For providers and/or their staff who would like to refer a patient to [facility], please contact us through our one-stop-shop provider referral line, [facility] , at [phone].    If you feel you have received this communication in error or would no longer like to receive these types of communications, please e-mail [email or web address]

## 2019-02-12 NOTE — TELEPHONE ENCOUNTER
Arrange appointment with ortho today. Xray revealled a likely left hip femoral neck fracture. Wheelchair use only until seen by ortho.

## 2019-02-12 NOTE — TELEPHONE ENCOUNTER
----- Message from Cipriano Muro sent at 2/12/2019  8:50 AM CST -----  Contact: Granddaughter Roxana Adan needs to speak with Dr Nieto regarding ptnt's upcoming orthopedic appnt today for hip fracture.  Please call [phone]

## 2019-02-12 NOTE — PROGRESS NOTES
DATE: 2/12/2019  PATIENT: Michela Calvert  REFERRING MD:   CHIEF COMPLAINT:   Chief Complaint   Patient presents with    Left Hip - Pain       HISTORY:  Michela Calvert is a 89 y.o. female  who presents for initial evaluation of her left hip.  She is accompanied today by her son and grand daughter who are both providing history.  She had a fall on Sunday, 02/10/19, at home.  She was taken to urgent care on Monday and xray were suspicious for a left femoral neck fracture.  She presents today for orthopedic evaluation.  Her pain rating is 6/10.  Her son and grand daughter are concerned that she is more confused than normal.      PAST MEDICAL/SURGICAL HISTORY:  Past Medical History:   Diagnosis Date    Atrial fibrillation     Cataracts, bilateral     Chronic pain syndrome 7/25/2018    DDD (degenerative disc disease), lumbar     DDD (degenerative disc disease), lumbar     Heart murmur 01/2017    HLD (hyperlipidemia)     no medication taken    Hypertension 12/27/2018    Hypothyroidism     Lichen sclerosus et atrophicus     Rectal/Vaginal areas    Lumbar spinal stenosis     Lumbar spondylosis     Mitral valve disorder 01/2017    Asymptomatic    Osteopenia     Urinary incontinence      Past Surgical History:   Procedure Laterality Date    CATARACT EXTRACTION EXTRACAPSULAR W/ INTRAOCULAR LENS IMPLANTATION Bilateral     HYSTERECTOMY      INJECTION-STEROID-EPIDURAL-LUMBAR L5/S1 N/A 8/30/2017    Performed by Derek Daily [facility]    INSERTION, DORSAL COLUMN NEUROSTIMULATOR, FOR TRIAL N/A 7/25/2018    [facility] at [facility] OR    spinal ablation  12/2017    TONSILLECTOMY         Current Medications:   Current Outpatient Medications:     acetaminophen (TYLENOL) 650 MG TbSR, Take 650 mg by mouth every 8 (eight) hours as needed., Disp: , Rfl:     ANTIOX #8/OM3/DHA/EPA/LUT/ZEAX (PRESERVISION AREDS 2, OMEGA-3, ORAL), Take 1 tablet by mouth once daily. , Disp: , Rfl:      calcium carbonate (OS-ARCHIE) 500 mg calcium (1,250 mg) tablet, Take 1 tablet by mouth once daily., Disp: , Rfl:     calcium-vitamin D3 500 mg(1,250mg) -200 unit per tablet, Take 1 tablet by mouth 2 (two) times daily with meals., Disp: , Rfl:     gabapentin (NEURONTIN) 300 MG capsule, Take 2 capsules (600 mg total) by mouth 3 (three) times daily., Disp: 180 capsule, Rfl: 5    hydrocortisone 2.5 % cream, Apply to affected areas twice daily., Disp: 20 g, Rfl: 6    lactobacillus rhamnosus GG (CULTURELLE) 10 billion cell capsule, Take 1 capsule by mouth once daily., Disp: , Rfl:     levothyroxine (SYNTHROID) 75 MCG tablet, Take 1 tablet (75 mcg total) by mouth once daily., Disp: 90 tablet, Rfl: 3    lisinopril (PRINIVIL,ZESTRIL) 5 MG tablet, Take 1 tablet (5 mg total) by mouth once daily., Disp: 30 tablet, Rfl: 1    metoprolol succinate (TOPROL-XL) 50 MG 24 hr tablet, Take 1 tablet (50 mg total) by mouth once daily., Disp: 90 tablet, Rfl: 3    mupirocin (BACTROBAN) 2 % ointment, Apply topically 3 (three) times daily., Disp: 22 g, Rfl: 0    traMADol (ULTRAM) 50 mg tablet, TAKE ONE TABLET (50MG TOTAL) BY MOUTH THREE TIMES DAILY, Disp: 90 tablet, Rfl: 0    vitamins  A,C,E-zinc-copper 7,160-113-100 unit-mg-unit Tab, , Disp: , Rfl:     Family History: family history was reviewed and is noncontributory  Social History:   Social History     Socioeconomic History    Marital status:      Spouse name: Not on file    Number of children: Not on file    Years of education: Not on file    Highest education level: Not on file   Social Needs    Financial resource strain: Not on file    Food insecurity - worry: Not on file    Food insecurity - inability: Not on file    Transportation needs - medical: Not on file    Transportation needs - non-medical: Not on file   Occupational History    Not on file   Tobacco Use    Smoking status: Never Smoker    Smokeless tobacco: Never Used   Substance and Sexual Activity  "   Alcohol use: Yes     Comment: 1 gin & tonic daily @ 5pm    Drug use: No    Sexual activity: No   Other Topics Concern    Not on file   Social History Narrative    Not on file       ROS:  Constitution: Negative for chills, fever, and sweats. Negative for unexplained weight loss.  Eyes: no redness, no discharge  Ears: no ear pain or tinnitus  Cardiovascular: Negative for chest pain, irregular heartbeat, leg swelling and palpitations.   Respiratory: Negative for cough and shortness of breath.   Gastrointestinal: Negative for abdominal pain, nausea and vomiting.   Genitourinary: Negative for bladder incontinence and dysuria.   Neurological: Negative for numbness.   Psychiatric/Behavioral: Negative for behavior changes.   Endocrine: Negative for palpitations.   Hematologic/Lymphatic: Negative for bleeding disorders.  Skin: Negative for pruritis or rash.       PHYSICAL EXAM:  BP (!) 87/46   Pulse 78   Ht 5' 2" (1.575 m)   Wt 54.9 kg (121 lb)   BMI 22.13 kg/m²   Michela Calvert is a well developed, well nourished female in no acute distress. Physical examination of the left hip and lumbar spine evaluated the following:    Gait and Alignment  Lumbar spine range of motion  Inspection for ecchymosis, swelling, atrophy, or deformity  Tenderness to palpation over the bony and soft tissue structures around the lower back/hip  Inspection for intra-articular and/or bursal effusions  Range of Motion and presence of contractures  Sensation and motor strength to the lower extremity  Pain/pop/click with logrolling or range of motion  Straight leg raise testing  Vascular exam to include skin temperature/color/capillary refill    Remarkable findings included:  Awake, alert, oriented to place, time and situation  Patient stood up next to exam table without trouble, pants partially removed to examine skin  No edema, effusion, ecchymosis or obvious deformity  Nontender to palpation   ROM hip flexion to 130, no pain with " abduction, adduction, internal or external rotation, able to stand and shift weight to left foot  Strength 5/5  No gross instability  Sensation intact  Skin warm, dry, intact      IMAGING:   X-ray obtained Left hip performed 02/11/19 personally reviewed with patient. Radiologist report as follows:   A left femoral neck fracture is suspected with sclerosis present suggesting this is subacute.  This report was flagged in Epic as abnormal.     ASSESSMENT:   Fall 02/10/19    PLAN:  The nature of the diagnosis, using models and diagrams when appropriate, was explained to the patient and her son and grand daughter in detail. Treatment option discussed included non-operative measures of continued observation, rest,  modification of activities, over the counter pain/antiinflammatory relief and physica/occupational therapy.  More aggressive treatment options include MRI and referal to orthopedic surgeon.  All questions answered and the patient wishes to proceed with MRI to rule out fracture of hip.  While I was placing an order and scheduling an MRI, the family became concerned that the patient was more confused and slurring her words and not making sense.  They requested to have a same day appointment with primary care, unfortunately there are no available appointments today.  Her blood pressure was taken and was 84/50 manually and her pulse was 48, irregular, she has a history of afib. I have recommended that they be evaluated through the ER as her symptoms are concerning them and they do not want her to be alone in her home.  Family reported they will wait on scheduling an MRI and present to the ER for evaluation.  I offered to call for an ambulance but they declined.  I instructed them to call back to schedule MRI when they are ready.
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
## 2019-02-13 LAB
BACTERIA UR CULT: NORMAL
BACTERIA UR CULT: NORMAL

## 2019-02-14 ENCOUNTER — TELEPHONE (OUTPATIENT)
Dept: URGENT CARE | Facility: CLINIC | Age: 84
End: 2019-02-14

## 2019-02-14 PROBLEM — M25.552 LEFT HIP PAIN: Status: ACTIVE | Noted: 2019-02-14

## 2019-02-14 PROBLEM — R47.89 SPELL OF CHANGE IN SPEECH: Status: ACTIVE | Noted: 2019-02-12

## 2019-02-14 NOTE — TELEPHONE ENCOUNTER
----- Message from Marcia Carvajal NP sent at 2/14/2019  8:55 AM CST -----  Patient is in the hospital according to the chart, Please inform the patient or family the urine culture did not grow out and she does not need to start abx at this time.      Spoke c son, pt's son stated pt is now in hospital for UTI. However stated understanding.-VL

## 2019-02-25 ENCOUNTER — TELEPHONE (OUTPATIENT)
Dept: FAMILY MEDICINE | Facility: CLINIC | Age: 84
End: 2019-02-25

## 2019-02-25 RX ORDER — METOPROLOL TARTRATE 25 MG/1
TABLET, FILM COATED ORAL
Qty: 60 TABLET | Refills: 1 | Status: SHIPPED | OUTPATIENT
Start: 2019-02-25 | End: 2019-02-25 | Stop reason: SDUPTHER

## 2019-02-25 RX ORDER — AMIODARONE HYDROCHLORIDE 200 MG/1
200 TABLET ORAL DAILY
Qty: 30 TABLET | Refills: 1 | Status: SHIPPED | OUTPATIENT
Start: 2019-02-25 | End: 2019-04-24 | Stop reason: SDUPTHER

## 2019-02-25 RX ORDER — AMIODARONE HYDROCHLORIDE 200 MG/1
TABLET ORAL
Qty: 30 TABLET | Refills: 1 | Status: SHIPPED | OUTPATIENT
Start: 2019-02-25 | End: 2019-02-25 | Stop reason: SDUPTHER

## 2019-02-25 RX ORDER — METOPROLOL TARTRATE 25 MG/1
25 TABLET, FILM COATED ORAL 2 TIMES DAILY
Qty: 60 TABLET | Refills: 1 | Status: SHIPPED | OUTPATIENT
Start: 2019-02-25 | End: 2019-04-24 | Stop reason: SDUPTHER

## 2019-02-25 RX ORDER — APIXABAN 2.5 MG/1
TABLET, FILM COATED ORAL
Qty: 60 TABLET | Refills: 1 | Status: SHIPPED | OUTPATIENT
Start: 2019-02-25 | End: 2019-02-25 | Stop reason: SDUPTHER

## 2019-02-25 NOTE — PROGRESS NOTES
Refill Authorization Note     is requesting a refill authorization.    Brief assessment and rationale for refill: DEFER: new starts  Name and strength of medication: amiodorone / metoprolol succinate / apixaban       Medication Therapy Plan: Patient new start on amiodarone and apixaban from hospitalization; metoprolol changed from succinate to tartrate; pt has upcoming appointment with cardiology; defer to you; patient on correct dosage of apixaban based on indication and patient factors for dose reduction                   How patient will take medication: UTD          Comments:   BP Readings from Last 3 Encounters:   02/15/19 124/82   02/12/19 (!) 87/46   02/11/19 (!) 142/82      Pulse Readings from Last 3 Encounters:   02/15/19 (!) 133   02/12/19 78   02/11/19 93      Lab Results   Component Value Date    CREATININE 0.64 02/14/2019    BUN 17 02/14/2019     02/14/2019    K 3.5 02/14/2019     02/14/2019    CO2 27 02/14/2019      Lab Results   Component Value Date    WBC 11.48 02/14/2019    HGB 11.6 (L) 02/14/2019    HCT 36.2 (L) 02/14/2019    MCV 95 02/14/2019     (L) 02/14/2019

## 2019-02-25 NOTE — TELEPHONE ENCOUNTER
----- Message from Bijal Ventura sent at 2/25/2019  2:19 PM CST -----  Contact: Neisha emanuel/ [facility] ph [phone]  Neisha emanuel/ [facility] ph [phone]  Call stated patient need eliquis 2.5, metoprolol, and  Amiodarone 200  See escribe

## 2019-02-25 NOTE — TELEPHONE ENCOUNTER
----- Message from Hemalatha Harris sent at 2/25/2019  9:34 AM CST -----  Type: Needs Medical Advice    Who Called: Son/May Jimenez  Pharmacy name and phone #:      [facility] - [place], [place] - [address]  [address]  [facility] [address]  Phone: [phone] Fax: [phone]    Best Call Back Number: [phone]  Additional Information: Patient was in the hospital and was given new medications that are not at the pharmacy. Please call for the details.

## 2019-02-25 NOTE — TELEPHONE ENCOUNTER
Amiodarone, eliquis, and metoprolol pending Dr. Nieto approval. Spoke with patient's son and informed medications are pending to [facility]. Confirmed new strength and dose as well.

## 2019-03-01 ENCOUNTER — TELEPHONE (OUTPATIENT)
Dept: PAIN MEDICINE | Facility: CLINIC | Age: 84
End: 2019-03-01

## 2019-03-01 ENCOUNTER — OFFICE VISIT (OUTPATIENT)
Dept: PAIN MEDICINE | Facility: CLINIC | Age: 84
End: 2019-03-01
Payer: MEDICARE

## 2019-03-01 VITALS
WEIGHT: 116.88 LBS | HEART RATE: 57 BPM | BODY MASS INDEX: 21.37 KG/M2 | SYSTOLIC BLOOD PRESSURE: 123 MMHG | TEMPERATURE: 99 F | DIASTOLIC BLOOD PRESSURE: 65 MMHG

## 2019-03-01 DIAGNOSIS — S72.002D CLOSED FRACTURE OF LEFT HIP WITH ROUTINE HEALING, SUBSEQUENT ENCOUNTER: ICD-10-CM

## 2019-03-01 DIAGNOSIS — M48.062 SPINAL STENOSIS OF LUMBAR REGION WITH NEUROGENIC CLAUDICATION: ICD-10-CM

## 2019-03-01 DIAGNOSIS — M43.16 SPONDYLOLISTHESIS OF LUMBAR REGION: ICD-10-CM

## 2019-03-01 DIAGNOSIS — M51.36 DDD (DEGENERATIVE DISC DISEASE), LUMBAR: Primary | ICD-10-CM

## 2019-03-01 DIAGNOSIS — R26.81 GAIT INSTABILITY: ICD-10-CM

## 2019-03-01 PROCEDURE — 1101F PR PT FALLS ASSESS DOC 0-1 FALLS W/OUT INJ PAST YR: ICD-10-PCS | Mod: HCNC,CPTII,S$GLB, | Performed by: PHYSICIAN ASSISTANT

## 2019-03-01 PROCEDURE — 99999 PR PBB SHADOW E&M-EST. PATIENT-LVL IV: ICD-10-PCS | Mod: PBBFAC,HCNC,, | Performed by: PHYSICIAN ASSISTANT

## 2019-03-01 PROCEDURE — 1101F PT FALLS ASSESS-DOCD LE1/YR: CPT | Mod: HCNC,CPTII,S$GLB, | Performed by: PHYSICIAN ASSISTANT

## 2019-03-01 PROCEDURE — 99999 PR PBB SHADOW E&M-EST. PATIENT-LVL IV: CPT | Mod: PBBFAC,HCNC,, | Performed by: PHYSICIAN ASSISTANT

## 2019-03-01 PROCEDURE — 99214 OFFICE O/P EST MOD 30 MIN: CPT | Mod: HCNC,S$GLB,, | Performed by: PHYSICIAN ASSISTANT

## 2019-03-01 PROCEDURE — 99214 PR OFFICE/OUTPT VISIT, EST, LEVL IV, 30-39 MIN: ICD-10-PCS | Mod: HCNC,S$GLB,, | Performed by: PHYSICIAN ASSISTANT

## 2019-03-01 NOTE — TELEPHONE ENCOUNTER
----- Message from Jonathon Lloyd sent at 3/1/2019 11:55 AM CST -----  Contact: [facility]   Type: Needs Medical Advice    Who Called:  [facility] home health physical therapist- Angella Symptoms (please be specific):  NA How long has patient had these symptoms:    Pharmacy name and phone #:  NA   Best Call Back Number: [phone]  Additional Information: Physical therapist requesting to speak with  Np Mr Mandel  regarding the patient weight bearing

## 2019-03-03 NOTE — PROGRESS NOTES
CC:Back pain    HPI: The patient is an 89-year-old woman with a history of lumbar degenerative disc disease and spondylosis who presents in self-referral for low back pain.  She returns in follow-up today with back and hip pain. Since her last visit, she reports falling on 02/10 and there is a question of if she fractured her hip than or prior because the x-ray was read as subacute fracture.  She reports standing in the hospital for week and then at [facility] for week and now she is at home with home health care.  She feels that her back and hip pain is currently tolerable.  She is taking Gabapentin daily and Tylenol every now and then.  Her son states that she has some Tramadol at home but has not had ask for recently.  She has not been full weight-bearing because of her left hip.  No surgery was required and she seems to be recovering from this well.    Intervention history: She had undergone a couple epidural steroid injections 15 years ago which had given her relief until A few years ago.  By Dr De Jesus she underwent epidural steroid injections, facet injections, RFA is, SI joint injections without any major relief.  The patient is status post L5/S1 TIFFANIE on 8/30/17 with no relief.  She has taken hydrocodone in the past but this made her nauseous.    ROS:She reports itching, rhinorrhea, easy bruising and back pain.  Balance of review of systems is negative.    Medical, surgical, family and social history reviewed elsewhere in record.    Medications/Allergies: See med card    Vitals:    03/01/19 1010   BP: 123/65   Pulse: (!) 57   Temp: 98.6 °F (37 °C)   TempSrc: Oral   Weight: 53 kg (116 lb 13.5 oz)   PainSc:   6   PainLoc: Leg         Physical exam:  Gen: A and O x3, pleasant, well-groomed  Skin: No rashes or obvious lesions  HEENT: PERRLA, no obvious deformities on ears or in canals.   CVS: Regular rate and rhythm, normal S1 and S2, no murmurs.  Resp: Clear to auscultation bilaterally, no wheezes or  rales.  Abdomen: Soft, NT/ND, normal bowel sounds present.  Musculoskeletal: Presents in a wheelchair.    Neuro:  Lower extremities: 5/5 strength right side and left foot plantar flexion and dorsiflexion 5/5.  Other left leg strength not tested due to recent hip fracture.  Reflexes: Patellar 2+, Achilles 2+ bilaterally.  Sensory:  Intact and symmetrical to light touch and pinprick in L2-S1 dermatomes bilaterally.    Lumbar spine exam:  Range of motion, Bryon's test, hip examination and straight leg raise is deferred due to recent fracture.  No major tenderness to palpation over the lumbar muscles.    Imaging:  MRI lumbar spine 8/22/13  T11/12: There is annular disk bulging which combines with some degenerative facet changes to produce mild canal and foraminal distortion.  T12/L1: There is annular disk bulging and there is mild resultant central canal stenosis. Ligamentous hypertrophy is present.  L1/2: Annular disk bulge and osteophyte formation produce moderate canal stenosis. There is right left foraminal stenosis. Degenerative facet changes are noted bilaterally.  L2/3: Annular disk bulge and osteophyte formation combined with facet and ligamentous hypertrophic changes to produce moderate foraminal narrowing and mild central canal stenosis.  L3/4: There is annular disk bulging which combines with facet and ligamentous hypertrophy to produce moderate canal and foraminal stenosis.  L4/5: Prominent degenerative facet changes are present and there is a central disk extrusion which extends craniad to the disk level. This combines with facet and ligamentous hypertrophy and the spondylolisthesis to produce prominent canal stenosis. The central canal is narrowed to approximately 6 mm. There is prominent foraminal narrowing bilaterally as well.  L5/S1: Prominent degenerative facet changes are present and there is annular disk bulging with moderate canal and foraminal stenosis.     MRI lumbar spine 1/28/16  The lumbar  vertebral bodies show no evidence of acute compression fracture or a pathologic marrow replacement process. There is degenerative disc desiccation, disc space narrowing, and marginal osteophyte formation at every lumbar level with prominent degenerative endplate changes noted at T12-L1 through L3-L4. There is a grade I retrolisthesis of T12 on L1, L1 on L2, L2 on L3, and L3 on L4. There is a grade I anterolisthesis of L4 on L5 and L5 on S1. The conus medullaris terminates at L1. The visualized retroperitoneal/abdominal soft tissue structures reveal cholelithiasis (series 5 image 7).  T11-T12: There is a broad disc bulge with a minimal superimposed broad central disc protrusion.  T12-L1: There is a grade I retrolisthesis of T12 on L1. There is ligamentum flavum thickening and facet arthropathy. There is uncovering of the intervertebral disc and a superimposed disc bulge. No central canal or neuroforaminal stenosis. No disc protrusion or extrusion.  L1-L2: There is a grade I retrolisthesis of L1 on L2. There is associated uncovering of the intervertebral disc. There is ligamentum flavum thickening and facet arthropathy. There is moderate left and right neuroforaminal stenosis. No overall central canal stenosis.  L2-L3: There is a grade I retrolisthesis of L2 on L3 with uncovering of the intervertebral disc. There is a broad disc bulge which extends into both neural foramina there is ligamentum flavum thickening and facet arthropathy. There is moderate central canal stenosis. There is moderate bilateral neuroforaminal stenosis.  L3-L4: There is a grade I retrolisthesis of L3 on L4. There is uncovering of the intervertebral disc and a superimposed disc bulge. There is ligamentum flavum thickening and facet arthropathy. There is moderate-severe central canal stenosis. There is mild-moderate left and moderate right neuroforaminal stenosis.  L4-L5: There is a grade I anterolisthesis of L4 on L5 with associated uncovering  of the intervertebral disc and a superimposed disc bulge which extends into both neural foramina. There is left ligamentum flavum thickening and severe bilateral facet arthropathy with a left facet joint effusion appreciated. There is severe central canal stenosis, moderate left, and mild right neuroforaminal stenosis  L5-S1: There is a minimal grade I anterolisthesis of L5 on S1 with associated uncovering of the intervertebral disc. There is severe bilateral facet arthropathy and ligamentum flavum. There is moderate central canal stenosis. There is mild-moderate right neuroforaminal stenosis.      Assessment:  The patient is an 89-year-old woman with a history of lumbar degenerative disc disease and spondylosis who presents in self-referral for low back pain.     1. DDD (degenerative disc disease), lumbar     2. Spinal stenosis of lumbar region with neurogenic claudication     3. Gait instability     4. Spondylolisthesis of lumbar region     5. Closed fracture of left hip with routine healing, subsequent encounter       Plan:  1.  The patient overall is doing well in regards to her back and hip pain.  We viewed her hip x-rays.  She is taking gabapentin every day with relief and Tylenol occasionally.  She has not had to take any Tramadol in a while but her son has some in case she asks for it.  She does not feel that she currently need that medication and will follow up here on an as-needed basis for now.

## 2019-03-08 ENCOUNTER — OFFICE VISIT (OUTPATIENT)
Dept: FAMILY MEDICINE | Facility: CLINIC | Age: 84
End: 2019-03-08
Payer: MEDICARE

## 2019-03-08 ENCOUNTER — OFFICE VISIT (OUTPATIENT)
Dept: CARDIOLOGY | Facility: CLINIC | Age: 84
End: 2019-03-08
Payer: MEDICARE

## 2019-03-08 VITALS
RESPIRATION RATE: 18 BRPM | HEART RATE: 73 BPM | HEART RATE: 68 BPM | SYSTOLIC BLOOD PRESSURE: 118 MMHG | DIASTOLIC BLOOD PRESSURE: 64 MMHG | BODY MASS INDEX: 21.35 KG/M2 | WEIGHT: 116 LBS | BODY MASS INDEX: 22.19 KG/M2 | OXYGEN SATURATION: 96 % | WEIGHT: 120.56 LBS | SYSTOLIC BLOOD PRESSURE: 115 MMHG | DIASTOLIC BLOOD PRESSURE: 61 MMHG | HEIGHT: 62 IN | HEIGHT: 62 IN

## 2019-03-08 DIAGNOSIS — I35.0 NONRHEUMATIC AORTIC VALVE STENOSIS: ICD-10-CM

## 2019-03-08 DIAGNOSIS — I48.0 PAROXYSMAL ATRIAL FIBRILLATION: Primary | ICD-10-CM

## 2019-03-08 DIAGNOSIS — G45.9 TIA (TRANSIENT ISCHEMIC ATTACK): ICD-10-CM

## 2019-03-08 DIAGNOSIS — S72.002S CLOSED FRACTURE OF LEFT HIP, SEQUELA: Primary | ICD-10-CM

## 2019-03-08 PROBLEM — S72.002A CLOSED FRACTURE OF NECK OF LEFT FEMUR: Status: ACTIVE | Noted: 2019-03-08

## 2019-03-08 PROCEDURE — 1101F PT FALLS ASSESS-DOCD LE1/YR: CPT | Mod: HCNC,CPTII,S$GLB, | Performed by: FAMILY MEDICINE

## 2019-03-08 PROCEDURE — 99999 PR PBB SHADOW E&M-EST. PATIENT-LVL IV: CPT | Mod: PBBFAC,HCNC,, | Performed by: FAMILY MEDICINE

## 2019-03-08 PROCEDURE — 99499 UNLISTED E&M SERVICE: CPT | Mod: HCNC,S$GLB,, | Performed by: FAMILY MEDICINE

## 2019-03-08 PROCEDURE — 1101F PR PT FALLS ASSESS DOC 0-1 FALLS W/OUT INJ PAST YR: ICD-10-PCS | Mod: HCNC,CPTII,S$GLB, | Performed by: FAMILY MEDICINE

## 2019-03-08 PROCEDURE — 99999 PR PBB SHADOW E&M-EST. PATIENT-LVL III: CPT | Mod: PBBFAC,HCNC,, | Performed by: INTERNAL MEDICINE

## 2019-03-08 PROCEDURE — 99213 PR OFFICE/OUTPT VISIT, EST, LEVL III, 20-29 MIN: ICD-10-PCS | Mod: HCNC,S$GLB,, | Performed by: FAMILY MEDICINE

## 2019-03-08 PROCEDURE — 99214 PR OFFICE/OUTPT VISIT, EST, LEVL IV, 30-39 MIN: ICD-10-PCS | Mod: HCNC,S$GLB,, | Performed by: INTERNAL MEDICINE

## 2019-03-08 PROCEDURE — 99213 OFFICE O/P EST LOW 20 MIN: CPT | Mod: HCNC,S$GLB,, | Performed by: FAMILY MEDICINE

## 2019-03-08 PROCEDURE — 99999 PR PBB SHADOW E&M-EST. PATIENT-LVL IV: ICD-10-PCS | Mod: PBBFAC,HCNC,, | Performed by: FAMILY MEDICINE

## 2019-03-08 PROCEDURE — 1101F PT FALLS ASSESS-DOCD LE1/YR: CPT | Mod: HCNC,CPTII,S$GLB, | Performed by: INTERNAL MEDICINE

## 2019-03-08 PROCEDURE — 99214 OFFICE O/P EST MOD 30 MIN: CPT | Mod: HCNC,S$GLB,, | Performed by: INTERNAL MEDICINE

## 2019-03-08 PROCEDURE — 1101F PR PT FALLS ASSESS DOC 0-1 FALLS W/OUT INJ PAST YR: ICD-10-PCS | Mod: HCNC,CPTII,S$GLB, | Performed by: INTERNAL MEDICINE

## 2019-03-08 PROCEDURE — 99999 PR PBB SHADOW E&M-EST. PATIENT-LVL III: ICD-10-PCS | Mod: PBBFAC,HCNC,, | Performed by: INTERNAL MEDICINE

## 2019-03-08 PROCEDURE — 99499 RISK ADDL DX/OHS AUDIT: ICD-10-PCS | Mod: HCNC,S$GLB,, | Performed by: FAMILY MEDICINE

## 2019-03-08 NOTE — PROGRESS NOTES
"Subjective:       Patient ID: Michela Calvert is a 89 y.o. female.    Chief Complaint: Follow-up    Here for f/u on recent hospitalization.    Patient Name: Michela Calvert  MRN: [id number]  Admission Date: 2/12/2019  Hospital Length of Stay: 0 days  Discharge Date and Time:  02/15/2019 10:21 AM  Attending Physician: Francis Gao MD   Discharging Provider: Francis Gao MD  Primary Care Provider: Enzo Nieto MD  HPI:   Michela Cerda is an 89yoF with HTN, pAF, hypothyroid, and osteoarthritis who presented to [facility] ED on 2/12/19 for evaluation of confusion/difficulty speaking. She initially presented to her orthopedist for evaluation of left hip pain after a fall. During the visit, she developed slurred speech, described as talking "like she was drunk," which lasted about 30 minutes. She denied any other associated symptoms such as unilateral weakness, numbness, facial droop, etc.  She  denied visual changes or headache. She was noted to be hypotensive with SBP in the 80s during the visit. She was advised to present to the ED for further evaluation. The episode resolved and she returned to her baseline at arrival to the ED.  She was found to have afib RVR, which she has had previously and is followed by Dr. Puentes. She was taken off Eliquis about one month ago for unclear reasons. [facility] was consulted for further evaluation.  Hospital Course:   Patient admitted with fall with hip pain.  TIA symptoms resolved by the time seen in the ER, with noted hypotension and in AF RVR. Cardio and Neuro consulted. MRI brain negative for ischemia. Suspect symptoms were related to low BP on arrival. Home lisinopril held at discharge and BB dose decreased, with addition of amio for rate/rhythm control. Anticoagulation with eliquis recommended for stroke ppx.   Ortho consulted for subacute left hip fx on xray. Recommend WBAT, PT/OT, and follow-up xray in one week. She received 3 doses of IV Rocephin for questionable UTI no " further antibiotics indicated (urine culture with multiple organisms). She worked with PT/OT and SNF recommended. She was discharged to [facility] in stable condition.     She is being treated non-operatively for left femoral neck fracture.  She reports acute onset of pain earlier today and cannot rabia the left leg without pain.  No pain at rest.  Pain 6/10 presently while seated  Pain is unbearable with weight bearing and her son had to literally pick her up and put her in the car.      living at the [place].  Using walker and wheelchair for ambulation  Getting HH    afib - rate- controlled on Lopressor 25mg BID; taking asa 81mg daily; taking amiodarone 200mg daily  Hypothyroidism - tolerating Synthroid 75mcg daily  Lumbar DDD - taking gabapentin and following with pain management; using tramadol as needed    BMs controlled on Miralax daily           Past Medical History:   Diagnosis Date    Atrial fibrillation     Cataracts, bilateral     Chronic pain syndrome 7/25/2018    DDD (degenerative disc disease), lumbar     DDD (degenerative disc disease), lumbar     Heart murmur 01/2017    HLD (hyperlipidemia)     no medication taken    Hypertension 12/27/2018    Hypothyroidism     Lichen sclerosus et atrophicus     Rectal/Vaginal areas    Lumbar spinal stenosis     Lumbar spondylosis     Mitral valve disorder 01/2017    Asymptomatic    Osteopenia     Urinary incontinence        Past Surgical History:   Procedure Laterality Date    CATARACT EXTRACTION EXTRACAPSULAR W/ INTRAOCULAR LENS IMPLANTATION Bilateral     HYSTERECTOMY      INJECTION-STEROID-EPIDURAL-LUMBAR L5/S1 N/A 8/30/2017    Performed by [facility] at [facility]    INSERTION, DORSAL COLUMN NEUROSTIMULATOR, FOR TRIAL N/A 7/25/2018    Per[facility] by [facility] at [facility] OR    spinal ablation  12/2017    TONSILLECTOMY         Review of patient's allergies indicates:   Allergen Reactions    Nitrofuran analogues Nausea Only     Ciprofloxacin Nausea And Vomiting       Social History     Socioeconomic History    Marital status:      Spouse name: Not on file    Number of children: Not on file    Years of education: Not on file    Highest education level: Not on file   Social Needs    Financial resource strain: Not on file    Food insecurity - worry: Not on file    Food insecurity - inability: Not on file    Transportation needs - medical: Not on file    Transportation needs - non-medical: Not on file   Occupational History    Not on file   Tobacco Use    Smoking status: Never Smoker    Smokeless tobacco: Never Used   Substance and Sexual Activity    Alcohol use: Yes     Comment: 1 gin & tonic daily @ 5pm    Drug use: No    Sexual activity: No   Other Topics Concern    Not on file   Social History Narrative    Not on file       No current facility-administered medications on file prior to visit.      Current Outpatient Medications on File Prior to Visit   Medication Sig Dispense Refill    acetaminophen (TYLENOL) 650 MG TbSR Take 650 mg by mouth every 8 (eight) hours as needed.      amiodarone (PACERONE) 200 MG Tab Take 1 tablet (200 mg total) by mouth once daily. 30 tablet 1    ANTIOX #8/OM3/DHA/EPA/LUT/ZEAX (PRESERVISION AREDS 2, OMEGA-3, ORAL) Take 1 tablet by mouth once daily.       apixaban (ELIQUIS) 2.5 mg Tab Take 1 tablet (2.5 mg total) by mouth 2 (two) times daily. 60 tablet 1    calcium carbonate/vitamin D2 (LIQUID CALCIUM 600-VITAMIN D ORAL) Take 1 capsule by mouth once daily.      gabapentin (NEURONTIN) 300 MG capsule Take 2 capsules (600 mg total) by mouth 3 (three) times daily. 180 capsule 5    hydrocortisone 2.5 % cream Apply to affected areas twice daily. 20 g 6    lactobacillus rhamnosus GG (CULTURELLE) 10 billion cell capsule Take 1 capsule by mouth once daily.      levothyroxine (SYNTHROID) 75 MCG tablet Take 1 tablet (75 mcg total) by mouth once daily. 90 tablet 3    metoprolol tartrate  "(LOPRESSOR) 25 MG tablet Take 1 tablet (25 mg total) by mouth 2 (two) times daily. 60 tablet 1    mupirocin (BACTROBAN) 2 % ointment Apply topically 3 (three) times daily. 22 g 0    traMADol (ULTRAM) 50 mg tablet Take 1 tablet (50 mg total) by mouth every 8 (eight) hours as needed for Pain. 12 tablet 0    polyethylene glycol (GLYCOLAX) 17 gram PwPk Take 17 g by mouth 2 (two) times daily as needed.  0       No family history on file.    Review of Systems   Constitutional: Positive for fatigue. Negative for appetite change, chills, fever and unexpected weight change.   HENT: Negative for sore throat and trouble swallowing.    Eyes: Negative for pain and visual disturbance.   Respiratory: Negative for cough, shortness of breath and wheezing.    Cardiovascular: Positive for palpitations. Negative for chest pain.   Gastrointestinal: Negative for abdominal pain, blood in stool, diarrhea, nausea and vomiting.   Genitourinary: Negative for difficulty urinating, dysuria and hematuria.   Musculoskeletal: Positive for arthralgias (left hip) and gait problem. Negative for neck pain.   Skin: Negative for rash and wound.   Neurological: Positive for weakness. Negative for dizziness, numbness and headaches.   Hematological: Negative for adenopathy.   Psychiatric/Behavioral: Negative for dysphoric mood.       Objective:      /64   Pulse 68   Resp 18   Ht 5' 2" (1.575 m)   Wt 54.7 kg (120 lb 9.1 oz)   SpO2 96%   BMI 22.05 kg/m²   Physical Exam   Constitutional: She is oriented to person, place, and time. She appears well-developed and well-nourished.   HENT:   Head: Normocephalic.   Mouth/Throat: Oropharynx is clear and moist. No oropharyngeal exudate or posterior oropharyngeal erythema.   Eyes: Conjunctivae and EOM are normal. Pupils are equal, round, and reactive to light.   Neck: Normal range of motion. Neck supple. No thyromegaly present.   Cardiovascular: Normal rate, regular rhythm, S1 normal, S2 normal, normal " heart sounds and intact distal pulses. Exam reveals no gallop and no friction rub.   No murmur heard.  Pulmonary/Chest: Effort normal and breath sounds normal. She has no wheezes. She has no rales.   Abdominal: Normal appearance.   Musculoskeletal:        Left hip: She exhibits decreased range of motion.        Right lower leg: She exhibits no edema.        Left lower leg: She exhibits no edema.   Patient c/o severe pain with external and internal hip rotation while seated   Lymphadenopathy:     She has no cervical adenopathy.   Neurological: She is alert and oriented to person, place, and time. No cranial nerve deficit. Gait normal.   Skin: Skin is warm and intact. No rash noted.   Psychiatric: She has a normal mood and affect.       Results for orders placed or performed during the hospital encounter of 02/12/19   Urine culture   Result Value Ref Range    Urine Culture, Routine       Multiple organisms isolated. None in predominance.  Repeat if    Urine Culture, Routine clinically necessary.    CBC auto differential   Result Value Ref Range    WBC 14.47 (H) 3.90 - 12.70 K/uL    RBC 4.17 4.00 - 5.40 M/uL    Hemoglobin 12.8 12.0 - 16.0 g/dL    Hematocrit 40.0 37.0 - 48.5 %    MCV 96 82 - 98 fL    MCH 30.7 27.0 - 31.0 pg    MCHC 32.0 32.0 - 36.0 g/dL    RDW 15.4 (H) 11.5 - 14.5 %    Platelets 165 150 - 350 K/uL    MPV 12.1 9.2 - 12.9 fL    Immature Granulocytes 0.6 (H) 0.0 - 0.5 %    Gran # (ANC) 10.5 (H) 1.8 - 7.7 K/uL    Immature Grans (Abs) 0.09 (H) 0.00 - 0.04 K/uL    Lymph # 2.1 1.0 - 4.8 K/uL    Mono # 1.2 (H) 0.3 - 1.0 K/uL    Eos # 0.6 (H) 0.0 - 0.5 K/uL    Baso # 0.05 0.00 - 0.20 K/uL    nRBC 0 0 /100 WBC    Gran% 72.9 38.0 - 73.0 %    Lymph% 14.2 (L) 18.0 - 48.0 %    Mono% 8.2 4.0 - 15.0 %    Eosinophil% 3.8 0.0 - 8.0 %    Basophil% 0.3 0.0 - 1.9 %    Differential Method Automated    Comprehensive metabolic panel   Result Value Ref Range    Sodium 136 136 - 145 mmol/L    Potassium 3.8 3.5 - 5.1 mmol/L     Chloride 100 95 - 110 mmol/L    CO2 25 22 - 31 mmol/L    Glucose 103 70 - 110 mg/dL    BUN, Bld 27 (H) 7 - 18 mg/dL    Creatinine 1.25 0.50 - 1.40 mg/dL    Calcium 9.0 8.4 - 10.2 mg/dL    Total Protein 7.3 6.0 - 8.4 g/dL    Albumin 4.2 3.5 - 5.2 g/dL    Total Bilirubin 0.7 0.2 - 1.3 mg/dL    Alkaline Phosphatase 75 38 - 145 U/L    AST 37 (H) 14 - 36 U/L    ALT 26 10 - 44 U/L    Anion Gap 11 8 - 16 mmol/L    eGFR if African American 44 (A) >60 mL/min/1.73 m^2    eGFR if non African American 38 (A) >60 mL/min/1.73 m^2   Troponin I   Result Value Ref Range    Troponin I 0.160 (HH) 0.012 - 0.034 ng/mL   NT-Pro Natriuretic Peptide   Result Value Ref Range    NT-proBNP 6290 (H) 5 - 1800 pg/mL   Urinalysis   Result Value Ref Range    Specimen UA Urine, Unspecified     Color, UA Yellow Yellow, Straw, Katelyn    Appearance, UA Cloudy (A) Clear    pH, UA 6.0 5.0 - 8.0    Specific Gravity, UA 1.020 1.005 - 1.030    Protein, UA Negative Negative    Glucose, UA Negative Negative    Ketones, UA 1+ (A) Negative    Bilirubin (UA) Negative Negative    Occult Blood UA Negative Negative    Nitrite, UA Negative Negative    Urobilinogen, UA 1.0 <2.0 EU/dL    Leukocytes, UA 2+ (A) Negative   Troponin I now and in 2 hours   Result Value Ref Range    Troponin I 0.095 (H) 0.012 - 0.034 ng/mL   APTT   Result Value Ref Range    aPTT 35.6 24.6 - 36.7 sec   Troponin I in 6 hours   Result Value Ref Range    Troponin I 0.079 (H) 0.012 - 0.034 ng/mL   Troponin I now and in 2 hours   Result Value Ref Range    Troponin I 0.101 (H) 0.012 - 0.034 ng/mL   Protime-INR   Result Value Ref Range    PT 13.7 11.8 - 14.7 sec    INR 1.1    TSH   Result Value Ref Range    TSH 2.790 0.400 - 4.000 uIU/mL   Hemoglobin A1c   Result Value Ref Range    Hemoglobin A1C 5.0 0.0 - 5.6 %    Estimated Avg Glucose 97 68 - 131 mg/dL   RBC, UA   Result Value Ref Range    RBC, UA 7 (H) 0 - 4 /hpf   Squamous Epithelial, UA   Result Value Ref Range    Squam Epithel, UA 12 /hpf    WBC, UA   Result Value Ref Range    WBC, UA 34 (H) 0 - 5 /hpf   Hyaline Casts, UA   Result Value Ref Range    Hyaline Casts, UA 0 0 - 1 /lpf   Bacteria, UA   Result Value Ref Range    Bacteria, UA Few (A) None-Occ /hpf   Urinalysis Microscopic   Result Value Ref Range    RBC, UA 7 (H) 0 - 4 /hpf    WBC, UA 34 (H) 0 - 5 /hpf    Bacteria, UA Few (A) None-Occ /hpf    Squam Epithel, UA 12 /hpf    Hyaline Casts, UA 0 0 - 1 /lpf    Microscopic Comment SEE COMMENT    Comprehensive metabolic panel   Result Value Ref Range    Sodium 136 136 - 145 mmol/L    Potassium 4.1 3.5 - 5.1 mmol/L    Chloride 103 95 - 110 mmol/L    CO2 24 22 - 31 mmol/L    Glucose 115 (H) 70 - 110 mg/dL    BUN, Bld 18 7 - 18 mg/dL    Creatinine 0.66 0.50 - 1.40 mg/dL    Calcium 8.5 8.4 - 10.2 mg/dL    Total Protein 7.0 6.0 - 8.4 g/dL    Albumin 3.7 3.5 - 5.2 g/dL    Total Bilirubin 0.9 0.2 - 1.3 mg/dL    Alkaline Phosphatase 67 38 - 145 U/L    AST 44 (H) 14 - 36 U/L    ALT 21 10 - 44 U/L    Anion Gap 9 8 - 16 mmol/L    eGFR if African American >60 >60 mL/min/1.73 m^2    eGFR if non African American >60 >60 mL/min/1.73 m^2   Magnesium   Result Value Ref Range    Magnesium 2.0 1.6 - 2.6 mg/dL   Phosphorus   Result Value Ref Range    Phosphorus 2.7 2.7 - 4.5 mg/dL   Lipid panel   Result Value Ref Range    Cholesterol 150 120 - 199 mg/dL    Triglycerides 155 (H) 30 - 150 mg/dL    HDL 34 (L) 40 - 75 mg/dL    LDL Cholesterol 85.0 63.0 - 159.0 mg/dL    HDL/Chol Ratio 22.7 20.0 - 50.0 %    Total Cholesterol/HDL Ratio 4.4 2.0 - 5.0    Non-HDL Cholesterol 116 mg/dL   CBC auto differential   Result Value Ref Range    WBC 11.40 3.90 - 12.70 K/uL    RBC 3.78 (L) 4.00 - 5.40 M/uL    Hemoglobin 11.8 (L) 12.0 - 16.0 g/dL    Hematocrit 35.3 (L) 37.0 - 48.5 %    MCV 93 82 - 98 fL    MCH 31.2 (H) 27.0 - 31.0 pg    MCHC 33.4 32.0 - 36.0 g/dL    RDW 15.2 (H) 11.5 - 14.5 %    Platelets 135 (L) 150 - 350 K/uL    MPV 12.2 9.2 - 12.9 fL    Immature Granulocytes 0.5 0.0 - 0.5  %    Gran # (ANC) 8.7 (H) 1.8 - 7.7 K/uL    Immature Grans (Abs) 0.06 (H) 0.00 - 0.04 K/uL    Lymph # 1.3 1.0 - 4.8 K/uL    Mono # 1.0 0.3 - 1.0 K/uL    Eos # 0.3 0.0 - 0.5 K/uL    Baso # 0.04 0.00 - 0.20 K/uL    nRBC 0 0 /100 WBC    Gran% 76.4 (H) 38.0 - 73.0 %    Lymph% 11.6 (L) 18.0 - 48.0 %    Mono% 8.4 4.0 - 15.0 %    Eosinophil% 2.7 0.0 - 8.0 %    Basophil% 0.4 0.0 - 1.9 %    Differential Method Automated    APTT   Result Value Ref Range    aPTT 56.2 (H) 24.6 - 36.7 sec   APTT   Result Value Ref Range    aPTT 136.9 (HH) 24.6 - 36.7 sec   APTT   Result Value Ref Range    aPTT 137.3 (HH) 24.6 - 36.7 sec   Comprehensive metabolic panel   Result Value Ref Range    Sodium 139 136 - 145 mmol/L    Potassium 3.5 3.5 - 5.1 mmol/L    Chloride 105 95 - 110 mmol/L    CO2 27 22 - 31 mmol/L    Glucose 98 70 - 110 mg/dL    BUN, Bld 17 7 - 18 mg/dL    Creatinine 0.64 0.50 - 1.40 mg/dL    Calcium 9.0 8.4 - 10.2 mg/dL    Total Protein 6.5 6.0 - 8.4 g/dL    Albumin 3.5 3.5 - 5.2 g/dL    Total Bilirubin 0.7 0.2 - 1.3 mg/dL    Alkaline Phosphatase 74 38 - 145 U/L    AST 29 14 - 36 U/L    ALT 27 10 - 44 U/L    Anion Gap 7 (L) 8 - 16 mmol/L    eGFR if African American >60 >60 mL/min/1.73 m^2    eGFR if non African American >60 >60 mL/min/1.73 m^2   CBC auto differential   Result Value Ref Range    WBC 11.48 3.90 - 12.70 K/uL    RBC 3.83 (L) 4.00 - 5.40 M/uL    Hemoglobin 11.6 (L) 12.0 - 16.0 g/dL    Hematocrit 36.2 (L) 37.0 - 48.5 %    MCV 95 82 - 98 fL    MCH 30.3 27.0 - 31.0 pg    MCHC 32.0 32.0 - 36.0 g/dL    RDW 15.0 (H) 11.5 - 14.5 %    Platelets 149 (L) 150 - 350 K/uL    MPV 12.2 9.2 - 12.9 fL    Immature Granulocytes 0.4 0.0 - 0.5 %    Gran # (ANC) 7.3 1.8 - 7.7 K/uL    Immature Grans (Abs) 0.05 (H) 0.00 - 0.04 K/uL    Lymph # 1.8 1.0 - 4.8 K/uL    Mono # 1.2 (H) 0.3 - 1.0 K/uL    Eos # 1.1 (H) 0.0 - 0.5 K/uL    Baso # 0.04 0.00 - 0.20 K/uL    nRBC 0 0 /100 WBC    Gran% 63.6 38.0 - 73.0 %    Lymph% 15.7 (L) 18.0 - 48.0 %     Mono% 10.7 4.0 - 15.0 %    Eosinophil% 9.3 (H) 0.0 - 8.0 %    Basophil% 0.3 0.0 - 1.9 %    Differential Method Automated    Transthoracic echo (TTE) complete (Cupid Only)   Result Value Ref Range    LVIDS 2.34 2.1 - 4.0 cm    STJ 2.71 cm    AV mean gradient 19 mmHg    Ao VTI 53.3 cm    IVS 1.10 (A) 0.6 - 1.1 cm    LA size 3.9 cm    LVIDD 3.42 (A) 3.5 - 6.0 cm    LVOT diameter 2.1 cm    LVOT peak VTI 24.6 cm    PW 1.04 0.6 - 1.1 cm    MV Peak A Sylvester 2.11 m/s    E wave decelartion time 254 msec    RA Major Axis 5.04 cm    RA Width 2.63 cm    TR Max Sylvester 3.85 m/s    LA WIDTH 3.51 cm    PV PEAK VELOCITY 119 cm/s    BSA 1.53 m2    TDI SEPTAL 0.05     LV LATERAL E/E' RATIO 31.60     LV SEPTAL E/E' RATIO 31.60     TDI LATERAL 0.05     FS 32 28 - 44 %    LA volume 59.08 cm3    LV mass 110.32 g    Left Ventricle Relative Wall Thickness 0.61 cm    AV valve area 1.60 cm2    AV index (prosthetic) 0.46     E/A ratio 0.75     Mean e' 0.05     LVOT area 3.46 cm2    LVOT peak sylvester 1.4 m/s    LVOT stroke volume 85.16 cm3    E/E' ratio 31.60     MV Peak E Sylvester 1.58 m/s    LA Volume Index 38.7 mL/m2    LV Mass Index 72.2 g/m2    Left Atrium Minor Axis 5.52 cm    Left Atrium Major Axis 4.70 cm    Triscuspid Valve Regurgitation Peak Gradient 59.29 mmHg    Right Atrial Pressure (from IVC) 3 mmHg    TV rest pulmonary artery pressure 62 mmHg       Assessment:       1. Closed fracture of left hip, sequela        Plan:       Closed fracture of left hip, sequela          Will send directly to hospital since she cannot bear weight on left leg and cannot abduct or rotation the hip with passive motion without pain.  I suspect she possibly displaced the left femoral neck fracture that was being managed non-operatively.  Sone will bring directly to [facility]

## 2019-03-08 NOTE — PROGRESS NOTES
Subjective:    Patient ID:  Michela Calvert is a 89 y.o. female who presents for evaluation of Hospital Follow Up (hospital follow up )      HPI89 yo WF with PAF recently in the hospital with AF and a fall with episode of hypotension and confusion. At discharge she was placed on amiodarone 200mg a day to try to prevent recurrences. Also restarted the eliquis  Because of possible TIA. It was stopped before after consultation with family because of hx of falls. Today she is in sinus with PAC's. Main issue is pain in hip from previous fall. Apparently has no displaced fracture and cannot place weight on it.    Review of Systems   Constitution: Negative for decreased appetite, fever, weakness, malaise/fatigue, weight gain and weight loss.   HENT: Negative for hearing loss and nosebleeds.    Eyes: Negative for visual disturbance.   Cardiovascular: Negative for chest pain, claudication, cyanosis, dyspnea on exertion, irregular heartbeat, leg swelling, near-syncope, orthopnea, palpitations, paroxysmal nocturnal dyspnea and syncope.   Respiratory: Negative for cough, hemoptysis, shortness of breath, sleep disturbances due to breathing, snoring and wheezing.    Endocrine: Negative for cold intolerance, heat intolerance, polydipsia and polyuria.   Hematologic/Lymphatic: Negative for adenopathy and bleeding problem. Does not bruise/bleed easily.   Skin: Negative for color change, itching, poor wound healing, rash and suspicious lesions.   Musculoskeletal: Positive for falls and joint pain. Negative for arthritis, back pain, joint swelling, muscle cramps, muscle weakness and myalgias.   Gastrointestinal: Negative for bloating, abdominal pain, change in bowel habit, constipation, flatus, heartburn, hematemesis, hematochezia, hemorrhoids, jaundice, melena, nausea and vomiting.   Genitourinary: Negative for bladder incontinence, decreased libido, frequency, hematuria, hesitancy and urgency.   Neurological: Negative for brief  "paralysis, difficulty with concentration, excessive daytime sleepiness, dizziness, focal weakness, headaches, light-headedness, loss of balance, numbness and vertigo.   Psychiatric/Behavioral: Negative for altered mental status, depression and memory loss. The patient does not have insomnia and is not nervous/anxious.    Allergic/Immunologic: Negative for environmental allergies, hives and persistent infections.        Objective:    Physical Exam   Constitutional: She is oriented to person, place, and time. She appears well-developed and well-nourished.   /61   Pulse 73   Ht 5' 2" (1.575 m)   Wt 52.6 kg (116 lb)   BMI 21.22 kg/m²      HENT:   Head: Normocephalic and atraumatic.   Right Ear: External ear normal.   Left Ear: External ear normal.   Nose: Nose normal.   Mouth/Throat: Oropharynx is clear and moist.   Eyes: Conjunctivae, EOM and lids are normal. Pupils are equal, round, and reactive to light. Right eye exhibits no discharge. Left eye exhibits no discharge. Right conjunctiva has no hemorrhage. No scleral icterus.   Neck: Normal range of motion. Neck supple. No JVD present. No tracheal deviation present. No thyromegaly present.   Cardiovascular: Normal rate and intact distal pulses. A regularly irregular rhythm present.  Occasional extrasystoles are present. Exam reveals no gallop and no friction rub.   Murmur heard.   Harsh midsystolic murmur is present with a grade of 2/6 at the upper right sternal border radiating to the neck.  Pulmonary/Chest: Effort normal and breath sounds normal. No respiratory distress. She has no wheezes. She has no rales. She exhibits no tenderness. Breasts are symmetrical.   Abdominal: Soft. Bowel sounds are normal. She exhibits no distension and no mass. There is no hepatosplenomegaly or hepatomegaly. There is no tenderness. There is no rebound and no guarding.   Musculoskeletal: Normal range of motion. She exhibits no edema or tenderness.   Lymphadenopathy:     She " has no cervical adenopathy.   Neurological: She is alert and oriented to person, place, and time. She displays normal reflexes. No cranial nerve deficit. Coordination normal.   Skin: Skin is warm and dry. No rash noted. There is erythema. No pallor.   Psychiatric: She has a normal mood and affect. Her behavior is normal. Judgment and thought content normal.   Nursing note and vitals reviewed.        Assessment:       1. Paroxysmal atrial fibrillation    2. TIA (transient ischemic attack)    3. Nonrheumatic aortic valve stenosis         Plan:     Will leave cardiac meds as is as well as low dose eliquis despite fall risk because of recent possible TIA     She needs pain relief for hip and to see primary today    No orders of the defined types were placed in this encounter.    Follow-up in about 3 months (around 6/8/2019).

## 2019-03-28 ENCOUNTER — TELEPHONE (OUTPATIENT)
Dept: CARDIOLOGY | Facility: CLINIC | Age: 84
End: 2019-03-28

## 2019-03-28 NOTE — TELEPHONE ENCOUNTER
junior pt an appt ----- Message from Josesito Cain sent at 3/28/2019  9:08 AM CDT -----  Contact: pt son Cece  Pt is requesting to reschedule their appointment.    Date of current appointment: 4.4.19  Reason for reschedule: pt son, Cece, states pt is still in Rehab and unable to make it.   First Available: 4.25.19  Additional information: Cece is requesting something the week of the 15th    Call back: [phone]  thanks

## 2019-03-29 ENCOUNTER — TELEPHONE (OUTPATIENT)
Dept: FAMILY MEDICINE | Facility: CLINIC | Age: 84
End: 2019-03-29

## 2019-03-29 DIAGNOSIS — E03.4 HYPOTHYROIDISM DUE TO ACQUIRED ATROPHY OF THYROID: ICD-10-CM

## 2019-03-29 RX ORDER — LEVOTHYROXINE SODIUM 88 UG/1
88 TABLET ORAL DAILY
Qty: 90 TABLET | Refills: 3 | Status: SHIPPED | OUTPATIENT
Start: 2019-03-29 | End: 2020-02-14

## 2019-03-29 NOTE — TELEPHONE ENCOUNTER
----- Message from Marianela Salvador sent at 3/29/2019  1:56 PM CDT -----  Please call [facility] / [phone] ... Sugey valenzuela / requesting an appt in 7 days for hospital follow up

## 2019-03-29 NOTE — TELEPHONE ENCOUNTER
Dr. Salas of [facility] has changed pts synthroid from 75 to 88mcg. Son states that he needs the rx changed to be available when she comes home. Pharm verified, please advise. F/u scheduled.

## 2019-04-08 ENCOUNTER — TELEPHONE (OUTPATIENT)
Dept: FAMILY MEDICINE | Facility: CLINIC | Age: 84
End: 2019-04-08

## 2019-04-08 NOTE — TELEPHONE ENCOUNTER
----- Message from Sherrill Harvey sent at 4/8/2019  9:59 AM CDT -----    Type: Needs Medical Advice    Who Called: pt  Yovany russell is calling to  See if   Pt  Can be fitted in at the same  Time  With dad :Virgie moises    Has an dorian  At 11:00  Same  day  Best Call Back Number: [phone]  Additional Information:   Please call  Son to address

## 2019-04-11 ENCOUNTER — OFFICE VISIT (OUTPATIENT)
Dept: FAMILY MEDICINE | Facility: CLINIC | Age: 84
End: 2019-04-11
Payer: MEDICARE

## 2019-04-11 ENCOUNTER — LAB VISIT (OUTPATIENT)
Dept: LAB | Facility: HOSPITAL | Age: 84
End: 2019-04-11
Attending: FAMILY MEDICINE
Payer: MEDICARE

## 2019-04-11 VITALS
HEIGHT: 62 IN | TEMPERATURE: 98 F | SYSTOLIC BLOOD PRESSURE: 114 MMHG | OXYGEN SATURATION: 97 % | BODY MASS INDEX: 20.69 KG/M2 | WEIGHT: 112.44 LBS | DIASTOLIC BLOOD PRESSURE: 72 MMHG | HEART RATE: 58 BPM

## 2019-04-11 DIAGNOSIS — S72.002A CLOSED FRACTURE OF NECK OF LEFT FEMUR, INITIAL ENCOUNTER: Primary | ICD-10-CM

## 2019-04-11 DIAGNOSIS — I10 ESSENTIAL HYPERTENSION: ICD-10-CM

## 2019-04-11 DIAGNOSIS — I48.0 PAROXYSMAL ATRIAL FIBRILLATION: ICD-10-CM

## 2019-04-11 DIAGNOSIS — E03.9 HYPOTHYROIDISM, UNSPECIFIED TYPE: ICD-10-CM

## 2019-04-11 PROBLEM — I95.9 HYPOTENSION: Status: RESOLVED | Noted: 2019-02-12 | Resolved: 2019-04-11
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
## 2019-04-11 PROBLEM — R47.89 SPELL OF CHANGE IN SPEECH: Status: RESOLVED | Noted: 2019-02-12 | Resolved: 2019-04-11

## 2019-04-11 PROBLEM — R53.1 GENERALIZED WEAKNESS: Status: RESOLVED | Noted: 2018-12-27 | Resolved: 2019-04-11

## 2019-04-11 PROBLEM — N17.9 AKI (ACUTE KIDNEY INJURY): Status: RESOLVED | Noted: 2019-02-12 | Resolved: 2019-04-11
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
## 2019-04-11 PROBLEM — I48.91 ATRIAL FIBRILLATION WITH RVR: Status: RESOLVED | Noted: 2018-02-28 | Resolved: 2019-04-11

## 2019-04-11 PROBLEM — E86.1 INTRAVASCULAR VOLUME DEPLETION: Status: RESOLVED | Noted: 2018-12-13 | Resolved: 2019-04-11

## 2019-04-11 LAB
ALBUMIN SERPL BCP-MCNC: 3.6 G/DL (ref 3.5–5.2)
ALP SERPL-CCNC: 85 U/L (ref 55–135)
ALT SERPL W/O P-5'-P-CCNC: 9 U/L (ref 10–44)
ANION GAP SERPL CALC-SCNC: 9 MMOL/L (ref 8–16)
AST SERPL-CCNC: 17 U/L (ref 10–40)
BASOPHILS # BLD AUTO: 0.05 K/UL (ref 0–0.2)
BASOPHILS NFR BLD: 0.5 % (ref 0–1.9)
BILIRUB SERPL-MCNC: 0.4 MG/DL (ref 0.1–1)
BUN SERPL-MCNC: 14 MG/DL (ref 8–23)
CALCIUM SERPL-MCNC: 9.2 MG/DL (ref 8.7–10.5)
CHLORIDE SERPL-SCNC: 102 MMOL/L (ref 95–110)
CO2 SERPL-SCNC: 26 MMOL/L (ref 23–29)
CREAT SERPL-MCNC: 0.8 MG/DL (ref 0.5–1.4)
DIFFERENTIAL METHOD: ABNORMAL
EOSINOPHIL # BLD AUTO: 0.2 K/UL (ref 0–0.5)
EOSINOPHIL NFR BLD: 1.4 % (ref 0–8)
ERYTHROCYTE [DISTWIDTH] IN BLOOD BY AUTOMATED COUNT: 16.1 % (ref 11.5–14.5)
EST. GFR  (AFRICAN AMERICAN): >60 ML/MIN/1.73 M^2
EST. GFR  (NON AFRICAN AMERICAN): >60 ML/MIN/1.73 M^2
GLUCOSE SERPL-MCNC: 93 MG/DL (ref 70–110)
HCT VFR BLD AUTO: 37 % (ref 37–48.5)
HGB BLD-MCNC: 11.6 G/DL (ref 12–16)
IMM GRANULOCYTES # BLD AUTO: 0.06 K/UL (ref 0–0.04)
IMM GRANULOCYTES NFR BLD AUTO: 0.6 % (ref 0–0.5)
LYMPHOCYTES # BLD AUTO: 2 K/UL (ref 1–4.8)
LYMPHOCYTES NFR BLD: 19.3 % (ref 18–48)
MCH RBC QN AUTO: 29.9 PG (ref 27–31)
MCHC RBC AUTO-ENTMCNC: 31.4 G/DL (ref 32–36)
MCV RBC AUTO: 95 FL (ref 82–98)
MONOCYTES # BLD AUTO: 0.9 K/UL (ref 0.3–1)
MONOCYTES NFR BLD: 8.2 % (ref 4–15)
NEUTROPHILS # BLD AUTO: 7.3 K/UL (ref 1.8–7.7)
NEUTROPHILS NFR BLD: 70 % (ref 38–73)
NRBC BLD-RTO: 0 /100 WBC
PLATELET # BLD AUTO: 250 K/UL (ref 150–350)
PMV BLD AUTO: 11.7 FL (ref 9.2–12.9)
POTASSIUM SERPL-SCNC: 4 MMOL/L (ref 3.5–5.1)
PROT SERPL-MCNC: 6.5 G/DL (ref 6–8.4)
RBC # BLD AUTO: 3.88 M/UL (ref 4–5.4)
SODIUM SERPL-SCNC: 137 MMOL/L (ref 136–145)
TSH SERPL DL<=0.005 MIU/L-ACNC: 1.6 UIU/ML (ref 0.4–4)
WBC # BLD AUTO: 10.45 K/UL (ref 3.9–12.7)

## 2019-04-11 PROCEDURE — 84443 ASSAY THYROID STIM HORMONE: CPT | Mod: HCNC

## 2019-04-11 PROCEDURE — 1100F PR PT FALLS ASSESS DOC 2+ FALLS/FALL W/INJURY/YR: ICD-10-PCS | Mod: HCNC,CPTII,S$GLB, | Performed by: FAMILY MEDICINE

## 2019-04-11 PROCEDURE — 80053 COMPREHEN METABOLIC PANEL: CPT | Mod: HCNC

## 2019-04-11 PROCEDURE — 3288F PR FALLS RISK ASSESSMENT DOCUMENTED: ICD-10-PCS | Mod: HCNC,CPTII,S$GLB, | Performed by: FAMILY MEDICINE

## 2019-04-11 PROCEDURE — 99214 OFFICE O/P EST MOD 30 MIN: CPT | Mod: HCNC,S$GLB,, | Performed by: FAMILY MEDICINE

## 2019-04-11 PROCEDURE — 85025 COMPLETE CBC W/AUTO DIFF WBC: CPT | Mod: HCNC

## 2019-04-11 PROCEDURE — 99999 PR PBB SHADOW E&M-EST. PATIENT-LVL IV: ICD-10-PCS | Mod: PBBFAC,HCNC,, | Performed by: FAMILY MEDICINE

## 2019-04-11 PROCEDURE — 99999 PR PBB SHADOW E&M-EST. PATIENT-LVL IV: CPT | Mod: PBBFAC,HCNC,, | Performed by: FAMILY MEDICINE

## 2019-04-11 PROCEDURE — 99214 PR OFFICE/OUTPT VISIT, EST, LEVL IV, 30-39 MIN: ICD-10-PCS | Mod: HCNC,S$GLB,, | Performed by: FAMILY MEDICINE

## 2019-04-11 PROCEDURE — 36415 COLL VENOUS BLD VENIPUNCTURE: CPT | Mod: HCNC,PO

## 2019-04-11 PROCEDURE — 1100F PTFALLS ASSESS-DOCD GE2>/YR: CPT | Mod: HCNC,CPTII,S$GLB, | Performed by: FAMILY MEDICINE

## 2019-04-11 PROCEDURE — 3288F FALL RISK ASSESSMENT DOCD: CPT | Mod: HCNC,CPTII,S$GLB, | Performed by: FAMILY MEDICINE

## 2019-04-11 RX ORDER — MAG HYDROX/ALUMINUM HYD/SIMETH 400-400-40
SUSPENSION, ORAL (FINAL DOSE FORM) ORAL
Refills: 3 | COMMUNITY
Start: 2019-04-01 | End: 2019-12-05 | Stop reason: SDUPTHER

## 2019-04-11 RX ORDER — VIT C/E/ZN/COPPR/LUTEIN/ZEAXAN 250MG-90MG
CAPSULE ORAL
Refills: 3 | COMMUNITY
Start: 2019-04-01 | End: 2019-11-20 | Stop reason: SDUPTHER

## 2019-04-11 NOTE — PROGRESS NOTES
Subjective:       Patient ID: Michela Calvert is a 89 y.o. female.    Chief Complaint: Surgery follow-up    Here for f/u on recent hospitalization.    Admission Date: 3/8/2019  Hospital Length of Stay: 4 days  Discharge Date and Time:  03/12/2019 11:33 AM  HPI:   89 y/o WF w/pmhx of pAfib, HTN, osteoarthritis, DDD, debility who was bib son for evaluation of worsening hip pain at left hip. Hx of fall with left hip fracture about 1 month prior. Since that time she has been evaluated by ortho in clinic and doing well with medical mgt and WBAT up until today. She notes worsening hip pain, severe, refractory to home meds including tramadol and tylenol. Dull quality to pain. Unable to ambulate. At baseline she is mostly scooter bound but dose occasionally ambulate with a walker. I have reviewed the chart for information, dicussed the case with the patient and her son and with the ED RN team. The patient has been started back on her Eliquis for underlying Afib. Today she is currently in NSR. Cards and Ortho consulted. [facility] consulted for admission.   Procedure(s) (LRB):  HEMIARTHROPLASTY, HIP (Left)    Hospital Course:   The patient was admitted for left femoral neck fracture with acute pain and refractory to out patient medical mgt. Orthopedic surgery was consulted and the patient underwent a L heimarthroplasty after discussion with Dr. Leo on 3/9. She had some expected ABLA and post op anemia and required a transfusion of 1uPRBC on 3/10. PT/OT was consulted and the patient was placed on NOAC for DVT prof and afib. PT/OT recommended IPR and SW was consulted to help with placement. The patient was accepted to [facility] and stable for discharge.     She is now back living at the [place].  Using walker and wheelchair for ambulation  Getting HH with [facility] with PT, nurse and aid.    afib - rate- controlled on pacerone, Eliquis, Lopresor 25mg BID; taking asa 81mg daily  Hypothyroidism - tolerating Synthroid 88mcg daily  Lumbar  DDD - using tylenol as needed    BMs controlled on Miralax daily         Past Medical History:   Diagnosis Date    Atrial fibrillation     Cataracts, bilateral     Chronic pain syndrome 7/25/2018    DDD (degenerative disc disease), lumbar     DDD (degenerative disc disease), lumbar     Heart murmur 01/2017    HLD (hyperlipidemia)     no medication taken    Hypertension 12/27/2018    Hypothyroidism     Lichen sclerosus et atrophicus     Rectal/Vaginal areas    Lumbar spinal stenosis     Lumbar spondylosis     Mitral valve disorder 01/2017    Asymptomatic    Osteopenia     Urinary incontinence        Past Surgical History:   Procedure Laterality Date    CATARACT EXTRACTION EXTRACAPSULAR W/ INTRAOCULAR LENS IMPLANTATION Bilateral     HEMIARTHROPLASTY, HIP Left 3/9/2019    [facility]    HYSTERECTOMY      INJECTION-STEROID-EPIDURAL-LUMBAR L5/S1 N/A 8/30/2017    Performed by [facility] at [facility] OR    INSERTION, DORSAL COLUMN NEUROSTIMULATOR, FOR TRIAL N/A 7/25/2018    Performed by Derek [facility] at [facility] OR    spinal ablation  12/2017    TONSILLECTOMY         Review of patient's allergies indicates:   Allergen Reactions    Nitrofuran analogues Nausea Only    Ciprofloxacin Nausea And Vomiting       Social History     Socioeconomic History    Marital status:      Spouse name: Not on file    Number of children: Not on file    Years of education: Not on file    Highest education level: Not on file   Occupational History    Not on file   Social Needs    Financial resource strain: Not on file    Food insecurity:     Worry: Not on file     Inability: Not on file    Transportation needs:     Medical: Not on file     Non-medical: Not on file   Tobacco Use    Smoking status: Never Smoker    Smokeless tobacco: Never Used   Substance and Sexual Activity    Alcohol use: Yes     Comment: 1 gin & tonic daily @ 5pm    Drug use: No    Sexual  activity: Never   Lifestyle    Physical activity:     Days per week: Not on file     Minutes per session: Not on file    Stress: Not on file   Relationships    Social connections:     Talks on phone: Not on file     Gets together: Not on file     Attends Yazidi service: Not on file     Active member of club or organization: Not on file     Attends meetings of clubs or organizations: Not on file     Relationship status: Not on file   Other Topics Concern    Not on file   Social History Narrative    Not on file       Current Outpatient Medications on File Prior to Visit   Medication Sig Dispense Refill    acetaminophen (TYLENOL) 650 MG TbSR Take 650 mg by mouth every 8 (eight) hours as needed.      amiodarone (PACERONE) 200 MG Tab Take 1 tablet (200 mg total) by mouth once daily. 30 tablet 1    apixaban (ELIQUIS) 2.5 mg Tab Take 1 tablet (2.5 mg total) by mouth 2 (two) times daily. 60 tablet 1    docusate sodium (COLACE) 100 MG capsule Take 1 capsule (100 mg total) by mouth 2 (two) times daily.  0    hydrocortisone 2.5 % cream Apply to affected areas twice daily. 20 g 6    lactobacillus rhamnosus GG (CULTURELLE) 10 billion cell capsule Take 1 capsule by mouth once daily.      levothyroxine (SYNTHROID) 88 MCG tablet Take 1 tablet (88 mcg total) by mouth once daily. 90 tablet 3    metoprolol tartrate (LOPRESSOR) 25 MG tablet Take 1 tablet (25 mg total) by mouth 2 (two) times daily. 60 tablet 1    polyethylene glycol (GLYCOLAX) 17 gram PwPk Take 17 g by mouth 2 (two) times daily as needed.  0    CALCIUM 600 WITH VITAMIN D3 600 mg(1,500mg) -500 unit Cap TAKE ONE CAPSULE BY MOUTH ONCE DAILY IN THE MORNING  3    PRESERVISION AREDS-2 [id number] mg-unit-mg-mg Cap TAKE ONE CAPSULE BY MOUTH TWICE DAILY (IN THE MORNING AND IN THE EVENING)  3     No current facility-administered medications on file prior to visit.        No family history on file.    Review of Systems   Constitutional: Positive for fatigue.  "Negative for appetite change, chills, fever and unexpected weight change.   HENT: Negative for sore throat and trouble swallowing.    Eyes: Negative for pain and visual disturbance.   Respiratory: Negative for cough, shortness of breath and wheezing.    Cardiovascular: Positive for palpitations. Negative for chest pain.   Gastrointestinal: Negative for abdominal pain, blood in stool, diarrhea, nausea and vomiting.   Genitourinary: Negative for difficulty urinating, dysuria and hematuria.   Musculoskeletal: Positive for arthralgias (left hip) and gait problem. Negative for neck pain.   Skin: Negative for rash and wound.   Neurological: Positive for weakness. Negative for dizziness, numbness and headaches.   Hematological: Negative for adenopathy.   Psychiatric/Behavioral: Negative for dysphoric mood.       Objective:      /72   Pulse (!) 58   Temp 98.1 °F (36.7 °C) (Oral)   Ht 5' 2" (1.575 m)   Wt 51 kg (112 lb 7 oz)   SpO2 97%   BMI 20.56 kg/m²   Physical Exam   Constitutional: She is oriented to person, place, and time. She appears well-developed and well-nourished.   HENT:   Head: Normocephalic.   Mouth/Throat: Oropharynx is clear and moist. No oropharyngeal exudate or posterior oropharyngeal erythema.   Eyes: Pupils are equal, round, and reactive to light. Conjunctivae and EOM are normal.   Neck: Normal range of motion. Neck supple. No thyromegaly present.   Cardiovascular: Normal rate, regular rhythm, S1 normal, S2 normal, normal heart sounds and intact distal pulses. Exam reveals no gallop and no friction rub.   No murmur heard.  Pulmonary/Chest: Effort normal and breath sounds normal. She has no wheezes. She has no rales.   Abdominal: Normal appearance.   Musculoskeletal:        Right lower leg: She exhibits no edema.        Left lower leg: She exhibits no edema.   Lymphadenopathy:     She has no cervical adenopathy.   Neurological: She is alert and oriented to person, place, and time. No cranial " nerve deficit. Gait normal.   Skin: Skin is warm and intact. No rash noted.   Psychiatric: She has a normal mood and affect.       Results for orders placed or performed in visit on 04/11/19   Comprehensive metabolic panel   Result Value Ref Range    Sodium 137 136 - 145 mmol/L    Potassium 4.0 3.5 - 5.1 mmol/L    Chloride 102 95 - 110 mmol/L    CO2 26 23 - 29 mmol/L    Glucose 93 70 - 110 mg/dL    BUN, Bld 14 8 - 23 mg/dL    Creatinine 0.8 0.5 - 1.4 mg/dL    Calcium 9.2 8.7 - 10.5 mg/dL    Total Protein 6.5 6.0 - 8.4 g/dL    Albumin 3.6 3.5 - 5.2 g/dL    Total Bilirubin 0.4 0.1 - 1.0 mg/dL    Alkaline Phosphatase 85 55 - 135 U/L    AST 17 10 - 40 U/L    ALT 9 (L) 10 - 44 U/L    Anion Gap 9 8 - 16 mmol/L    eGFR if African American >60.0 >60 mL/min/1.73 m^2    eGFR if non African American >60.0 >60 mL/min/1.73 m^2   CBC auto differential   Result Value Ref Range    WBC 10.45 3.90 - 12.70 K/uL    RBC 3.88 (L) 4.00 - 5.40 M/uL    Hemoglobin 11.6 (L) 12.0 - 16.0 g/dL    Hematocrit 37.0 37.0 - 48.5 %    MCV 95 82 - 98 fL    MCH 29.9 27.0 - 31.0 pg    MCHC 31.4 (L) 32.0 - 36.0 g/dL    RDW 16.1 (H) 11.5 - 14.5 %    Platelets 250 150 - 350 K/uL    MPV 11.7 9.2 - 12.9 fL    Immature Granulocytes 0.6 (H) 0.0 - 0.5 %    Gran # (ANC) 7.3 1.8 - 7.7 K/uL    Immature Grans (Abs) 0.06 (H) 0.00 - 0.04 K/uL    Lymph # 2.0 1.0 - 4.8 K/uL    Mono # 0.9 0.3 - 1.0 K/uL    Eos # 0.2 0.0 - 0.5 K/uL    Baso # 0.05 0.00 - 0.20 K/uL    nRBC 0 0 /100 WBC    Gran% 70.0 38.0 - 73.0 %    Lymph% 19.3 18.0 - 48.0 %    Mono% 8.2 4.0 - 15.0 %    Eosinophil% 1.4 0.0 - 8.0 %    Basophil% 0.5 0.0 - 1.9 %    Differential Method Automated    TSH   Result Value Ref Range    TSH 1.598 0.400 - 4.000 uIU/mL     Hospital records reviewed      Assessment:       1. Closed fracture of neck of left femur, initial encounter    2. Hypothyroidism, unspecified type    3. Essential hypertension    4. Paroxysmal atrial fibrillation        Plan:       Closed fracture of  neck of left femur, initial encounter    Hypothyroidism, unspecified type  -     TSH; Future; Expected date: 04/11/2019    Essential hypertension  -     Comprehensive metabolic panel; Future; Expected date: 04/11/2019  -     CBC auto differential; Future; Expected date: 04/11/2019    Paroxysmal atrial fibrillation        Overall healing process progressing as expected  F/u with Dr. Leo as planned  Ok to use Tylenol QHS  Overall stable  Continue present medications  F/u with specialists as planned  Counseled on regular exercise, maintenance of a healthy weight, balanced diet rich in fruits/vegetables and lean protein, and avoidance of unhealthy habits like smoking and excessive alcohol intake.  F/u 4 weeks

## 2019-04-24 RX ORDER — METOPROLOL TARTRATE 25 MG/1
TABLET, FILM COATED ORAL
Qty: 60 TABLET | Refills: 1 | Status: SHIPPED | OUTPATIENT
Start: 2019-04-24 | End: 2019-05-18 | Stop reason: SDUPTHER

## 2019-04-24 RX ORDER — AMIODARONE HYDROCHLORIDE 200 MG/1
TABLET ORAL
Qty: 30 TABLET | Refills: 1 | Status: SHIPPED | OUTPATIENT
Start: 2019-04-24 | End: 2019-05-18 | Stop reason: SDUPTHER

## 2019-05-03 ENCOUNTER — PATIENT OUTREACH (OUTPATIENT)
Dept: ADMINISTRATIVE | Facility: HOSPITAL | Age: 84
End: 2019-05-03

## 2019-05-03 NOTE — PROGRESS NOTES
Health Maintenance Due   Topic Date Due    Sign Pain Contract  11/03/1947    Shingles Vaccine (2 of 3) 05/19/2011     Chart review complete/scrubbed 05/03/2019

## 2019-05-09 ENCOUNTER — OFFICE VISIT (OUTPATIENT)
Dept: CARDIOLOGY | Facility: CLINIC | Age: 84
End: 2019-05-09
Payer: MEDICARE

## 2019-05-09 VITALS
HEIGHT: 62 IN | BODY MASS INDEX: 20.61 KG/M2 | HEART RATE: 57 BPM | WEIGHT: 112 LBS | SYSTOLIC BLOOD PRESSURE: 150 MMHG | DIASTOLIC BLOOD PRESSURE: 72 MMHG

## 2019-05-09 DIAGNOSIS — I10 ESSENTIAL HYPERTENSION: ICD-10-CM

## 2019-05-09 DIAGNOSIS — I35.0 NONRHEUMATIC AORTIC VALVE STENOSIS: ICD-10-CM

## 2019-05-09 DIAGNOSIS — I48.0 PAROXYSMAL ATRIAL FIBRILLATION: Primary | ICD-10-CM

## 2019-05-09 PROCEDURE — 1101F PT FALLS ASSESS-DOCD LE1/YR: CPT | Mod: HCNC,CPTII,S$GLB, | Performed by: INTERNAL MEDICINE

## 2019-05-09 PROCEDURE — 1101F PR PT FALLS ASSESS DOC 0-1 FALLS W/OUT INJ PAST YR: ICD-10-PCS | Mod: HCNC,CPTII,S$GLB, | Performed by: INTERNAL MEDICINE

## 2019-05-09 PROCEDURE — 99214 OFFICE O/P EST MOD 30 MIN: CPT | Mod: HCNC,S$GLB,, | Performed by: INTERNAL MEDICINE

## 2019-05-09 PROCEDURE — 99999 PR PBB SHADOW E&M-EST. PATIENT-LVL III: ICD-10-PCS | Mod: PBBFAC,HCNC,, | Performed by: INTERNAL MEDICINE

## 2019-05-09 PROCEDURE — 99999 PR PBB SHADOW E&M-EST. PATIENT-LVL III: CPT | Mod: PBBFAC,HCNC,, | Performed by: INTERNAL MEDICINE

## 2019-05-09 PROCEDURE — 99214 PR OFFICE/OUTPT VISIT, EST, LEVL IV, 30-39 MIN: ICD-10-PCS | Mod: HCNC,S$GLB,, | Performed by: INTERNAL MEDICINE

## 2019-05-09 RX ORDER — L.ACID/L.CASEI/B.BIF/B.LON/FOS 2B CELL-50
CAPSULE ORAL
Refills: 3 | COMMUNITY
Start: 2019-04-24 | End: 2019-05-23

## 2019-05-09 NOTE — PROGRESS NOTES
Subjective:    Patient ID:  Michela Calvert is a 89 y.o. female who presents for follow-up of Hypertension (hosital follow up visit )      HPI89 yo WF with PAF. Recently had hip surgery and doing well. Denies chest pain, SOB, or edema Denies palpitations, weak spells, and syncope    Review of Systems   Constitution: Negative for decreased appetite, fever, malaise/fatigue, weight gain and weight loss.   HENT: Negative for hearing loss and nosebleeds.    Eyes: Negative for visual disturbance.   Cardiovascular: Negative for chest pain, claudication, cyanosis, dyspnea on exertion, irregular heartbeat, leg swelling, near-syncope, orthopnea, palpitations, paroxysmal nocturnal dyspnea and syncope.   Respiratory: Negative for cough, hemoptysis, shortness of breath, sleep disturbances due to breathing, snoring and wheezing.    Endocrine: Negative for cold intolerance, heat intolerance, polydipsia and polyuria.   Hematologic/Lymphatic: Negative for adenopathy and bleeding problem. Does not bruise/bleed easily.   Skin: Negative for color change, itching, poor wound healing, rash and suspicious lesions.   Musculoskeletal: Positive for arthritis and joint pain. Negative for back pain, falls, joint swelling, muscle cramps, muscle weakness and myalgias.   Gastrointestinal: Negative for bloating, abdominal pain, change in bowel habit, constipation, flatus, heartburn, hematemesis, hematochezia, hemorrhoids, jaundice, melena, nausea and vomiting.   Genitourinary: Negative for bladder incontinence, decreased libido, frequency, hematuria, hesitancy and urgency.   Neurological: Negative for brief paralysis, difficulty with concentration, excessive daytime sleepiness, dizziness, focal weakness, headaches, light-headedness, loss of balance, numbness, vertigo and weakness.   Psychiatric/Behavioral: Negative for altered mental status, depression and memory loss. The patient does not have insomnia and is not nervous/anxious.   "  Allergic/Immunologic: Negative for environmental allergies, hives and persistent infections.        Objective:    Physical Exam   Constitutional: She is oriented to person, place, and time. She appears well-developed and well-nourished.   BP (!) 150/72   Pulse (!) 57   Ht 5' 2" (1.575 m)   Wt 50.8 kg (111 lb 15.9 oz)   BMI 20.48 kg/m²      HENT:   Head: Normocephalic and atraumatic.   Right Ear: External ear normal.   Left Ear: External ear normal.   Nose: Nose normal.   Mouth/Throat: Oropharynx is clear and moist.   Eyes: Pupils are equal, round, and reactive to light. Conjunctivae, EOM and lids are normal. Right eye exhibits no discharge. Left eye exhibits no discharge. Right conjunctiva has no hemorrhage. No scleral icterus.   Neck: Normal range of motion. Neck supple. No JVD present. No tracheal deviation present. No thyromegaly present.   Cardiovascular: Normal rate, regular rhythm, normal heart sounds and intact distal pulses. Exam reveals no gallop and no friction rub.   No murmur heard.  Pulmonary/Chest: Effort normal and breath sounds normal. No respiratory distress. She has no wheezes. She has no rales. She exhibits no tenderness. Breasts are symmetrical.   Abdominal: Soft. Bowel sounds are normal. She exhibits no distension and no mass. There is no hepatosplenomegaly or hepatomegaly. There is no tenderness. There is no rebound and no guarding.   Musculoskeletal: Normal range of motion. She exhibits no edema or tenderness.   Lymphadenopathy:     She has no cervical adenopathy.   Neurological: She is alert and oriented to person, place, and time. She displays normal reflexes. No cranial nerve deficit. Coordination normal.   Skin: Skin is warm and dry. No rash noted. No erythema. No pallor.   Psychiatric: She has a normal mood and affect. Her behavior is normal. Judgment and thought content normal.   Nursing note and vitals reviewed.        Assessment:       1. Paroxysmal atrial fibrillation    2. " Essential hypertension         Plan:    Patient remains in sinus rhythm   The current medical regimen is effective;  continue present plan and medications.     No orders of the defined types were placed in this encounter.    Follow up in about 3 months (around 8/9/2019).

## 2019-05-16 ENCOUNTER — OFFICE VISIT (OUTPATIENT)
Dept: FAMILY MEDICINE | Facility: CLINIC | Age: 84
End: 2019-05-16
Payer: MEDICARE

## 2019-05-16 VITALS
TEMPERATURE: 98 F | HEART RATE: 62 BPM | DIASTOLIC BLOOD PRESSURE: 80 MMHG | WEIGHT: 112 LBS | BODY MASS INDEX: 20.61 KG/M2 | SYSTOLIC BLOOD PRESSURE: 130 MMHG | HEIGHT: 62 IN | OXYGEN SATURATION: 97 %

## 2019-05-16 DIAGNOSIS — I48.0 PAROXYSMAL ATRIAL FIBRILLATION: ICD-10-CM

## 2019-05-16 DIAGNOSIS — S72.002A CLOSED FRACTURE OF NECK OF LEFT FEMUR, INITIAL ENCOUNTER: ICD-10-CM

## 2019-05-16 DIAGNOSIS — E03.9 HYPOTHYROIDISM, UNSPECIFIED TYPE: ICD-10-CM

## 2019-05-16 DIAGNOSIS — I10 ESSENTIAL HYPERTENSION: Primary | ICD-10-CM

## 2019-05-16 PROCEDURE — 1100F PR PT FALLS ASSESS DOC 2+ FALLS/FALL W/INJURY/YR: ICD-10-PCS | Mod: HCNC,CPTII,S$GLB, | Performed by: FAMILY MEDICINE

## 2019-05-16 PROCEDURE — 99999 PR PBB SHADOW E&M-EST. PATIENT-LVL IV: CPT | Mod: PBBFAC,HCNC,, | Performed by: FAMILY MEDICINE

## 2019-05-16 PROCEDURE — 99999 PR PBB SHADOW E&M-EST. PATIENT-LVL IV: ICD-10-PCS | Mod: PBBFAC,HCNC,, | Performed by: FAMILY MEDICINE

## 2019-05-16 PROCEDURE — 1100F PTFALLS ASSESS-DOCD GE2>/YR: CPT | Mod: HCNC,CPTII,S$GLB, | Performed by: FAMILY MEDICINE

## 2019-05-16 PROCEDURE — 3288F PR FALLS RISK ASSESSMENT DOCUMENTED: ICD-10-PCS | Mod: HCNC,CPTII,S$GLB, | Performed by: FAMILY MEDICINE

## 2019-05-16 PROCEDURE — 3288F FALL RISK ASSESSMENT DOCD: CPT | Mod: HCNC,CPTII,S$GLB, | Performed by: FAMILY MEDICINE

## 2019-05-16 PROCEDURE — 99214 OFFICE O/P EST MOD 30 MIN: CPT | Mod: HCNC,S$GLB,, | Performed by: FAMILY MEDICINE

## 2019-05-16 PROCEDURE — 99214 PR OFFICE/OUTPT VISIT, EST, LEVL IV, 30-39 MIN: ICD-10-PCS | Mod: HCNC,S$GLB,, | Performed by: FAMILY MEDICINE

## 2019-05-16 RX ORDER — HYDROCORTISONE 25 MG/G
CREAM TOPICAL
Qty: 20 G | Refills: 6 | Status: SHIPPED | OUTPATIENT
Start: 2019-05-16 | End: 2020-01-21

## 2019-05-16 RX ORDER — POLYETHYLENE GLYCOL 3350 17 G/17G
17 POWDER, FOR SOLUTION ORAL DAILY
Refills: 0
Start: 2019-05-16 | End: 2019-05-23

## 2019-05-16 NOTE — PROGRESS NOTES
Subjective:       Patient ID: Michela Calvert is a 89 y.o. female.    Chief Complaint: 4 week f/u    Here for 1 month f/u on chronic health issues.    S/p HEMIARTHROPLASTY, HIP (Left)  - following with Dr. Leo  She is living at the [facility].  Using walker and wheelchair for ambulation; using walker more for ambulation  Getting HH with [facility] with PT, nurse and aid.  afib - rate- controlled on pacerone, Eliquis, Lopresor 25mg BID; taking asa 81mg daily  Hypothyroidism - tolerating Synthroid 88mcg daily  Lumbar DDD - using tylenol as needed  BMs have been more difficult to pass in the last 2 weeks.         Past Medical History:   Diagnosis Date    Atrial fibrillation     Cataracts, bilateral     Chronic pain syndrome 7/25/2018    DDD (degenerative disc disease), lumbar     DDD (degenerative disc disease), lumbar     Heart murmur 01/2017    HLD (hyperlipidemia)     no medication taken    Hypertension 12/27/2018    Hypothyroidism     Lichen sclerosus et atrophicus     Rectal/Vaginal areas    Lumbar spinal stenosis     Lumbar spondylosis     Mitral valve disorder 01/2017    Asymptomatic    Osteopenia     Urinary incontinence        Past Surgical History:   Procedure Laterality Date    APPENDECTOMY      CATARACT EXTRACTION EXTRACAPSULAR W/ INTRAOCULAR LENS IMPLANTATION Bilateral     HEMIARTHROPLASTY, HIP Left 3/9/2019    [facility]    HYSTERECTOMY      INJECTION-STEROID-EPIDURAL-LUMBAR L5/S1 N/A 8/30/2017    [facility]    INSERTION, DORSAL COLUMN NEUROSTIMULATOR, FOR TRIAL N/A 7/25/2018    [facility] OR    spinal ablation  12/2017    TONSILLECTOMY         Review of patient's allergies indicates:   Allergen Reactions    Nitrofuran analogues Nausea Only    Ciprofloxacin Nausea And Vomiting       Social History     Socioeconomic History    Marital status:      Spouse name: Not on file    Number of  children: Not on file    Years of education: Not on file    Highest education level: Not on file   Occupational History    Not on file   Social Needs    Financial resource strain: Not on file    Food insecurity:     Worry: Not on file     Inability: Not on file    Transportation needs:     Medical: Not on file     Non-medical: Not on file   Tobacco Use    Smoking status: Never Smoker    Smokeless tobacco: Never Used   Substance and Sexual Activity    Alcohol use: Not Currently     Comment: 1 gin & tonic daily @ 5pm; not since her hip replacement in 3/2019    Drug use: No    Sexual activity: Never   Lifestyle    Physical activity:     Days per week: Not on file     Minutes per session: Not on file    Stress: Not on file   Relationships    Social connections:     Talks on phone: Not on file     Gets together: Not on file     Attends [employer] service: Not on file     Active member of club or organization: Not on file     Attends meetings of clubs or organizations: Not on file     Relationship status: Not on file   Other Topics Concern    Not on file   Social History Narrative    Not on file       Current Outpatient Medications on File Prior to Visit   Medication Sig Dispense Refill    apixaban (ELIQUIS) 2.5 mg Tab Take 1 tablet (2.5 mg total) by mouth 2 (two) times daily. 60 tablet 1    CALCIUM 600 WITH VITAMIN D3 600 mg(1,500mg) -500 unit Cap TAKE ONE CAPSULE BY MOUTH ONCE DAILY IN THE MORNING  3    levothyroxine (SYNTHROID) 88 MCG tablet Take 1 tablet (88 mcg total) by mouth once daily. 90 tablet 3    acetaminophen (TYLENOL) 650 MG TbSR Take 650 mg by mouth every 8 (eight) hours as needed.      lactobacillus rhamnosus GG (CULTURELLE) 10 billion cell capsule Take 1 capsule by mouth once daily.      PRESERVISION AREDS-2 [id number] mg-unit-mg-mg Cap TAKE ONE CAPSULE BY MOUTH TWICE DAILY (IN THE MORNING AND IN THE EVENING)  3     No current facility-administered medications on file prior to  "visit.        Family History   Problem Relation Age of Onset    Colon cancer Neg Hx     Crohn's disease Neg Hx     Stomach cancer Neg Hx     Ulcerative colitis Neg Hx     Esophageal cancer Neg Hx        Review of Systems   Constitutional: Positive for fatigue. Negative for appetite change, chills, fever and unexpected weight change.   HENT: Negative for sore throat and trouble swallowing.    Eyes: Negative for pain and visual disturbance.   Respiratory: Negative for cough, shortness of breath and wheezing.    Cardiovascular: Positive for palpitations. Negative for chest pain.   Gastrointestinal: Negative for abdominal pain, blood in stool, diarrhea, nausea and vomiting.   Genitourinary: Negative for difficulty urinating, dysuria and hematuria.   Musculoskeletal: Positive for arthralgias (left hip) and gait problem. Negative for neck pain.   Skin: Negative for rash and wound.   Neurological: Positive for weakness. Negative for dizziness, numbness and headaches.   Hematological: Negative for adenopathy.   Psychiatric/Behavioral: Negative for dysphoric mood.       Objective:      /80   Pulse 62   Temp 98.1 °F (36.7 °C) (Oral)   Ht 5' 2" (1.575 m)   Wt 50.8 kg (111 lb 15.9 oz)   SpO2 97%   BMI 20.48 kg/m²   Physical Exam   Constitutional: She is oriented to person, place, and time. She appears well-developed and well-nourished.   HENT:   Head: Normocephalic.   Mouth/Throat: Oropharynx is clear and moist. No oropharyngeal exudate or posterior oropharyngeal erythema.   Eyes: Pupils are equal, round, and reactive to light. Conjunctivae and EOM are normal.   Neck: Normal range of motion. Neck supple. No thyromegaly present.   Cardiovascular: Normal rate, regular rhythm, S1 normal, S2 normal, normal heart sounds and intact distal pulses. Exam reveals no gallop and no friction rub.   No murmur heard.  Pulmonary/Chest: Effort normal and breath sounds normal. She has no wheezes. She has no rales.   Abdominal: " Normal appearance.   Musculoskeletal:        Right lower leg: She exhibits no edema.        Left lower leg: She exhibits no edema.   Lymphadenopathy:     She has no cervical adenopathy.   Neurological: She is alert and oriented to person, place, and time. No cranial nerve deficit. Gait normal.   Skin: Skin is warm and intact. No rash noted.   Psychiatric: She has a normal mood and affect.       Results for orders placed or performed in visit on 04/11/19   Comprehensive metabolic panel   Result Value Ref Range    Sodium 137 136 - 145 mmol/L    Potassium 4.0 3.5 - 5.1 mmol/L    Chloride 102 95 - 110 mmol/L    CO2 26 23 - 29 mmol/L    Glucose 93 70 - 110 mg/dL    BUN, Bld 14 8 - 23 mg/dL    Creatinine 0.8 0.5 - 1.4 mg/dL    Calcium 9.2 8.7 - 10.5 mg/dL    Total Protein 6.5 6.0 - 8.4 g/dL    Albumin 3.6 3.5 - 5.2 g/dL    Total Bilirubin 0.4 0.1 - 1.0 mg/dL    Alkaline Phosphatase 85 55 - 135 U/L    AST 17 10 - 40 U/L    ALT 9 (L) 10 - 44 U/L    Anion Gap 9 8 - 16 mmol/L    eGFR if African American >60.0 >60 mL/min/1.73 m^2    eGFR if non African American >60.0 >60 mL/min/1.73 m^2   CBC auto differential   Result Value Ref Range    WBC 10.45 3.90 - 12.70 K/uL    RBC 3.88 (L) 4.00 - 5.40 M/uL    Hemoglobin 11.6 (L) 12.0 - 16.0 g/dL    Hematocrit 37.0 37.0 - 48.5 %    Mean Corpuscular Volume 95 82 - 98 fL    Mean Corpuscular Hemoglobin 29.9 27.0 - 31.0 pg    Mean Corpuscular Hemoglobin Conc 31.4 (L) 32.0 - 36.0 g/dL    RDW 16.1 (H) 11.5 - 14.5 %    Platelets 250 150 - 350 K/uL    MPV 11.7 9.2 - 12.9 fL    Immature Granulocytes 0.6 (H) 0.0 - 0.5 %    Gran # (ANC) 7.3 1.8 - 7.7 K/uL    Immature Grans (Abs) 0.06 (H) 0.00 - 0.04 K/uL    Lymph # 2.0 1.0 - 4.8 K/uL    Mono # 0.9 0.3 - 1.0 K/uL    Eos # 0.2 0.0 - 0.5 K/uL    Baso # 0.05 0.00 - 0.20 K/uL    nRBC 0 0 /100 WBC    Gran% 70.0 38.0 - 73.0 %    Lymph% 19.3 18.0 - 48.0 %    Mono% 8.2 4.0 - 15.0 %    Eosinophil% 1.4 0.0 - 8.0 %    Basophil% 0.5 0.0 - 1.9 %    Differential  Method Automated    TSH   Result Value Ref Range    TSH 1.598 0.400 - 4.000 uIU/mL         Assessment:       1. Essential hypertension    2. Hypothyroidism, unspecified type    3. Closed fracture of neck of left femur, initial encounter    4. Paroxysmal atrial fibrillation        Plan:       Essential hypertension  -     Comprehensive metabolic panel; Future; Expected date: 11/12/2019  -     Lipid panel; Future; Expected date: 11/12/2019  -     CBC auto differential; Future; Expected date: 11/12/2019    Hypothyroidism, unspecified type  -     TSH; Future; Expected date: 11/12/2019    Closed fracture of neck of left femur, initial encounter    Paroxysmal atrial fibrillation    Other orders  -     hydrocortisone 2.5 % cream; Apply to affected areas twice daily.  Dispense: 20 g; Refill: 6  -     Discontinue: polyethylene glycol (GLYCOLAX) 17 gram PwPk; Take 17 g by mouth once daily.; Refill: 0        Overall healing process progressing as expected  F/u with Dr. Leo as planned  Ok to use Tylenol QHS  Overall stable  Continue present medications  F/u with specialists as planned  Counseled on regular exercise, maintenance of a healthy weight, balanced diet rich in fruits/vegetables and lean protein, and avoidance of unhealthy habits like smoking and excessive alcohol intake.  F/u 6 months with labs

## 2019-05-17 ENCOUNTER — TELEPHONE (OUTPATIENT)
Dept: FAMILY MEDICINE | Facility: CLINIC | Age: 84
End: 2019-05-17

## 2019-05-17 NOTE — TELEPHONE ENCOUNTER
----- Message from Lori Morales sent at 5/17/2019 12:51 PM CDT -----  Contact: pt  Pt is wanting the nurse to give her a call concerning her polyethylene glycol (GLYCOLAX) 17 gram PwPk that was not called would like a call back today cause she was supposed to start taking this medication today..[phone]

## 2019-05-21 RX ORDER — METOPROLOL TARTRATE 25 MG/1
TABLET, FILM COATED ORAL
Qty: 60 TABLET | Refills: 11 | Status: SHIPPED | OUTPATIENT
Start: 2019-05-21 | End: 2020-04-14

## 2019-05-21 RX ORDER — AMIODARONE HYDROCHLORIDE 200 MG/1
TABLET ORAL
Qty: 30 TABLET | Refills: 11 | Status: SHIPPED | OUTPATIENT
Start: 2019-05-21 | End: 2019-08-09

## 2019-05-23 ENCOUNTER — HOSPITAL ENCOUNTER (OUTPATIENT)
Dept: RADIOLOGY | Facility: HOSPITAL | Age: 84
Discharge: HOME | End: 2019-05-23
Attending: NURSE PRACTITIONER
Payer: MEDICARE

## 2019-05-23 ENCOUNTER — OFFICE VISIT (OUTPATIENT)
Dept: GASTROENTEROLOGY | Facility: CLINIC | Age: 84
End: 2019-05-23
Payer: MEDICARE

## 2019-05-23 VITALS
WEIGHT: 105.81 LBS | BODY MASS INDEX: 19.47 KG/M2 | DIASTOLIC BLOOD PRESSURE: 73 MMHG | RESPIRATION RATE: 18 BRPM | HEIGHT: 62 IN | SYSTOLIC BLOOD PRESSURE: 140 MMHG | HEART RATE: 56 BPM

## 2019-05-23 DIAGNOSIS — R19.4 CHANGE IN BOWEL HABITS: Primary | ICD-10-CM

## 2019-05-23 DIAGNOSIS — R63.4 WEIGHT LOSS: ICD-10-CM

## 2019-05-23 DIAGNOSIS — Z98.890 HISTORY OF HIP SURGERY: ICD-10-CM

## 2019-05-23 DIAGNOSIS — K59.00 CONSTIPATION, UNSPECIFIED CONSTIPATION TYPE: ICD-10-CM

## 2019-05-23 DIAGNOSIS — R19.4 CHANGE IN BOWEL HABITS: ICD-10-CM

## 2019-05-23 PROCEDURE — 1101F PR PT FALLS ASSESS DOC 0-1 FALLS W/OUT INJ PAST YR: ICD-10-PCS | Mod: HCNC,CPTII,S$GLB, | Performed by: NURSE PRACTITIONER

## 2019-05-23 PROCEDURE — 1101F PT FALLS ASSESS-DOCD LE1/YR: CPT | Mod: HCNC,CPTII,S$GLB, | Performed by: NURSE PRACTITIONER

## 2019-05-23 PROCEDURE — 99999 PR PBB SHADOW E&M-EST. PATIENT-LVL IV: CPT | Mod: PBBFAC,HCNC,, | Performed by: NURSE PRACTITIONER

## 2019-05-23 PROCEDURE — 74019 RADEX ABDOMEN 2 VIEWS: CPT | Mod: 26,HCNC,, | Performed by: RADIOLOGY

## 2019-05-23 PROCEDURE — 99214 PR OFFICE/OUTPT VISIT, EST, LEVL IV, 30-39 MIN: ICD-10-PCS | Mod: HCNC,S$GLB,, | Performed by: NURSE PRACTITIONER

## 2019-05-23 PROCEDURE — 99999 PR PBB SHADOW E&M-EST. PATIENT-LVL IV: ICD-10-PCS | Mod: PBBFAC,HCNC,, | Performed by: NURSE PRACTITIONER

## 2019-05-23 PROCEDURE — 74019 RADEX ABDOMEN 2 VIEWS: CPT | Mod: TC,HCNC,FY,PO

## 2019-05-23 PROCEDURE — 99214 OFFICE O/P EST MOD 30 MIN: CPT | Mod: HCNC,S$GLB,, | Performed by: NURSE PRACTITIONER

## 2019-05-23 PROCEDURE — 74019 XR ABDOMEN FLAT AND ERECT: ICD-10-PCS | Mod: 26,HCNC,, | Performed by: RADIOLOGY

## 2019-05-23 RX ORDER — LACTULOSE 10 G/15ML
10 SOLUTION ORAL 2 TIMES DAILY
Qty: 900 ML | Refills: 0 | Status: SHIPPED | OUTPATIENT
Start: 2019-05-23 | End: 2019-05-23 | Stop reason: SDUPTHER

## 2019-05-23 RX ORDER — POLYETHYLENE GLYCOL 3350 17 G/17G
POWDER, FOR SOLUTION ORAL
Refills: 0 | COMMUNITY
Start: 2019-05-17 | End: 2019-05-23

## 2019-05-23 RX ORDER — LACTULOSE 10 G/15ML
10 SOLUTION ORAL 2 TIMES DAILY
Qty: 900 ML | Refills: 0 | Status: SHIPPED | OUTPATIENT
Start: 2019-05-23 | End: 2019-06-22

## 2019-05-23 NOTE — PROGRESS NOTES
Subjective:       Patient ID: Michela Calvert is a 89 y.o. female Body mass index is 19.35 kg/m².    Chief Complaint: GI Problem (change in BM)    This patient is new to me.    Patient is a resident at the [facility]. Patient is here with a family member, whom assisted with history.    Constipation   Episode onset: started after hip surgery on 3/9/19; reports was on pain medication for a little while & has not taken any recently. The problem is unchanged. Her stool frequency is 1 time per day (has to perform digital disimpaction to have a bowel movement). The stool is described as formed (soft). The patient is not on a high fiber diet (eating some vegetables). She does not exercise regularly. There has not been adequate water intake. Associated symptoms include flatus and weight loss (has been losing weight since her hip surgery in 3/2019; eating small portions of each meal). Pertinent negatives include no abdominal pain, anorexia, bloating, diarrhea, difficulty urinating, fever, hematochezia, melena, nausea, rectal pain or vomiting. Risk factors include immobility. She has tried manual disimpaction (probiotic daily; PAST: glycolax x 5 days no relief, colace never took) for the symptoms. The treatment provided moderate relief.     Review of Systems   Constitutional: Positive for weight loss (has been losing weight since her hip surgery in 3/2019; eating small portions of each meal). Negative for appetite change, chills, fatigue and fever.   HENT: Negative for sore throat and trouble swallowing.    Respiratory: Negative for cough, choking and shortness of breath.    Cardiovascular: Negative for chest pain.   Gastrointestinal: Positive for constipation and flatus. Negative for abdominal pain, anal bleeding, anorexia, bloating, blood in stool, diarrhea, hematochezia, melena, nausea, rectal pain and vomiting.   Genitourinary: Negative for difficulty urinating, dysuria and flank pain.   Neurological: Negative for weakness.        No LMP recorded. Patient has had a hysterectomy.  Past Medical History:   Diagnosis Date    Atrial fibrillation     Cataracts, bilateral     Chronic pain syndrome 7/25/2018    DDD (degenerative disc disease), lumbar     DDD (degenerative disc disease), lumbar     Heart murmur 01/2017    HLD (hyperlipidemia)     no medication taken    Hypertension 12/27/2018    Hypothyroidism     Lichen sclerosus et atrophicus     Rectal/Vaginal areas    Lumbar spinal stenosis     Lumbar spondylosis     Mitral valve disorder 01/2017    Asymptomatic    Osteopenia     Urinary incontinence      Past Surgical History:   Procedure Laterality Date    APPENDECTOMY      CATARACT EXTRACTION EXTRACAPSULAR W/ INTRAOCULAR LENS IMPLANTATION Bilateral     HEMIARTHROPLASTY, HIP Left 3/9/2019    [facility]    HYSTERECTOMY      INJECTION-STEROID-EPIDURAL-LUMBAR L5/S1 N/A 8/30/2017    Performed by [facility] at [facility] OR    INSERTION, DORSAL COLUMN NEUROSTIMULATOR, FOR TRIAL N/A 7/25/2018    [facility] OR    spinal ablation  12/2017    TONSILLECTOMY       Family History   Problem Relation Age of Onset    Colon cancer Neg Hx     Crohn's disease Neg Hx     Stomach cancer Neg Hx     Ulcerative colitis Neg Hx     Esophageal cancer Neg Hx      Wt Readings from Last 10 Encounters:   05/23/19 48 kg (105 lb 13.1 oz)   05/16/19 50.8 kg (111 lb 15.9 oz)   05/09/19 50.8 kg (111 lb 15.9 oz)   04/11/19 51 kg (112 lb 7 oz)   03/08/19 56.9 kg (125 lb 8 oz)   03/08/19 54.7 kg (120 lb 9.1 oz)   03/08/19 52.6 kg (116 lb)   03/01/19 53 kg (116 lb 13.5 oz)   02/13/19 53.5 kg (118 lb)   02/12/19 54.9 kg (121 lb)     Lab Results   Component Value Date    WBC 10.45 04/11/2019    HGB 11.6 (L) 04/11/2019    HCT 37.0 04/11/2019    MCV 95 04/11/2019     04/11/2019     CMP  Sodium   Date Value Ref Range Status   04/11/2019 137 136 - 145 mmol/L Final     Potassium    Date Value Ref Range Status   04/11/2019 4.0 3.5 - 5.1 mmol/L Final     Chloride   Date Value Ref Range Status   04/11/2019 102 95 - 110 mmol/L Final     CO2   Date Value Ref Range Status   04/11/2019 26 23 - 29 mmol/L Final     Glucose   Date Value Ref Range Status   04/11/2019 93 70 - 110 mg/dL Final     BUN, Bld   Date Value Ref Range Status   04/11/2019 14 8 - 23 mg/dL Final     Creatinine   Date Value Ref Range Status   04/11/2019 0.8 0.5 - 1.4 mg/dL Final     Calcium   Date Value Ref Range Status   04/11/2019 9.2 8.7 - 10.5 mg/dL Final     Total Protein   Date Value Ref Range Status   04/11/2019 6.5 6.0 - 8.4 g/dL Final     Albumin   Date Value Ref Range Status   04/11/2019 3.6 3.5 - 5.2 g/dL Final     Total Bilirubin   Date Value Ref Range Status   04/11/2019 0.4 0.1 - 1.0 mg/dL Final     Comment:     For infants and newborns, interpretation of results should be based  on gestational age, weight and in agreement with clinical  observations.  Premature Infant recommended reference ranges:  Up to 24 hours.............<8.0 mg/dL  Up to 48 hours............<12.0 mg/dL  3-5 days..................<15.0 mg/dL  6-29 days.................<15.0 mg/dL       Alkaline Phosphatase   Date Value Ref Range Status   04/11/2019 85 55 - 135 U/L Final     AST   Date Value Ref Range Status   04/11/2019 17 10 - 40 U/L Final     ALT   Date Value Ref Range Status   04/11/2019 9 (L) 10 - 44 U/L Final     Anion Gap   Date Value Ref Range Status   04/11/2019 9 8 - 16 mmol/L Final     eGFR if    Date Value Ref Range Status   04/11/2019 >60.0 >60 mL/min/1.73 m^2 Final     eGFR if non    Date Value Ref Range Status   04/11/2019 >60.0 >60 mL/min/1.73 m^2 Final     Comment:     Calculation used to obtain the estimated glomerular filtration  rate (eGFR) is the CKD-EPI equation.        Lab Results   Component Value Date    TSH 1.598 04/11/2019     Reviewed prior medical records including radiology report of  3/8/19 ct pelvis.    Objective:      Physical Exam   Constitutional: She is oriented to person, place, and time. She appears well-developed and well-nourished. No distress.   Patient is in a wheelchair.   HENT:   Mouth/Throat: Oropharynx is clear and moist and mucous membranes are normal. No oral lesions. No oropharyngeal exudate.   Eyes: Pupils are equal, round, and reactive to light. Conjunctivae are normal. No scleral icterus.   Pulmonary/Chest: Effort normal and breath sounds normal. No respiratory distress. She has no wheezes.   Abdominal: Soft. Normal appearance and bowel sounds are normal. She exhibits no distension and no mass. There is no tenderness. There is no rigidity, no rebound and no guarding.   Neurological: She is alert and oriented to person, place, and time.   Skin: Skin is warm and dry. No rash noted. She is not diaphoretic. No erythema. No pallor.   Non-jaundiced   Psychiatric: She has a normal mood and affect. Her behavior is normal. Judgment and thought content normal.   Nursing note and vitals reviewed.      Assessment:       1. Change in bowel habits    2. Constipation, unspecified constipation type    3. History of hip surgery    4. Weight loss        Plan:       Change in bowel habits & Constipation, unspecified constipation type  -     X-Ray Abdomen Flat And Erect; Future; Expected date: 05/23/2019  -  START   lactulose (CHRONULAC) 20 gram/30 mL Soln; Take 15 mLs (10 g total) by mouth 2 (two) times daily.  Dispense: 900 mL; Refill: 0  Recommend daily exercise as tolerated, adequate water intake (six 8-oz glasses of water daily), and high fiber diet. OTC fiber supplements are recommended if diet does not reach daily fiber goal (20-30 grams daily), such as Metamucil, Citrucel, or FiberCon (take as directed, separate from other oral medications by >2 hours).  -Recommend taking an OTC stool softener such as Colace as directed to avoid hard stools and straining with bowel movements  PRN  -Recommend trying OTC MiraLax once daily (17g PO) as directed  - If no improvement with above recommendations, try intermittently dosed Dulcolax OTC as directed (every 3-4  days) PRN to facilitate bowel movements  -If still no improvement with these measures, call/follow-up  - discussed about Colonoscopy, including the risks and benefits of colonoscopy, patient verbalized understanding but patient declined colonoscopy.    History of hip surgery  Recommend follow-up with Primary Care Provider/surgeon for continued evaluation and management.    Weight loss  - encouraged PO intake and daily calorie counts to ensure adequate nutrition is taken in, recommend at least 2,000 calories a day  - recommend nutritional drinks, such as Boost, Ensure or Glucerna, to supplement nutrition needs (recommend 3 cans a day)  - discussed about Colonoscopy, including the risks and benefits of colonoscopy, patient verbalized understanding but patient declined colonoscopy.  - Possible CT SCAN/egd/colonoscopy pending results of testing and if symptoms persist    Follow up in about 1 month (around 6/23/2019), or if symptoms worsen or fail to improve.      If no improvement in symptoms or symptoms worsen, call/follow-up at clinic or go to ER.

## 2019-05-23 NOTE — PATIENT INSTRUCTIONS
Constipation (Adult)  Constipation means that you have bowel movements that are less frequent than usual. Stools often become very hard and difficult to pass.  Constipation is very common. At some point in life it affects almost everyone. Since everyone's bowel habits are different, what is constipation to one person may not be to another. Your healthcare provider may do tests to diagnose constipation. It depends on what he or she finds when evaluating you.    Symptoms of constipation include:  · Abdominal pain  · Bloating  · Vomiting  · Painful bowel movements  · Itching, swelling, bleeding, or pain around the anus  Causes  Constipation can have many causes. These include:  · Diet low in fiber  · Too much dairy  · Not drinking enough liquids  · Lack of exercise or physical activity. This is especially true for older adults.  · Changes in lifestyle or daily routine, including pregnancy, aging, work, and travel  · Frequent use or misuse of laxatives  · Ignoring the urge to have a bowel movement or delaying it until later  · Medicines, such as certain prescription pain medicines, iron supplements, antacids, certain antidepressants, and calcium supplements  · Diseases like irritable bowel syndrome, bowel obstructions, stroke, diabetes, thyroid disease, Parkinson disease, hemorrhoids, and colon cancer  Complications  Potential complications of constipation can include:  · Hemorrhoids  · Rectal bleeding from hemorrhoids or anal fissures (skin tears)  · Hernias  · Dependency on laxatives  · Chronic constipation  · Fecal impaction  · Bowel obstruction or perforation  Home care  All treatment should be done after talking with your healthcare provider. This is especially true if you have another medical problems, are taking prescription medicines, or are an older adult. Treatment most often involves lifestyle changes. You may also need medicines. Your healthcare provider will tell you which will work best for you. Follow  the advice below to help avoid this problem in the future.  Lifestyle changes  These lifestyle changes can help prevent constipation:  · Diet. Eat a high-fiber diet, with fresh fruit and vegetables, and reduce dairy intake, meats, and processed foods  · Fluids. It's important to get enough fluids each day. Drink plenty of water when you eat more fiber. If you are on diet that limits the amount of fluid you can have, talk about this with your healthcare provider.  · Regular exercise. Check with your healthcare provider first.  Medications  Take any medicines as directed. Some laxatives are safe to use only every now and then. Others can be taken on a regular basis. Talk with your doctor or pharmacist if you have questions.  Prescription pain medicines can cause constipation. If you are taking this kind of medicine, ask your healthcare provider if you should also take a stool softener.  Medicines you may take to treat constipation include:  · Fiber supplements  · Stool softeners  · Laxatives  · Enemas  · Rectal suppositories  Follow-up care  Follow up with your healthcare provider if symptoms don't get better in the next few days. You may need to have more tests or see a specialist.  Call 911  Call 911 if any of these occur:  · Trouble breathing  · Stiff, rigid abdomen that is severely painful to touch  · Confusion  · Fainting or loss of consciousness  · Rapid heart rate  · Chest pain  When to seek medical advice  Call your healthcare provider right away if any of these occur:  · Fever over 100.4°F (38°C)  · Failure to resume normal bowel movements  · Pain in your abdomen or back gets worse  · Nausea or vomiting  · Swelling in your abdomen  · Blood in the stool  · Black, tarry stool  · Involuntary weight loss  · Weakness  Date Last Reviewed: 12/30/2015  © 8142-7218 [employer]. [address] [place] [address], [place] [address]. All rights reserved. This information is not intended as a substitute for  professional medical care. Always follow your healthcare professional's instructions.        Discharge Instructions: Eating a High-Fiber Diet  Your health care provider has prescribed a high-fiber diet for you. Fiber is what gives strength and structure to plants. Most grains, beans, vegetables, and fruits contain fiber. Foods rich in fiber are often low in calories and fat, but they fill you up more. These foods may also reduce the risk of certain health problems.  There are two types of fiber:  · Insoluble fiber. This is found in whole grains, cereals, and certain fruits and vegetables (such as apple skins, corn, and beans). Insoluble fiber is made up mainly of plant cell walls. It may prevent constipation and reduce the risk of certain types of cancer.  · Soluble fiber. This type of fiber is found in oats, beans, nuts, and certain fruits and vegetables (such as strawberries and peas). Soluble fiber turns to gel in the digestive system, slowing the movement of the digestive tract. It helps control blood sugar levels and can reduce cholesterol, which may help lower the risk of heart disease. Soluble fiber can also help control appetite.     Home care  · Know how much fiber you need a day. The recommended daily amount of fiber is 25 grams for women and 38 grams for men. After age 50, daily fiber needs drop to 21 grams for women and 30 grams for men.  · Ask your doctor about a fiber supplement. (Always take fiber supplements with a large glass of water.)  · Keep track of how much fiber you eat.  · Eat a variety of foods high in fiber.  · Learn to read and understand food labels.  · Ask your healthcare provider how much water you should be drinking.  · Look for these high-fiber foods:  ¨ Whole-grain breads and cereals  § 6 ounces a day give you about 18 grams of fiber (1 ounce is equal to 1 slice of bread, 1 cup of dry cereal, or 1/2 cup of cooked rice).  § Include wheat and oat bran cereals, whole-wheat muffins or  toast, and corn tortillas in your meals.  ¨ Fruits   § 2 cups a day give you about 8 grams of fiber.  § Apples, oranges, strawberries, pears, and bananas are good sources.  § Fruit juice does not contain as much fiber as the fruit it was made from.  ¨ Vegetables  § 2½ cups a day give you about 11 grams of fiber. Add asparagus, carrots, broccoli, peas, and corn to your meals.  ¨ Legumes  § 1/4 cup a day (in place of meat) gives you about 4 grams of fiber. Try navy beans, lentils, chickpeas, and soybeans.  ¨ Seeds   § A small handful of seeds gives you about 3 grams of fiber. Try sunflower seeds.  Follow-up  Make a follow-up appointment with a nutritionist as directed by our staff.  Date Last Reviewed: 6/1/2015  © 7022-4354 [employer]. [address] [place] [address], [place] [address]. All rights reserved. This information is not intended as a substitute for professional medical care. Always follow your healthcare professional's instructions.

## 2019-05-24 ENCOUNTER — TELEPHONE (OUTPATIENT)
Dept: GASTROENTEROLOGY | Facility: CLINIC | Age: 84
End: 2019-05-24

## 2019-05-24 DIAGNOSIS — K59.00 CONSTIPATION, UNSPECIFIED CONSTIPATION TYPE: Primary | ICD-10-CM

## 2019-05-24 RX ORDER — SYRING-NEEDL,DISP,INSUL,0.3 ML 29 G X1/2"
295 SYRINGE, EMPTY DISPOSABLE MISCELLANEOUS ONCE
Qty: 295 ML | Refills: 0 | COMMUNITY
Start: 2019-05-24 | End: 2019-05-24

## 2019-05-24 NOTE — TELEPHONE ENCOUNTER
Spoke with pt and informed of results and recommendations per Meredith Duran NP. Pt verbalized understanding.

## 2019-05-24 NOTE — TELEPHONE ENCOUNTER
Please call to inform & review the results with the patient- radiology report of the Abdominal x-ray showed unremarkable bowel gas pattern, no signs of intestinal obstruction and a large amount of stool in the colon which is consistent with/suggest history of constipation.  Can try one time dose of OTC magnesium citrate (295ml- take as directed) to help clear the stool out of the colon. Otherwise, continue previous recommendations as discussed.    Continue with previous recommendations. If no improvement in symptoms or symptoms worsen, call/follow-up at clinic or go to ER.  Please release results to patient's mychart once you have discussed results and recommendations with patient.  Thanks,  Lianet HILL

## 2019-05-27 ENCOUNTER — TELEPHONE (OUTPATIENT)
Dept: GASTROENTEROLOGY | Facility: CLINIC | Age: 84
End: 2019-05-27

## 2019-05-27 NOTE — TELEPHONE ENCOUNTER
----- Message from Sherrill Harvey sent at 5/27/2019  8:52 AM CDT -----  Type:  Patient Returning Call    Who Called:  pt  Who Left Message for Patient: emperatriz  Does the patient know what this is regarding? A  script  Best Call Back Number:[phone]  Additional Information:  Please call

## 2019-06-26 ENCOUNTER — TELEPHONE (OUTPATIENT)
Dept: GASTROENTEROLOGY | Facility: CLINIC | Age: 84
End: 2019-06-26

## 2019-06-26 DIAGNOSIS — K59.00 CONSTIPATION, UNSPECIFIED CONSTIPATION TYPE: Primary | ICD-10-CM

## 2019-06-26 NOTE — TELEPHONE ENCOUNTER
----- Message from Darnell Lerner sent at 6/26/2019 10:12 AM CDT -----  Contact: Chata  Type:  RX Refill Request    Who Called:  Chata with [facility] pharmacy  Refill or New Rx:  refill  RX Name and Strength:   Lactulose  How is the patient currently taking it? (ex. 1XDay):    Is this a 30 day or 90 day RX:    Preferred Pharmacy with phone number:  [phone]  Local or Mail Order:  local  Ordering Provider:  Meredith Turcios Call Back Number:    Additional Information:  Requesting refill for patient

## 2019-06-27 RX ORDER — LACTULOSE 10 G/15ML
10 SOLUTION ORAL 2 TIMES DAILY
Qty: 900 ML | Refills: 2 | Status: SHIPPED | OUTPATIENT
Start: 2019-06-27 | End: 2019-07-27

## 2019-06-27 NOTE — TELEPHONE ENCOUNTER
Pt son states pt is still taking the Lactulose. She takes 15ml by mouth twice a day. Please send refill to [facility] pharmacy if approved. thanks

## 2019-06-27 NOTE — TELEPHONE ENCOUNTER
Lactulose is no longer on medication list. Please clarify with patient if she is still taking this and how she has been doing since our last visit.  Thanks

## 2019-06-27 NOTE — TELEPHONE ENCOUNTER
----- Message from Lis Hodges sent at 6/27/2019 11:12 AM CDT -----  Contact: Son, Laura  Patient need a refill on lactulose please send to [facility], please call after you call rx in [phone]       [facility]  [facility] [address]   [place], [place]  Phone Number [phone]

## 2019-06-28 ENCOUNTER — TELEPHONE (OUTPATIENT)
Dept: GASTROENTEROLOGY | Facility: CLINIC | Age: 84
End: 2019-06-28

## 2019-06-28 NOTE — TELEPHONE ENCOUNTER
----- Message from Niki Rooney sent at 6/28/2019  9:49 AM CDT -----  Contact: [facility]  Type: Needs Medical Advice    Who Called:  Miriam Turcios Call Back Number: [phone]  Additional Information: Lactulose--been faxing Rx since Tuesday, still have not received anything and they need it today asap

## 2019-06-28 NOTE — TELEPHONE ENCOUNTER
Spoke with Laura and informed that Lactulose was sent to [facility] on [address]. Son of pt verbalized understanding.

## 2019-06-28 NOTE — TELEPHONE ENCOUNTER
----- Message from Niki Rooney sent at 6/28/2019  9:49 AM CDT -----  Contact: [facility]  Type: Needs Medical Advice    Who Called:  Miriam Turcios Call Back Number: [phone]  Additional Information: Lactulose--been faxing Rx since Tuesday, still have not received anything and they need it today asap

## 2019-07-03 ENCOUNTER — TELEPHONE (OUTPATIENT)
Dept: FAMILY MEDICINE | Facility: CLINIC | Age: 84
End: 2019-07-03

## 2019-07-16 NOTE — PROGRESS NOTES
Refill Authorization Note     is requesting a refill authorization.    Brief assessment and rationale for refill: ROUTE: op   Name and strength of medication: ELIQUIS 2.5 mg Tab (apixaban)       Medication Therapy Plan: afib - rate- controlled on pacerone, Eliquis, Lopresor 25mg BID; taking asa 81mg daily, LCO(LOV); Outside of protocol, route to you     Medication reconciliation completed: No              How patient will take medication: t1t po bid          Comments:   APPOINTMENTS (past 12m or future 3m authorizing provider)  LAST VISIT DATE  Enzo Nieto MD 5/16/2019         NEXT VISIT DATE  Enzo Nieto MD 11/15/2019

## 2019-07-17 RX ORDER — APIXABAN 2.5 MG/1
TABLET, FILM COATED ORAL
Qty: 60 TABLET | Refills: 11 | Status: SHIPPED | OUTPATIENT
Start: 2019-07-17 | End: 2019-09-09 | Stop reason: SDUPTHER

## 2019-08-09 ENCOUNTER — OFFICE VISIT (OUTPATIENT)
Dept: CARDIOLOGY | Facility: CLINIC | Age: 84
End: 2019-08-09
Payer: MEDICARE

## 2019-08-09 VITALS
SYSTOLIC BLOOD PRESSURE: 153 MMHG | WEIGHT: 114.88 LBS | HEIGHT: 62 IN | DIASTOLIC BLOOD PRESSURE: 72 MMHG | HEART RATE: 61 BPM | BODY MASS INDEX: 21.14 KG/M2

## 2019-08-09 DIAGNOSIS — I10 ESSENTIAL HYPERTENSION: Primary | ICD-10-CM

## 2019-08-09 DIAGNOSIS — I35.0 NONRHEUMATIC AORTIC VALVE STENOSIS: ICD-10-CM

## 2019-08-09 DIAGNOSIS — I48.0 PAROXYSMAL ATRIAL FIBRILLATION: ICD-10-CM

## 2019-08-09 PROCEDURE — 1101F PR PT FALLS ASSESS DOC 0-1 FALLS W/OUT INJ PAST YR: ICD-10-PCS | Mod: HCNC,CPTII,S$GLB, | Performed by: INTERNAL MEDICINE

## 2019-08-09 PROCEDURE — 99999 PR PBB SHADOW E&M-EST. PATIENT-LVL III: ICD-10-PCS | Mod: PBBFAC,HCNC,, | Performed by: INTERNAL MEDICINE

## 2019-08-09 PROCEDURE — 1101F PT FALLS ASSESS-DOCD LE1/YR: CPT | Mod: HCNC,CPTII,S$GLB, | Performed by: INTERNAL MEDICINE

## 2019-08-09 PROCEDURE — 99214 OFFICE O/P EST MOD 30 MIN: CPT | Mod: HCNC,S$GLB,, | Performed by: INTERNAL MEDICINE

## 2019-08-09 PROCEDURE — 99214 PR OFFICE/OUTPT VISIT, EST, LEVL IV, 30-39 MIN: ICD-10-PCS | Mod: HCNC,S$GLB,, | Performed by: INTERNAL MEDICINE

## 2019-08-09 PROCEDURE — 99999 PR PBB SHADOW E&M-EST. PATIENT-LVL III: CPT | Mod: PBBFAC,HCNC,, | Performed by: INTERNAL MEDICINE

## 2019-08-09 RX ORDER — AMIODARONE HYDROCHLORIDE 200 MG/1
TABLET ORAL
Qty: 90 TABLET | Refills: 3 | Status: SHIPPED | OUTPATIENT
Start: 2019-08-09 | End: 2020-02-13 | Stop reason: SDUPTHER

## 2019-09-09 NOTE — TELEPHONE ENCOUNTER
----- Message from Lis Hodges sent at 9/9/2019  2:02 PM CDT -----  Contact: Son, Adrian  Patient need a refill on ELIQUIS 2.5 mg 90day supply please send to [facility], any questions please call back at [phone] (home)       [facility]  Phone Number [phone]  Fax Number [phone]

## 2019-09-13 ENCOUNTER — TELEPHONE (OUTPATIENT)
Dept: GASTROENTEROLOGY | Facility: CLINIC | Age: 84
End: 2019-09-13

## 2019-09-13 NOTE — TELEPHONE ENCOUNTER
----- Message from Radha Carrillo sent at 9/13/2019  1:33 PM CDT -----  Contact: patient  Type:  Sooner Apoointment Request    Caller is requesting a sooner appointment.  Caller declined first available appointment listed below.  Caller will not accept being placed on the waitlist and is requesting a message be sent to doctor.    Name of Caller:  patient  When is the first available appointment?  09/27/2019  Symptoms:  Bowels are not moving  Best Call Back Number:  [phone]  Additional Information:

## 2019-09-13 NOTE — TELEPHONE ENCOUNTER
Spoke with pt and pt stated she is taking recommended medications per Meredith Duran NP and its no longer working. Pt stated she has not had a BM in 4 days. I informed pt I would schedule an appt and message provider to get advice. Pt advised to go to ER for any severe pain and or bleeding. Pt verbalized understanding.

## 2019-09-25 ENCOUNTER — IMMUNIZATION (OUTPATIENT)
Dept: PHARMACY | Facility: CLINIC | Age: 84
End: 2019-09-25
Payer: MEDICARE

## 2019-11-01 ENCOUNTER — PATIENT OUTREACH (OUTPATIENT)
Dept: ADMINISTRATIVE | Facility: HOSPITAL | Age: 84
End: 2019-11-01

## 2019-11-05 ENCOUNTER — LAB VISIT (OUTPATIENT)
Dept: LAB | Facility: HOSPITAL | Age: 84
End: 2019-11-05
Attending: FAMILY MEDICINE
Payer: MEDICARE

## 2019-11-05 DIAGNOSIS — E03.9 HYPOTHYROIDISM, UNSPECIFIED TYPE: ICD-10-CM

## 2019-11-05 DIAGNOSIS — I10 ESSENTIAL HYPERTENSION: ICD-10-CM

## 2019-11-05 LAB
ALBUMIN SERPL BCP-MCNC: 3.6 G/DL (ref 3.5–5.2)
ALP SERPL-CCNC: 73 U/L (ref 55–135)
ALT SERPL W/O P-5'-P-CCNC: 11 U/L (ref 10–44)
ANION GAP SERPL CALC-SCNC: 7 MMOL/L (ref 8–16)
AST SERPL-CCNC: 17 U/L (ref 10–40)
BASOPHILS # BLD AUTO: 0.06 K/UL (ref 0–0.2)
BASOPHILS NFR BLD: 0.7 % (ref 0–1.9)
BILIRUB SERPL-MCNC: 0.3 MG/DL (ref 0.1–1)
BUN SERPL-MCNC: 15 MG/DL (ref 8–23)
CALCIUM SERPL-MCNC: 9.4 MG/DL (ref 8.7–10.5)
CHLORIDE SERPL-SCNC: 105 MMOL/L (ref 95–110)
CHOLEST SERPL-MCNC: 212 MG/DL (ref 120–199)
CHOLEST/HDLC SERPL: 3.1 {RATIO} (ref 2–5)
CO2 SERPL-SCNC: 30 MMOL/L (ref 23–29)
CREAT SERPL-MCNC: 1 MG/DL (ref 0.5–1.4)
DIFFERENTIAL METHOD: ABNORMAL
EOSINOPHIL # BLD AUTO: 0.3 K/UL (ref 0–0.5)
EOSINOPHIL NFR BLD: 3.6 % (ref 0–8)
ERYTHROCYTE [DISTWIDTH] IN BLOOD BY AUTOMATED COUNT: 15.4 % (ref 11.5–14.5)
EST. GFR  (AFRICAN AMERICAN): 57.3 ML/MIN/1.73 M^2
EST. GFR  (NON AFRICAN AMERICAN): 49.7 ML/MIN/1.73 M^2
GLUCOSE SERPL-MCNC: 85 MG/DL (ref 70–110)
HCT VFR BLD AUTO: 41.4 % (ref 37–48.5)
HDLC SERPL-MCNC: 68 MG/DL (ref 40–75)
HDLC SERPL: 32.1 % (ref 20–50)
HGB BLD-MCNC: 12.4 G/DL (ref 12–16)
IMM GRANULOCYTES # BLD AUTO: 0.04 K/UL (ref 0–0.04)
IMM GRANULOCYTES NFR BLD AUTO: 0.5 % (ref 0–0.5)
LDLC SERPL CALC-MCNC: 130.8 MG/DL (ref 63–159)
LYMPHOCYTES # BLD AUTO: 3 K/UL (ref 1–4.8)
LYMPHOCYTES NFR BLD: 34.8 % (ref 18–48)
MCH RBC QN AUTO: 29.4 PG (ref 27–31)
MCHC RBC AUTO-ENTMCNC: 30 G/DL (ref 32–36)
MCV RBC AUTO: 98 FL (ref 82–98)
MONOCYTES # BLD AUTO: 0.7 K/UL (ref 0.3–1)
MONOCYTES NFR BLD: 8.5 % (ref 4–15)
NEUTROPHILS # BLD AUTO: 4.5 K/UL (ref 1.8–7.7)
NEUTROPHILS NFR BLD: 51.9 % (ref 38–73)
NONHDLC SERPL-MCNC: 144 MG/DL
NRBC BLD-RTO: 0 /100 WBC
PLATELET # BLD AUTO: 227 K/UL (ref 150–350)
PMV BLD AUTO: 11.7 FL (ref 9.2–12.9)
POTASSIUM SERPL-SCNC: 4.6 MMOL/L (ref 3.5–5.1)
PROT SERPL-MCNC: 6.7 G/DL (ref 6–8.4)
RBC # BLD AUTO: 4.22 M/UL (ref 4–5.4)
SODIUM SERPL-SCNC: 142 MMOL/L (ref 136–145)
TRIGL SERPL-MCNC: 66 MG/DL (ref 30–150)
TSH SERPL DL<=0.005 MIU/L-ACNC: 2.78 UIU/ML (ref 0.4–4)
WBC # BLD AUTO: 8.7 K/UL (ref 3.9–12.7)

## 2019-11-05 PROCEDURE — 80053 COMPREHEN METABOLIC PANEL: CPT | Mod: HCNC

## 2019-11-05 PROCEDURE — 80061 LIPID PANEL: CPT | Mod: HCNC

## 2019-11-05 PROCEDURE — 84443 ASSAY THYROID STIM HORMONE: CPT | Mod: HCNC

## 2019-11-05 PROCEDURE — 85025 COMPLETE CBC W/AUTO DIFF WBC: CPT | Mod: HCNC

## 2019-11-05 PROCEDURE — 36415 COLL VENOUS BLD VENIPUNCTURE: CPT | Mod: HCNC,PN

## 2019-11-15 ENCOUNTER — OFFICE VISIT (OUTPATIENT)
Dept: FAMILY MEDICINE | Facility: CLINIC | Age: 84
End: 2019-11-15
Payer: MEDICARE

## 2019-11-15 VITALS
TEMPERATURE: 98 F | DIASTOLIC BLOOD PRESSURE: 92 MMHG | WEIGHT: 123 LBS | BODY MASS INDEX: 22.63 KG/M2 | HEART RATE: 54 BPM | OXYGEN SATURATION: 94 % | HEIGHT: 62 IN | SYSTOLIC BLOOD PRESSURE: 154 MMHG

## 2019-11-15 DIAGNOSIS — E03.9 HYPOTHYROIDISM, UNSPECIFIED TYPE: ICD-10-CM

## 2019-11-15 DIAGNOSIS — I48.0 PAROXYSMAL ATRIAL FIBRILLATION: ICD-10-CM

## 2019-11-15 DIAGNOSIS — I10 ESSENTIAL HYPERTENSION: ICD-10-CM

## 2019-11-15 DIAGNOSIS — Z00.00 PREVENTATIVE HEALTH CARE: Primary | ICD-10-CM

## 2019-11-15 PROCEDURE — 99499 RISK ADDL DX/OHS AUDIT: ICD-10-PCS | Mod: S$GLB,,, | Performed by: FAMILY MEDICINE

## 2019-11-15 PROCEDURE — 99999 PR PBB SHADOW E&M-EST. PATIENT-LVL III: CPT | Mod: PBBFAC,HCNC,, | Performed by: FAMILY MEDICINE

## 2019-11-15 PROCEDURE — 99397 PER PM REEVAL EST PAT 65+ YR: CPT | Mod: HCNC,S$GLB,, | Performed by: FAMILY MEDICINE

## 2019-11-15 PROCEDURE — 99397 PR PREVENTIVE VISIT,EST,65 & OVER: ICD-10-PCS | Mod: HCNC,S$GLB,, | Performed by: FAMILY MEDICINE

## 2019-11-15 PROCEDURE — 99999 PR PBB SHADOW E&M-EST. PATIENT-LVL III: ICD-10-PCS | Mod: PBBFAC,HCNC,, | Performed by: FAMILY MEDICINE

## 2019-11-15 PROCEDURE — 99499 UNLISTED E&M SERVICE: CPT | Mod: S$GLB,,, | Performed by: FAMILY MEDICINE

## 2019-11-15 RX ORDER — LACTULOSE 10 G/15ML
SOLUTION ORAL
Refills: 2 | COMMUNITY
Start: 2019-09-12 | End: 2020-01-21

## 2019-11-15 NOTE — PROGRESS NOTES
Subjective:       Patient ID: Michela Calvert is a 89 y.o. female.    Chief Complaint: Annual Exam    Here for 1 month f/u on chronic health issues.    S/p HEMIARTHROPLASTY, HIP (Left)  - following with Dr. Leo  She is living at the [facility].  Using walker for ambulation  Getting aid once a week  afib - rate- controlled on pacerone, Eliquis, Lopresor 25mg BID; taking asa 81mg daily; following with cardiology every 6 months  Hypothyroidism - tolerating Synthroid 88mcg daily  Lumbar DDD - using tylenol as needed         Past Medical History:   Diagnosis Date    Atrial fibrillation     Cataracts, bilateral     Chronic pain syndrome 7/25/2018    DDD (degenerative disc disease), lumbar     DDD (degenerative disc disease), lumbar     Heart murmur 01/2017    HLD (hyperlipidemia)     no medication taken    Hypertension 12/27/2018    Hypothyroidism     Lichen sclerosus et atrophicus     Rectal/Vaginal areas    Lumbar spinal stenosis     Lumbar spondylosis     Mitral valve disorder 01/2017    Asymptomatic    Osteopenia     Urinary incontinence        Past Surgical History:   Procedure Laterality Date    APPENDECTOMY      CATARACT EXTRACTION EXTRACAPSULAR W/ INTRAOCULAR LENS IMPLANTATION Bilateral     HEMIARTHROPLASTY OF HIP Left 3/9/2019    Procedure: HEMIARTHROPLASTY, HIP;  Surgeon: Josesito Leo MD;  Location: [facility] OR;  Service: Orthopedics;  Laterality: Left;    HYSTERECTOMY      INSERTION OF DORSAL COLUMN NERVE STIMULATOR FOR TRIAL N/A 7/25/2018    Procedure: INSERTION, DORSAL COLUMN NEUROSTIMULATOR, FOR TRIAL;  Surgeon: Derek Daily MD;  Location: [facility] OR;  Service: Pain Management;  Laterality: N/A;    spinal ablation  12/2017    TONSILLECTOMY         Review of patient's allergies indicates:   Allergen Reactions    Nitrofuran analogues Nausea Only    Ciprofloxacin Nausea And Vomiting       Social History     Socioeconomic History    Marital status:      Spouse name: Not on file     Number of children: Not on file    Years of education: Not on file    Highest education level: Not on file   Occupational History    Not on file   Social Needs    Financial resource strain: Not on file    Food insecurity:     Worry: Not on file     Inability: Not on file    Transportation needs:     Medical: Not on file     Non-medical: Not on file   Tobacco Use    Smoking status: Never Smoker    Smokeless tobacco: Never Used   Substance and Sexual Activity    Alcohol use: Not Currently     Comment: 1 gin & tonic daily @ 5pm; not since her hip replacement in 3/2019    Drug use: No    Sexual activity: Never   Lifestyle    Physical activity:     Days per week: Not on file     Minutes per session: Not on file    Stress: Not on file   Relationships    Social connections:     Talks on phone: Not on file     Gets together: Not on file     Attends Jehovah's witness service: Not on file     Active member of club or organization: Not on file     Attends meetings of clubs or organizations: Not on file     Relationship status: Not on file   Other Topics Concern    Not on file   Social History Narrative    Not on file       Current Outpatient Medications on File Prior to Visit   Medication Sig Dispense Refill    acetaminophen (TYLENOL) 650 MG TbSR Take 650 mg by mouth every 8 (eight) hours as needed.      amiodarone (PACERONE) 200 MG Tab One tablet po three times a week(M-W-F) 90 tablet 3    apixaban (ELIQUIS) 2.5 mg Tab Take 1 tablet (2.5 mg total) by mouth 2 (two) times daily. 60 tablet 6    CALCIUM 600 WITH VITAMIN D3 600 mg(1,500mg) -500 unit Cap TAKE ONE CAPSULE BY MOUTH ONCE DAILY IN THE MORNING  3    CONSTULOSE 10 gram/15 mL solution Take 15ml's (ONE tablespoonful) by MOUTH twice daily  2    hydrocortisone 2.5 % cream Apply to affected areas twice daily. 20 g 6    levothyroxine (SYNTHROID) 88 MCG tablet Take 1 tablet (88 mcg total) by mouth once daily. 90 tablet 3    metoprolol tartrate (LOPRESSOR) 25 MG  "tablet TAKE ONE TABLET BY MOUTH TWICE DAILY 60 tablet 11    PRESERVISION AREDS-2 760-061-24-1 mg-unit-mg-mg Cap TAKE ONE CAPSULE BY MOUTH TWICE DAILY (IN THE MORNING AND IN THE EVENING)  3    lactobacillus rhamnosus GG (CULTURELLE) 10 billion cell capsule Take 1 capsule by mouth once daily.       No current facility-administered medications on file prior to visit.        Family History   Problem Relation Age of Onset    Colon cancer Neg Hx     Crohn's disease Neg Hx     Stomach cancer Neg Hx     Ulcerative colitis Neg Hx     Esophageal cancer Neg Hx        Review of Systems   Constitutional: Positive for fatigue. Negative for appetite change, chills, fever and unexpected weight change.   HENT: Negative for sore throat and trouble swallowing.    Eyes: Negative for pain and visual disturbance.   Respiratory: Negative for cough, shortness of breath and wheezing.    Cardiovascular: Positive for palpitations. Negative for chest pain.   Gastrointestinal: Negative for abdominal pain, blood in stool, diarrhea, nausea and vomiting.   Genitourinary: Negative for difficulty urinating, dysuria and hematuria.   Musculoskeletal: Positive for arthralgias (left hip) and gait problem. Negative for neck pain.   Skin: Negative for rash and wound.   Neurological: Positive for weakness. Negative for dizziness, numbness and headaches.   Hematological: Negative for adenopathy.   Psychiatric/Behavioral: Negative for dysphoric mood.       Objective:      BP (!) 154/92 (BP Location: Left arm, Patient Position: Sitting)   Pulse (!) 54   Temp 98 °F (36.7 °C) (Oral)   Ht 5' 2" (1.575 m)   Wt 55.8 kg (123 lb 0.3 oz)   SpO2 (!) 94%   BMI 22.50 kg/m²   Physical Exam   Constitutional: She is oriented to person, place, and time. She appears well-developed and well-nourished.   HENT:   Head: Normocephalic.   Mouth/Throat: Oropharynx is clear and moist. No oropharyngeal exudate or posterior oropharyngeal erythema.   Eyes: Pupils are " equal, round, and reactive to light. Conjunctivae and EOM are normal.   Neck: Normal range of motion. Neck supple. No thyromegaly present.   Cardiovascular: Normal rate, regular rhythm, S1 normal, S2 normal, normal heart sounds and intact distal pulses. Exam reveals no gallop and no friction rub.   No murmur heard.  Pulmonary/Chest: Effort normal and breath sounds normal. She has no wheezes. She has no rales.   Abdominal: Normal appearance.   Musculoskeletal:        Right lower leg: She exhibits no edema.        Left lower leg: She exhibits no edema.   Lymphadenopathy:     She has no cervical adenopathy.   Neurological: She is alert and oriented to person, place, and time. No cranial nerve deficit. Gait normal.   Skin: Skin is warm and intact. No rash noted.   Psychiatric: She has a normal mood and affect.       Results for orders placed or performed in visit on 11/05/19   Comprehensive metabolic panel   Result Value Ref Range    Sodium 142 136 - 145 mmol/L    Potassium 4.6 3.5 - 5.1 mmol/L    Chloride 105 95 - 110 mmol/L    CO2 30 (H) 23 - 29 mmol/L    Glucose 85 70 - 110 mg/dL    BUN, Bld 15 8 - 23 mg/dL    Creatinine 1.0 0.5 - 1.4 mg/dL    Calcium 9.4 8.7 - 10.5 mg/dL    Total Protein 6.7 6.0 - 8.4 g/dL    Albumin 3.6 3.5 - 5.2 g/dL    Total Bilirubin 0.3 0.1 - 1.0 mg/dL    Alkaline Phosphatase 73 55 - 135 U/L    AST 17 10 - 40 U/L    ALT 11 10 - 44 U/L    Anion Gap 7 (L) 8 - 16 mmol/L    eGFR if African American 57.3 (A) >60 mL/min/1.73 m^2    eGFR if non  49.7 (A) >60 mL/min/1.73 m^2   Lipid panel   Result Value Ref Range    Cholesterol 212 (H) 120 - 199 mg/dL    Triglycerides 66 30 - 150 mg/dL    HDL 68 40 - 75 mg/dL    LDL Cholesterol 130.8 63.0 - 159.0 mg/dL    Hdl/Cholesterol Ratio 32.1 20.0 - 50.0 %    Total Cholesterol/HDL Ratio 3.1 2.0 - 5.0    Non-HDL Cholesterol 144 mg/dL   TSH   Result Value Ref Range    TSH 2.776 0.400 - 4.000 uIU/mL   CBC auto differential   Result Value Ref Range     WBC 8.70 3.90 - 12.70 K/uL    RBC 4.22 4.00 - 5.40 M/uL    Hemoglobin 12.4 12.0 - 16.0 g/dL    Hematocrit 41.4 37.0 - 48.5 %    Mean Corpuscular Volume 98 82 - 98 fL    Mean Corpuscular Hemoglobin 29.4 27.0 - 31.0 pg    Mean Corpuscular Hemoglobin Conc 30.0 (L) 32.0 - 36.0 g/dL    RDW 15.4 (H) 11.5 - 14.5 %    Platelets 227 150 - 350 K/uL    MPV 11.7 9.2 - 12.9 fL    Immature Granulocytes 0.5 0.0 - 0.5 %    Gran # (ANC) 4.5 1.8 - 7.7 K/uL    Immature Grans (Abs) 0.04 0.00 - 0.04 K/uL    Lymph # 3.0 1.0 - 4.8 K/uL    Mono # 0.7 0.3 - 1.0 K/uL    Eos # 0.3 0.0 - 0.5 K/uL    Baso # 0.06 0.00 - 0.20 K/uL    nRBC 0 0 /100 WBC    Gran% 51.9 38.0 - 73.0 %    Lymph% 34.8 18.0 - 48.0 %    Mono% 8.5 4.0 - 15.0 %    Eosinophil% 3.6 0.0 - 8.0 %    Basophil% 0.7 0.0 - 1.9 %    Differential Method Automated          Assessment:       1. Preventative health care    2. Hypothyroidism, unspecified type    3. Essential hypertension    4. Paroxysmal atrial fibrillation        Plan:       Preventative health care    Hypothyroidism, unspecified type    Essential hypertension    Paroxysmal atrial fibrillation        F/u with Dr. Leo as planned  Ok to use Tylenol QHS  Overall stable  Continue present medications  F/u with specialists as planned  Counseled on regular exercise, maintenance of a healthy weight, balanced diet rich in fruits/vegetables and lean protein, and avoidance of unhealthy habits like smoking and excessive alcohol intake.  F/u 12 months or PRN

## 2019-11-21 RX ORDER — VIT C/E/ZN/COPPR/LUTEIN/ZEAXAN 250MG-90MG
CAPSULE ORAL
Qty: 180 CAPSULE | Refills: 3 | Status: SHIPPED | OUTPATIENT
Start: 2019-11-21 | End: 2020-11-17

## 2019-11-21 NOTE — TELEPHONE ENCOUNTER
Refill Routing Note    Medication(s) are appropriate for refill Outside of protocol      Requested Prescriptions   Pending Prescriptions Disp Refills    PRESERVISION AREDS-2 [id number] mg-unit-mg-mg Cap [Pharmacy Med Name: PRESERVISION AREDS 2 250-200-40 CAPSULE] 180 capsule      Sig: TAKE ONE CAPSULE BY MOUTH TWICE DAILY (IN THE MORNING AND IN THE EVENING)       There is no refill protocol information for this order

## 2019-12-05 RX ORDER — MAG HYDROX/ALUMINUM HYD/SIMETH 400-400-40
SUSPENSION, ORAL (FINAL DOSE FORM) ORAL
Qty: 180 CAPSULE | Refills: 3 | Status: SHIPPED | OUTPATIENT
Start: 2019-12-05 | End: 2020-12-15

## 2019-12-05 NOTE — PROGRESS NOTES
Refill Routing Note     Medication(s) are appropriate for refill:    Medication Outside of Protocol    Appointments  past 18m or future 3m with PCP    Date Provider   Last Visit   11/15/2019 Enzo Nieto MD   Next Visit   Visit date not found Enzo Nieto MD       Automatic Epic Protocol Generated Data:    Requested Prescriptions   Pending Prescriptions Disp Refills    CALCIUM 600 WITH VITAMIN D3 600 mg(1,500mg) -500 unit Cap [Pharmacy Med Name: LIQUID CALCIUM 600-VIT D3 600 MG-500 CAPSULE] 180 capsule      Sig: TAKE ONE CAPSULE BY MOUTH ONCE daily in THE evening       There is no refill protocol information for this order

## 2019-12-31 RX ORDER — L.ACID/L.CASEI/B.BIF/B.LON/FOS 2B CELL-50
CAPSULE ORAL
Qty: 90 CAPSULE | Refills: 3 | Status: SHIPPED | OUTPATIENT
Start: 2019-12-31 | End: 2021-07-15

## 2020-01-20 NOTE — TELEPHONE ENCOUNTER
----- Message from Gloria Hennessy sent at 1/20/2020  1:52 PM CST -----  Contact: self  Type:  Needs Medical Advice    Who Called: self  Would the patient rather a call back or a response via [employer]? Call back  Best Call Back Number: [phone]  Additional Information: Pt would like to speak to nurse in office

## 2020-01-21 ENCOUNTER — HOSPITAL ENCOUNTER (OUTPATIENT)
Dept: RADIOLOGY | Facility: HOSPITAL | Age: 85
Discharge: HOME | End: 2020-01-21
Attending: NURSE PRACTITIONER
Payer: MEDICARE

## 2020-01-21 ENCOUNTER — OFFICE VISIT (OUTPATIENT)
Dept: FAMILY MEDICINE | Facility: CLINIC | Age: 85
End: 2020-01-21
Payer: MEDICARE

## 2020-01-21 ENCOUNTER — TELEPHONE (OUTPATIENT)
Dept: FAMILY MEDICINE | Facility: CLINIC | Age: 85
End: 2020-01-21

## 2020-01-21 VITALS
WEIGHT: 128.06 LBS | HEIGHT: 62 IN | OXYGEN SATURATION: 95 % | HEART RATE: 57 BPM | SYSTOLIC BLOOD PRESSURE: 164 MMHG | DIASTOLIC BLOOD PRESSURE: 90 MMHG | TEMPERATURE: 98 F | BODY MASS INDEX: 23.57 KG/M2

## 2020-01-21 DIAGNOSIS — M25.472 LEFT ANKLE SWELLING: Primary | ICD-10-CM

## 2020-01-21 DIAGNOSIS — R60.9 EDEMA, UNSPECIFIED TYPE: ICD-10-CM

## 2020-01-21 DIAGNOSIS — I10 ESSENTIAL HYPERTENSION: ICD-10-CM

## 2020-01-21 DIAGNOSIS — I48.0 PAROXYSMAL ATRIAL FIBRILLATION: ICD-10-CM

## 2020-01-21 DIAGNOSIS — I48.19 PERSISTENT ATRIAL FIBRILLATION: ICD-10-CM

## 2020-01-21 DIAGNOSIS — M25.472 LEFT ANKLE SWELLING: ICD-10-CM

## 2020-01-21 DIAGNOSIS — Z79.01 ON ANTICOAGULANT THERAPY: ICD-10-CM

## 2020-01-21 PROBLEM — S72.002A CLOSED FRACTURE OF NECK OF LEFT FEMUR: Status: RESOLVED | Noted: 2019-03-08 | Resolved: 2020-01-21

## 2020-01-21 PROCEDURE — 99999 PR PBB SHADOW E&M-EST. PATIENT-LVL V: CPT | Mod: PBBFAC,,, | Performed by: NURSE PRACTITIONER

## 2020-01-21 PROCEDURE — 73610 XR ANKLE COMPLETE 3 VIEW LEFT: ICD-10-PCS | Mod: 26,LT,, | Performed by: RADIOLOGY

## 2020-01-21 PROCEDURE — 1125F AMNT PAIN NOTED PAIN PRSNT: CPT | Mod: S$GLB,,, | Performed by: NURSE PRACTITIONER

## 2020-01-21 PROCEDURE — 99499 RISK ADDL DX/OHS AUDIT: ICD-10-PCS | Mod: S$GLB,,, | Performed by: NURSE PRACTITIONER

## 2020-01-21 PROCEDURE — 1159F PR MEDICATION LIST DOCUMENTED IN MEDICAL RECORD: ICD-10-PCS | Mod: S$GLB,,, | Performed by: NURSE PRACTITIONER

## 2020-01-21 PROCEDURE — 99499 UNLISTED E&M SERVICE: CPT | Mod: S$GLB,,, | Performed by: NURSE PRACTITIONER

## 2020-01-21 PROCEDURE — 1159F MED LIST DOCD IN RCRD: CPT | Mod: S$GLB,,, | Performed by: NURSE PRACTITIONER

## 2020-01-21 PROCEDURE — 99214 PR OFFICE/OUTPT VISIT, EST, LEVL IV, 30-39 MIN: ICD-10-PCS | Mod: S$GLB,,, | Performed by: NURSE PRACTITIONER

## 2020-01-21 PROCEDURE — 1101F PT FALLS ASSESS-DOCD LE1/YR: CPT | Mod: CPTII,S$GLB,, | Performed by: NURSE PRACTITIONER

## 2020-01-21 PROCEDURE — 73610 X-RAY EXAM OF ANKLE: CPT | Mod: 26,LT,, | Performed by: RADIOLOGY

## 2020-01-21 PROCEDURE — 99999 PR PBB SHADOW E&M-EST. PATIENT-LVL V: ICD-10-PCS | Mod: PBBFAC,,, | Performed by: NURSE PRACTITIONER

## 2020-01-21 PROCEDURE — 1125F PR PAIN SEVERITY QUANTIFIED, PAIN PRESENT: ICD-10-PCS | Mod: S$GLB,,, | Performed by: NURSE PRACTITIONER

## 2020-01-21 PROCEDURE — 99214 OFFICE O/P EST MOD 30 MIN: CPT | Mod: S$GLB,,, | Performed by: NURSE PRACTITIONER

## 2020-01-21 PROCEDURE — 1101F PR PT FALLS ASSESS DOC 0-1 FALLS W/OUT INJ PAST YR: ICD-10-PCS | Mod: CPTII,S$GLB,, | Performed by: NURSE PRACTITIONER

## 2020-01-21 PROCEDURE — 73610 X-RAY EXAM OF ANKLE: CPT | Mod: TC,FY,PO,LT

## 2020-01-21 NOTE — TELEPHONE ENCOUNTER
----- Message from Alex Tai sent at 1/21/2020  1:29 PM CST -----  Contact: pt  Type: Needs Medical Advice    Who Called:  pt    Best Call Back Number: [phone]  Additional Information: Would like to speak to Edwina Feliz. States she forgot to ask her a question this morning. Please call to advise.

## 2020-01-21 NOTE — PROGRESS NOTES
Subjective:       Patient ID: Michela Calvert is a 89 y.o. female.    Chief Complaint: Joint Swelling (left ankle--problems walking)        Woke up yesterday with left leg with swelling   No pain with ambulation   Did not ambulate much yesterday due to swelling.   Pain with flexion of foot and under calf    Edema   This is a new problem. The current episode started yesterday. The problem occurs daily. The problem has been unchanged. Pertinent negatives include no abdominal pain, anorexia, arthralgias, change in bowel habit, chest pain, chills, congestion, coughing, diaphoresis, fatigue, fever, headaches, joint swelling, myalgias, nausea, neck pain, numbness, rash, sore throat, swollen glands, urinary symptoms, vertigo, visual change, vomiting or weakness. The symptoms are aggravated by standing. She has tried rest and lying down for the symptoms.     Vitals:    01/21/20 1047   BP: (!) 164/90   Pulse:    Temp:      Review of Systems   Constitutional: Negative.  Negative for activity change, appetite change, chills, diaphoresis, fatigue and fever.   HENT: Negative.  Negative for congestion, drooling, ear discharge, facial swelling, nosebleeds, postnasal drip, rhinorrhea, sinus pressure, sinus pain and sore throat.    Eyes: Negative.  Negative for discharge, redness and itching.   Respiratory: Negative.  Negative for apnea, cough, choking, chest tightness, shortness of breath and wheezing.    Cardiovascular: Positive for leg swelling. Negative for chest pain.   Gastrointestinal: Negative.  Negative for abdominal pain, anal bleeding, anorexia, change in bowel habit, constipation, diarrhea, nausea and vomiting.   Endocrine: Negative.  Negative for cold intolerance, heat intolerance and polydipsia.   Genitourinary: Negative.  Negative for decreased urine volume, difficulty urinating, dysuria, flank pain, hematuria, pelvic pain and vaginal bleeding.   Musculoskeletal: Positive for gait problem. Negative for arthralgias,  joint swelling, myalgias and neck pain.        Due to swelling    Skin: Negative.  Negative for color change and rash.   Allergic/Immunologic: Negative.  Negative for environmental allergies and food allergies.   Neurological: Negative for dizziness, vertigo, speech difficulty, weakness, numbness and headaches.   Hematological: Negative.  Negative for adenopathy.   Psychiatric/Behavioral: Negative.  Negative for behavioral problems, confusion, hallucinations and self-injury. The patient is not hyperactive.    All other systems reviewed and are negative.      Past Medical History:   Diagnosis Date    Atrial fibrillation     Cataracts, bilateral     Chronic pain syndrome 7/25/2018    Closed fracture of neck of left femur 3/8/2019    DDD (degenerative disc disease), lumbar     DDD (degenerative disc disease), lumbar     Heart murmur 01/2017    HLD (hyperlipidemia)     no medication taken    Hypertension 12/27/2018    Hypothyroidism     Lichen sclerosus et atrophicus     Rectal/Vaginal areas    Lumbar spinal stenosis     Lumbar spondylosis     Mitral valve disorder 01/2017    Asymptomatic    Osteopenia     Urinary incontinence        Objective:      Physical Exam   Constitutional: She is oriented to person, place, and time. She appears well-developed and well-nourished.   HENT:   Head: Normocephalic and atraumatic.   Right Ear: Hearing, tympanic membrane, external ear and ear canal normal.   Left Ear: Hearing, tympanic membrane, external ear and ear canal normal.   Nose: Nose normal. No mucosal edema, rhinorrhea or sinus tenderness. Right sinus exhibits no maxillary sinus tenderness and no frontal sinus tenderness. Left sinus exhibits no maxillary sinus tenderness and no frontal sinus tenderness.   Mouth/Throat: Uvula is midline, oropharynx is clear and moist and mucous membranes are normal. No oropharyngeal exudate or posterior oropharyngeal edema.   Eyes: Pupils are equal, round, and reactive to light.  Conjunctivae and EOM are normal.   Neck: Normal range of motion. Neck supple.   Cardiovascular: Normal rate, regular rhythm and intact distal pulses. Exam reveals gallop and S3. Exam reveals no friction rub.   Murmur heard.   Systolic murmur is present with a grade of 3/6.  Pulmonary/Chest: Effort normal and breath sounds normal. No stridor. No respiratory distress. She has no wheezes. She has no rales.   Abdominal: Soft. Bowel sounds are normal.   Musculoskeletal: Normal range of motion. She exhibits no edema or tenderness.        Left ankle: She exhibits swelling. She exhibits normal range of motion and no ecchymosis.        Legs:  Swelling to left lower leg  +2 and swelling to ankle  +1 swelling to lower right leg    Neurological: She is alert and oriented to person, place, and time. No cranial nerve deficit. Coordination normal.   Skin: Skin is warm and dry.   Psychiatric: She has a normal mood and affect. Her behavior is normal. Judgment and thought content normal.   Nursing note and vitals reviewed.      Assessment:       1. Left ankle swelling    2. Edema, unspecified type    3. Essential hypertension    4. Paroxysmal atrial fibrillation    5. On anticoagulant therapy    6. Persistent atrial fibrillation        Plan:       Left ankle swelling  -     X-Ray Ankle Complete Left; Future; Expected date: 01/21/2020  -     US Lower Extremity Veins Left; Future; Expected date: 01/21/2020    Edema, unspecified type  -     US Lower Extremity Veins Left; Future; Expected date: 01/21/2020  -     COMPRESSION STOCKINGS  Could be vascular insufficiency - - very acute issue    Essential hypertension  On med    Paroxysmal atrial fibrillation  On eliquis 2.5 mg BID      On anticoagulant therapy  -     US Lower Extremity Veins Left; Future; Expected date: 01/21/2020    Persistent atrial fibrillation  -     US Lower Extremity Veins Left; Future; Expected date: 01/21/2020          Fu if not better  Will call with studies

## 2020-01-22 ENCOUNTER — OFFICE VISIT (OUTPATIENT)
Dept: FAMILY MEDICINE | Facility: CLINIC | Age: 85
End: 2020-01-22
Payer: MEDICARE

## 2020-01-22 VITALS
OXYGEN SATURATION: 98 % | HEART RATE: 71 BPM | BODY MASS INDEX: 23.61 KG/M2 | DIASTOLIC BLOOD PRESSURE: 82 MMHG | TEMPERATURE: 98 F | SYSTOLIC BLOOD PRESSURE: 138 MMHG | WEIGHT: 128.31 LBS | HEIGHT: 62 IN

## 2020-01-22 DIAGNOSIS — S80.02XA CONTUSION OF LEFT KNEE AND LOWER LEG, INITIAL ENCOUNTER: ICD-10-CM

## 2020-01-22 DIAGNOSIS — M79.89 LEG SWELLING: Primary | ICD-10-CM

## 2020-01-22 DIAGNOSIS — S80.12XA CONTUSION OF LEFT KNEE AND LOWER LEG, INITIAL ENCOUNTER: ICD-10-CM

## 2020-01-22 PROCEDURE — 1101F PT FALLS ASSESS-DOCD LE1/YR: CPT | Mod: CPTII,S$GLB,, | Performed by: FAMILY MEDICINE

## 2020-01-22 PROCEDURE — 1126F AMNT PAIN NOTED NONE PRSNT: CPT | Mod: S$GLB,,, | Performed by: FAMILY MEDICINE

## 2020-01-22 PROCEDURE — 1101F PR PT FALLS ASSESS DOC 0-1 FALLS W/OUT INJ PAST YR: ICD-10-PCS | Mod: CPTII,S$GLB,, | Performed by: FAMILY MEDICINE

## 2020-01-22 PROCEDURE — 99213 OFFICE O/P EST LOW 20 MIN: CPT | Mod: S$GLB,,, | Performed by: FAMILY MEDICINE

## 2020-01-22 PROCEDURE — 1159F PR MEDICATION LIST DOCUMENTED IN MEDICAL RECORD: ICD-10-PCS | Mod: S$GLB,,, | Performed by: FAMILY MEDICINE

## 2020-01-22 PROCEDURE — 1126F PR PAIN SEVERITY QUANTIFIED, NO PAIN PRESENT: ICD-10-PCS | Mod: S$GLB,,, | Performed by: FAMILY MEDICINE

## 2020-01-22 PROCEDURE — 99999 PR PBB SHADOW E&M-EST. PATIENT-LVL IV: ICD-10-PCS | Mod: PBBFAC,,, | Performed by: FAMILY MEDICINE

## 2020-01-22 PROCEDURE — 99213 PR OFFICE/OUTPT VISIT, EST, LEVL III, 20-29 MIN: ICD-10-PCS | Mod: S$GLB,,, | Performed by: FAMILY MEDICINE

## 2020-01-22 PROCEDURE — 1159F MED LIST DOCD IN RCRD: CPT | Mod: S$GLB,,, | Performed by: FAMILY MEDICINE

## 2020-01-22 PROCEDURE — 99999 PR PBB SHADOW E&M-EST. PATIENT-LVL IV: CPT | Mod: PBBFAC,,, | Performed by: FAMILY MEDICINE

## 2020-01-22 NOTE — PROGRESS NOTES
Subjective:       Patient ID: Michela Calvert is a 89 y.o. female.    Chief Complaint: Follow-up (Xray and Ultrasound)    Onset yesterday with left leg swelling.  This has improved today.        Past Medical History:   Diagnosis Date    Atrial fibrillation     Cataracts, bilateral     Chronic pain syndrome 7/25/2018    Closed fracture of neck of left femur 3/8/2019    DDD (degenerative disc disease), lumbar     DDD (degenerative disc disease), lumbar     Heart murmur 01/2017    HLD (hyperlipidemia)     no medication taken    Hypertension 12/27/2018    Hypothyroidism     Lichen sclerosus et atrophicus     Rectal/Vaginal areas    Lumbar spinal stenosis     Lumbar spondylosis     Mitral valve disorder 01/2017    Asymptomatic    Osteopenia     Urinary incontinence        Past Surgical History:   Procedure Laterality Date    APPENDECTOMY      CATARACT EXTRACTION EXTRACAPSULAR W/ INTRAOCULAR LENS IMPLANTATION Bilateral     HEMIARTHROPLASTY OF HIP Left 3/9/2019    Procedure: HEMIARTHROPLASTY, HIP;  Surgeon: Josesito Leo MD;  Location: [facility];  Service: Orthopedics;  Laterality: Left;    HYSTERECTOMY      INSERTION OF DORSAL COLUMN NERVE STIMULATOR FOR TRIAL N/A 7/25/2018    Procedure: INSERTION, DORSAL COLUMN NEUROSTIMULATOR, FOR TRIAL;  Surgeon: Derek Daily MD;  Location: [facility] OR;  Service: Pain Management;  Laterality: N/A;    spinal ablation  12/2017    TONSILLECTOMY         Review of patient's allergies indicates:   Allergen Reactions    Nitrofuran analogues Nausea Only    Ciprofloxacin Nausea And Vomiting       Social History     Socioeconomic History    Marital status:      Spouse name: Not on file    Number of children: Not on file    Years of education: Not on file    Highest education level: Not on file   Occupational History    Not on file   Social Needs    Financial resource strain: Not on file    Food insecurity:     Worry: Not on file     Inability: Not on file     Transportation needs:     Medical: Not on file     Non-medical: Not on file   Tobacco Use    Smoking status: Never Smoker    Smokeless tobacco: Never Used   Substance and Sexual Activity    Alcohol use: Not Currently     Comment: 1 gin & tonic daily @ 5pm; not since her hip replacement in 3/2019    Drug use: No    Sexual activity: Never   Lifestyle    Physical activity:     Days per week: Not on file     Minutes per session: Not on file    Stress: Not on file   Relationships    Social connections:     Talks on phone: Not on file     Gets together: Not on file     Attends [employer] service: Not on file     Active member of club or organization: Not on file     Attends meetings of clubs or organizations: Not on file     Relationship status: Not on file   Other Topics Concern    Not on file   Social History Narrative    Not on file       Current Outpatient Medications on File Prior to Visit   Medication Sig Dispense Refill    acetaminophen (TYLENOL) 650 MG TbSR Take 650 mg by mouth every 8 (eight) hours as needed.      amiodarone (PACERONE) 200 MG Tab One tablet po three times a week(M-W-F) 90 tablet 3    apixaban (ELIQUIS) 2.5 mg Tab Take 1 tablet (2.5 mg total) by mouth 2 (two) times daily. 60 tablet 6    CALCIUM 600 WITH VITAMIN D3 600 mg(1,500mg) -500 unit Cap TAKE ONE CAPSULE BY MOUTH ONCE daily in THE evening 180 capsule 3    levothyroxine (SYNTHROID) 88 MCG tablet Take 1 tablet (88 mcg total) by mouth once daily. 90 tablet 3    metoprolol tartrate (LOPRESSOR) 25 MG tablet TAKE ONE TABLET BY MOUTH TWICE DAILY 60 tablet 11    PRESERVISION AREDS-2 [id number] mg-unit-mg-mg Cap TAKE ONE CAPSULE BY MOUTH TWICE DAILY (In THE morning AND In THE evening) 180 capsule 3    PROBIOTIC BLEND 2 billion cell-50 mg Cap TAKE ONE CAPSULE BY MOUTH ONCE daily in THE morning 90 capsule 3     No current facility-administered medications on file prior to visit.        Family History   Problem Relation Age of  "Onset    Colon cancer Neg Hx     Crohn's disease Neg Hx     Stomach cancer Neg Hx     Ulcerative colitis Neg Hx     Esophageal cancer Neg Hx        Review of Systems   Constitutional: Negative for appetite change, chills, fever and unexpected weight change.   HENT: Negative for sore throat and trouble swallowing.    Eyes: Negative for pain and visual disturbance.   Respiratory: Negative for cough, shortness of breath and wheezing.    Cardiovascular: Positive for leg swelling. Negative for chest pain and palpitations.   Gastrointestinal: Negative for abdominal pain, blood in stool, diarrhea, nausea and vomiting.   Genitourinary: Negative for difficulty urinating, dysuria and hematuria.   Musculoskeletal: Negative for arthralgias, gait problem and neck pain.   Skin: Negative for rash and wound.   Neurological: Negative for dizziness, weakness, numbness and headaches.   Hematological: Negative for adenopathy.   Psychiatric/Behavioral: Negative for dysphoric mood.       Objective:      /82   Pulse 71   Temp 98.3 °F (36.8 °C) (Oral)   Ht 5' 2" (1.575 m)   Wt 58.2 kg (128 lb 4.9 oz)   SpO2 98%   BMI 23.47 kg/m²   Physical Exam   Constitutional: She is oriented to person, place, and time. She appears well-developed and well-nourished.   HENT:   Head: Normocephalic.   Mouth/Throat: Oropharynx is clear and moist. No oropharyngeal exudate or posterior oropharyngeal erythema.   Eyes: Pupils are equal, round, and reactive to light. Conjunctivae and EOM are normal.   Neck: Normal range of motion. Neck supple. No thyromegaly present.   Cardiovascular: Normal rate, regular rhythm, S1 normal, S2 normal and intact distal pulses. Exam reveals no gallop and no friction rub.   Murmur heard.  Pulmonary/Chest: Effort normal and breath sounds normal. She has no wheezes. She has no rales.   Abdominal: Normal appearance.   Musculoskeletal:        Right lower leg: She exhibits no edema.        Left lower leg: She exhibits no " edema.        Legs:  Lymphadenopathy:     She has no cervical adenopathy.   Neurological: She is alert and oriented to person, place, and time. No cranial nerve deficit. Gait normal.   Skin: Skin is warm and intact. No rash noted.   Psychiatric: She has a normal mood and affect.     xray and US reviewed    Assessment:       1. Leg swelling    2. Contusion of left knee and lower leg, initial encounter        Plan:       Leg swelling    Contusion of left knee and lower leg, initial encounter      suspect likely venous insufficiency aggravated by recent contusion to lower leg  Counseled on regular exercise, maintenance of a healthy weight, balanced diet rich in fruits/vegetables and lean protein, and avoidance of unhealthy habits like smoking and excessive alcohol intake.

## 2020-02-11 DIAGNOSIS — E03.4 HYPOTHYROIDISM DUE TO ACQUIRED ATROPHY OF THYROID: ICD-10-CM

## 2020-02-13 ENCOUNTER — OFFICE VISIT (OUTPATIENT)
Dept: CARDIOLOGY | Facility: CLINIC | Age: 85
End: 2020-02-13
Payer: MEDICARE

## 2020-02-13 VITALS
SYSTOLIC BLOOD PRESSURE: 180 MMHG | HEIGHT: 62 IN | DIASTOLIC BLOOD PRESSURE: 80 MMHG | BODY MASS INDEX: 23.37 KG/M2 | WEIGHT: 127 LBS | HEART RATE: 58 BPM

## 2020-02-13 DIAGNOSIS — I10 ESSENTIAL HYPERTENSION: ICD-10-CM

## 2020-02-13 DIAGNOSIS — I35.0 NONRHEUMATIC AORTIC VALVE STENOSIS: Primary | ICD-10-CM

## 2020-02-13 DIAGNOSIS — I48.0 PAROXYSMAL ATRIAL FIBRILLATION: ICD-10-CM

## 2020-02-13 PROCEDURE — 99999 PR PBB SHADOW E&M-EST. PATIENT-LVL III: CPT | Mod: PBBFAC,,, | Performed by: INTERNAL MEDICINE

## 2020-02-13 PROCEDURE — 1126F AMNT PAIN NOTED NONE PRSNT: CPT | Mod: S$GLB,,, | Performed by: INTERNAL MEDICINE

## 2020-02-13 PROCEDURE — 1159F MED LIST DOCD IN RCRD: CPT | Mod: S$GLB,,, | Performed by: INTERNAL MEDICINE

## 2020-02-13 PROCEDURE — 1159F PR MEDICATION LIST DOCUMENTED IN MEDICAL RECORD: ICD-10-PCS | Mod: S$GLB,,, | Performed by: INTERNAL MEDICINE

## 2020-02-13 PROCEDURE — 99214 OFFICE O/P EST MOD 30 MIN: CPT | Mod: S$GLB,,, | Performed by: INTERNAL MEDICINE

## 2020-02-13 PROCEDURE — 99999 PR PBB SHADOW E&M-EST. PATIENT-LVL III: ICD-10-PCS | Mod: PBBFAC,,, | Performed by: INTERNAL MEDICINE

## 2020-02-13 PROCEDURE — 99214 PR OFFICE/OUTPT VISIT, EST, LEVL IV, 30-39 MIN: ICD-10-PCS | Mod: S$GLB,,, | Performed by: INTERNAL MEDICINE

## 2020-02-13 PROCEDURE — 1101F PT FALLS ASSESS-DOCD LE1/YR: CPT | Mod: CPTII,S$GLB,, | Performed by: INTERNAL MEDICINE

## 2020-02-13 PROCEDURE — 1126F PR PAIN SEVERITY QUANTIFIED, NO PAIN PRESENT: ICD-10-PCS | Mod: S$GLB,,, | Performed by: INTERNAL MEDICINE

## 2020-02-13 PROCEDURE — 1101F PR PT FALLS ASSESS DOC 0-1 FALLS W/OUT INJ PAST YR: ICD-10-PCS | Mod: CPTII,S$GLB,, | Performed by: INTERNAL MEDICINE

## 2020-02-13 RX ORDER — AMIODARONE HYDROCHLORIDE 200 MG/1
TABLET ORAL
Qty: 90 TABLET | Refills: 3 | Status: SHIPPED | OUTPATIENT
Start: 2020-02-13 | End: 2021-02-23

## 2020-02-13 NOTE — PROGRESS NOTES
Subjective:    Patient ID:  Michela Calvert is a 89 y.o. female who presents for follow-up of Follow-up (follow up )      HPI 89 yo WF with PAF and HTN.Last visit in [facility] . Continues in sinus rhythm. She states she is doing well. Denies palpitations, weak spells, and syncope No bleeding issues.    · Normal left ventricular systolic function. The estimated ejection fraction is 55%  · Concentric left ventricular remodeling.  · Normal right ventricular systolic function.  · Grade I (mild) left ventricular diastolic dysfunction consistent with impaired relaxation.  · Mild aortic valve stenosis.  · Aortic valve area is 1.60 cm2; peak velocity is m/s; mean gradient is 19 mmHg.  · Mild mitral regurgitation.  · The estimated PA systolic pressure is 62 mm Hg    Review of Systems   Constitution: Negative for decreased appetite, fever, malaise/fatigue, weight gain and weight loss.   HENT: Negative for hearing loss and nosebleeds.    Eyes: Negative for visual disturbance.   Cardiovascular: Negative for chest pain, claudication, cyanosis, dyspnea on exertion, irregular heartbeat, leg swelling, near-syncope, orthopnea, palpitations, paroxysmal nocturnal dyspnea and syncope.   Respiratory: Negative for cough, hemoptysis, shortness of breath, sleep disturbances due to breathing, snoring and wheezing.    Endocrine: Negative for cold intolerance, heat intolerance, polydipsia and polyuria.   Hematologic/Lymphatic: Negative for adenopathy and bleeding problem. Does not bruise/bleed easily.   Skin: Negative for color change, itching, poor wound healing, rash and suspicious lesions.   Musculoskeletal: Positive for arthritis and back pain. Negative for falls, joint pain, joint swelling, muscle cramps, muscle weakness and myalgias.   Gastrointestinal: Negative for bloating, abdominal pain, change in bowel habit, constipation, flatus, heartburn, hematemesis, hematochezia, hemorrhoids, jaundice, melena, nausea and vomiting.   Genitourinary:  "Negative for bladder incontinence, decreased libido, frequency, hematuria, hesitancy and urgency.   Neurological: Positive for loss of balance. Negative for brief paralysis, difficulty with concentration, excessive daytime sleepiness, dizziness, focal weakness, headaches, light-headedness, numbness, vertigo and weakness.   Psychiatric/Behavioral: Negative for altered mental status, depression and memory loss. The patient does not have insomnia and is not nervous/anxious.    Allergic/Immunologic: Negative for environmental allergies, hives and persistent infections.        Objective:    Physical Exam   Constitutional: She is oriented to person, place, and time. She appears well-developed and well-nourished.   BP (!) 180/80   Pulse (!) 58   Ht 5' 2" (1.575 m)   Wt 57.6 kg (126 lb 15.8 oz)   BMI 23.23 kg/m²      HENT:   Head: Normocephalic and atraumatic.   Right Ear: External ear normal.   Left Ear: External ear normal.   Nose: Nose normal.   Mouth/Throat: Oropharynx is clear and moist.   Eyes: Pupils are equal, round, and reactive to light. Conjunctivae, EOM and lids are normal. Right eye exhibits no discharge. Left eye exhibits no discharge. Right conjunctiva has no hemorrhage. No scleral icterus.   Neck: Normal range of motion. Neck supple. No JVD present. No tracheal deviation present. No thyromegaly present.   Cardiovascular: Normal rate, regular rhythm, normal heart sounds and intact distal pulses. Exam reveals no gallop and no friction rub.   No murmur heard.  Pulmonary/Chest: Effort normal and breath sounds normal. No respiratory distress. She has no wheezes. She has no rales. She exhibits no tenderness. Breasts are symmetrical.   Abdominal: Soft. Bowel sounds are normal. She exhibits no distension and no mass. There is no hepatosplenomegaly or hepatomegaly. There is no tenderness. There is no rebound and no guarding.   Musculoskeletal: Normal range of motion. She exhibits no edema or tenderness. "   Lymphadenopathy:     She has no cervical adenopathy.   Neurological: She is alert and oriented to person, place, and time. She displays normal reflexes. No cranial nerve deficit. Coordination normal.   Skin: Skin is warm and dry. No rash noted. No erythema. No pallor.   Psychiatric: She has a normal mood and affect. Her behavior is normal. Judgment and thought content normal.   Nursing note and vitals reviewed.        Assessment:       1. Nonrheumatic aortic valve stenosis- Mild    2. Paroxysmal atrial fibrillation    3. Essential hypertension         Plan:     Continue current meds    Patient advised to limit exposure to caffeine, stimulants, and alcohol    Monitor BP at home and if remains elevated will add medication    No orders of the defined types were placed in this encounter.    Follow up in about 6 months (around 8/13/2020).

## 2020-02-14 RX ORDER — LEVOTHYROXINE SODIUM 88 UG/1
TABLET ORAL
Qty: 90 TABLET | Refills: 2 | Status: SHIPPED | OUTPATIENT
Start: 2020-02-14 | End: 2020-10-22

## 2020-02-14 NOTE — PROGRESS NOTES
Refill Authorization Note     is requesting a refill authorization.    Brief assessment and rationale for refill: APPROVE: prr          Medication Therapy Plan: TSH wnl                              Comments:   Requested Prescriptions   Pending Prescriptions Disp Refills    levothyroxine (SYNTHROID) 88 MCG tablet [Pharmacy Med Name: LEVOTHYROXINE SODIUM 88 MCG TABLET] 90 tablet 2     Sig: TAKE ONE TABLET (88mcg total) BY MOUTH ONCE daily IN THE MORNING       Endocrinology:  Hypothyroid Agents Failed - 2/11/2020  6:10 PM        Failed - Manual Review: FT4 is not required if last TSH is WNL.        Passed - Patient is at least 18 years old        Passed - Office visit in past 12 months or future 90 days.     Recent Outpatient Visits            Yesterday Nonrheumatic aortic valve stenosis- Mild    [facility] Bryon Puentes Jr., MD    3 weeks ago Leg swelling    [facility] Enzo Nieto MD    3 weeks ago Left ankle swelling    [facility] Edwina Feliz NP    3 months ago Preventative health care    [facility] Enzo Nieto MD    6 months ago Essential hypertension    [facility] Bryon Puentes Jr., MD          Future Appointments              In 6 months [facility]                Passed - TSH in normal range and within 360 days     TSH   Date Value Ref Range Status   11/05/2019 2.776 0.400 - 4.000 uIU/mL Final   04/11/2019 1.598 0.400 - 4.000 uIU/mL Final   02/12/2019 2.790 0.400 - 4.000 uIU/mL Final     Comment:     Warning:  Heterophilic antibodies in serum or plasma of   certain individuals are known to cause interference with   immunoassays. These antibodies may be present in blood samples   from individuals regularly exposed to animal or who have been   treated with animal products.   Patients taking very high Biotin doses of >300 mcg/day may   cause a  negative bias in this assay.                Passed - T4 free within 1080 days     Free T4   Date Value Ref Range Status   2018 1.41 0.78 - 2.19 ng/dL Final     Comment:     Performance of this assay has not been established with    specimens.  When interpretating FT4 results, note the potential   effects of certain drugs on the free-hormone equilibrium. Thyroid   hormone autoantibodies in serum or plasma samples may cause   interference in immunoassays  WARNING: Heterophilic antibodies in the serum or plasma of   certain individuals are known to cause interference with   immunoassays. These antibodies may be present in blood samples   from individuals regularly exposed to animals or who have been   treated with animal serum products.      2009 1.33 0.71 - 1.51 ng/dl Final   2008 1.28 0.71 - 1.51 ng/dl Final

## 2020-02-18 ENCOUNTER — TELEPHONE (OUTPATIENT)
Dept: CARDIOLOGY | Facility: CLINIC | Age: 85
End: 2020-02-18

## 2020-02-18 NOTE — TELEPHONE ENCOUNTER
Duplicate message----- Message from Anna Hanson sent at 2/18/2020 12:48 PM CST -----  Contact: Pt  Type: Needs Medical Advice    Who Called:  Pt  Best Call Back Number: [phone]  Additional Information: Requesting a call back regarding pt treatment plan and had some question regarding it   Please Advise ---Thank you

## 2020-02-18 NOTE — TELEPHONE ENCOUNTER
Pt will fax form over----- Message from Lori Morales sent at 2/18/2020  1:49 PM CST -----  Contact: pt  Pt calling to speak to nurse Kincaid or someone in the office wanting to ask questions for a form from [employer]....[phone] (home)

## 2020-02-19 ENCOUNTER — TELEPHONE (OUTPATIENT)
Dept: CARDIOLOGY | Facility: CLINIC | Age: 85
End: 2020-02-19

## 2020-02-19 NOTE — TELEPHONE ENCOUNTER
----- Message from Anita Gaspar sent at 2/19/2020  8:59 AM CST -----  Contact: May, son  Type: Needs Medical Advice    Who Called:  Son  Best Call Back Number:   Additional Information: Per son wanted to notify that he was faxing a form for the dr to sign and fax back regarding eliquis treatment-please advise-thank you

## 2020-03-16 ENCOUNTER — TELEPHONE (OUTPATIENT)
Dept: CARDIOLOGY | Facility: CLINIC | Age: 85
End: 2020-03-16

## 2020-03-16 NOTE — TELEPHONE ENCOUNTER
----- Message from Jessica Ford sent at 3/16/2020 10:54 AM CDT -----  Contact: self  Type: Needs Medical Advice    Who Called:  self  Symptoms (please be specific):    How long has patient had these symptoms:    Pharmacy name and phone #:    Best Call Back Number: [phone] (home)   Additional Information: Patient is calling regarding the PA for Norah. She states Lalitha sent forms to be filled out and sent back. Patient states they have been waiting 3 weeks. Please call patient to advise. Thanks!

## 2020-04-08 DIAGNOSIS — I10 ESSENTIAL HYPERTENSION: Primary | ICD-10-CM

## 2020-04-14 RX ORDER — METOPROLOL TARTRATE 25 MG/1
TABLET, FILM COATED ORAL
Qty: 180 TABLET | Refills: 3 | Status: SHIPPED | OUTPATIENT
Start: 2020-04-14 | End: 2021-02-10

## 2020-04-14 NOTE — TELEPHONE ENCOUNTER
I have reviewed and agree with the assessment below. BP WNL at LOV with PCP. Approve 12 more. Thank you.

## 2020-04-14 NOTE — PROGRESS NOTES
Refill Authorization Note     is requesting a refill authorization.    Brief assessment and rationale for refill: APPROVE: prr          Medication Therapy Plan: Elevated BP at cards ov and ov for leg swelling; approve 12 more    Medication reconciliation completed: No                         Comments:   Refill Center Care Gap Closure protocols temporarily suspended.   Requested Prescriptions   Pending Prescriptions Disp Refills    metoprolol tartrate (LOPRESSOR) 25 MG tablet [Pharmacy Med Name: metoprolol tartrate 25 mg tablet] 180 tablet 3     Sig: TAKE ONE TABLET BY MOUTH TWICE DAILY       Cardiovascular:  Beta Blockers Failed - 4/8/2020 10:17 AM        Failed - Last BP in normal range within 360 days.     BP Readings from Last 3 Encounters:   02/13/20 (!) 180/80   01/22/20 138/82   01/21/20 (!) 164/90              Passed - Patient is at least 18 years old        Passed - Last Heart Rate in normal range within 360 days.     Pulse Readings from Last 3 Encounters:   02/13/20 58   01/22/20 71   01/21/20 57             Passed - Office visit in past 12 months or future 90 days.     Recent Outpatient Visits            2 months ago Nonrheumatic aortic valve stenosis- Mild    [facility] Bryon Puentes Jr., MD    2 months ago Leg swelling    [facility] Enzo Nieto MD    2 months ago Left ankle swelling    [facility] Edwina Feliz, NP    5 months ago Preventative health care    [facility] Enzo Nieto MD    8 months ago Essential hypertension    [facility] Bryon Puentse Jr., MD          Future Appointments              In 4 months Bryon Puentes Jr., MD [facility], [place]                 Appointments  past 12m or future 3m with PCP    Date Provider   Last Visit   1/22/2020 Enzo Nieto MD   Next Visit   Visit date not found Enzo Nieto MD   .  ED visits in past 90 days:  0       Note composed:10:59 AM 04/14/2020      Principal Discharge DX:	Epigastric pain

## 2020-05-05 ENCOUNTER — PATIENT MESSAGE (OUTPATIENT)
Dept: ADMINISTRATIVE | Facility: HOSPITAL | Age: 85
End: 2020-05-05

## 2020-06-19 DIAGNOSIS — L29.9 ITCHING: Primary | ICD-10-CM

## 2020-06-19 RX ORDER — HYDROCORTISONE 25 MG/G
CREAM TOPICAL
Qty: 3 TUBE | Refills: 2 | Status: SHIPPED | OUTPATIENT
Start: 2020-06-19 | End: 2020-06-19

## 2020-06-19 NOTE — PROGRESS NOTES
Refill Authorization Note    is requesting a refill authorization.    Brief assessment and rationale for refill: APPROVE: prr          Medication Therapy Plan: approve 9 more    Medication reconciliation completed: No                         Comments:      Requested Prescriptions   Signed Prescriptions Disp Refills    hydrocortisone 2.5 % cream 3 Tube 2     Sig: APPLY TO THE AFFECTED AREA(S) TWICE DAILY       Off-Protocol Failed - 6/19/2020  9:11 AM        Failed - Medication not assigned to a protocol, review manually.        Passed - Office visit in past 12 months or future 90 days.     Recent Outpatient Visits            4 months ago Nonrheumatic aortic valve stenosis- Mild    [facility] Bryon Puentes Jr., MD    4 months ago Leg swelling    [facility] Enzo Nieto MD    5 months ago Left ankle swelling    [facility] Edwina Feliz NP    7 months ago Preventative health care    [facility] Enzo Nieto MD    10 months ago Essential hypertension    [facility] Bryon Puentes Jr., MD          Future Appointments              In 2 months [facility]                    Appointments  past 12m or future 3m with PCP    Date Provider   Last Visit   1/22/2020 Enzo Nieto MD   Next Visit   Visit date not found Enzo Nieto MD   ED visits in past 90 days: 0     Note composed:1:01 PM 06/19/2020

## 2020-07-02 ENCOUNTER — LAB VISIT (OUTPATIENT)
Dept: PRIMARY CARE CLINIC | Facility: OTHER | Age: 85
End: 2020-07-02
Attending: INTERNAL MEDICINE
Payer: MEDICARE

## 2020-07-02 DIAGNOSIS — Z11.59 SPECIAL SCREENING EXAMINATION FOR UNSPECIFIED VIRAL DISEASE: ICD-10-CM

## 2020-07-02 PROCEDURE — U0003 INFECTIOUS AGENT DETECTION BY NUCLEIC ACID (DNA OR RNA); SEVERE ACUTE RESPIRATORY SYNDROME CORONAVIRUS 2 (SARS-COV-2) (CORONAVIRUS DISEASE [COVID-19]), AMPLIFIED PROBE TECHNIQUE, MAKING USE OF HIGH THROUGHPUT TECHNOLOGIES AS DESCRIBED BY CMS-2020-01-R: HCPCS | Mod: ST120,HCNC

## 2020-07-08 LAB — SARS-COV-2 RNA RESP QL NAA+PROBE: NOT DETECTED

## 2020-08-20 ENCOUNTER — OFFICE VISIT (OUTPATIENT)
Dept: CARDIOLOGY | Facility: CLINIC | Age: 85
End: 2020-08-20
Payer: MEDICARE

## 2020-08-20 VITALS
HEIGHT: 62 IN | WEIGHT: 129.19 LBS | SYSTOLIC BLOOD PRESSURE: 179 MMHG | BODY MASS INDEX: 23.77 KG/M2 | HEART RATE: 59 BPM | DIASTOLIC BLOOD PRESSURE: 83 MMHG

## 2020-08-20 DIAGNOSIS — I35.0 NONRHEUMATIC AORTIC VALVE STENOSIS: ICD-10-CM

## 2020-08-20 DIAGNOSIS — I48.0 PAROXYSMAL ATRIAL FIBRILLATION: ICD-10-CM

## 2020-08-20 DIAGNOSIS — I10 ESSENTIAL HYPERTENSION: Primary | ICD-10-CM

## 2020-08-20 PROCEDURE — 99999 PR PBB SHADOW E&M-EST. PATIENT-LVL III: ICD-10-PCS | Mod: PBBFAC,HCNC,, | Performed by: INTERNAL MEDICINE

## 2020-08-20 PROCEDURE — 99214 PR OFFICE/OUTPT VISIT, EST, LEVL IV, 30-39 MIN: ICD-10-PCS | Mod: HCNC,S$GLB,, | Performed by: INTERNAL MEDICINE

## 2020-08-20 PROCEDURE — 1126F AMNT PAIN NOTED NONE PRSNT: CPT | Mod: HCNC,S$GLB,, | Performed by: INTERNAL MEDICINE

## 2020-08-20 PROCEDURE — 1159F PR MEDICATION LIST DOCUMENTED IN MEDICAL RECORD: ICD-10-PCS | Mod: HCNC,S$GLB,, | Performed by: INTERNAL MEDICINE

## 2020-08-20 PROCEDURE — 1126F PR PAIN SEVERITY QUANTIFIED, NO PAIN PRESENT: ICD-10-PCS | Mod: HCNC,S$GLB,, | Performed by: INTERNAL MEDICINE

## 2020-08-20 PROCEDURE — 99499 UNLISTED E&M SERVICE: CPT | Mod: S$GLB,,, | Performed by: INTERNAL MEDICINE

## 2020-08-20 PROCEDURE — 99214 OFFICE O/P EST MOD 30 MIN: CPT | Mod: HCNC,S$GLB,, | Performed by: INTERNAL MEDICINE

## 2020-08-20 PROCEDURE — 1101F PT FALLS ASSESS-DOCD LE1/YR: CPT | Mod: HCNC,CPTII,S$GLB, | Performed by: INTERNAL MEDICINE

## 2020-08-20 PROCEDURE — 99499 RISK ADDL DX/OHS AUDIT: ICD-10-PCS | Mod: S$GLB,,, | Performed by: INTERNAL MEDICINE

## 2020-08-20 PROCEDURE — 1159F MED LIST DOCD IN RCRD: CPT | Mod: HCNC,S$GLB,, | Performed by: INTERNAL MEDICINE

## 2020-08-20 PROCEDURE — 1101F PR PT FALLS ASSESS DOC 0-1 FALLS W/OUT INJ PAST YR: ICD-10-PCS | Mod: HCNC,CPTII,S$GLB, | Performed by: INTERNAL MEDICINE

## 2020-08-20 PROCEDURE — 99999 PR PBB SHADOW E&M-EST. PATIENT-LVL III: CPT | Mod: PBBFAC,HCNC,, | Performed by: INTERNAL MEDICINE

## 2020-08-20 RX ORDER — AMLODIPINE BESYLATE 5 MG/1
5 TABLET ORAL DAILY
Qty: 90 TABLET | Refills: 3 | Status: SHIPPED | OUTPATIENT
Start: 2020-08-20 | End: 2021-07-29

## 2020-08-20 NOTE — PROGRESS NOTES
Subjective:    Patient ID:  Michela Calvert is a 89 y.o. female who presents for evaluation of Follow-up (follow up )      HPI 89 yo WF with PAF and HTN.Last visit in [facility] . Remains in sinus. BP elevated but patient feeling good. Denies palpitations, weak spells, and syncope    · Normal left ventricular systolic function. The estimated ejection fraction is 55%  · Concentric left ventricular remodeling.  · Normal right ventricular systolic function.  · Grade I (mild) left ventricular diastolic dysfunction consistent with impaired relaxation.  · Mild aortic valve stenosis.  · Aortic valve area is 1.60 cm2; peak velocity is m/s; mean gradient is 19 mmHg.  · Mild mitral regurgitation.  · The estimated PA systolic pressure is 62 mm Hg       Review of Systems   Constitution: Negative for decreased appetite, fever, malaise/fatigue, weight gain and weight loss.   HENT: Negative for hearing loss and nosebleeds.    Eyes: Negative for visual disturbance.   Cardiovascular: Negative for chest pain, claudication, cyanosis, dyspnea on exertion, irregular heartbeat, leg swelling, near-syncope, orthopnea, palpitations, paroxysmal nocturnal dyspnea and syncope.   Respiratory: Negative for cough, hemoptysis, shortness of breath, sleep disturbances due to breathing, snoring and wheezing.    Endocrine: Negative for cold intolerance, heat intolerance, polydipsia and polyuria.   Hematologic/Lymphatic: Negative for adenopathy and bleeding problem. Does not bruise/bleed easily.   Skin: Negative for color change, itching, poor wound healing, rash and suspicious lesions.   Musculoskeletal: Positive for arthritis. Negative for back pain, falls, joint pain, joint swelling, muscle cramps, muscle weakness and myalgias.   Gastrointestinal: Negative for bloating, abdominal pain, change in bowel habit, constipation, flatus, heartburn, hematemesis, hematochezia, hemorrhoids, jaundice, melena, nausea and vomiting.   Genitourinary: Negative for bladder  "incontinence, decreased libido, frequency, hematuria, hesitancy and urgency.   Neurological: Negative for brief paralysis, difficulty with concentration, excessive daytime sleepiness, dizziness, focal weakness, headaches, light-headedness, loss of balance, numbness, vertigo and weakness.   Psychiatric/Behavioral: Negative for altered mental status, depression and memory loss. The patient does not have insomnia and is not nervous/anxious.    Allergic/Immunologic: Negative for environmental allergies, hives and persistent infections.        Objective:    Physical Exam   Constitutional: She is oriented to person, place, and time. She appears well-developed and well-nourished.   BP (!) 179/83   Pulse (!) 59   Ht 5' 2" (1.575 m)   Wt 58.6 kg (129 lb 3 oz)   BMI 23.63 kg/m²      HENT:   Head: Normocephalic and atraumatic.   Right Ear: External ear normal.   Left Ear: External ear normal.   Nose: Nose normal.   Mouth/Throat: Oropharynx is clear and moist.   Eyes: Pupils are equal, round, and reactive to light. Conjunctivae, EOM and lids are normal. Right eye exhibits no discharge. Left eye exhibits no discharge. Right conjunctiva has no hemorrhage. No scleral icterus.   Neck: Normal range of motion. Neck supple. No JVD present. No tracheal deviation present. No thyromegaly present.   Cardiovascular: Normal rate, regular rhythm and intact distal pulses. Exam reveals no gallop and no friction rub.   Murmur heard.   Harsh midsystolic murmur is present with a grade of 1/6 at the upper right sternal border radiating to the neck.  Pulmonary/Chest: Effort normal and breath sounds normal. No respiratory distress. She has no wheezes. She has no rales. She exhibits no tenderness. Breasts are symmetrical.   Abdominal: Soft. Bowel sounds are normal. She exhibits no distension and no mass. There is no hepatosplenomegaly or hepatomegaly. There is no abdominal tenderness. There is no rebound and no guarding.   Musculoskeletal: Normal " range of motion.         General: No tenderness or edema.   Lymphadenopathy:     She has no cervical adenopathy.   Neurological: She is alert and oriented to person, place, and time. She displays normal reflexes. No cranial nerve deficit. Coordination normal.   Skin: Skin is warm and dry. No rash noted. No erythema. No pallor.   Psychiatric: She has a normal mood and affect. Her behavior is normal. Judgment and thought content normal.   Nursing note and vitals reviewed.        Assessment:       1. Essential hypertension    2. Paroxysmal atrial fibrillation    3. Nonrheumatic aortic valve stenosis- Mild         Plan:     Add amlodipine 5 mg    Low salt diet    No orders of the defined types were placed in this encounter.    Follow up in about 6 months (around 2/20/2021).

## 2020-09-29 ENCOUNTER — PATIENT MESSAGE (OUTPATIENT)
Dept: OTHER | Facility: OTHER | Age: 85
End: 2020-09-29

## 2020-09-30 ENCOUNTER — IMMUNIZATION (OUTPATIENT)
Dept: PHARMACY | Facility: CLINIC | Age: 85
End: 2020-09-30
Payer: MEDICARE

## 2020-10-22 DIAGNOSIS — E03.4 HYPOTHYROIDISM DUE TO ACQUIRED ATROPHY OF THYROID: ICD-10-CM

## 2020-10-22 DIAGNOSIS — I10 ESSENTIAL HYPERTENSION: Primary | ICD-10-CM

## 2020-10-22 RX ORDER — LEVOTHYROXINE SODIUM 88 UG/1
TABLET ORAL
Qty: 90 TABLET | Refills: 0 | Status: SHIPPED | OUTPATIENT
Start: 2020-10-22 | End: 2021-02-10

## 2020-10-22 NOTE — TELEPHONE ENCOUNTER
No new care gaps identified.  Powered by [employer]. Reference number: [id number]. 10/22/2020 9:46:44 AM   BILLT

## 2020-10-23 NOTE — PROGRESS NOTES
Provider Staff:     Action is required for this patient.     Please schedule patient for Labs (CMP/LIPID/TSH)    Thanks!  [facility]     Appointments  past 12m or future 3m with PCP    Date Provider   Last Visit   1/22/2020 Enzo Nieto MD   Next Visit   11/23/2020 Enzo Nieto MD     Note composed:8:28 PM 10/22/2020

## 2020-10-23 NOTE — PROGRESS NOTES
Refill Authorization Note   Michela Calvert is requesting a refill authorization.  Brief assessment and rationale for refill: Approve    -Medication-related problems identified: Requires labs  Medication Therapy Plan: CDMF. LABS: (CMP/LIPID/TSH); FOVS    Medication reconciliation completed: No   Comments:   Orders Placed This Encounter    Comprehensive Metabolic Panel    Lipid Panel    TSH    levothyroxine (SYNTHROID) 88 MCG tablet      Requested Prescriptions   Signed Prescriptions Disp Refills    levothyroxine (SYNTHROID) 88 MCG tablet 90 tablet 0     Sig: TAKE ONE TABLET (88mcg total) BY MOUTH ONCE daily IN THE MORNING       Endocrinology:  Hypothyroid Agents Failed - 10/22/2020  9:46 AM        Failed - Manual Review: FT4 is not required if last TSH is WNL.        Passed - Patient is at least 18 years old        Passed - Office Visit within last 12 months or future 90 days.     Recent Outpatient Visits            2 months ago Essential hypertension    [facility] Bryon Puentes Jr., MD    8 months ago Nonrheumatic aortic valve stenosis- Mild    [facility] Bryon Puentes Jr., MD    9 months ago Leg swelling    [facility] Enzo Nieto MD    9 months ago Left ankle swelling    [facility] Edwina Feliz NP    11 months ago Preventative health care    [facility] Enzo Nieto MD          Future Appointments              In 1 month Enzo Nieto MD [facility]    In 4 months [facility]entes Jr., [facility]                Passed - TSH in normal range and within 360 days     TSH   Date Value Ref Range Status   11/05/2019 2.776 0.400 - 4.000 uIU/mL Final   04/11/2019 1.598 0.400 - 4.000 uIU/mL Final   02/12/2019 2.790 0.400 - 4.000 uIU/mL Final     Comment:     Warning:  Heterophilic antibodies in serum or plasma of   certain individuals are known to cause  interference with   immunoassays. These antibodies may be present in blood samples   from individuals regularly exposed to animal or who have been   treated with animal products.   Patients taking very high Biotin doses of >300 mcg/day may   cause a negative bias in this assay.                Passed - T4 free within 1080 days     Free T4   Date Value Ref Range Status   2018 1.41 0.78 - 2.19 ng/dL Final     Comment:     Performance of this assay has not been established with    specimens.  When interpretating FT4 results, note the potential   effects of certain drugs on the free-hormone equilibrium. Thyroid   hormone autoantibodies in serum or plasma samples may cause   interference in immunoassays  WARNING: Heterophilic antibodies in the serum or plasma of   certain individuals are known to cause interference with   immunoassays. These antibodies may be present in blood samples   from individuals regularly exposed to animals or who have been   treated with animal serum products.      2009 1.33 0.71 - 1.51 ng/dl Final   2008 1.28 0.71 - 1.51 ng/dl Final                  Appointments  past 12m or future 3m with PCP    Date Provider   Last Visit   2020 Enzo Nieto MD   Next Visit   2020 Enzo Nieto MD   ED visits in past 90 days: 0     Note composed:8:28 PM 10/22/2020

## 2020-11-09 ENCOUNTER — TELEPHONE (OUTPATIENT)
Dept: CARDIOLOGY | Facility: CLINIC | Age: 85
End: 2020-11-09

## 2020-11-09 NOTE — TELEPHONE ENCOUNTER
----- Message from Sherrill Harvey sent at 11/9/2020  2:04 PM CST -----  Contact: call  pt [address] [phone]  Type: Needs Medical Advice  Who Called:  pt  Best Call Back Number:call  pt [address] [phone]  Additional Information:     calling  about a  pt assit  form  please  call  for  details

## 2020-11-09 NOTE — TELEPHONE ENCOUNTER
----- Message from Charley Calhoun sent at 3/8/2018  9:56 AM CST -----  Contact: self  Patient called regarding having trouble bowel movement, stating hard. Been taking Murelax, not working. Please contact [phone] (home)      Attending Attestation (For Attendings USE Only)...

## 2020-11-17 RX ORDER — VIT C/E/ZN/COPPR/LUTEIN/ZEAXAN 250MG-90MG
CAPSULE ORAL
Qty: 180 CAPSULE | Refills: 3 | Status: SHIPPED | OUTPATIENT
Start: 2020-11-17 | End: 2021-11-16

## 2020-11-17 NOTE — PROGRESS NOTES
Refill Routing Note   Medication(s) are not appropriate for processing by [facility] for the following reason(s):     - Outside of Protocol  [facility] action(s):   Route          Medication reconciliation completed: No   Automatic Epic Generated Protocol Data:        Requested Prescriptions   Pending Prescriptions Disp Refills    PRESERVISION AREDS-2 058-492-31-1 mg-unit-mg-mg Cap [Pharmacy Med Name: PreserVision AREDS-2 250 mg-200 unit-40 mg-1 mg capsule] 180 capsule 3     Sig: TAKE ONE CAPSULE BY MOUTH TWICE DAILY (In THE morning AND In THE evening)       There is no refill protocol information for this order           Appointments  past 12m or future 3m with PCP    Date Provider   Last Visit   1/22/2020 Enzo Nieto MD   Next Visit   11/23/2020 Enzo Nieto MD   ED visits in past 90 days: 0        Note composed:5:57 PM 11/17/2020

## 2020-11-28 NOTE — PROGRESS NOTES
Patient reports left sided flank pain around 1000 this morning, now present more in groin. History of kidney stones.   Subjective:    Patient ID:  Michela Calvert is a 89 y.o. female who presents for evaluation of Follow-up (follow up)      HPI89 yo WF with PAF and HTN.Last visit in [facility] . Continues in sinus rhythm. No CP or SOB. Gets around with a walker. No bleeding issues or recent falls.    Review of Systems   Constitution: Negative for decreased appetite, fever, malaise/fatigue, weight gain and weight loss.   HENT: Negative for hearing loss and nosebleeds.    Eyes: Negative for visual disturbance.   Cardiovascular: Negative for chest pain, claudication, cyanosis, dyspnea on exertion, irregular heartbeat, leg swelling, near-syncope, orthopnea, palpitations, paroxysmal nocturnal dyspnea and syncope.   Respiratory: Negative for cough, hemoptysis, shortness of breath, sleep disturbances due to breathing, snoring and wheezing.    Endocrine: Negative for cold intolerance, heat intolerance, polydipsia and polyuria.   Hematologic/Lymphatic: Negative for adenopathy and bleeding problem. Bruises/bleeds easily.        Superficial bruising   Skin: Negative for color change, itching, poor wound healing, rash and suspicious lesions.   Musculoskeletal: Positive for arthritis and joint pain. Negative for back pain, falls, joint swelling, muscle cramps, muscle weakness and myalgias.   Gastrointestinal: Negative for bloating, abdominal pain, change in bowel habit, constipation, flatus, heartburn, hematemesis, hematochezia, hemorrhoids, jaundice, melena, nausea and vomiting.   Genitourinary: Negative for bladder incontinence, decreased libido, frequency, hematuria, hesitancy and urgency.   Neurological: Negative for brief paralysis, difficulty with concentration, excessive daytime sleepiness, dizziness, focal weakness, headaches, light-headedness, loss of balance, numbness, vertigo and weakness.   Psychiatric/Behavioral: Negative for altered mental status, depression and memory loss. The patient does not have insomnia and is not nervous/anxious.   "  Allergic/Immunologic: Negative for environmental allergies, hives and persistent infections.        Objective:    Physical Exam   Constitutional: She is oriented to person, place, and time. She appears well-developed and well-nourished.   BP (!) 153/72   Pulse 61   Ht 5' 2" (1.575 m)   Wt 52.1 kg (114 lb 13.8 oz)   BMI 21.01 kg/m²      HENT:   Head: Normocephalic and atraumatic.   Right Ear: External ear normal.   Left Ear: External ear normal.   Nose: Nose normal.   Mouth/Throat: Oropharynx is clear and moist.   Eyes: Pupils are equal, round, and reactive to light. Conjunctivae, EOM and lids are normal. Right eye exhibits no discharge. Left eye exhibits no discharge. Right conjunctiva has no hemorrhage. No scleral icterus.   Neck: Normal range of motion. Neck supple. No JVD present. No tracheal deviation present. No thyromegaly present.   Cardiovascular: Normal rate, regular rhythm and intact distal pulses. Exam reveals no gallop and no friction rub.   Murmur heard.   Harsh midsystolic murmur is present with a grade of 1/6 at the upper right sternal border radiating to the neck.  Pulmonary/Chest: Effort normal and breath sounds normal. No respiratory distress. She has no wheezes. She has no rales. She exhibits no tenderness. Breasts are symmetrical.   Abdominal: Soft. Bowel sounds are normal. She exhibits no distension and no mass. There is no hepatosplenomegaly or hepatomegaly. There is no tenderness. There is no rebound and no guarding.   Musculoskeletal: Normal range of motion. She exhibits no edema or tenderness.   Lymphadenopathy:     She has no cervical adenopathy.   Neurological: She is alert and oriented to person, place, and time. She displays normal reflexes. No cranial nerve deficit. Coordination normal.   Skin: Skin is warm and dry. No rash noted. No erythema. No pallor.   Psychiatric: She has a normal mood and affect. Her behavior is normal. Judgment and thought content normal.   Nursing note and " vitals reviewed.        Assessment:       1. Essential hypertension    2. Paroxysmal atrial fibrillation    3. Nonrheumatic aortic valve stenosis         Plan:     Reduce amio to three times a week    Continue other meds    No orders of the defined types were placed in this encounter.    Follow up in about 6 months (around 2/9/2020).

## 2020-12-11 ENCOUNTER — PATIENT MESSAGE (OUTPATIENT)
Dept: OTHER | Facility: OTHER | Age: 85
End: 2020-12-11

## 2020-12-15 RX ORDER — MAG HYDROX/ALUMINUM HYD/SIMETH 400-400-40
SUSPENSION, ORAL (FINAL DOSE FORM) ORAL
Qty: 180 CAPSULE | Refills: 3 | Status: SHIPPED | OUTPATIENT
Start: 2020-12-15 | End: 2021-12-30 | Stop reason: SDUPTHER

## 2020-12-15 NOTE — PROGRESS NOTES
Refill Routing Note   Medication(s) are not appropriate for processing by [facility] for the following reason(s):     - Outside of protocol  [facility] action(s):  Route        Medication reconciliation completed: No   Automatic Epic Generated Protocol Data:        Requested Prescriptions   Pending Prescriptions Disp Refills    CALCIUM 600 WITH VITAMIN D3 600 mg(1,500mg) -500 unit Cap [Pharmacy Med Name: Calcium 600 with Vitamin D3 600 mg (1,500 mg)-500 unit capsule] 180 capsule 3     Sig: TAKE ONE CAPSULE BY MOUTH ONCE DAILY IN THE EVENING       There is no refill protocol information for this order           Appointments  past 12m or future 3m with PCP    Date Provider   Last Visit   1/22/2020 Enzo Nieto MD   Next Visit   Visit date not found Enzo Nieto MD   ED visits in past 90 days: 0        Note composed:11:11 AM 12/15/2020

## 2021-02-09 DIAGNOSIS — E03.4 HYPOTHYROIDISM DUE TO ACQUIRED ATROPHY OF THYROID: ICD-10-CM

## 2021-02-09 DIAGNOSIS — I10 ESSENTIAL HYPERTENSION: ICD-10-CM

## 2021-02-10 RX ORDER — LEVOTHYROXINE SODIUM 88 UG/1
TABLET ORAL
Qty: 90 TABLET | Refills: 0 | Status: SHIPPED | OUTPATIENT
Start: 2021-02-10 | End: 2021-05-07

## 2021-02-11 RX ORDER — METOPROLOL TARTRATE 25 MG/1
TABLET, FILM COATED ORAL
Qty: 180 TABLET | Refills: 3 | Status: SHIPPED | OUTPATIENT
Start: 2021-02-11 | End: 2022-03-09 | Stop reason: SDUPTHER

## 2021-05-05 DIAGNOSIS — E03.4 HYPOTHYROIDISM DUE TO ACQUIRED ATROPHY OF THYROID: ICD-10-CM

## 2021-05-06 ENCOUNTER — OFFICE VISIT (OUTPATIENT)
Dept: CARDIOLOGY | Facility: CLINIC | Age: 86
End: 2021-05-06
Payer: MEDICARE

## 2021-05-06 VITALS
SYSTOLIC BLOOD PRESSURE: 150 MMHG | WEIGHT: 135.81 LBS | DIASTOLIC BLOOD PRESSURE: 73 MMHG | HEART RATE: 58 BPM | HEIGHT: 62 IN | BODY MASS INDEX: 24.99 KG/M2

## 2021-05-06 DIAGNOSIS — I35.0 NONRHEUMATIC AORTIC VALVE STENOSIS: ICD-10-CM

## 2021-05-06 DIAGNOSIS — E03.4 HYPOTHYROIDISM DUE TO ACQUIRED ATROPHY OF THYROID: ICD-10-CM

## 2021-05-06 DIAGNOSIS — I48.0 PAROXYSMAL ATRIAL FIBRILLATION: Primary | ICD-10-CM

## 2021-05-06 DIAGNOSIS — I10 ESSENTIAL HYPERTENSION: ICD-10-CM

## 2021-05-06 PROCEDURE — 99999 PR PBB SHADOW E&M-EST. PATIENT-LVL III: CPT | Mod: PBBFAC,,, | Performed by: INTERNAL MEDICINE

## 2021-05-06 PROCEDURE — 1159F PR MEDICATION LIST DOCUMENTED IN MEDICAL RECORD: ICD-10-PCS | Mod: S$GLB,,, | Performed by: INTERNAL MEDICINE

## 2021-05-06 PROCEDURE — 1101F PT FALLS ASSESS-DOCD LE1/YR: CPT | Mod: CPTII,S$GLB,, | Performed by: INTERNAL MEDICINE

## 2021-05-06 PROCEDURE — 99499 RISK ADDL DX/OHS AUDIT: ICD-10-PCS | Mod: HCNC,S$GLB,, | Performed by: INTERNAL MEDICINE

## 2021-05-06 PROCEDURE — 3288F FALL RISK ASSESSMENT DOCD: CPT | Mod: CPTII,S$GLB,, | Performed by: INTERNAL MEDICINE

## 2021-05-06 PROCEDURE — 1101F PR PT FALLS ASSESS DOC 0-1 FALLS W/OUT INJ PAST YR: ICD-10-PCS | Mod: CPTII,S$GLB,, | Performed by: INTERNAL MEDICINE

## 2021-05-06 PROCEDURE — 1126F PR PAIN SEVERITY QUANTIFIED, NO PAIN PRESENT: ICD-10-PCS | Mod: S$GLB,,, | Performed by: INTERNAL MEDICINE

## 2021-05-06 PROCEDURE — 3288F PR FALLS RISK ASSESSMENT DOCUMENTED: ICD-10-PCS | Mod: CPTII,S$GLB,, | Performed by: INTERNAL MEDICINE

## 2021-05-06 PROCEDURE — 1126F AMNT PAIN NOTED NONE PRSNT: CPT | Mod: S$GLB,,, | Performed by: INTERNAL MEDICINE

## 2021-05-06 PROCEDURE — 99214 OFFICE O/P EST MOD 30 MIN: CPT | Mod: S$GLB,,, | Performed by: INTERNAL MEDICINE

## 2021-05-06 PROCEDURE — 99499 UNLISTED E&M SERVICE: CPT | Mod: HCNC,S$GLB,, | Performed by: INTERNAL MEDICINE

## 2021-05-06 PROCEDURE — 1159F MED LIST DOCD IN RCRD: CPT | Mod: S$GLB,,, | Performed by: INTERNAL MEDICINE

## 2021-05-06 PROCEDURE — 99999 PR PBB SHADOW E&M-EST. PATIENT-LVL III: ICD-10-PCS | Mod: PBBFAC,,, | Performed by: INTERNAL MEDICINE

## 2021-05-06 PROCEDURE — 99214 PR OFFICE/OUTPT VISIT, EST, LEVL IV, 30-39 MIN: ICD-10-PCS | Mod: S$GLB,,, | Performed by: INTERNAL MEDICINE

## 2021-05-07 RX ORDER — LEVOTHYROXINE SODIUM 88 UG/1
88 TABLET ORAL
Qty: 90 TABLET | Refills: 0 | Status: SHIPPED | OUTPATIENT
Start: 2021-05-07 | End: 2021-05-24 | Stop reason: SDUPTHER

## 2021-05-11 RX ORDER — LEVOTHYROXINE SODIUM 88 UG/1
TABLET ORAL
Qty: 90 TABLET | Refills: 0 | OUTPATIENT
Start: 2021-05-11

## 2021-05-12 ENCOUNTER — TELEPHONE (OUTPATIENT)
Dept: FAMILY MEDICINE | Facility: CLINIC | Age: 86
End: 2021-05-12

## 2021-05-19 ENCOUNTER — LAB VISIT (OUTPATIENT)
Dept: LAB | Facility: HOSPITAL | Age: 86
End: 2021-05-19
Attending: FAMILY MEDICINE
Payer: MEDICARE

## 2021-05-19 DIAGNOSIS — I10 ESSENTIAL HYPERTENSION: ICD-10-CM

## 2021-05-19 DIAGNOSIS — E03.4 HYPOTHYROIDISM DUE TO ACQUIRED ATROPHY OF THYROID: ICD-10-CM

## 2021-05-19 LAB
ALBUMIN SERPL BCP-MCNC: 3.6 G/DL (ref 3.5–5.2)
ALP SERPL-CCNC: 76 U/L (ref 55–135)
ALT SERPL W/O P-5'-P-CCNC: 10 U/L (ref 10–44)
ANION GAP SERPL CALC-SCNC: 7 MMOL/L (ref 8–16)
AST SERPL-CCNC: 17 U/L (ref 10–40)
BILIRUB SERPL-MCNC: 0.4 MG/DL (ref 0.1–1)
BUN SERPL-MCNC: 17 MG/DL (ref 10–30)
CALCIUM SERPL-MCNC: 9.1 MG/DL (ref 8.7–10.5)
CHLORIDE SERPL-SCNC: 105 MMOL/L (ref 95–110)
CHOLEST SERPL-MCNC: 210 MG/DL (ref 120–199)
CHOLEST/HDLC SERPL: 3.3 {RATIO} (ref 2–5)
CO2 SERPL-SCNC: 28 MMOL/L (ref 23–29)
CREAT SERPL-MCNC: 1 MG/DL (ref 0.5–1.4)
EST. GFR  (AFRICAN AMERICAN): 56.9 ML/MIN/1.73 M^2
EST. GFR  (NON AFRICAN AMERICAN): 49.4 ML/MIN/1.73 M^2
GLUCOSE SERPL-MCNC: 90 MG/DL (ref 70–110)
HDLC SERPL-MCNC: 63 MG/DL (ref 40–75)
HDLC SERPL: 30 % (ref 20–50)
LDLC SERPL CALC-MCNC: 133.8 MG/DL (ref 63–159)
NONHDLC SERPL-MCNC: 147 MG/DL
POTASSIUM SERPL-SCNC: 4.2 MMOL/L (ref 3.5–5.1)
PROT SERPL-MCNC: 6.7 G/DL (ref 6–8.4)
SODIUM SERPL-SCNC: 140 MMOL/L (ref 136–145)
T4 FREE SERPL-MCNC: 1.44 NG/DL (ref 0.71–1.51)
TRIGL SERPL-MCNC: 66 MG/DL (ref 30–150)
TSH SERPL DL<=0.005 MIU/L-ACNC: 5.57 UIU/ML (ref 0.4–4)

## 2021-05-19 PROCEDURE — 80061 LIPID PANEL: CPT | Performed by: FAMILY MEDICINE

## 2021-05-19 PROCEDURE — 80053 COMPREHEN METABOLIC PANEL: CPT | Performed by: FAMILY MEDICINE

## 2021-05-19 PROCEDURE — 36415 COLL VENOUS BLD VENIPUNCTURE: CPT | Mod: PN | Performed by: FAMILY MEDICINE

## 2021-05-19 PROCEDURE — 84443 ASSAY THYROID STIM HORMONE: CPT | Performed by: FAMILY MEDICINE

## 2021-05-19 PROCEDURE — 84439 ASSAY OF FREE THYROXINE: CPT | Performed by: FAMILY MEDICINE

## 2021-05-19 NOTE — TELEPHONE ENCOUNTER
Phoned Roxana, Grand-daughter, PT is inquiring if she should still bring pt in to see NP with Ortho. Instructed Roxana she should still bring pt in due to her fracture and the NP should be able to consult with someone in regards to pt's fracture. Roxana voiced understanding. CLC   agitated

## 2021-05-24 ENCOUNTER — OFFICE VISIT (OUTPATIENT)
Dept: FAMILY MEDICINE | Facility: CLINIC | Age: 86
End: 2021-05-24
Payer: MEDICARE

## 2021-05-24 VITALS
DIASTOLIC BLOOD PRESSURE: 60 MMHG | HEART RATE: 70 BPM | SYSTOLIC BLOOD PRESSURE: 134 MMHG | TEMPERATURE: 98 F | RESPIRATION RATE: 18 BRPM | WEIGHT: 135.81 LBS | BODY MASS INDEX: 24.99 KG/M2 | HEIGHT: 62 IN

## 2021-05-24 DIAGNOSIS — E03.4 HYPOTHYROIDISM DUE TO ACQUIRED ATROPHY OF THYROID: ICD-10-CM

## 2021-05-24 DIAGNOSIS — Z00.00 PREVENTATIVE HEALTH CARE: Primary | ICD-10-CM

## 2021-05-24 PROCEDURE — 1126F PR PAIN SEVERITY QUANTIFIED, NO PAIN PRESENT: ICD-10-PCS | Mod: S$GLB,,, | Performed by: FAMILY MEDICINE

## 2021-05-24 PROCEDURE — 99397 PR PREVENTIVE VISIT,EST,65 & OVER: ICD-10-PCS | Mod: S$GLB,,, | Performed by: FAMILY MEDICINE

## 2021-05-24 PROCEDURE — 3288F FALL RISK ASSESSMENT DOCD: CPT | Mod: CPTII,S$GLB,, | Performed by: FAMILY MEDICINE

## 2021-05-24 PROCEDURE — 1126F AMNT PAIN NOTED NONE PRSNT: CPT | Mod: S$GLB,,, | Performed by: FAMILY MEDICINE

## 2021-05-24 PROCEDURE — 99999 PR PBB SHADOW E&M-EST. PATIENT-LVL III: ICD-10-PCS | Mod: PBBFAC,,, | Performed by: FAMILY MEDICINE

## 2021-05-24 PROCEDURE — 1101F PR PT FALLS ASSESS DOC 0-1 FALLS W/OUT INJ PAST YR: ICD-10-PCS | Mod: CPTII,S$GLB,, | Performed by: FAMILY MEDICINE

## 2021-05-24 PROCEDURE — 99999 PR PBB SHADOW E&M-EST. PATIENT-LVL III: CPT | Mod: PBBFAC,,, | Performed by: FAMILY MEDICINE

## 2021-05-24 PROCEDURE — 99397 PER PM REEVAL EST PAT 65+ YR: CPT | Mod: S$GLB,,, | Performed by: FAMILY MEDICINE

## 2021-05-24 PROCEDURE — 1101F PT FALLS ASSESS-DOCD LE1/YR: CPT | Mod: CPTII,S$GLB,, | Performed by: FAMILY MEDICINE

## 2021-05-24 PROCEDURE — 3288F PR FALLS RISK ASSESSMENT DOCUMENTED: ICD-10-PCS | Mod: CPTII,S$GLB,, | Performed by: FAMILY MEDICINE

## 2021-05-24 RX ORDER — CLOBETASOL PROPIONATE 0.5 MG/G
OINTMENT TOPICAL
COMMUNITY
Start: 2021-02-11

## 2021-05-24 RX ORDER — LEVOTHYROXINE SODIUM 100 UG/1
100 TABLET ORAL
Qty: 90 TABLET | Refills: 3 | Status: SHIPPED | OUTPATIENT
Start: 2021-05-24 | End: 2022-05-04

## 2021-06-24 ENCOUNTER — TELEPHONE (OUTPATIENT)
Dept: CARDIOLOGY | Facility: CLINIC | Age: 86
End: 2021-06-24

## 2021-07-12 ENCOUNTER — PES CALL (OUTPATIENT)
Dept: ADMINISTRATIVE | Facility: CLINIC | Age: 86
End: 2021-07-12

## 2021-07-13 ENCOUNTER — PES CALL (OUTPATIENT)
Dept: ADMINISTRATIVE | Facility: CLINIC | Age: 86
End: 2021-07-13

## 2021-07-15 ENCOUNTER — OFFICE VISIT (OUTPATIENT)
Dept: HOME HEALTH SERVICES | Facility: CLINIC | Age: 86
End: 2021-07-15
Payer: MEDICARE

## 2021-07-15 VITALS
SYSTOLIC BLOOD PRESSURE: 150 MMHG | OXYGEN SATURATION: 98 % | WEIGHT: 128 LBS | HEART RATE: 67 BPM | DIASTOLIC BLOOD PRESSURE: 82 MMHG | BODY MASS INDEX: 23.55 KG/M2 | HEIGHT: 62 IN

## 2021-07-15 DIAGNOSIS — Z79.01 ON ANTICOAGULANT THERAPY: ICD-10-CM

## 2021-07-15 DIAGNOSIS — N18.31 STAGE 3A CHRONIC KIDNEY DISEASE: ICD-10-CM

## 2021-07-15 DIAGNOSIS — I35.0 NONRHEUMATIC AORTIC VALVE STENOSIS: ICD-10-CM

## 2021-07-15 DIAGNOSIS — H35.3123 NONEXUDATIVE AGE-RELATED MACULAR DEGENERATION, LEFT EYE, ADVANCED ATROPHIC WITHOUT SUBFOVEAL INVOLVEMENT: ICD-10-CM

## 2021-07-15 DIAGNOSIS — I48.0 PAROXYSMAL ATRIAL FIBRILLATION: ICD-10-CM

## 2021-07-15 DIAGNOSIS — E03.9 HYPOTHYROIDISM, UNSPECIFIED TYPE: ICD-10-CM

## 2021-07-15 DIAGNOSIS — M48.062 SPINAL STENOSIS OF LUMBAR REGION WITH NEUROGENIC CLAUDICATION: ICD-10-CM

## 2021-07-15 DIAGNOSIS — L90.0 LICHEN SCLEROSUS: ICD-10-CM

## 2021-07-15 DIAGNOSIS — Z87.81 HISTORY OF FRACTURE OF HIP: ICD-10-CM

## 2021-07-15 DIAGNOSIS — M85.80 OSTEOPENIA, UNSPECIFIED LOCATION: ICD-10-CM

## 2021-07-15 DIAGNOSIS — Z99.89 DEPENDENCE ON OTHER ENABLING MACHINES AND DEVICES: ICD-10-CM

## 2021-07-15 DIAGNOSIS — Z74.09 OTHER REDUCED MOBILITY: ICD-10-CM

## 2021-07-15 DIAGNOSIS — Z00.00 ENCOUNTER FOR PREVENTIVE HEALTH EXAMINATION: Primary | ICD-10-CM

## 2021-07-15 DIAGNOSIS — H35.3112 INTERMEDIATE STAGE NONEXUDATIVE AGE-RELATED MACULAR DEGENERATION OF RIGHT EYE: ICD-10-CM

## 2021-07-15 DIAGNOSIS — I10 ESSENTIAL HYPERTENSION: ICD-10-CM

## 2021-07-15 PROCEDURE — 99499 UNLISTED E&M SERVICE: CPT | Mod: S$GLB,,, | Performed by: NURSE PRACTITIONER

## 2021-07-15 PROCEDURE — 1101F PT FALLS ASSESS-DOCD LE1/YR: CPT | Mod: CPTII,S$GLB,, | Performed by: NURSE PRACTITIONER

## 2021-07-15 PROCEDURE — 1101F PR PT FALLS ASSESS DOC 0-1 FALLS W/OUT INJ PAST YR: ICD-10-PCS | Mod: CPTII,S$GLB,, | Performed by: NURSE PRACTITIONER

## 2021-07-15 PROCEDURE — 3288F FALL RISK ASSESSMENT DOCD: CPT | Mod: CPTII,S$GLB,, | Performed by: NURSE PRACTITIONER

## 2021-07-15 PROCEDURE — 1126F AMNT PAIN NOTED NONE PRSNT: CPT | Mod: S$GLB,,, | Performed by: NURSE PRACTITIONER

## 2021-07-15 PROCEDURE — 1126F PR PAIN SEVERITY QUANTIFIED, NO PAIN PRESENT: ICD-10-PCS | Mod: S$GLB,,, | Performed by: NURSE PRACTITIONER

## 2021-07-15 PROCEDURE — G0439 PR MEDICARE ANNUAL WELLNESS SUBSEQUENT VISIT: ICD-10-PCS | Mod: S$GLB,,, | Performed by: NURSE PRACTITIONER

## 2021-07-15 PROCEDURE — 3288F PR FALLS RISK ASSESSMENT DOCUMENTED: ICD-10-PCS | Mod: CPTII,S$GLB,, | Performed by: NURSE PRACTITIONER

## 2021-07-15 PROCEDURE — G0439 PPPS, SUBSEQ VISIT: HCPCS | Mod: S$GLB,,, | Performed by: NURSE PRACTITIONER

## 2021-07-15 PROCEDURE — 99499 RISK ADDL DX/OHS AUDIT: ICD-10-PCS | Mod: S$GLB,,, | Performed by: NURSE PRACTITIONER

## 2021-08-13 ENCOUNTER — TELEPHONE (OUTPATIENT)
Dept: HOME HEALTH SERVICES | Facility: CLINIC | Age: 86
End: 2021-08-13

## 2021-08-13 RX ORDER — FLUTICASONE PROPIONATE 50 MCG
1 SPRAY, SUSPENSION (ML) NASAL 2 TIMES DAILY
Qty: 16 G | Refills: 0 | Status: SHIPPED | OUTPATIENT
Start: 2021-08-13

## 2021-08-27 ENCOUNTER — LAB VISIT (OUTPATIENT)
Dept: LAB | Facility: HOSPITAL | Age: 86
End: 2021-08-27
Attending: FAMILY MEDICINE
Payer: MEDICARE

## 2021-08-27 DIAGNOSIS — E03.4 HYPOTHYROIDISM DUE TO ACQUIRED ATROPHY OF THYROID: ICD-10-CM

## 2021-08-27 LAB — TSH SERPL DL<=0.005 MIU/L-ACNC: 1.2 UIU/ML (ref 0.4–4)

## 2021-08-27 PROCEDURE — 84443 ASSAY THYROID STIM HORMONE: CPT | Performed by: FAMILY MEDICINE

## 2021-08-27 PROCEDURE — 36415 COLL VENOUS BLD VENIPUNCTURE: CPT | Mod: PN | Performed by: FAMILY MEDICINE

## 2021-09-23 ENCOUNTER — HOSPITAL ENCOUNTER (OUTPATIENT)
Dept: RADIOLOGY | Facility: HOSPITAL | Age: 86
Discharge: HOME | End: 2021-09-23
Attending: FAMILY MEDICINE
Payer: MEDICARE

## 2021-09-23 ENCOUNTER — OFFICE VISIT (OUTPATIENT)
Dept: FAMILY MEDICINE | Facility: CLINIC | Age: 86
End: 2021-09-23
Payer: MEDICARE

## 2021-09-23 ENCOUNTER — TELEPHONE (OUTPATIENT)
Dept: ORTHOPEDICS | Facility: CLINIC | Age: 86
End: 2021-09-23

## 2021-09-23 VITALS
BODY MASS INDEX: 24.95 KG/M2 | SYSTOLIC BLOOD PRESSURE: 140 MMHG | DIASTOLIC BLOOD PRESSURE: 88 MMHG | OXYGEN SATURATION: 95 % | HEART RATE: 83 BPM | WEIGHT: 135.56 LBS | HEIGHT: 62 IN

## 2021-09-23 DIAGNOSIS — R35.0 URINARY FREQUENCY: ICD-10-CM

## 2021-09-23 DIAGNOSIS — R35.0 URINARY FREQUENCY: Primary | ICD-10-CM

## 2021-09-23 DIAGNOSIS — M25.562 ACUTE PAIN OF LEFT KNEE: ICD-10-CM

## 2021-09-23 DIAGNOSIS — M25.562 ACUTE PAIN OF LEFT KNEE: Primary | ICD-10-CM

## 2021-09-23 LAB
BACTERIA #/AREA URNS HPF: ABNORMAL /HPF
BILIRUB UR QL STRIP: NEGATIVE
CLARITY UR: ABNORMAL
COLOR UR: YELLOW
GLUCOSE UR QL STRIP: NEGATIVE
HGB UR QL STRIP: NEGATIVE
KETONES UR QL STRIP: NEGATIVE
LEUKOCYTE ESTERASE UR QL STRIP: ABNORMAL
MICROSCOPIC COMMENT: ABNORMAL
NITRITE UR QL STRIP: POSITIVE
PH UR STRIP: 7 [PH] (ref 5–8)
PROT UR QL STRIP: NEGATIVE
SP GR UR STRIP: 1.01 (ref 1–1.03)
SQUAMOUS #/AREA URNS HPF: 2 /HPF
URN SPEC COLLECT METH UR: ABNORMAL
WBC #/AREA URNS HPF: 15 /HPF (ref 0–5)

## 2021-09-23 PROCEDURE — 1125F PR PAIN SEVERITY QUANTIFIED, PAIN PRESENT: ICD-10-PCS | Mod: HCNC,CPTII,S$GLB, | Performed by: FAMILY MEDICINE

## 2021-09-23 PROCEDURE — 1125F AMNT PAIN NOTED PAIN PRSNT: CPT | Mod: HCNC,CPTII,S$GLB, | Performed by: FAMILY MEDICINE

## 2021-09-23 PROCEDURE — 99999 PR PBB SHADOW E&M-EST. PATIENT-LVL IV: ICD-10-PCS | Mod: PBBFAC,HCNC,, | Performed by: FAMILY MEDICINE

## 2021-09-23 PROCEDURE — 73560 X-RAY EXAM OF KNEE 1 OR 2: CPT | Mod: TC,HCNC,FY,PO,LT

## 2021-09-23 PROCEDURE — 73560 X-RAY EXAM OF KNEE 1 OR 2: CPT | Mod: 26,HCNC,LT, | Performed by: RADIOLOGY

## 2021-09-23 PROCEDURE — 87186 SC STD MICRODIL/AGAR DIL: CPT | Mod: HCNC | Performed by: FAMILY MEDICINE

## 2021-09-23 PROCEDURE — 87088 URINE BACTERIA CULTURE: CPT | Mod: HCNC | Performed by: FAMILY MEDICINE

## 2021-09-23 PROCEDURE — 99214 OFFICE O/P EST MOD 30 MIN: CPT | Mod: HCNC,S$GLB,, | Performed by: FAMILY MEDICINE

## 2021-09-23 PROCEDURE — 87086 URINE CULTURE/COLONY COUNT: CPT | Mod: HCNC | Performed by: FAMILY MEDICINE

## 2021-09-23 PROCEDURE — 1160F PR REVIEW ALL MEDS BY PRESCRIBER/CLIN PHARMACIST DOCUMENTED: ICD-10-PCS | Mod: HCNC,CPTII,S$GLB, | Performed by: FAMILY MEDICINE

## 2021-09-23 PROCEDURE — 1160F RVW MEDS BY RX/DR IN RCRD: CPT | Mod: HCNC,CPTII,S$GLB, | Performed by: FAMILY MEDICINE

## 2021-09-23 PROCEDURE — 1159F MED LIST DOCD IN RCRD: CPT | Mod: HCNC,CPTII,S$GLB, | Performed by: FAMILY MEDICINE

## 2021-09-23 PROCEDURE — 87077 CULTURE AEROBIC IDENTIFY: CPT | Mod: HCNC | Performed by: FAMILY MEDICINE

## 2021-09-23 PROCEDURE — 73560 XR KNEE 1 OR 2 VIEW LEFT: ICD-10-PCS | Mod: 26,HCNC,LT, | Performed by: RADIOLOGY

## 2021-09-23 PROCEDURE — 1159F PR MEDICATION LIST DOCUMENTED IN MEDICAL RECORD: ICD-10-PCS | Mod: HCNC,CPTII,S$GLB, | Performed by: FAMILY MEDICINE

## 2021-09-23 PROCEDURE — 81000 URINALYSIS NONAUTO W/SCOPE: CPT | Mod: HCNC,PO | Performed by: FAMILY MEDICINE

## 2021-09-23 PROCEDURE — 99999 PR PBB SHADOW E&M-EST. PATIENT-LVL IV: CPT | Mod: PBBFAC,HCNC,, | Performed by: FAMILY MEDICINE

## 2021-09-23 PROCEDURE — 99214 PR OFFICE/OUTPT VISIT, EST, LEVL IV, 30-39 MIN: ICD-10-PCS | Mod: HCNC,S$GLB,, | Performed by: FAMILY MEDICINE

## 2021-09-23 RX ORDER — CEPHALEXIN 500 MG/1
CAPSULE ORAL
Qty: 30 CAPSULE | Refills: 0 | Status: SHIPPED | OUTPATIENT
Start: 2021-09-23 | End: 2022-05-27

## 2021-09-26 LAB — BACTERIA UR CULT: ABNORMAL

## 2021-09-29 ENCOUNTER — OFFICE VISIT (OUTPATIENT)
Dept: ORTHOPEDICS | Facility: CLINIC | Age: 86
End: 2021-09-29
Payer: MEDICARE

## 2021-09-29 VITALS — RESPIRATION RATE: 16 BRPM | BODY MASS INDEX: 24.84 KG/M2 | WEIGHT: 135 LBS | HEIGHT: 62 IN

## 2021-09-29 DIAGNOSIS — M17.10 ARTHRITIS OF KNEE: Primary | ICD-10-CM

## 2021-09-29 DIAGNOSIS — M25.562 ACUTE PAIN OF LEFT KNEE: ICD-10-CM

## 2021-09-29 PROCEDURE — 3288F FALL RISK ASSESSMENT DOCD: CPT | Mod: HCNC,CPTII,S$GLB, | Performed by: ORTHOPAEDIC SURGERY

## 2021-09-29 PROCEDURE — 99213 OFFICE O/P EST LOW 20 MIN: CPT | Mod: HCNC,S$GLB,, | Performed by: ORTHOPAEDIC SURGERY

## 2021-09-29 PROCEDURE — 99999 PR PBB SHADOW E&M-EST. PATIENT-LVL III: CPT | Mod: PBBFAC,HCNC,, | Performed by: ORTHOPAEDIC SURGERY

## 2021-09-29 PROCEDURE — 1160F RVW MEDS BY RX/DR IN RCRD: CPT | Mod: HCNC,CPTII,S$GLB, | Performed by: ORTHOPAEDIC SURGERY

## 2021-09-29 PROCEDURE — 1101F PT FALLS ASSESS-DOCD LE1/YR: CPT | Mod: HCNC,CPTII,S$GLB, | Performed by: ORTHOPAEDIC SURGERY

## 2021-09-29 PROCEDURE — 1160F PR REVIEW ALL MEDS BY PRESCRIBER/CLIN PHARMACIST DOCUMENTED: ICD-10-PCS | Mod: HCNC,CPTII,S$GLB, | Performed by: ORTHOPAEDIC SURGERY

## 2021-09-29 PROCEDURE — 1159F PR MEDICATION LIST DOCUMENTED IN MEDICAL RECORD: ICD-10-PCS | Mod: HCNC,CPTII,S$GLB, | Performed by: ORTHOPAEDIC SURGERY

## 2021-09-29 PROCEDURE — 1159F MED LIST DOCD IN RCRD: CPT | Mod: HCNC,CPTII,S$GLB, | Performed by: ORTHOPAEDIC SURGERY

## 2021-09-29 PROCEDURE — 99213 PR OFFICE/OUTPT VISIT, EST, LEVL III, 20-29 MIN: ICD-10-PCS | Mod: HCNC,S$GLB,, | Performed by: ORTHOPAEDIC SURGERY

## 2021-09-29 PROCEDURE — 99999 PR PBB SHADOW E&M-EST. PATIENT-LVL III: ICD-10-PCS | Mod: PBBFAC,HCNC,, | Performed by: ORTHOPAEDIC SURGERY

## 2021-09-29 PROCEDURE — 1101F PR PT FALLS ASSESS DOC 0-1 FALLS W/OUT INJ PAST YR: ICD-10-PCS | Mod: HCNC,CPTII,S$GLB, | Performed by: ORTHOPAEDIC SURGERY

## 2021-09-29 PROCEDURE — 1125F PR PAIN SEVERITY QUANTIFIED, PAIN PRESENT: ICD-10-PCS | Mod: HCNC,CPTII,S$GLB, | Performed by: ORTHOPAEDIC SURGERY

## 2021-09-29 PROCEDURE — 3288F PR FALLS RISK ASSESSMENT DOCUMENTED: ICD-10-PCS | Mod: HCNC,CPTII,S$GLB, | Performed by: ORTHOPAEDIC SURGERY

## 2021-09-29 PROCEDURE — 1125F AMNT PAIN NOTED PAIN PRSNT: CPT | Mod: HCNC,CPTII,S$GLB, | Performed by: ORTHOPAEDIC SURGERY

## 2021-10-06 ENCOUNTER — IMMUNIZATION (OUTPATIENT)
Dept: PHARMACY | Facility: CLINIC | Age: 86
End: 2021-10-06
Payer: MEDICARE

## 2021-10-25 ENCOUNTER — TELEPHONE (OUTPATIENT)
Dept: CARDIOLOGY | Facility: CLINIC | Age: 86
End: 2021-10-25
Payer: MEDICARE

## 2021-10-28 ENCOUNTER — PATIENT OUTREACH (OUTPATIENT)
Dept: ADMINISTRATIVE | Facility: OTHER | Age: 86
End: 2021-10-28
Payer: MEDICARE

## 2021-11-02 ENCOUNTER — OFFICE VISIT (OUTPATIENT)
Dept: CARDIOLOGY | Facility: CLINIC | Age: 86
End: 2021-11-02
Payer: MEDICARE

## 2021-11-02 VITALS
DIASTOLIC BLOOD PRESSURE: 70 MMHG | HEART RATE: 80 BPM | WEIGHT: 139.13 LBS | SYSTOLIC BLOOD PRESSURE: 138 MMHG | BODY MASS INDEX: 25.44 KG/M2

## 2021-11-02 DIAGNOSIS — I89.0 LYMPHEDEMA OF RIGHT LOWER EXTREMITY: ICD-10-CM

## 2021-11-02 DIAGNOSIS — I10 PRIMARY HYPERTENSION: ICD-10-CM

## 2021-11-02 DIAGNOSIS — I48.0 PAROXYSMAL ATRIAL FIBRILLATION: ICD-10-CM

## 2021-11-02 DIAGNOSIS — I35.0 NONRHEUMATIC AORTIC VALVE STENOSIS: Primary | ICD-10-CM

## 2021-11-02 PROCEDURE — 99499 UNLISTED E&M SERVICE: CPT | Mod: HCNC,S$GLB,, | Performed by: INTERNAL MEDICINE

## 2021-11-02 PROCEDURE — 3288F PR FALLS RISK ASSESSMENT DOCUMENTED: ICD-10-PCS | Mod: HCNC,CPTII,S$GLB, | Performed by: INTERNAL MEDICINE

## 2021-11-02 PROCEDURE — 99499 RISK ADDL DX/OHS AUDIT: ICD-10-PCS | Mod: HCNC,S$GLB,, | Performed by: INTERNAL MEDICINE

## 2021-11-02 PROCEDURE — 99214 PR OFFICE/OUTPT VISIT, EST, LEVL IV, 30-39 MIN: ICD-10-PCS | Mod: HCNC,S$GLB,, | Performed by: INTERNAL MEDICINE

## 2021-11-02 PROCEDURE — 1125F PR PAIN SEVERITY QUANTIFIED, PAIN PRESENT: ICD-10-PCS | Mod: HCNC,CPTII,S$GLB, | Performed by: INTERNAL MEDICINE

## 2021-11-02 PROCEDURE — 1101F PT FALLS ASSESS-DOCD LE1/YR: CPT | Mod: HCNC,CPTII,S$GLB, | Performed by: INTERNAL MEDICINE

## 2021-11-02 PROCEDURE — 99214 OFFICE O/P EST MOD 30 MIN: CPT | Mod: HCNC,S$GLB,, | Performed by: INTERNAL MEDICINE

## 2021-11-02 PROCEDURE — 99999 PR PBB SHADOW E&M-EST. PATIENT-LVL III: CPT | Mod: PBBFAC,HCNC,, | Performed by: INTERNAL MEDICINE

## 2021-11-02 PROCEDURE — 3288F FALL RISK ASSESSMENT DOCD: CPT | Mod: HCNC,CPTII,S$GLB, | Performed by: INTERNAL MEDICINE

## 2021-11-02 PROCEDURE — 1159F MED LIST DOCD IN RCRD: CPT | Mod: HCNC,CPTII,S$GLB, | Performed by: INTERNAL MEDICINE

## 2021-11-02 PROCEDURE — 1101F PR PT FALLS ASSESS DOC 0-1 FALLS W/OUT INJ PAST YR: ICD-10-PCS | Mod: HCNC,CPTII,S$GLB, | Performed by: INTERNAL MEDICINE

## 2021-11-02 PROCEDURE — 1125F AMNT PAIN NOTED PAIN PRSNT: CPT | Mod: HCNC,CPTII,S$GLB, | Performed by: INTERNAL MEDICINE

## 2021-11-02 PROCEDURE — 1159F PR MEDICATION LIST DOCUMENTED IN MEDICAL RECORD: ICD-10-PCS | Mod: HCNC,CPTII,S$GLB, | Performed by: INTERNAL MEDICINE

## 2021-11-02 PROCEDURE — 99999 PR PBB SHADOW E&M-EST. PATIENT-LVL III: ICD-10-PCS | Mod: PBBFAC,HCNC,, | Performed by: INTERNAL MEDICINE

## 2021-11-02 RX ORDER — FUROSEMIDE 20 MG/1
20 TABLET ORAL DAILY
Qty: 30 TABLET | Refills: 3 | Status: SHIPPED | OUTPATIENT
Start: 2021-11-02 | End: 2022-05-27 | Stop reason: ALTCHOICE

## 2021-11-03 ENCOUNTER — CLINICAL SUPPORT (OUTPATIENT)
Dept: CARDIOLOGY | Facility: HOSPITAL | Age: 86
End: 2021-11-03
Attending: INTERNAL MEDICINE
Payer: MEDICARE

## 2021-11-03 VITALS — WEIGHT: 139 LBS | HEIGHT: 62 IN | BODY MASS INDEX: 25.58 KG/M2

## 2021-11-03 LAB
ASCENDING AORTA: 2.88 CM
AV INDEX (PROSTH): 0.51
AV MEAN GRADIENT: 15 MMHG
AV PEAK GRADIENT: 27 MMHG
AV VALVE AREA: 1.52 CM2
AV VELOCITY RATIO: 0.5
BSA FOR ECHO PROCEDURE: 1.66 M2
CV ECHO LV RWT: 0.61 CM
DOP CALC AO PEAK VEL: 2.61 M/S
DOP CALC AO VTI: 59.76 CM
DOP CALC LVOT AREA: 3 CM2
DOP CALC LVOT DIAMETER: 1.95 CM
DOP CALC LVOT PEAK VEL: 1.31 M/S
DOP CALC LVOT STROKE VOLUME: 91.04 CM3
DOP CALC MV VTI: 60.64 CM
DOP CALCLVOT PEAK VEL VTI: 30.5 CM
E WAVE DECELERATION TIME: 193.99 MSEC
E/A RATIO: 0.96
E/E' RATIO: 34.36 M/S
ECHO LV POSTERIOR WALL: 1.1 CM (ref 0.6–1.1)
EJECTION FRACTION: 60 %
FRACTIONAL SHORTENING: 36 % (ref 28–44)
INTERVENTRICULAR SEPTUM: 1.12 CM (ref 0.6–1.1)
LA MAJOR: 5.69 CM
LA MINOR: 5.4 CM
LA WIDTH: 4.88 CM
LEFT ATRIUM SIZE: 4.19 CM
LEFT ATRIUM VOLUME INDEX: 58.7 ML/M2
LEFT ATRIUM VOLUME: 96.31 CM3
LEFT INTERNAL DIMENSION IN SYSTOLE: 2.29 CM (ref 2.1–4)
LEFT VENTRICLE DIASTOLIC VOLUME INDEX: 32.91 ML/M2
LEFT VENTRICLE DIASTOLIC VOLUME: 53.97 ML
LEFT VENTRICLE MASS INDEX: 76 G/M2
LEFT VENTRICLE SYSTOLIC VOLUME INDEX: 10.9 ML/M2
LEFT VENTRICLE SYSTOLIC VOLUME: 17.9 ML
LEFT VENTRICULAR INTERNAL DIMENSION IN DIASTOLE: 3.59 CM (ref 3.5–6)
LEFT VENTRICULAR MASS: 125.28 G
LV LATERAL E/E' RATIO: 27 M/S
LV SEPTAL E/E' RATIO: 47.25 M/S
MV MEAN GRADIENT: 3 MMHG
MV PEAK A VEL: 1.96 M/S
MV PEAK E VEL: 1.89 M/S
MV PEAK GRADIENT: 15 MMHG
MV STENOSIS PRESSURE HALF TIME: 101 MS
MV VALVE AREA BY CONTINUITY EQUATION: 1.5 CM2
MV VALVE AREA P 1/2 METHOD: 2.18 CM2
PISA TR MAX VEL: 2.67 M/S
RA MAJOR: 5.21 CM
RA PRESSURE: 3 MMHG
RA WIDTH: 3.49 CM
RIGHT VENTRICULAR END-DIASTOLIC DIMENSION: 3.59 CM
RV TISSUE DOPPLER FREE WALL SYSTOLIC VELOCITY 1 (APICAL 4 CHAMBER VIEW): 17.52 CM/S
SINUS: 2.66 CM
STJ: 2.75 CM
TDI LATERAL: 0.07 M/S
TDI SEPTAL: 0.04 M/S
TDI: 0.06 M/S
TR MAX PG: 29 MMHG
TRICUSPID ANNULAR PLANE SYSTOLIC EXCURSION: 2.28 CM
TV REST PULMONARY ARTERY PRESSURE: 32 MMHG

## 2021-11-03 PROCEDURE — 93306 TTE W/DOPPLER COMPLETE: CPT | Mod: HCNC,PO

## 2021-11-04 ENCOUNTER — TELEPHONE (OUTPATIENT)
Dept: CARDIOLOGY | Facility: CLINIC | Age: 86
End: 2021-11-04
Payer: MEDICARE

## 2021-11-16 RX ORDER — VIT C/E/ZN/COPPR/LUTEIN/ZEAXAN 250MG-90MG
CAPSULE ORAL
Qty: 180 CAPSULE | Refills: 3 | Status: SHIPPED | OUTPATIENT
Start: 2021-11-16 | End: 2022-11-16

## 2021-12-14 ENCOUNTER — PATIENT OUTREACH (OUTPATIENT)
Dept: ADMINISTRATIVE | Facility: OTHER | Age: 86
End: 2021-12-14
Payer: MEDICARE

## 2021-12-17 ENCOUNTER — OFFICE VISIT (OUTPATIENT)
Dept: CARDIOLOGY | Facility: CLINIC | Age: 86
End: 2021-12-17
Payer: MEDICARE

## 2021-12-17 VITALS
BODY MASS INDEX: 24.54 KG/M2 | SYSTOLIC BLOOD PRESSURE: 146 MMHG | WEIGHT: 133.38 LBS | DIASTOLIC BLOOD PRESSURE: 71 MMHG | HEART RATE: 66 BPM | HEIGHT: 62 IN

## 2021-12-17 DIAGNOSIS — I48.0 PAROXYSMAL ATRIAL FIBRILLATION: ICD-10-CM

## 2021-12-17 DIAGNOSIS — I89.0 LYMPHEDEMA OF RIGHT LOWER EXTREMITY: ICD-10-CM

## 2021-12-17 DIAGNOSIS — I10 PRIMARY HYPERTENSION: ICD-10-CM

## 2021-12-17 DIAGNOSIS — I35.0 NONRHEUMATIC AORTIC VALVE STENOSIS: ICD-10-CM

## 2021-12-17 DIAGNOSIS — I89.0 LYMPHEDEMA: Primary | ICD-10-CM

## 2021-12-17 PROCEDURE — 99214 PR OFFICE/OUTPT VISIT, EST, LEVL IV, 30-39 MIN: ICD-10-PCS | Mod: HCNC,S$GLB,, | Performed by: INTERNAL MEDICINE

## 2021-12-17 PROCEDURE — 99214 OFFICE O/P EST MOD 30 MIN: CPT | Mod: HCNC,S$GLB,, | Performed by: INTERNAL MEDICINE

## 2021-12-17 PROCEDURE — 99999 PR PBB SHADOW E&M-EST. PATIENT-LVL IV: ICD-10-PCS | Mod: PBBFAC,HCNC,, | Performed by: INTERNAL MEDICINE

## 2021-12-17 PROCEDURE — 99999 PR PBB SHADOW E&M-EST. PATIENT-LVL IV: CPT | Mod: PBBFAC,HCNC,, | Performed by: INTERNAL MEDICINE

## 2022-01-21 ENCOUNTER — TELEPHONE (OUTPATIENT)
Dept: CARDIOLOGY | Facility: CLINIC | Age: 87
End: 2022-01-21
Payer: MEDICARE

## 2022-01-21 NOTE — TELEPHONE ENCOUNTER
----- Message from Gunjan Ambrocio sent at 1/21/2022  9:36 AM CST -----  Regarding: Fill out paper work for patient  I have placed paper work in your hanging file at the 2nd floor check in desk.That needs to be filled out by Dr. Deloris hernandez. Please contact her son as soon as it is finished, so that he can pick it up.     His contact information is [phone] May is his name and contact information.

## 2022-01-21 NOTE — TELEPHONE ENCOUNTER
Spoke with May. Notified paper work completed and will be sent to [facility]. May verbalized understanding

## 2022-01-31 ENCOUNTER — OFFICE VISIT (OUTPATIENT)
Dept: FAMILY MEDICINE | Facility: CLINIC | Age: 87
End: 2022-01-31
Payer: MEDICARE

## 2022-01-31 VITALS
SYSTOLIC BLOOD PRESSURE: 108 MMHG | HEART RATE: 106 BPM | WEIGHT: 134 LBS | HEIGHT: 62 IN | BODY MASS INDEX: 24.66 KG/M2 | DIASTOLIC BLOOD PRESSURE: 60 MMHG | OXYGEN SATURATION: 96 %

## 2022-01-31 DIAGNOSIS — N39.0 ACUTE UTI (URINARY TRACT INFECTION): ICD-10-CM

## 2022-01-31 DIAGNOSIS — I48.0 PAROXYSMAL ATRIAL FIBRILLATION: ICD-10-CM

## 2022-01-31 DIAGNOSIS — R30.0 DYSURIA: Primary | ICD-10-CM

## 2022-01-31 LAB
BILIRUB SERPL-MCNC: ABNORMAL MG/DL
BLOOD URINE, POC: ABNORMAL
CLARITY, POC UA: CLEAR
COLOR, POC UA: ABNORMAL
GLUCOSE UR QL STRIP: NORMAL
KETONES UR QL STRIP: NEGATIVE
LEUKOCYTE ESTERASE URINE, POC: ABNORMAL
NITRITE, POC UA: POSITIVE
PH, POC UA: 5
PROTEIN, POC: ABNORMAL
SPECIFIC GRAVITY, POC UA: 1.01
UROBILINOGEN, POC UA: 8

## 2022-01-31 PROCEDURE — 81002 POCT URINE DIPSTICK WITHOUT MICROSCOPE: ICD-10-PCS | Mod: HCNC,S$GLB,, | Performed by: FAMILY MEDICINE

## 2022-01-31 PROCEDURE — 99999 PR PBB SHADOW E&M-EST. PATIENT-LVL III: CPT | Mod: PBBFAC,HCNC,, | Performed by: FAMILY MEDICINE

## 2022-01-31 PROCEDURE — 87186 SC STD MICRODIL/AGAR DIL: CPT | Mod: HCNC | Performed by: FAMILY MEDICINE

## 2022-01-31 PROCEDURE — 87086 URINE CULTURE/COLONY COUNT: CPT | Mod: HCNC | Performed by: FAMILY MEDICINE

## 2022-01-31 PROCEDURE — 99999 PR PBB SHADOW E&M-EST. PATIENT-LVL III: ICD-10-PCS | Mod: PBBFAC,HCNC,, | Performed by: FAMILY MEDICINE

## 2022-01-31 PROCEDURE — 1160F PR REVIEW ALL MEDS BY PRESCRIBER/CLIN PHARMACIST DOCUMENTED: ICD-10-PCS | Mod: HCNC,CPTII,S$GLB, | Performed by: FAMILY MEDICINE

## 2022-01-31 PROCEDURE — 99499 UNLISTED E&M SERVICE: CPT | Mod: S$GLB,,, | Performed by: FAMILY MEDICINE

## 2022-01-31 PROCEDURE — 1159F MED LIST DOCD IN RCRD: CPT | Mod: HCNC,CPTII,S$GLB, | Performed by: FAMILY MEDICINE

## 2022-01-31 PROCEDURE — 99214 OFFICE O/P EST MOD 30 MIN: CPT | Mod: HCNC,S$GLB,, | Performed by: FAMILY MEDICINE

## 2022-01-31 PROCEDURE — 1126F AMNT PAIN NOTED NONE PRSNT: CPT | Mod: HCNC,CPTII,S$GLB, | Performed by: FAMILY MEDICINE

## 2022-01-31 PROCEDURE — 87077 CULTURE AEROBIC IDENTIFY: CPT | Mod: HCNC | Performed by: FAMILY MEDICINE

## 2022-01-31 PROCEDURE — 81002 URINALYSIS NONAUTO W/O SCOPE: CPT | Mod: HCNC,S$GLB,, | Performed by: FAMILY MEDICINE

## 2022-01-31 PROCEDURE — 99214 PR OFFICE/OUTPT VISIT, EST, LEVL IV, 30-39 MIN: ICD-10-PCS | Mod: HCNC,S$GLB,, | Performed by: FAMILY MEDICINE

## 2022-01-31 PROCEDURE — 99499 RISK ADDL DX/OHS AUDIT: ICD-10-PCS | Mod: S$GLB,,, | Performed by: FAMILY MEDICINE

## 2022-01-31 PROCEDURE — 87088 URINE BACTERIA CULTURE: CPT | Mod: HCNC | Performed by: FAMILY MEDICINE

## 2022-01-31 PROCEDURE — 1160F RVW MEDS BY RX/DR IN RCRD: CPT | Mod: HCNC,CPTII,S$GLB, | Performed by: FAMILY MEDICINE

## 2022-01-31 PROCEDURE — 1159F PR MEDICATION LIST DOCUMENTED IN MEDICAL RECORD: ICD-10-PCS | Mod: HCNC,CPTII,S$GLB, | Performed by: FAMILY MEDICINE

## 2022-01-31 PROCEDURE — 1126F PR PAIN SEVERITY QUANTIFIED, NO PAIN PRESENT: ICD-10-PCS | Mod: HCNC,CPTII,S$GLB, | Performed by: FAMILY MEDICINE

## 2022-01-31 RX ORDER — SULFAMETHOXAZOLE AND TRIMETHOPRIM 800; 160 MG/1; MG/1
1 TABLET ORAL 2 TIMES DAILY
Qty: 10 TABLET | Refills: 0 | Status: SHIPPED | OUTPATIENT
Start: 2022-01-31 | End: 2022-02-05

## 2022-01-31 RX ORDER — TRIAMCINOLONE ACETONIDE 1 MG/G
CREAM TOPICAL
COMMUNITY
Start: 2021-11-16

## 2022-01-31 NOTE — PROGRESS NOTES
"Subjective:       Patient ID: Michela Calvert is a 89 y.o. female.    Chief Complaint: Cystitis     Here today for an acute visit.  She is a patient of Dr. Nieto, new to me today.  Here today with her son. She is c/o dysuria and increased frequency x 2 days.  She has a history of UTI.  She denies nausea, abd pain or fever.  She did take OTC Azo.    Review of Systems   Constitutional: Negative for activity change, appetite change, fatigue and fever.   Respiratory: Negative for cough, shortness of breath and wheezing.    Cardiovascular: Negative for chest pain and palpitations.   Gastrointestinal: Negative for abdominal pain, constipation, diarrhea and nausea.   Skin: Negative for pallor and rash.   Neurological: Negative for dizziness, syncope, light-headedness and headaches.   Psychiatric/Behavioral: The patient is not nervous/anxious.        Objective:      Vitals:    01/31/22 1319   BP: 108/60   BP Location: Right arm   Patient Position: Sitting   BP Method: Large (Manual)   Pulse: 106   SpO2: 96%   Weight: 60.8 kg (134 lb)   Height: 5' 2" (1.575 m)      Physical Exam  Constitutional:       General: She is not in acute distress.  Cardiovascular:      Rate and Rhythm: Normal rate. Rhythm irregular.      Heart sounds: Normal heart sounds. No murmur heard.      Pulmonary:      Effort: Pulmonary effort is normal. No respiratory distress.      Breath sounds: Normal breath sounds. No wheezing, rhonchi or rales.   Abdominal:      General: Abdomen is flat. There is no distension.      Palpations: Abdomen is soft.      Tenderness: There is abdominal tenderness (suprapubic). There is no right CVA tenderness, left CVA tenderness or guarding.   Skin:     General: Skin is warm and dry.   Neurological:      General: No focal deficit present.      Mental Status: She is alert.   Psychiatric:         Mood and Affect: Mood normal.         Behavior: Behavior normal.         Thought Content: Thought content normal.       "   Assessment:       1. Dysuria    2. Paroxysmal atrial fibrillation    3. Acute UTI (urinary tract infection)        Plan:       Dysuria  -     POCT URINE DIPSTICK WITHOUT MICROSCOPE  -     sulfamethoxazole-trimethoprim 800-160mg (BACTRIM DS) 800-160 mg Tab; Take 1 tablet by mouth 2 (two) times daily. for 5 days  Dispense: 10 tablet; Refill: 0    Paroxysmal atrial fibrillation    Acute UTI (urinary tract infection)  -     Urine culture          Medication List with Changes/Refills   New Medications    SULFAMETHOXAZOLE-TRIMETHOPRIM 800-160MG (BACTRIM DS) 800-160 MG TAB    Take 1 tablet by mouth 2 (two) times daily. for 5 days   Current Medications    ACETAMINOPHEN (TYLENOL) 650 MG TBSR    Take 650 mg by mouth every 8 (eight) hours as needed.    AMIODARONE (PACERONE) 200 MG TAB    TAKE ONE TABLET BY MOUTH three times a week(M-W-F)    AMLODIPINE (NORVASC) 5 MG TABLET    Take 1 tablet (5 mg total) by mouth once daily in the morning    CALCIUM CARBONATE-VITAMIN D3 (CALCIUM 600 WITH VITAMIN D3) 600 MG-12.5 MCG (500 UNIT) CAP    TAKE ONE CAPSULE BY MOUTH ONCE DAILY IN THE EVENING    CEPHALEXIN (KEFLEX) 500 MG CAPSULE    Take 1 pill po 3 times a day for 10 days    CLOBETASOL 0.05% (TEMOVATE) 0.05 % OINT    Apply daily to affected area    ELIQUIS 2.5 MG TAB    TAKE ONE TABLET BY MOUTH TWICE DAILY    FLUTICASONE PROPIONATE (FLONASE) 50 MCG/ACTUATION NASAL SPRAY    1 spray (50 mcg total) by Each Nostril route 2 (two) times a day.    FUROSEMIDE (LASIX) 20 MG TABLET    Take 1 tablet (20 mg total) by mouth once daily.    HYDROCORTISONE 2.5 % CREAM    APPLY TO THE AFFECTED AREA(S) TWICE DAILY    LEVOTHYROXINE (SYNTHROID) 100 MCG TABLET    Take 1 tablet (100 mcg total) by mouth before breakfast. Administer on an empty stomach at least 30 minutes before food. If receiving tube feeds, HOLD tube feeds for 1 hour before and after levothyroxine administration.    METOPROLOL TARTRATE (LOPRESSOR) 25 MG TABLET    TAKE ONE TABLET BY MOUTH  TWICE DAILY    PRESERVISION AREDS-2 250-90-40-1 MG CAP    TAKE ONE CAPSULE BY MOUTH TWICE DAILY (In THE morning AND In THE evening)

## 2022-02-03 LAB — BACTERIA UR CULT: ABNORMAL

## 2022-03-03 DIAGNOSIS — I10 ESSENTIAL HYPERTENSION: ICD-10-CM

## 2022-03-03 NOTE — TELEPHONE ENCOUNTER
No new care gaps identified.  Powered by [employer]. Reference number: [id number].   3/03/2022 8:02:19 AM CST

## 2022-03-09 DIAGNOSIS — I10 ESSENTIAL HYPERTENSION: ICD-10-CM

## 2022-03-09 RX ORDER — METOPROLOL TARTRATE 25 MG/1
25 TABLET, FILM COATED ORAL 2 TIMES DAILY
Qty: 180 TABLET | Refills: 3 | Status: SHIPPED | OUTPATIENT
Start: 2022-03-09 | End: 2023-02-05

## 2022-03-09 NOTE — TELEPHONE ENCOUNTER
No new care gaps identified.  Powered by [employer]. Reference number: [id number].   3/09/2022 1:34:23 PM CST

## 2022-03-09 NOTE — TELEPHONE ENCOUNTER
----- Message from Reji Knapp sent at 3/9/2022  1:11 PM CST -----  [facility]  Type[facility]  RX Refill Request    Who Called:  [facility]  Refill or New Rx:  new    metoprolol tartrate (LOPRESSOR) 25 MG tablet 180 tablet 3 2/11/2021  No  Sig: TAKE ONE TABLET BY MOUTH TWICE DAILY  Sent to pharmacy as: metoprolol tartrate (LOPRESSOR) 25 MG tablet    Preferred Pharmacy with phone number:    [facility] - [place] - [address]  [address]  [place] [address]  Phone: [phone] Fax: [phone]    Ordering Provider:  Emilia Turcios Call Back Number:  See Above  Additional Information:  n/a

## 2022-03-10 RX ORDER — METOPROLOL TARTRATE 25 MG/1
TABLET, FILM COATED ORAL
Qty: 180 TABLET | Refills: 3 | OUTPATIENT
Start: 2022-03-10

## 2022-03-10 NOTE — TELEPHONE ENCOUNTER
This medication has been handled/signed by PCP already.  Will remove duplicate and close out encounter.

## 2022-03-10 NOTE — TELEPHONE ENCOUNTER
Quick DC. Request already responded to by other means (e.g. phone or fax)   Refill Authorization Note   Michela Calvert  is requesting a refill authorization.  Brief Assessment and Rationale for Refill:  Quick Discontinue  Medication Therapy Plan:       Medication Reconciliation Completed:  No      Comments:   Pended Medication(s)       Requested Prescriptions     Pending Prescriptions Disp Refills    metoprolol tartrate (LOPRESSOR) 25 MG tablet [Pharmacy Med Name: metoprolol tartrate 25 mg tablet] 180 tablet 3     Sig: TAKE ONE TABLET BY MOUTH TWICE DAILY        Duplicate Pended Encounter(s)/ Last Prescribed Details: (includes pharmacy & prescriber details)   metoprolol tartrate (LOPRESSOR) 25 MG tablet 180 tablet 3 3/9/2022  No   Sig - Route: Take 1 tablet (25 mg total) by mouth 2 (two) times daily. - Oral   Sent to pharmacy as: metoprolol tartrate (LOPRESSOR) 25 MG tablet   Class: Normal   Notes to Pharmacy: .   Order: [id number]   Date/Time Signed: 3/9/2022 19:50       E-Prescribing Status: Receipt confirmed by pharmacy (3/9/2022  8:01 PM CST)       Associated Reports   View Encounter          Note composed:11:30 AM 03/10/2022

## 2022-05-03 DIAGNOSIS — E03.4 HYPOTHYROIDISM DUE TO ACQUIRED ATROPHY OF THYROID: ICD-10-CM

## 2022-05-03 NOTE — TELEPHONE ENCOUNTER
No new care gaps identified.  [facility] Embedded Care Gaps. Reference number: [id number]. 5/03/2022   3:04:48 PM CDT

## 2022-05-04 RX ORDER — LEVOTHYROXINE SODIUM 100 UG/1
TABLET ORAL
Qty: 90 TABLET | Refills: 0 | Status: SHIPPED | OUTPATIENT
Start: 2022-05-04 | End: 2022-07-27

## 2022-05-04 NOTE — TELEPHONE ENCOUNTER
Refill Authorization Note   Michela Calvert  is requesting a refill authorization.  Brief Assessment and Rationale for Refill:  Approve     Medication Therapy Plan:       Medication Reconciliation Completed: No   Comments:     No Care Gaps recommended.     Note composed:10:14 AM 05/04/2022

## 2022-05-09 ENCOUNTER — PATIENT MESSAGE (OUTPATIENT)
Dept: SMOKING CESSATION | Facility: CLINIC | Age: 87
End: 2022-05-09
Payer: MEDICARE

## 2022-05-27 ENCOUNTER — TELEPHONE (OUTPATIENT)
Dept: FAMILY MEDICINE | Facility: CLINIC | Age: 87
End: 2022-05-27
Payer: MEDICARE

## 2022-05-27 NOTE — TELEPHONE ENCOUNTER
----- Message from Amol Garcia sent at 5/27/2022 10:22 AM CDT -----  Type: Needs Medical Advice  Who Called:  Granddaughter/ Emma Post  Symptoms (please be specific):  Possible blood clot on left leg  How long has patient had these symptoms: 1 day    Best Call Back Number:  [phone]  Additional Information: Please call to advise

## 2022-05-27 NOTE — TELEPHONE ENCOUNTER
Spoke with Emma Post patient's  Granddaughter who  Is also a nurse in  at [employer]. . She stated patient is has a possible blood clot on left lower leg. It is  Red swollen, and painfull. She wanted to know if she should take her to the ER or do an STAT US. Advised to go to Er due to Dr Nieto is out of the clinic. Verbalized understanding. She will get her to the ER.  Please advise if more info is needed for Emma in Dr Nieto's absence

## 2022-05-30 ENCOUNTER — TELEPHONE (OUTPATIENT)
Dept: FAMILY MEDICINE | Facility: CLINIC | Age: 87
End: 2022-05-30
Payer: MEDICARE

## 2022-05-30 NOTE — TELEPHONE ENCOUNTER
----- Message from Bridget Paredes sent at 5/30/2022 10:17 AM CDT -----  Contact: Son  Type:  Same Day Appointment Request    Caller is requesting a same day appointment.  Caller declined first available appointment listed below.      Name of Caller: Son     When is the first available appointment? 05/31    Symptoms: Stomach pain     Best Call Back Number: [phone]    Additional Information: Son needs to get patient in today for stomach pain

## 2022-05-31 ENCOUNTER — OFFICE VISIT (OUTPATIENT)
Dept: FAMILY MEDICINE | Facility: CLINIC | Age: 87
End: 2022-05-31
Payer: MEDICARE

## 2022-05-31 ENCOUNTER — TELEPHONE (OUTPATIENT)
Dept: WOUND CARE | Facility: CLINIC | Age: 87
End: 2022-05-31
Payer: MEDICARE

## 2022-05-31 VITALS
HEART RATE: 75 BPM | DIASTOLIC BLOOD PRESSURE: 70 MMHG | BODY MASS INDEX: 23.39 KG/M2 | OXYGEN SATURATION: 97 % | WEIGHT: 132.06 LBS | SYSTOLIC BLOOD PRESSURE: 130 MMHG

## 2022-05-31 DIAGNOSIS — L03.116 CELLULITIS OF LEFT LEG: Primary | ICD-10-CM

## 2022-05-31 PROCEDURE — 99214 PR OFFICE/OUTPT VISIT, EST, LEVL IV, 30-39 MIN: ICD-10-PCS | Mod: S$GLB,,, | Performed by: PHYSICIAN ASSISTANT

## 2022-05-31 PROCEDURE — 1126F AMNT PAIN NOTED NONE PRSNT: CPT | Mod: CPTII,S$GLB,, | Performed by: PHYSICIAN ASSISTANT

## 2022-05-31 PROCEDURE — 99214 OFFICE O/P EST MOD 30 MIN: CPT | Mod: S$GLB,,, | Performed by: PHYSICIAN ASSISTANT

## 2022-05-31 PROCEDURE — 1126F PR PAIN SEVERITY QUANTIFIED, NO PAIN PRESENT: ICD-10-PCS | Mod: CPTII,S$GLB,, | Performed by: PHYSICIAN ASSISTANT

## 2022-05-31 PROCEDURE — 99999 PR PBB SHADOW E&M-EST. PATIENT-LVL III: CPT | Mod: PBBFAC,,, | Performed by: PHYSICIAN ASSISTANT

## 2022-05-31 PROCEDURE — 99999 PR PBB SHADOW E&M-EST. PATIENT-LVL III: ICD-10-PCS | Mod: PBBFAC,,, | Performed by: PHYSICIAN ASSISTANT

## 2022-05-31 NOTE — PROGRESS NOTES
Subjective:       Patient ID: Michela Calvert is a 89 y.o. female       Chief Complaint: Abdominal Pain    HPI  Patient's  PCP: Enzo Nieto MD.  The patient is new to me  Patient's past medical history includes:  Macular degeneration, aortic stenosis, atrial fibrillation, hypertension and hypothyroidism    The patient IS here today with her son Juan José, she presents today complaining of abdominal pain that began after taking Bactrim and Clindamycin that was prescribed to her for Cellultitis in the ER on 5/27/2022.  In the ER DVT was rulled out with US.  She could tell it was the Bactrim that bothered her because of the taste so she stopped taking it and has already thrown out the antibiotic. Her pain was a 10/10 but she currently denies any pain.  Her leg is not bothering her unless she presses on it.  She denies any fever, no change in appetite, no nausea, vomiting or diarrhea.     Review of Systems   Constitutional: Negative for activity change, chills and fever.   HENT: Negative for congestion and sore throat.    Eyes: Negative for visual disturbance.   Respiratory: Negative for cough and shortness of breath.    Cardiovascular: Negative for chest pain and palpitations.   Gastrointestinal: Positive for abdominal pain (resolved). Negative for diarrhea, nausea and vomiting.   Endocrine: Negative for polydipsia and polyuria.   Musculoskeletal: Negative for myalgias.   Skin: Positive for color change. Negative for rash.   Neurological: Negative for headaches.   Psychiatric/Behavioral: The patient is not nervous/anxious.         Objective:   Physical Exam  Constitutional:       General: She is not in acute distress.     Appearance: She is well-developed. She is not diaphoretic.   HENT:      Head: Normocephalic and atraumatic.   Neck:      Thyroid: No thyromegaly.   Cardiovascular:      Rate and Rhythm: Normal rate.   Pulmonary:      Effort: No respiratory distress.   Musculoskeletal:         General: Tenderness  present. No deformity. Normal range of motion.      Cervical back: Normal range of motion and neck supple.      Left lower leg: Edema (see photp) present.      Comments: The left leg shows some yellow exudate anteriorly and the posterior vesicles are intact without drainage   Neurological:      Mental Status: She is alert and oriented to person, place, and time.      Deep Tendon Reflexes: Reflexes are normal and symmetric.   Psychiatric:         Behavior: Behavior normal.         Thought Content: Thought content normal.         Judgment: Judgment normal.                  Assessment:       1. Cellulitis of left leg  Ambulatory referral/consult to Home Health    Ambulatory referral/consult to Wound Clinic    POCT Apply ace wrap        Plan:       Cellulitis of left leg  -     Ambulatory referral/consult to Home Health; Future; Expected date: 06/01/2022  -     Ambulatory referral/consult to Wound Clinic; Future; Expected date: 06/07/2022  -     POCT Apply ace wrap    - she is no longer taking Bactrim  - continue Clindamycin as prescribed in the ER  - recommend elevation above the level of the heart while resting  - clean the skin with antibacterial soap and water daily and use ACE or compression stocking during the day  - recommend a probiotic(Align or Culturelle)  - I personally applied an ACE wrap with curlex underlying from the foot up to the left knee    Follow up if symptoms worsen or fail to improve.       Felicia Oliver PA-C   05/31/2022   7:55 AM

## 2022-05-31 NOTE — TELEPHONE ENCOUNTER
----- Message from Carolee Dunlap sent at 5/31/2022  9:49 AM CDT -----  Contact: Juan José Gutierrez  Type:  New Appointment Request    Name of Caller:  Juan José Gutierrez  When is the first available appointment?  N/A - nothing comes up  Symptoms:  Cellulitis on legs, referral from Felicia Oliver  Best Call Back Number:  [phone]  Additional Information:  Please call back.  Thanks.

## 2022-05-31 NOTE — TELEPHONE ENCOUNTER
Spoke to Son Juan José and he would like for the pt to be seen sooner then the 14th. He is willing to travel to [place]. I called the [facility] and they are going to give him a call to offer him a sooner apt. I spoke to Anna at the clinic in [place].

## 2022-06-01 PROCEDURE — G0180 MD CERTIFICATION HHA PATIENT: HCPCS | Mod: ,,, | Performed by: PHYSICIAN ASSISTANT

## 2022-06-01 PROCEDURE — G0180 PR HOME HEALTH MD CERTIFICATION: ICD-10-PCS | Mod: ,,, | Performed by: PHYSICIAN ASSISTANT

## 2022-06-02 ENCOUNTER — TELEPHONE (OUTPATIENT)
Dept: FAMILY MEDICINE | Facility: CLINIC | Age: 87
End: 2022-06-02
Payer: MEDICARE

## 2022-06-02 NOTE — TELEPHONE ENCOUNTER
Spoke with Evita with [facility].She is asking for wound care orders.She is okay with a verbal order.She is asking if its okay for Cleanse with wound , place a piece of silver AG, cover with gauze , wrap with a ace bandage twice weakly and OT. JOHN Skaggs verbally okayed the orders and notified Evita with [facility].

## 2022-06-02 NOTE — TELEPHONE ENCOUNTER
----- Message from Yue Brown sent at 6/2/2022  7:58 AM CDT -----  Contact: Evita  Type: Needs Medical Advice    Who: Called:  Evita With [facility]      Best Call Back Number: [phone]    Inquiry/Question: Evita needs to clarify some wound care orders she did her admit yesterday...  please call to advise  Thank you~

## 2022-06-03 ENCOUNTER — TELEPHONE (OUTPATIENT)
Dept: FAMILY MEDICINE | Facility: CLINIC | Age: 87
End: 2022-06-03
Payer: MEDICARE

## 2022-06-03 NOTE — TELEPHONE ENCOUNTER
----- Message from Scar De Jesus sent at 6/3/2022  2:45 PM CDT -----  Type: Patient Call Back         Who called: Charo [facility] Nurse            What is the request in detail:regarding wanting to admit Pt today's visit          Can the clinic reply by [facility]?no          Would the patient rather a call back or a response via [facility]?call back          Best call back number:[phone] Evita          Additional Information:           Thank You

## 2022-06-03 NOTE — TELEPHONE ENCOUNTER
Nurse was calling to ask for approval for second visit this week for wound care due to need for the visit.   Advised to send documentation to ordering provider for review.

## 2022-06-08 ENCOUNTER — OFFICE VISIT (OUTPATIENT)
Dept: WOUND CARE | Facility: HOSPITAL | Age: 87
End: 2022-06-08
Attending: FAMILY MEDICINE
Payer: MEDICARE

## 2022-06-08 VITALS
DIASTOLIC BLOOD PRESSURE: 65 MMHG | RESPIRATION RATE: 17 BRPM | TEMPERATURE: 99 F | HEART RATE: 78 BPM | SYSTOLIC BLOOD PRESSURE: 132 MMHG

## 2022-06-08 DIAGNOSIS — I83.029 VENOUS ULCER OF LEFT LEG: Primary | ICD-10-CM

## 2022-06-08 DIAGNOSIS — L97.929 VENOUS ULCER OF LEFT LEG: Primary | ICD-10-CM

## 2022-06-08 DIAGNOSIS — S80.12XA HEMATOMA OF LEFT LOWER LEG: ICD-10-CM

## 2022-06-08 DIAGNOSIS — R60.0 PERIPHERAL EDEMA: ICD-10-CM

## 2022-06-08 DIAGNOSIS — L81.8 HEMOSIDERIN PIGMENTATION OF SKIN: ICD-10-CM

## 2022-06-08 DIAGNOSIS — I87.2 VENOUS STASIS DERMATITIS OF BOTH LOWER EXTREMITIES: ICD-10-CM

## 2022-06-08 PROCEDURE — 99203 PR OFFICE/OUTPT VISIT, NEW, LEVL III, 30-44 MIN: ICD-10-PCS | Mod: ,,, | Performed by: FAMILY MEDICINE

## 2022-06-08 PROCEDURE — 1159F MED LIST DOCD IN RCRD: CPT | Mod: CPTII,,, | Performed by: FAMILY MEDICINE

## 2022-06-08 PROCEDURE — 1160F RVW MEDS BY RX/DR IN RCRD: CPT | Mod: CPTII,,, | Performed by: FAMILY MEDICINE

## 2022-06-08 PROCEDURE — 99203 OFFICE O/P NEW LOW 30 MIN: CPT | Mod: ,,, | Performed by: FAMILY MEDICINE

## 2022-06-08 PROCEDURE — 1126F AMNT PAIN NOTED NONE PRSNT: CPT | Mod: CPTII,,, | Performed by: FAMILY MEDICINE

## 2022-06-08 PROCEDURE — 1126F PR PAIN SEVERITY QUANTIFIED, NO PAIN PRESENT: ICD-10-PCS | Mod: CPTII,,, | Performed by: FAMILY MEDICINE

## 2022-06-08 PROCEDURE — 1160F PR REVIEW ALL MEDS BY PRESCRIBER/CLIN PHARMACIST DOCUMENTED: ICD-10-PCS | Mod: CPTII,,, | Performed by: FAMILY MEDICINE

## 2022-06-08 PROCEDURE — 99214 OFFICE O/P EST MOD 30 MIN: CPT | Performed by: FAMILY MEDICINE

## 2022-06-08 PROCEDURE — 1159F PR MEDICATION LIST DOCUMENTED IN MEDICAL RECORD: ICD-10-PCS | Mod: CPTII,,, | Performed by: FAMILY MEDICINE

## 2022-06-08 NOTE — PROGRESS NOTES
Chief complaint:  Wound Care      HPI:  Michela Calvert is a 89 y.o. female presenting with multiple open wounds of the BL LE. Pt has a dried lesion on the right leg, and has a hx of skin cancer that she is seeing dermatology for next week. Pt also has a hematoma of the left anterior leg that is not open. She also has a venous ulcer of the Left LE, as well as two areas on the posterior left leg that are dried eschar from possible hematomas that have opened up. Pt is on a blood thinner, which is contributing to hef bruises     PMH:  As per HPI and below:  Past Medical History:   Diagnosis Date    Atrial fibrillation     Cataracts, bilateral     Chronic pain syndrome 7/25/2018    Closed fracture of neck of left femur 3/8/2019    DDD (degenerative disc disease), lumbar     DDD (degenerative disc disease), lumbar     Heart murmur 01/2017    HLD (hyperlipidemia)     no medication taken    Hypertension 12/27/2018    Hypothyroidism     Lichen sclerosus et atrophicus     Rectal/Vaginal areas    Lumbar spinal stenosis     Lumbar spondylosis     Mitral valve disorder 01/2017    Asymptomatic    Osteopenia     Urinary incontinence        Social History     Socioeconomic History    Marital status:    Tobacco Use    Smoking status: Never Smoker    Smokeless tobacco: Never Used   Substance and Sexual Activity    Alcohol use: Not Currently     Alcohol/week: 1.0 standard drink     Types: 1 Shots of liquor per week    Drug use: No    Sexual activity: Never     Social Determinants of Health     Financial Resource Strain: Low Risk     Difficulty of Paying Living Expenses: Not hard at all   Food Insecurity: No Food Insecurity    Worried About Running Out of Food in the Last Year: Never true    Ran Out of Food in the Last Year: Never true   Transportation Needs: No Transportation Needs    Lack of Transportation (Medical): No    Lack of Transportation (Non-Medical): No   Physical Activity: Unknown     Days of Exercise per Week: 3 days   Stress: Stress Concern Present    Feeling of Stress : To some extent   Social Connections: Moderately Integrated    Frequency of Communication with Friends and Family: More than three times a week    Frequency of Social Gatherings with Friends and Family: Twice a week    Attends Church Services: More than 4 times per year    Active Member of Clubs or Organizations: No    Attends Club or Organization Meetings: Never    Marital Status:        Past Surgical History:   Procedure Laterality Date    APPENDECTOMY      CATARACT EXTRACTION EXTRACAPSULAR W/ INTRAOCULAR LENS IMPLANTATION Bilateral     HEMIARTHROPLASTY OF HIP Left 3/9/2019    Procedure: HEMIARTHROPLASTY, HIP;  Surgeon: Josesito Leo MD;  Location: [facility] OR;  Service: Orthopedics;  Laterality: Left;    HYSTERECTOMY      INSERTION OF DORSAL COLUMN NERVE STIMULATOR FOR TRIAL N/A 7/25/2018    Procedure: INSERTION, DORSAL COLUMN NEUROSTIMULATOR, FOR TRIAL;  Surgeon: Derek Daily MD;  Location: [facility] OR;  Service: Pain Management;  Laterality: N/A;    spinal ablation  12/2017    TONSILLECTOMY         Family History   Problem Relation Age of Onset    Colon cancer Neg Hx     Crohn's disease Neg Hx     Stomach cancer Neg Hx     Ulcerative colitis Neg Hx     Esophageal cancer Neg Hx        Review of patient's allergies indicates:   Allergen Reactions    Nitrofuran analogues Nausea Only    Ciprofloxacin Nausea And Vomiting       Current Outpatient Medications on File Prior to Visit   Medication Sig Dispense Refill    acetaminophen (TYLENOL) 650 MG TbSR Take 650 mg by mouth every 8 (eight) hours as needed.      amiodarone (PACERONE) 200 MG Tab TAKE ONE TABLET BY MOUTH three times a week(M-W-F) IN THE MORNING 90 tablet 3    amLODIPine (NORVASC) 5 MG tablet Take 1 tablet (5 mg total) by mouth once daily in the morning 90 tablet 3    calcium carbonate-vitamin D3 (CALCIUM 600 WITH VITAMIN D3) 600 mg-12.5  mcg (500 unit) Cap TAKE ONE CAPSULE BY MOUTH ONCE DAILY IN THE EVENING 180 capsule 3    clobetasol 0.05% (TEMOVATE) 0.05 % Oint Apply daily to affected area      ELIQUIS 2.5 mg Tab TAKE ONE TABLET BY MOUTH TWICE DAILY 56 tablet 6    fluticasone propionate (FLONASE) 50 mcg/actuation nasal spray 1 spray (50 mcg total) by Each Nostril route 2 (two) times a day. (Patient not taking: No sig reported) 16 g 0    hydrocortisone 2.5 % cream APPLY TO THE AFFECTED AREA(S) TWICE DAILY 84 g 2    levothyroxine (SYNTHROID) 100 MCG tablet TAKE ONE TABLET BY MOUTH BEFORE BREAKFAST. Administer on an empty stomach at least 30 minutes before food 90 tablet 0    metoprolol tartrate (LOPRESSOR) 25 MG tablet Take 1 tablet (25 mg total) by mouth 2 (two) times daily. 180 tablet 3    PRESERVISION AREDS-2 250-90-40-1 mg Cap TAKE ONE CAPSULE BY MOUTH TWICE DAILY (In THE morning AND In THE evening) 180 capsule 3    triamcinolone acetonide 0.1% (KENALOG) 0.1 % cream        No current facility-administered medications on file prior to visit.       ROS: As per HPI and below:  10 point ROS all negative except that noted in the HPI      Physical Exam:     Vitals:    06/08/22 1056   BP: 132/65   Pulse: 78   Resp: 17   Temp: 98.6 °F (37 °C)       @FLOWSTAT(1)@    There is no height or weight on file to calculate BMI.          General:             Well developed, well nourished, no apparent distress  HEENT:              NCAT, no JVD, mucous membranes moist, EOM intact  Cardiovascular:  Regular rate and rhythm, normal S1, normal S2, No murmurs, rubs, or gallops  Respiratory:        Normal breath sounds, no wheezes, no rales, no rhonchi  Abdomen:           Bowel sounds present, non tender, non distended, no masses, no hepatojugular reflux  Extremities:        No clubbing, no cyanosis, no edema.  Neurological:      No focal deficits  Skin:                   1. Left leg venous ulcer                             2. Right leg atypical lesion                              3. Hemosiderin staining on BL LE                             4. Peripheral edema                             5. Stasis dermatitis BL LE                             6. Left leg hematoma    Lab Results   Component Value Date    WBC 8.70 11/05/2019    HGB 12.4 11/05/2019    HCT 41.4 11/05/2019    MCV 98 11/05/2019     11/05/2019     Lab Results   Component Value Date    CHOL 210 (H) 05/19/2021    CHOL 212 (H) 11/05/2019    CHOL 150 02/13/2019     Lab Results   Component Value Date    HDL 63 05/19/2021    HDL 68 11/05/2019    HDL 34 (L) 02/13/2019     Lab Results   Component Value Date    LDLCALC 133.8 05/19/2021    LDLCALC 130.8 11/05/2019    LDLCALC 85.0 02/13/2019     Lab Results   Component Value Date    TRIG 66 05/19/2021    TRIG 66 11/05/2019    TRIG 155 (H) 02/13/2019     Lab Results   Component Value Date    CHOLHDL 30.0 05/19/2021    CHOLHDL 32.1 11/05/2019    CHOLHDL 22.7 02/13/2019     CMP     Lab Results   Component Value Date    TSH 1.196 08/27/2021             Assessment and Recommendations       Diagnoses:    1. Left leg venous ulcer  2. Right leg atypical lesion  3. Hemosiderin staining on BL LE  4. Peripheral edema  5. Stasis dermatitis BL LE  6. Left leg hematoma    Plan:  1. Doppler ordered  2. Pt following up at Derm for right leg lesion  3.Much of the documentation for this visit was completed in the Wound Docs system.  Please see the attached documentation for further details about the patient's care. Scanned under the Media tab.

## 2022-06-10 ENCOUNTER — HOSPITAL ENCOUNTER (OUTPATIENT)
Dept: RADIOLOGY | Facility: HOSPITAL | Age: 87
Discharge: HOME | End: 2022-06-10
Attending: FAMILY MEDICINE
Payer: MEDICARE

## 2022-06-10 ENCOUNTER — TELEPHONE (OUTPATIENT)
Dept: WOUND CARE | Facility: CLINIC | Age: 87
End: 2022-06-10
Payer: MEDICARE

## 2022-06-10 DIAGNOSIS — R60.0 PERIPHERAL EDEMA: ICD-10-CM

## 2022-06-10 DIAGNOSIS — L97.929 VENOUS ULCER OF LEFT LEG: ICD-10-CM

## 2022-06-10 DIAGNOSIS — I87.2 VENOUS STASIS DERMATITIS OF BOTH LOWER EXTREMITIES: ICD-10-CM

## 2022-06-10 DIAGNOSIS — I83.029 VENOUS ULCER OF LEFT LEG: ICD-10-CM

## 2022-06-10 PROCEDURE — 93970 US LOWER EXTREMITY VEINS BILATERAL: ICD-10-PCS | Mod: 26,,, | Performed by: RADIOLOGY

## 2022-06-10 PROCEDURE — 93970 EXTREMITY STUDY: CPT | Mod: TC,PO

## 2022-06-10 PROCEDURE — 93970 EXTREMITY STUDY: CPT | Mod: 26,,, | Performed by: RADIOLOGY

## 2022-06-10 NOTE — TELEPHONE ENCOUNTER
----- Message from Amol Garcia sent at 6/10/2022  8:58 AM CDT -----  Type:  Sooner Appointment Request    Caller is requesting a sooner appointment.  Caller declined first available appointment listed below.  Caller will not accept being placed on the waitlist and is requesting a message be sent to doctor.    Name of Caller: Juan José Calvert (Son)  When is the first available appointment? Pt would like to be seen in [place] on 06/14/22  Symptoms:  Wound on leg  Best Call Back Number:  [phone]  Additional Information:

## 2022-06-10 NOTE — TELEPHONE ENCOUNTER
Spoke to pt's son and booked pt for 6/21 to be seen in [place]. Pt has a f/u apt on 6/15 in [place] and then will transition to [place].

## 2022-06-15 ENCOUNTER — OFFICE VISIT (OUTPATIENT)
Dept: WOUND CARE | Facility: HOSPITAL | Age: 87
End: 2022-06-15
Attending: FAMILY MEDICINE
Payer: MEDICARE

## 2022-06-15 VITALS
HEART RATE: 60 BPM | DIASTOLIC BLOOD PRESSURE: 71 MMHG | SYSTOLIC BLOOD PRESSURE: 147 MMHG | TEMPERATURE: 99 F | RESPIRATION RATE: 17 BRPM

## 2022-06-15 DIAGNOSIS — I83.029 VENOUS ULCER OF LEFT LEG: Primary | ICD-10-CM

## 2022-06-15 DIAGNOSIS — L97.929 VENOUS ULCER OF LEFT LEG: Primary | ICD-10-CM

## 2022-06-15 DIAGNOSIS — L81.8 HEMOSIDERIN PIGMENTATION OF SKIN: ICD-10-CM

## 2022-06-15 DIAGNOSIS — R60.0 PERIPHERAL EDEMA: ICD-10-CM

## 2022-06-15 DIAGNOSIS — I87.2 VENOUS STASIS DERMATITIS OF BOTH LOWER EXTREMITIES: ICD-10-CM

## 2022-06-15 PROCEDURE — 1160F RVW MEDS BY RX/DR IN RCRD: CPT | Mod: CPTII,,, | Performed by: FAMILY MEDICINE

## 2022-06-15 PROCEDURE — 11042 DBRDMT SUBQ TIS 1ST 20SQCM/<: CPT | Performed by: FAMILY MEDICINE

## 2022-06-15 PROCEDURE — 11045 DBRDMT SUBQ TISS EACH ADDL: CPT | Performed by: FAMILY MEDICINE

## 2022-06-15 PROCEDURE — 11045 PR DEB SUBQ TISSUE ADD-ON: ICD-10-PCS | Mod: ,,, | Performed by: FAMILY MEDICINE

## 2022-06-15 PROCEDURE — 11042 DBRDMT SUBQ TIS 1ST 20SQCM/<: CPT | Mod: ,,, | Performed by: FAMILY MEDICINE

## 2022-06-15 PROCEDURE — 99499 NO LOS: ICD-10-PCS | Mod: ,,, | Performed by: FAMILY MEDICINE

## 2022-06-15 PROCEDURE — 11042 PR DEBRIDEMENT, SKIN, SUB-Q TISSUE,=<20 SQ CM: ICD-10-PCS | Mod: ,,, | Performed by: FAMILY MEDICINE

## 2022-06-15 PROCEDURE — 11045 DBRDMT SUBQ TISS EACH ADDL: CPT | Mod: ,,, | Performed by: FAMILY MEDICINE

## 2022-06-15 PROCEDURE — 1160F PR REVIEW ALL MEDS BY PRESCRIBER/CLIN PHARMACIST DOCUMENTED: ICD-10-PCS | Mod: CPTII,,, | Performed by: FAMILY MEDICINE

## 2022-06-15 PROCEDURE — 99499 UNLISTED E&M SERVICE: CPT | Mod: ,,, | Performed by: FAMILY MEDICINE

## 2022-06-15 PROCEDURE — 1159F MED LIST DOCD IN RCRD: CPT | Mod: CPTII,,, | Performed by: FAMILY MEDICINE

## 2022-06-15 PROCEDURE — 1159F PR MEDICATION LIST DOCUMENTED IN MEDICAL RECORD: ICD-10-PCS | Mod: CPTII,,, | Performed by: FAMILY MEDICINE

## 2022-06-15 NOTE — PROGRESS NOTES
Wound Care and Hyperbaric Medicine                Progress Note    Subjective:       Patient ID: Michela Calvert is a 89 y.o. female.    Chief Complaint: No chief complaint on file.    HPI  Pt seen in clinic for a FU visit for left leg venous ulcer. Wound is clean, some serous drainage. Pt will be seen in [place] next week. No new complaints today.  Review of Systems   Skin: Positive for wound.        Left leg venous ulcer   All other systems reviewed and are negative.        Objective:        Physical Exam  Vitals and nursing note reviewed. Exam conducted with a chaperone present.   Constitutional:       General: She is not in acute distress.     Appearance: Normal appearance. She is not ill-appearing, toxic-appearing or diaphoretic.   Skin:     General: Skin is warm and dry.      Coloration: Skin is not jaundiced or pale.      Findings: Lesion present. No bruising, erythema or rash.      Comments: Left leg venous ulcer, improved, see wound care assessment documentation in chart review scanned under the media tab   Neurological:      General: No focal deficit present.      Mental Status: She is alert and oriented to person, place, and time.   Psychiatric:         Mood and Affect: Mood normal.         Behavior: Behavior normal.         Thought Content: Thought content normal.         Judgment: Judgment normal.         There were no vitals filed for this visit.  EXAMINATION:  US LOWER EXTREMITY VEINS BILATERAL     CLINICAL HISTORY:  Varicose veins of left lower extremity with ulcer of unspecified site     TECHNIQUE:  Duplex and color flow Doppler and dynamic compression was performed of the bilateral lower extremity veins was performed.     COMPARISON:  None     FINDINGS:  Right thigh veins: The common femoral, femoral, popliteal, upper greater saphenous, and deep femoral veins are patent and free of thrombus. The veins are normally compressible and have normal phasic flow and augmentation  response.     Right calf veins: The visualized calf veins are patent.     Left thigh veins: The common femoral, femoral, popliteal, upper greater saphenous, and deep femoral veins are patent and free of thrombus. The veins are normally compressible and have normal phasic flow and augmentation response.     Left calf veins: The visualized calf veins are patent.     Miscellaneous: No venous reflux/insufficiency was detected.     Impression:     No evidence of deep venous thrombosis in either lower extremity.     Assessment:           ICD-10-CM ICD-9-CM   1. Venous ulcer of left leg  I83.029 454.0    L97.929    2. Hemosiderin pigmentation of skin  L81.8 709.09   3. Peripheral edema  R60.9 782.3   4. Venous stasis dermatitis of both lower extremities  I87.2 454.1                Plan:                  Diagnoses and all orders for this visit:    Venous ulcer of left leg    Hemosiderin pigmentation of skin    Peripheral edema    Venous stasis dermatitis of both lower extremities      See wound care instructions provided separately

## 2022-06-16 ENCOUNTER — EXTERNAL HOME HEALTH (OUTPATIENT)
Dept: HOME HEALTH SERVICES | Facility: HOSPITAL | Age: 87
End: 2022-06-16
Payer: MEDICARE

## 2022-06-17 ENCOUNTER — OFFICE VISIT (OUTPATIENT)
Dept: CARDIOLOGY | Facility: CLINIC | Age: 87
End: 2022-06-17
Payer: MEDICARE

## 2022-06-17 VITALS
HEIGHT: 63 IN | DIASTOLIC BLOOD PRESSURE: 70 MMHG | SYSTOLIC BLOOD PRESSURE: 140 MMHG | WEIGHT: 134.25 LBS | BODY MASS INDEX: 23.79 KG/M2 | HEART RATE: 65 BPM

## 2022-06-17 DIAGNOSIS — I48.0 PAROXYSMAL ATRIAL FIBRILLATION: Primary | ICD-10-CM

## 2022-06-17 DIAGNOSIS — I10 PRIMARY HYPERTENSION: ICD-10-CM

## 2022-06-17 DIAGNOSIS — I35.0 NONRHEUMATIC AORTIC VALVE STENOSIS: ICD-10-CM

## 2022-06-17 DIAGNOSIS — L97.929 VENOUS ULCER OF LEFT LEG: ICD-10-CM

## 2022-06-17 DIAGNOSIS — I83.029 VENOUS ULCER OF LEFT LEG: ICD-10-CM

## 2022-06-17 PROCEDURE — 1126F PR PAIN SEVERITY QUANTIFIED, NO PAIN PRESENT: ICD-10-PCS | Mod: CPTII,S$GLB,, | Performed by: INTERNAL MEDICINE

## 2022-06-17 PROCEDURE — 99214 PR OFFICE/OUTPT VISIT, EST, LEVL IV, 30-39 MIN: ICD-10-PCS | Mod: S$GLB,,, | Performed by: INTERNAL MEDICINE

## 2022-06-17 PROCEDURE — 1159F PR MEDICATION LIST DOCUMENTED IN MEDICAL RECORD: ICD-10-PCS | Mod: CPTII,S$GLB,, | Performed by: INTERNAL MEDICINE

## 2022-06-17 PROCEDURE — 99999 PR PBB SHADOW E&M-EST. PATIENT-LVL III: ICD-10-PCS | Mod: PBBFAC,,, | Performed by: INTERNAL MEDICINE

## 2022-06-17 PROCEDURE — 1159F MED LIST DOCD IN RCRD: CPT | Mod: CPTII,S$GLB,, | Performed by: INTERNAL MEDICINE

## 2022-06-17 PROCEDURE — 99999 PR PBB SHADOW E&M-EST. PATIENT-LVL III: CPT | Mod: PBBFAC,,, | Performed by: INTERNAL MEDICINE

## 2022-06-17 PROCEDURE — 99214 OFFICE O/P EST MOD 30 MIN: CPT | Mod: S$GLB,,, | Performed by: INTERNAL MEDICINE

## 2022-06-17 PROCEDURE — 1160F RVW MEDS BY RX/DR IN RCRD: CPT | Mod: CPTII,S$GLB,, | Performed by: INTERNAL MEDICINE

## 2022-06-17 PROCEDURE — 1160F PR REVIEW ALL MEDS BY PRESCRIBER/CLIN PHARMACIST DOCUMENTED: ICD-10-PCS | Mod: CPTII,S$GLB,, | Performed by: INTERNAL MEDICINE

## 2022-06-17 PROCEDURE — 1126F AMNT PAIN NOTED NONE PRSNT: CPT | Mod: CPTII,S$GLB,, | Performed by: INTERNAL MEDICINE

## 2022-06-17 RX ORDER — AMIODARONE HYDROCHLORIDE 200 MG/1
TABLET ORAL
Qty: 90 TABLET | Refills: 3 | Status: SHIPPED | OUTPATIENT
Start: 2022-06-17 | End: 2022-12-20 | Stop reason: SDUPTHER

## 2022-06-17 RX ORDER — AMLODIPINE BESYLATE 5 MG/1
5 TABLET ORAL DAILY
Qty: 90 TABLET | Refills: 3 | Status: SHIPPED | OUTPATIENT
Start: 2022-06-17 | End: 2022-12-20 | Stop reason: SDUPTHER

## 2022-06-17 NOTE — PROGRESS NOTES
"Subjective:    Patient ID:  Michela Calvert is a 89 y.o. female who presents for follow-up of PAF      HPI 89 yo WF with PAF, moderate AS and chronic lymphedema of left leg. Remains in sinus rhythm. Denies SOB or CP. Wound care managing leg.    Summary    · The left ventricle is normal in size with concentric remodeling and normal systolic function.  · The estimated ejection fraction is 60%.  · Grade II left ventricular diastolic dysfunction.  · Normal right ventricular size with normal right ventricular systolic function.  · Severe left atrial enlargement.  · There is mild-to-moderate aortic valve stenosis.  · Aortic valve area is 1.52 cm2; peak velocity is 2.61 m/s; mean gradient is 15 mmHg.  · Mild mitral regurgitation.  · Normal central venous pressure (3 mmHg).  · The estimated PA systolic pressure is 32 mmHg.        Review of Systems   Cardiovascular: Positive for leg swelling. Negative for chest pain, claudication, cyanosis, dyspnea on exertion, irregular heartbeat, near-syncope, orthopnea, palpitations, paroxysmal nocturnal dyspnea and syncope.   Skin: Positive for poor wound healing.        Objective:    Physical Exam  Vitals and nursing note reviewed.   Constitutional:       Appearance: She is well-developed.      Comments: BP (!) 140/70 (BP Location: Left arm, Patient Position: Sitting, BP Method: Small (Automatic))   Pulse 65   Ht 5' 3" (1.6 m)   Wt 60.9 kg (134 lb 4.2 oz)   BMI 23.78 kg/m²      HENT:      Head: Normocephalic and atraumatic.      Right Ear: External ear normal.      Left Ear: External ear normal.      Nose: Nose normal.   Eyes:      General: Lids are normal. No scleral icterus.        Right eye: No discharge.         Left eye: No discharge.      Conjunctiva/sclera: Conjunctivae normal.      Right eye: No hemorrhage.     Pupils: Pupils are equal, round, and reactive to light.   Neck:      Thyroid: No thyromegaly.      Vascular: No JVD.      Trachea: No tracheal deviation. "   Cardiovascular:      Rate and Rhythm: Normal rate and regular rhythm.      Pulses: Intact distal pulses.      Heart sounds: Murmur heard.    Harsh midsystolic murmur is present with a grade of 2/6 at the upper right sternal border radiating to the neck.    No friction rub. No gallop.   Pulmonary:      Effort: Pulmonary effort is normal. No respiratory distress.      Breath sounds: Normal breath sounds. No wheezing or rales.   Chest:      Chest wall: No tenderness.   Breasts: Breasts are symmetrical.       Abdominal:      General: Bowel sounds are normal. There is no distension.      Palpations: Abdomen is soft. There is no hepatomegaly or mass.      Tenderness: There is no abdominal tenderness. There is no guarding or rebound.   Musculoskeletal:         General: No tenderness. Normal range of motion.      Cervical back: Normal range of motion and neck supple.      Right lower leg: Edema present.      Left lower leg: Edema present.   Lymphadenopathy:      Cervical: No cervical adenopathy.   Skin:     General: Skin is warm and dry.      Coloration: Skin is not pale.      Findings: Erythema present. No rash.   Neurological:      Mental Status: She is alert and oriented to person, place, and time.      Cranial Nerves: No cranial nerve deficit.      Coordination: Coordination normal.      Deep Tendon Reflexes: Reflexes normal.   Psychiatric:         Behavior: Behavior normal.         Thought Content: Thought content normal.         Judgment: Judgment normal.           Assessment:       1. Paroxysmal atrial fibrillation    2. Nonrheumatic aortic valve stenosis- Mild    3. Primary hypertension    4. Venous ulcer of left leg         Plan:     The current medical regimen is effective;  continue present plan and medications.    Follow with wound care    No orders of the defined types were placed in this encounter.    Follow up in about 6 months (around 12/17/2022).

## 2022-06-21 ENCOUNTER — OFFICE VISIT (OUTPATIENT)
Dept: WOUND CARE | Facility: CLINIC | Age: 87
End: 2022-06-21
Payer: MEDICARE

## 2022-06-21 VITALS
SYSTOLIC BLOOD PRESSURE: 137 MMHG | HEART RATE: 59 BPM | HEIGHT: 63 IN | DIASTOLIC BLOOD PRESSURE: 65 MMHG | BODY MASS INDEX: 23.74 KG/M2 | WEIGHT: 134 LBS

## 2022-06-21 DIAGNOSIS — L97.929 VENOUS ULCER OF LEFT LEG: Primary | ICD-10-CM

## 2022-06-21 DIAGNOSIS — I83.029 VENOUS ULCER OF LEFT LEG: Primary | ICD-10-CM

## 2022-06-21 DIAGNOSIS — L81.8 HEMOSIDERIN PIGMENTATION OF SKIN: ICD-10-CM

## 2022-06-21 PROCEDURE — 1160F PR REVIEW ALL MEDS BY PRESCRIBER/CLIN PHARMACIST DOCUMENTED: ICD-10-PCS | Mod: CPTII,S$GLB,, | Performed by: FAMILY MEDICINE

## 2022-06-21 PROCEDURE — 1159F MED LIST DOCD IN RCRD: CPT | Mod: CPTII,S$GLB,, | Performed by: FAMILY MEDICINE

## 2022-06-21 PROCEDURE — 1101F PT FALLS ASSESS-DOCD LE1/YR: CPT | Mod: CPTII,S$GLB,, | Performed by: FAMILY MEDICINE

## 2022-06-21 PROCEDURE — 1159F PR MEDICATION LIST DOCUMENTED IN MEDICAL RECORD: ICD-10-PCS | Mod: CPTII,S$GLB,, | Performed by: FAMILY MEDICINE

## 2022-06-21 PROCEDURE — 99999 PR PBB SHADOW E&M-EST. PATIENT-LVL IV: CPT | Mod: PBBFAC,,, | Performed by: FAMILY MEDICINE

## 2022-06-21 PROCEDURE — 1126F PR PAIN SEVERITY QUANTIFIED, NO PAIN PRESENT: ICD-10-PCS | Mod: CPTII,S$GLB,, | Performed by: FAMILY MEDICINE

## 2022-06-21 PROCEDURE — 99499 UNLISTED E&M SERVICE: CPT | Mod: S$GLB,,, | Performed by: FAMILY MEDICINE

## 2022-06-21 PROCEDURE — 3288F PR FALLS RISK ASSESSMENT DOCUMENTED: ICD-10-PCS | Mod: CPTII,S$GLB,, | Performed by: FAMILY MEDICINE

## 2022-06-21 PROCEDURE — 1126F AMNT PAIN NOTED NONE PRSNT: CPT | Mod: CPTII,S$GLB,, | Performed by: FAMILY MEDICINE

## 2022-06-21 PROCEDURE — 3288F FALL RISK ASSESSMENT DOCD: CPT | Mod: CPTII,S$GLB,, | Performed by: FAMILY MEDICINE

## 2022-06-21 PROCEDURE — 1101F PR PT FALLS ASSESS DOC 0-1 FALLS W/OUT INJ PAST YR: ICD-10-PCS | Mod: CPTII,S$GLB,, | Performed by: FAMILY MEDICINE

## 2022-06-21 PROCEDURE — 99213 PR OFFICE/OUTPT VISIT, EST, LEVL III, 20-29 MIN: ICD-10-PCS | Mod: S$GLB,,, | Performed by: FAMILY MEDICINE

## 2022-06-21 PROCEDURE — 99999 PR PBB SHADOW E&M-EST. PATIENT-LVL IV: ICD-10-PCS | Mod: PBBFAC,,, | Performed by: FAMILY MEDICINE

## 2022-06-21 PROCEDURE — 1160F RVW MEDS BY RX/DR IN RCRD: CPT | Mod: CPTII,S$GLB,, | Performed by: FAMILY MEDICINE

## 2022-06-21 PROCEDURE — 99499 RISK ADDL DX/OHS AUDIT: ICD-10-PCS | Mod: S$GLB,,, | Performed by: FAMILY MEDICINE

## 2022-06-21 PROCEDURE — 99213 OFFICE O/P EST LOW 20 MIN: CPT | Mod: S$GLB,,, | Performed by: FAMILY MEDICINE

## 2022-06-21 NOTE — PATIENT INSTRUCTIONS
"  MD Orders:  Obtained wound photos and measurements.  Anesthetic:   No anesthetic needed.   Topical 4% Lidocaine Cream to wound bed prior to cleansing and/or debridement: none used today  Wound cleansing:   Mild soap and water to cleanse wound area.  Saline or wound cleanser to cleanse wound   Debridement:  Wounds were mechanically debrided with gauze and wound cleanser by RN  Topical Treatments:  Skin prep as needed to periwound areas  Dressings:   Silvercel and silicone bordered foam and tubigrip  Compression:   Elevate legs ("recliner position") as able.  Offloading:    Activity   Limit activity: Rest several times per day. - Keep off area as much as possible.   Dietary:   As tolerated: 3 well-balanced meals  Increased protein: Boost or Ensure, [place] Instant Breakfast (purchase sugarfree if Diabetic) Increasing your protein intake is very helpful with wound healing; if you are a kidney or dialysis patient please check with your Nephrologist as to how much protein you are allowed to take in with your condition. Taking a daily Multivitamin with Zinc as well as Vitamin C, 1000 mg minimum per day will also help with healing your wound. If you are on Coumadin, please contact the Coumadin Clinic before beginning a Multivitamin. High protein items that can be added to your diet include, extra helpings of meats, fish, beans, also High protein bars that add as little as 12 gms of protein and up to 32 gms of protein per bar.  Supplement with: Multivitamin with Zinc and Vitamin C 500mg twice a day.  Return Appointment: 7/12/22  Patient instructions: Leave dressings in place and do not get dressings wet. Call for any signs and symptoms of infection such as redness, severe pain or excessive drainage.    Home Health Instructions: Please see patient Friday and then 3 times a week times 2 weeks. Patient to return to clinic on 7/12/2022. Cleanse wounds with wound cleanser. Apply Silvercel and silicone bordered foam and " Tubigrip to left lower leg. Change 3 times a week. Please leave extra supplies in the home for the patient.

## 2022-06-21 NOTE — PROGRESS NOTES
[facility]                         Wound Care Clinic                Progress Note    Subjective:       Patient ID: Michela Calvert is a 89 y.o. female.    Chief Complaint: Wound Check    HPI     Pt seen in clinic for a FU visit for a left leg venous ulcer. Pt was being seen in [place] office, but has transferred here as it is closer to home. Pt wounds are improving. She has no new complaints today.    Case Summary:    Initial Consult Date:   Wound onset:5/27/22  Referring MD:Dr. Nieto  Physician List:PCP Dr. Nieto  Vascular Status/JENNIFER:    Sensilase or Vascular Studies:  Nutrition Risk/Labs:  Other Labs:  Recent Cultures & Dates:  Radiology/Xray:  Diabetes Status/HbA1c:  Offloading/Immobilization:  Edema/Compression:Tubigrip  Tobacco Use:  Other:  Recent Antibiotics:  Recent MD visit:  Upcoming MD Visit:  +    Review of Systems   Skin: Positive for wound.        Left leg venous ulcer   All other systems reviewed and are negative.        Objective:        Physical Exam  Vitals and nursing note reviewed. Exam conducted with a chaperone present.   Constitutional:       General: She is not in acute distress.     Appearance: Normal appearance. She is not ill-appearing, toxic-appearing or diaphoretic.   Skin:     General: Skin is warm and dry.      Coloration: Skin is not jaundiced or pale.      Findings: No bruising, erythema, lesion or rash.      Comments: Left leg venous ulcer, improved, granulating   Neurological:      General: No focal deficit present.      Mental Status: She is alert and oriented to person, place, and time.   Psychiatric:         Mood and Affect: Mood normal.         Behavior: Behavior normal.         Thought Content: Thought content normal.         Judgment: Judgment normal.         Vitals:    06/21/22 1054   BP: 137/65   Pulse: (!) 59       Assessment:           ICD-10-CM ICD-9-CM   1. Venous ulcer of left leg  I83.029 454.0     L97.929    2. Hemosiderin pigmentation of skin  L81.8 709.09            Wound 06/21/22 1114 Venous Ulcer Left proximal;posterior (Active)   06/21/22 1114    Pre-existing: Yes   Primary Wound Type: Venous ulcer   Side: Left   Orientation: proximal;posterior   Location:    Wound Number:    Ankle-Brachial Index:    Pulses:    Removal Indication and Assessment:    Wound Outcome:    (Retired) Wound Type:    (Retired) Wound Length (cm):    (Retired) Wound Width (cm):    (Retired) Depth (cm):    Wound Description (Comments):    Removal Indications:    Wound Image   06/21/22 1100   Dressing Appearance Intact;Moist drainage 06/21/22 1100   Drainage Amount Small 06/21/22 1100   Drainage Characteristics/Odor Serosanguineous 06/21/22 1100   Appearance Pink;Red 06/21/22 1100   Periwound Area Intact 06/21/22 1100   Wound Edges Irregular 06/21/22 1100   Wound Length (cm) 4 cm 06/21/22 1100   Wound Width (cm) 2.3 cm 06/21/22 1100   Wound Depth (cm) 0.1 cm 06/21/22 1100   Wound Volume (cm^3) 0.92 cm^3 06/21/22 1100   Wound Surface Area (cm^2) 9.2 cm^2 06/21/22 1100   Care Cleansed with:;Wound cleanser 06/21/22 1100   Dressing Applied;Silver;Foam 06/21/22 1100   Dressing Change Due 06/24/22 06/21/22 1100            Wound 06/21/22 1115 Venous Ulcer Left posterior;distal;lower Leg (Active)   06/21/22 1115    Pre-existing: Yes   Primary Wound Type: Venous ulcer   Side: Left   Orientation: posterior;distal;lower   Location: Leg   Wound Number:    Ankle-Brachial Index:    Pulses:    Removal Indication and Assessment:    Wound Outcome:    (Retired) Wound Type:    (Retired) Wound Length (cm):    (Retired) Wound Width (cm):    (Retired) Depth (cm):    Wound Description (Comments):    Removal Indications:    Wound Image   06/21/22 1100   Dressing Appearance Intact;Moist drainage 06/21/22 1100   Drainage Amount Small 06/21/22 1100   Appearance Pink;Red 06/21/22 1100   Periwound Area Intact 06/21/22 1100   Wound Edges Irregular 06/21/22 1100  "  Wound Length (cm) 2 cm 06/21/22 1100   Wound Width (cm) 2.1 cm 06/21/22 1100   Wound Depth (cm) 0.1 cm 06/21/22 1100   Wound Volume (cm^3) 0.42 cm^3 06/21/22 1100   Wound Surface Area (cm^2) 4.2 cm^2 06/21/22 1100   Care Cleansed with:;Wound cleanser 06/21/22 1100   Dressing Applied;Silver;Foam 06/21/22 1100   Dressing Change Due 06/24/22 06/21/22 1100           Plan:          MD Orders:  Obtained wound photos and measurements.  Anesthetic:    No anesthetic needed.    Topical 4% Lidocaine Cream to wound bed prior to cleansing and/or debridement: none used today  Wound cleansing:    Mild soap and water to cleanse wound area.   Saline or wound cleanser to cleanse wound   Debridement:   Wounds were mechanically debrided with gauze and wound cleanser by RN  Topical Treatments:   Skin prep as needed to periwound areas  Dressings:    Silvercel and silicone bordered foam and tubigrip  Compression:    Elevate legs ("recliner position") as able.  Offloading:     Activity    Limit activity: Rest several times per day. - Keep off area as much as possible.   Dietary:    As tolerated: 3 well-balanced meals   Increased protein: Boost or Ensure, [place] Instant Breakfast (purchase sugarfree if Diabetic) Increasing your protein intake is very helpful with wound healing; if you are a kidney or dialysis patient please check with your Nephrologist as to how much protein you are allowed to take in with your condition. Taking a daily Multivitamin with Zinc as well as Vitamin C, 1000 mg minimum per day will also help with healing your wound. If you are on Coumadin, please contact the Coumadin Clinic before beginning a Multivitamin. High protein items that can be added to your diet include, extra helpings of meats, fish, beans, also High protein bars that add as little as 12 gms of protein and up to 32 gms of protein per bar.   Supplement with: Multivitamin with Zinc and Vitamin C 500mg twice a day.  Return Appointment: " 7/12/22  Patient instructions: Leave dressings in place and do not get dressings wet. Call for any signs and symptoms of infection such as redness, severe pain or excessive drainage.    Home Health Instructions: Please see patient Friday and then 3 times a week times 2 weeks. Patient to return to clinic on 7/12/2022. Cleanse wounds with wound cleanser. Apply Silvercel and silicone bordered foam and Tubigrip to left lower leg. Change 3 times a week. Please leave extra supplies in the home for the patient.              Michela was seen today for wound check.    Diagnoses and all orders for this visit:    Venous ulcer of left leg  -     Change dressing    Hemosiderin pigmentation of skin  -     Change dressing

## 2022-07-01 ENCOUNTER — DOCUMENT SCAN (OUTPATIENT)
Dept: HOME HEALTH SERVICES | Facility: HOSPITAL | Age: 87
End: 2022-07-01
Payer: MEDICARE

## 2022-07-06 ENCOUNTER — DOCUMENT SCAN (OUTPATIENT)
Dept: HOME HEALTH SERVICES | Facility: HOSPITAL | Age: 87
End: 2022-07-06
Payer: MEDICARE

## 2022-07-11 ENCOUNTER — DOCUMENT SCAN (OUTPATIENT)
Dept: HOME HEALTH SERVICES | Facility: HOSPITAL | Age: 87
End: 2022-07-11
Payer: MEDICARE

## 2022-07-12 ENCOUNTER — OFFICE VISIT (OUTPATIENT)
Dept: WOUND CARE | Facility: CLINIC | Age: 87
End: 2022-07-12
Payer: MEDICARE

## 2022-07-12 VITALS
DIASTOLIC BLOOD PRESSURE: 70 MMHG | SYSTOLIC BLOOD PRESSURE: 139 MMHG | HEART RATE: 67 BPM | WEIGHT: 134 LBS | HEIGHT: 63 IN | BODY MASS INDEX: 23.74 KG/M2

## 2022-07-12 DIAGNOSIS — L97.929 VENOUS ULCER OF LEFT LEG: Primary | ICD-10-CM

## 2022-07-12 DIAGNOSIS — I83.029 VENOUS ULCER OF LEFT LEG: Primary | ICD-10-CM

## 2022-07-12 PROCEDURE — 1126F AMNT PAIN NOTED NONE PRSNT: CPT | Mod: CPTII,S$GLB,, | Performed by: FAMILY MEDICINE

## 2022-07-12 PROCEDURE — 99499 UNLISTED E&M SERVICE: CPT | Mod: S$GLB,,, | Performed by: FAMILY MEDICINE

## 2022-07-12 PROCEDURE — 1160F PR REVIEW ALL MEDS BY PRESCRIBER/CLIN PHARMACIST DOCUMENTED: ICD-10-PCS | Mod: CPTII,S$GLB,, | Performed by: FAMILY MEDICINE

## 2022-07-12 PROCEDURE — 3288F PR FALLS RISK ASSESSMENT DOCUMENTED: ICD-10-PCS | Mod: CPTII,S$GLB,, | Performed by: FAMILY MEDICINE

## 2022-07-12 PROCEDURE — 3288F FALL RISK ASSESSMENT DOCD: CPT | Mod: CPTII,S$GLB,, | Performed by: FAMILY MEDICINE

## 2022-07-12 PROCEDURE — 1101F PR PT FALLS ASSESS DOC 0-1 FALLS W/OUT INJ PAST YR: ICD-10-PCS | Mod: CPTII,S$GLB,, | Performed by: FAMILY MEDICINE

## 2022-07-12 PROCEDURE — 1159F MED LIST DOCD IN RCRD: CPT | Mod: CPTII,S$GLB,, | Performed by: FAMILY MEDICINE

## 2022-07-12 PROCEDURE — 99999 PR PBB SHADOW E&M-EST. PATIENT-LVL IV: ICD-10-PCS | Mod: PBBFAC,,, | Performed by: FAMILY MEDICINE

## 2022-07-12 PROCEDURE — 1159F PR MEDICATION LIST DOCUMENTED IN MEDICAL RECORD: ICD-10-PCS | Mod: CPTII,S$GLB,, | Performed by: FAMILY MEDICINE

## 2022-07-12 PROCEDURE — 1126F PR PAIN SEVERITY QUANTIFIED, NO PAIN PRESENT: ICD-10-PCS | Mod: CPTII,S$GLB,, | Performed by: FAMILY MEDICINE

## 2022-07-12 PROCEDURE — 99499 RISK ADDL DX/OHS AUDIT: ICD-10-PCS | Mod: S$GLB,,, | Performed by: FAMILY MEDICINE

## 2022-07-12 PROCEDURE — 1101F PT FALLS ASSESS-DOCD LE1/YR: CPT | Mod: CPTII,S$GLB,, | Performed by: FAMILY MEDICINE

## 2022-07-12 PROCEDURE — 99213 PR OFFICE/OUTPT VISIT, EST, LEVL III, 20-29 MIN: ICD-10-PCS | Mod: S$GLB,,, | Performed by: FAMILY MEDICINE

## 2022-07-12 PROCEDURE — 99213 OFFICE O/P EST LOW 20 MIN: CPT | Mod: S$GLB,,, | Performed by: FAMILY MEDICINE

## 2022-07-12 PROCEDURE — 99999 PR PBB SHADOW E&M-EST. PATIENT-LVL IV: CPT | Mod: PBBFAC,,, | Performed by: FAMILY MEDICINE

## 2022-07-12 PROCEDURE — 1160F RVW MEDS BY RX/DR IN RCRD: CPT | Mod: CPTII,S$GLB,, | Performed by: FAMILY MEDICINE

## 2022-07-12 NOTE — PROGRESS NOTES
[facility]                         Wound Care Clinic                Progress Note    Subjective:       Patient ID: Michela Calvert is a 89 y.o. female.    Chief Complaint: Wound Check    HPI     Pt seen in clinic for a FU visit for a left leg venous ulcer. Wound is doing well, no new complaints, no s/s of infection    Initial Consult Date:   Wound onset:5/27/22  Referring MD:Dr. Nieto  Physician List:PCP Dr. Nieto  Vascular Status/JENNIFER:    Sensilase or Vascular Studies:  Nutrition Risk/Labs:  Other Labs:  Recent Cultures & Dates:  Radiology/Xray:  Diabetes Status/HbA1c:  Offloading/Immobilization:  Edema/Compression:Tubigrip F  Tobacco Use:  Other:  Recent Antibiotics:  Recent MD visit:  Upcoming MD Visit:  Review of Systems   Skin: Positive for wound.        Left leg venous ulcer   All other systems reviewed and are negative.        Objective:        Physical Exam  Vitals and nursing note reviewed. Exam conducted with a chaperone present.   Constitutional:       General: She is not in acute distress.     Appearance: Normal appearance. She is not ill-appearing, toxic-appearing or diaphoretic.   Skin:     General: Skin is warm and dry.      Coloration: Skin is not jaundiced or pale.      Findings: Lesion present. No bruising, erythema or rash.      Comments: Left leg venous ulcer, improved   Neurological:      General: No focal deficit present.      Mental Status: She is alert and oriented to person, place, and time.   Psychiatric:         Mood and Affect: Mood normal.         Behavior: Behavior normal.         Thought Content: Thought content normal.         Judgment: Judgment normal.         Vitals:    07/12/22 1407   BP: 139/70   Pulse: 67       Assessment:           ICD-10-CM ICD-9-CM   1. Venous ulcer of left leg  I83.029 454.0    L97.929             Wound 06/21/22 1114 Venous Ulcer Left proximal;posterior (Active)   06/21/22 1114     Pre-existing: Yes   Primary Wound Type: Venous ulcer   Side: Left   Orientation: proximal;posterior   Location:    Wound Number:    Ankle-Brachial Index:    Pulses:    Removal Indication and Assessment:    Wound Outcome:    (Retired) Wound Type:    (Retired) Wound Length (cm):    (Retired) Wound Width (cm):    (Retired) Depth (cm):    Wound Description (Comments):    Removal Indications:    Wound Image   07/12/22 1400   Dressing Appearance Moist drainage;Intact 07/12/22 1400   Drainage Amount Small 07/12/22 1400   Drainage Characteristics/Odor Serosanguineous 07/12/22 1400   Appearance Pink;Granulating 07/12/22 1400   Periwound Area Intact;Dry 07/12/22 1400   Wound Edges Irregular 07/12/22 1400   Wound Length (cm) 302 cm 07/12/22 1400   Wound Width (cm) 0.6 cm 07/12/22 1400   Wound Depth (cm) 0.1 cm 07/12/22 1400   Wound Volume (cm^3) 18.12 cm^3 07/12/22 1400   Wound Surface Area (cm^2) 181.2 cm^2 07/12/22 1400   Care Cleansed with:;Wound cleanser 07/12/22 1400   Dressing Applied;Silver;Foam 07/12/22 1400   Periwound Care Moisturizer applied 07/12/22 1400   Dressing Change Due 07/15/22 07/12/22 1400            Wound 06/21/22 1115 Venous Ulcer Left posterior;distal;lower Leg (Active)   06/21/22 1115    Pre-existing: Yes   Primary Wound Type: Venous ulcer   Side: Left   Orientation: posterior;distal;lower   Location: Leg   Wound Number:    Ankle-Brachial Index:    Pulses:    Removal Indication and Assessment:    Wound Outcome:    (Retired) Wound Type:    (Retired) Wound Length (cm):    (Retired) Wound Width (cm):    (Retired) Depth (cm):    Wound Description (Comments):    Removal Indications:    Wound Image   07/12/22 1400   Dressing Appearance Intact;Moist drainage 07/12/22 1400   Drainage Amount Small 07/12/22 1400   Drainage Characteristics/Odor Serosanguineous 07/12/22 1400   Appearance [place] 07/12/22 1400   Periwound Area Intact;Dry 07/12/22 1400   Wound Edges Irregular 07/12/22 1400   Wound Length (cm) 1.6 cm  "07/12/22 1400   Wound Width (cm) 1.8 cm 07/12/22 1400   Wound Depth (cm) 0.1 cm 07/12/22 1400   Wound Volume (cm^3) 0.288 cm^3 07/12/22 1400   Wound Surface Area (cm^2) 2.88 cm^2 07/12/22 1400   Care Cleansed with:;Wound cleanser 07/12/22 1400   Dressing Applied;Silver;Foam 07/12/22 1400   Dressing Change Due 07/15/22 07/12/22 1400           Plan:          MD Orders:  Obtained wound photos and measurements.  Anesthetic:   · No anesthetic needed.   · Topical 4% Lidocaine Cream to wound bed prior to cleansing and/or debridement: none used today  Wound cleansing:   · Mild soap and water to cleanse wound area.  · Saline or wound cleanser to cleanse wound   Debridement:  · Wounds were mechanically debrided with gauze and wound cleanser by RN  Topical Treatments:  · Skin prep as needed to periwound areas  Dressings:   · Silvercel and silicone bordered foam and tubigrip size F  Compression:   · Elevate legs ("recliner position") as able.  Offloading:  ·    Activity   · Limit activity: Rest several times per day. - Keep off area as much as possible.   Dietary:   · As tolerated: 3 well-balanced meals  · Increased protein: Boost or Ensure, [place] Instant Breakfast (purchase sugarfree if Diabetic) Increasing your protein intake is very helpful with wound healing; if you are a kidney or dialysis patient please check with your Nephrologist as to how much protein you are allowed to take in with your condition. Taking a daily Multivitamin with Zinc as well as Vitamin C, 1000 mg minimum per day will also help with healing your wound. If you are on Coumadin, please contact the Coumadin Clinic before beginning a Multivitamin. High protein items that can be added to your diet include, extra helpings of meats, fish, beans, also High protein bars that add as little as 12 gms of protein and up to 32 gms of protein per bar.  · Supplement with: Multivitamin with Zinc and Vitamin C 500mg twice a day.  Return Appointment: 7/19/22  Patient " instructions: Leave dressings in place and do not get dressings wet. Call for any signs and symptoms of infection such as redness, severe pain or excessive drainage.     Home Health Instructions: Please see patient Friday and Monday. Patient to return to clinic on 7/19/2022. Cleanse wounds with wound cleanser. Apply Silvercel and silicone bordered foam and Tubigrip to left lower leg. Please leave extra supplies in the home for the patient.           Michela was seen today for wound check.    Diagnoses and all orders for this visit:    Venous ulcer of left leg  -     Change dressing

## 2022-07-12 NOTE — PATIENT INSTRUCTIONS
"MD Orders:  Obtained wound photos and measurements.  Anesthetic:   No anesthetic needed.   Topical 4% Lidocaine Cream to wound bed prior to cleansing and/or debridement: none used today  Wound cleansing:   Mild soap and water to cleanse wound area.  Saline or wound cleanser to cleanse wound   Debridement:  Wounds were mechanically debrided with gauze and wound cleanser by RN  Topical Treatments:  Skin prep as needed to periwound areas  Dressings:   Silvercel and silicone bordered foam and tubigrip size F  Compression:   Elevate legs ("recliner position") as able.  Offloading:     Activity   Limit activity: Rest several times per day. - Keep off area as much as possible.   Dietary:   As tolerated: 3 well-balanced meals  Increased protein: Boost or Ensure, [place] Instant Breakfast (purchase sugarfree if Diabetic) Increasing your protein intake is very helpful with wound healing; if you are a kidney or dialysis patient please check with your Nephrologist as to how much protein you are allowed to take in with your condition. Taking a daily Multivitamin with Zinc as well as Vitamin C, 1000 mg minimum per day will also help with healing your wound. If you are on Coumadin, please contact the Coumadin Clinic before beginning a Multivitamin. High protein items that can be added to your diet include, extra helpings of meats, fish, beans, also High protein bars that add as little as 12 gms of protein and up to 32 gms of protein per bar.  Supplement with: Multivitamin with Zinc and Vitamin C 500mg twice a day.  Return Appointment: 7/19/22  Patient instructions: Leave dressings in place and do not get dressings wet. Call for any signs and symptoms of infection such as redness, severe pain or excessive drainage.     Home Health Instructions: Please see patient Friday and Monday. Patient to return to clinic on 7/19/2022. Cleanse wounds with wound cleanser. Apply Silvercel and silicone bordered foam and Tubigrip to left lower " leg. Please leave extra supplies in the home for the patient.

## 2022-07-13 ENCOUNTER — TELEPHONE (OUTPATIENT)
Dept: WOUND CARE | Facility: CLINIC | Age: 87
End: 2022-07-13
Payer: MEDICARE

## 2022-07-13 NOTE — TELEPHONE ENCOUNTER
----- Message from Jonny Reynolds sent at 7/13/2022  9:14 AM CDT -----  Type:  Sooner Appointment Request    Caller is requesting a sooner appointment.  Caller declined first available appointment listed below.  Caller will not accept being placed on the waitlist and is requesting a message be sent to doctor.    Name of Caller:  Pt  When is the first available appointment?  7/19  Symptoms:  wound check  Best Call Back Number:  [phone]   Additional Information:  Pt sts she would like to move her time from 9 till about 10 or so if possible her ride is coming to pick her up from the [place].  She would like a call back.  Please advise -- Thank you

## 2022-07-19 ENCOUNTER — OFFICE VISIT (OUTPATIENT)
Dept: WOUND CARE | Facility: CLINIC | Age: 87
End: 2022-07-19
Payer: MEDICARE

## 2022-07-19 VITALS
HEART RATE: 76 BPM | DIASTOLIC BLOOD PRESSURE: 76 MMHG | WEIGHT: 134 LBS | HEIGHT: 63 IN | SYSTOLIC BLOOD PRESSURE: 150 MMHG | BODY MASS INDEX: 23.74 KG/M2

## 2022-07-19 DIAGNOSIS — I87.2 VENOUS STASIS DERMATITIS OF BOTH LOWER EXTREMITIES: ICD-10-CM

## 2022-07-19 DIAGNOSIS — L97.929 VENOUS ULCER OF LEFT LEG: Primary | ICD-10-CM

## 2022-07-19 DIAGNOSIS — I83.029 VENOUS ULCER OF LEFT LEG: Primary | ICD-10-CM

## 2022-07-19 DIAGNOSIS — R60.0 PERIPHERAL EDEMA: ICD-10-CM

## 2022-07-19 DIAGNOSIS — L81.8 HEMOSIDERIN PIGMENTATION OF SKIN: ICD-10-CM

## 2022-07-19 PROCEDURE — 1160F RVW MEDS BY RX/DR IN RCRD: CPT | Mod: CPTII,S$GLB,, | Performed by: FAMILY MEDICINE

## 2022-07-19 PROCEDURE — 1159F MED LIST DOCD IN RCRD: CPT | Mod: CPTII,S$GLB,, | Performed by: FAMILY MEDICINE

## 2022-07-19 PROCEDURE — 1159F PR MEDICATION LIST DOCUMENTED IN MEDICAL RECORD: ICD-10-PCS | Mod: CPTII,S$GLB,, | Performed by: FAMILY MEDICINE

## 2022-07-19 PROCEDURE — 99213 OFFICE O/P EST LOW 20 MIN: CPT | Mod: S$GLB,,, | Performed by: FAMILY MEDICINE

## 2022-07-19 PROCEDURE — 3288F PR FALLS RISK ASSESSMENT DOCUMENTED: ICD-10-PCS | Mod: CPTII,S$GLB,, | Performed by: FAMILY MEDICINE

## 2022-07-19 PROCEDURE — 99999 PR PBB SHADOW E&M-EST. PATIENT-LVL III: CPT | Mod: PBBFAC,,, | Performed by: FAMILY MEDICINE

## 2022-07-19 PROCEDURE — 99499 RISK ADDL DX/OHS AUDIT: ICD-10-PCS | Mod: S$GLB,,, | Performed by: FAMILY MEDICINE

## 2022-07-19 PROCEDURE — 1101F PR PT FALLS ASSESS DOC 0-1 FALLS W/OUT INJ PAST YR: ICD-10-PCS | Mod: CPTII,S$GLB,, | Performed by: FAMILY MEDICINE

## 2022-07-19 PROCEDURE — 1101F PT FALLS ASSESS-DOCD LE1/YR: CPT | Mod: CPTII,S$GLB,, | Performed by: FAMILY MEDICINE

## 2022-07-19 PROCEDURE — 1126F AMNT PAIN NOTED NONE PRSNT: CPT | Mod: CPTII,S$GLB,, | Performed by: FAMILY MEDICINE

## 2022-07-19 PROCEDURE — 1160F PR REVIEW ALL MEDS BY PRESCRIBER/CLIN PHARMACIST DOCUMENTED: ICD-10-PCS | Mod: CPTII,S$GLB,, | Performed by: FAMILY MEDICINE

## 2022-07-19 PROCEDURE — 99213 PR OFFICE/OUTPT VISIT, EST, LEVL III, 20-29 MIN: ICD-10-PCS | Mod: S$GLB,,, | Performed by: FAMILY MEDICINE

## 2022-07-19 PROCEDURE — 3288F FALL RISK ASSESSMENT DOCD: CPT | Mod: CPTII,S$GLB,, | Performed by: FAMILY MEDICINE

## 2022-07-19 PROCEDURE — 99999 PR PBB SHADOW E&M-EST. PATIENT-LVL III: ICD-10-PCS | Mod: PBBFAC,,, | Performed by: FAMILY MEDICINE

## 2022-07-19 PROCEDURE — 99499 UNLISTED E&M SERVICE: CPT | Mod: S$GLB,,, | Performed by: FAMILY MEDICINE

## 2022-07-19 PROCEDURE — 1126F PR PAIN SEVERITY QUANTIFIED, NO PAIN PRESENT: ICD-10-PCS | Mod: CPTII,S$GLB,, | Performed by: FAMILY MEDICINE

## 2022-07-19 NOTE — PATIENT INSTRUCTIONS
"MD Orders:  Obtained wound photos and measurements.  Anesthetic:   No anesthetic needed.   Topical 4% Lidocaine Cream to wound bed prior to cleansing and/or debridement: none used today  Wound cleansing:   Mild soap and water to cleanse wound area.  Saline or wound cleanser to cleanse wound   Debridement:  Wounds were mechanically debrided with gauze and wound cleanser by RN  Topical Treatments:  Skin prep as needed to periwound areas  Dressings:   Silvercel and silicone bordered foam and tubigrip size F  Compression:   Elevate legs ("recliner position") as able.  Offloading:     Activity   Limit activity: Rest several times per day. - Keep off area as much as possible.   Dietary:   As tolerated: 3 well-balanced meals  Increased protein: Boost or Ensure, [place] Instant Breakfast (purchase sugarfree if Diabetic) Increasing your protein intake is very helpful with wound healing; if you are a kidney or dialysis patient please check with your Nephrologist as to how much protein you are allowed to take in with your condition. Taking a daily Multivitamin with Zinc as well as Vitamin C, 1000 mg minimum per day will also help with healing your wound. If you are on Coumadin, please contact the Coumadin Clinic before beginning a Multivitamin. High protein items that can be added to your diet include, extra helpings of meats, fish, beans, also High protein bars that add as little as 12 gms of protein and up to 32 gms of protein per bar.  Supplement with: Multivitamin with Zinc and Vitamin C 500mg twice a day.  Return Appointment: 8/2/22  Patient instructions: Leave dressings in place and do not get dressings wet. Call for any signs and symptoms of infection such as redness, severe pain or excessive drainage.     Home Health Instructions: Please see patient Friday and twice a week, Patient to return to clinic on 8/2/22. Cleanse wounds with wound cleanser. Apply Silvercel and silicone bordered foam and Tubigrip to left lower " leg. Please leave extra supplies in the home for the patient.

## 2022-07-19 NOTE — PROGRESS NOTES
[facility]                         Wound Care Clinic                Progress Note    Subjective:       Patient ID: Michela Calvert is a 89 y.o. female.    Chief Complaint: Wound Check    HPI     Pt seen in clinic for a FU visit for left leg venous ulcer. Wound is nearly healed. Pt has no c/o pain, no s/s of infection and she has no new complaints at this visit.    Initial Consult Date:   Wound onset:5/27/22  Referring MD:Dr. Nieto  Physician List:PCP Dr. Nieto  Vascular Status/JENNIFER:    Sensilase or Vascular Studies:  Nutrition Risk/Labs:  Other Labs:  Recent Cultures & Dates:  Radiology/Xray:  Diabetes Status/HbA1c:  Offloading/Immobilization:  Edema/Compression:Tubigrip F  Tobacco Use:  Other:  Recent Antibiotics:  Recent MD visit:  Upcoming MD Visit:  Review of Systems   Skin: Positive for wound.        Left leg venous ulcer   All other systems reviewed and are negative.        Objective:        Physical Exam  Vitals and nursing note reviewed. Exam conducted with a chaperone present.   Constitutional:       General: She is not in acute distress.     Appearance: Normal appearance. She is not ill-appearing, toxic-appearing or diaphoretic.   Skin:     General: Skin is warm and dry.      Coloration: Skin is not jaundiced or pale.      Findings: Lesion present. No bruising, erythema or rash.      Comments: Left leg venous ulcer, nearly healed, no erythema, minimal drainage   Neurological:      General: No focal deficit present.      Mental Status: She is alert and oriented to person, place, and time.   Psychiatric:         Mood and Affect: Mood normal.         Behavior: Behavior normal.         Thought Content: Thought content normal.         Judgment: Judgment normal.         Vitals:    07/19/22 0950   BP: (!) 150/76   Pulse: 76       Assessment:           ICD-10-CM ICD-9-CM   1. Venous ulcer of left leg  I83.029 454.0    L97.929    2. Hemosiderin  pigmentation of skin  L81.8 709.09   3. Peripheral edema  R60.9 782.3   4. Venous stasis dermatitis of both lower extremities  I87.2 454.1            Wound 06/21/22 1114 Venous Ulcer Left proximal;posterior (Active)   06/21/22 1114    Pre-existing: Yes   Primary Wound Type: Venous ulcer   Side: Left   Orientation: proximal;posterior   Location:    Wound Number:    Ankle-Brachial Index:    Pulses:    Removal Indication and Assessment:    Wound Outcome:    (Retired) Wound Type:    (Retired) Wound Length (cm):    (Retired) Wound Width (cm):    (Retired) Depth (cm):    Wound Description (Comments):    Removal Indications:    Wound WDL WDL 07/19/22 1000   Wound Length (cm) 0 cm 07/19/22 1000   Wound Width (cm) 0 cm 07/19/22 1000   Wound Depth (cm) 0 cm 07/19/22 1000   Wound Volume (cm^3) 0 cm^3 07/19/22 1000   Wound Surface Area (cm^2) 0 cm^2 07/19/22 1000            Wound 06/21/22 1115 Venous Ulcer Left posterior;distal;lower Leg (Active)   06/21/22 1115    Pre-existing: Yes   Primary Wound Type: Venous ulcer   Side: Left   Orientation: posterior;distal;lower   Location: Leg   Wound Number:    Ankle-Brachial Index:    Pulses:    Removal Indication and Assessment:    Wound Outcome:    (Retired) Wound Type:    (Retired) Wound Length (cm):    (Retired) Wound Width (cm):    (Retired) Depth (cm):    Wound Description (Comments):    Removal Indications:    Wound Image   07/19/22 1000   Dressing Appearance Intact;Dried drainage 07/19/22 1000   Drainage Amount Scant 07/19/22 1000   Appearance Pink;Granulating 07/19/22 1000   Periwound Area Intact 07/19/22 1000   Wound Edges Irregular 07/19/22 1000   Wound Length (cm) 0.7 cm 07/19/22 1000   Wound Width (cm) 1.3 cm 07/19/22 1000   Wound Depth (cm) 0.1 cm 07/19/22 1000   Wound Volume (cm^3) 0.091 cm^3 07/19/22 1000   Wound Surface Area (cm^2) 0.91 cm^2 07/19/22 1000   Care Cleansed with:;Wound cleanser 07/19/22 1000   Dressing Applied;Silver;Silicone;Foam 07/19/22 1000   Dressing  "Change Due 07/22/22 07/19/22 1000           Plan:            MD Orders:  Obtained wound photos and measurements.  Anesthetic:   · No anesthetic needed.   · Topical 4% Lidocaine Cream to wound bed prior to cleansing and/or debridement: none used today  Wound cleansing:   · Mild soap and water to cleanse wound area.  · Saline or wound cleanser to cleanse wound   Debridement:  · Wounds were mechanically debrided with gauze and wound cleanser by RN  Topical Treatments:  · Skin prep as needed to periwound areas  Dressings:   · Silvercel and silicone bordered foam and tubigrip size F  Compression:   · Elevate legs ("recliner position") as able.  Offloading:  ·    Activity   · Limit activity: Rest several times per day. - Keep off area as much as possible.   Dietary:   · As tolerated: 3 well-balanced meals  · Increased protein: Boost or Ensure, Winter Instant Breakfast (purchase sugarfree if Diabetic) Increasing your protein intake is very helpful with wound healing; if you are a kidney or dialysis patient please check with your Nephrologist as to how much protein you are allowed to take in with your condition. Taking a daily Multivitamin with Zinc as well as Vitamin C, 1000 mg minimum per day will also help with healing your wound. If you are on Coumadin, please contact the Coumadin Clinic before beginning a Multivitamin. High protein items that can be added to your diet include, extra helpings of meats, fish, beans, also High protein bars that add as little as 12 gms of protein and up to 32 gms of protein per bar.  · Supplement with: Multivitamin with Zinc and Vitamin C 500mg twice a day.  Return Appointment: 8/2/22  Patient instructions: Leave dressings in place and do not get dressings wet. Call for any signs and symptoms of infection such as redness, severe pain or excessive drainage.     Home Health Instructions: Please see patient Friday and twice a week, Patient to return to clinic on 8/2/22. Cleanse wounds with " wound cleanser. Apply Silvercel and silicone bordered foam and Tubigrip to left lower leg. Please leave extra supplies in the home for the patient.         Michela was seen today for wound check.    Diagnoses and all orders for this visit:    Venous ulcer of left leg  -     Change dressing    Hemosiderin pigmentation of skin  -     Change dressing    Peripheral edema  -     Change dressing    Venous stasis dermatitis of both lower extremities

## 2022-08-02 ENCOUNTER — OFFICE VISIT (OUTPATIENT)
Dept: WOUND CARE | Facility: CLINIC | Age: 87
End: 2022-08-02
Payer: MEDICARE

## 2022-08-02 VITALS
HEART RATE: 54 BPM | TEMPERATURE: 98 F | SYSTOLIC BLOOD PRESSURE: 136 MMHG | RESPIRATION RATE: 20 BRPM | DIASTOLIC BLOOD PRESSURE: 63 MMHG

## 2022-08-02 DIAGNOSIS — I83.029 VENOUS ULCER OF LEFT LEG: Primary | ICD-10-CM

## 2022-08-02 DIAGNOSIS — R60.0 PERIPHERAL EDEMA: ICD-10-CM

## 2022-08-02 DIAGNOSIS — L97.929 VENOUS ULCER OF LEFT LEG: Primary | ICD-10-CM

## 2022-08-02 DIAGNOSIS — I87.2 VENOUS STASIS DERMATITIS OF BOTH LOWER EXTREMITIES: ICD-10-CM

## 2022-08-02 PROCEDURE — 1159F MED LIST DOCD IN RCRD: CPT | Mod: CPTII,S$GLB,, | Performed by: FAMILY MEDICINE

## 2022-08-02 PROCEDURE — 1160F RVW MEDS BY RX/DR IN RCRD: CPT | Mod: CPTII,S$GLB,, | Performed by: FAMILY MEDICINE

## 2022-08-02 PROCEDURE — 99212 PR OFFICE/OUTPT VISIT, EST, LEVL II, 10-19 MIN: ICD-10-PCS | Mod: S$GLB,,, | Performed by: FAMILY MEDICINE

## 2022-08-02 PROCEDURE — 1159F PR MEDICATION LIST DOCUMENTED IN MEDICAL RECORD: ICD-10-PCS | Mod: CPTII,S$GLB,, | Performed by: FAMILY MEDICINE

## 2022-08-02 PROCEDURE — 99999 PR PBB SHADOW E&M-EST. PATIENT-LVL IV: ICD-10-PCS | Mod: PBBFAC,,, | Performed by: FAMILY MEDICINE

## 2022-08-02 PROCEDURE — 1160F PR REVIEW ALL MEDS BY PRESCRIBER/CLIN PHARMACIST DOCUMENTED: ICD-10-PCS | Mod: CPTII,S$GLB,, | Performed by: FAMILY MEDICINE

## 2022-08-02 PROCEDURE — 3288F FALL RISK ASSESSMENT DOCD: CPT | Mod: CPTII,S$GLB,, | Performed by: FAMILY MEDICINE

## 2022-08-02 PROCEDURE — 1126F AMNT PAIN NOTED NONE PRSNT: CPT | Mod: CPTII,S$GLB,, | Performed by: FAMILY MEDICINE

## 2022-08-02 PROCEDURE — 1101F PR PT FALLS ASSESS DOC 0-1 FALLS W/OUT INJ PAST YR: ICD-10-PCS | Mod: CPTII,S$GLB,, | Performed by: FAMILY MEDICINE

## 2022-08-02 PROCEDURE — 99999 PR PBB SHADOW E&M-EST. PATIENT-LVL IV: CPT | Mod: PBBFAC,,, | Performed by: FAMILY MEDICINE

## 2022-08-02 PROCEDURE — 1126F PR PAIN SEVERITY QUANTIFIED, NO PAIN PRESENT: ICD-10-PCS | Mod: CPTII,S$GLB,, | Performed by: FAMILY MEDICINE

## 2022-08-02 PROCEDURE — 3288F PR FALLS RISK ASSESSMENT DOCUMENTED: ICD-10-PCS | Mod: CPTII,S$GLB,, | Performed by: FAMILY MEDICINE

## 2022-08-02 PROCEDURE — 99499 UNLISTED E&M SERVICE: CPT | Mod: HCNC,S$GLB,, | Performed by: FAMILY MEDICINE

## 2022-08-02 PROCEDURE — 99499 RISK ADDL DX/OHS AUDIT: ICD-10-PCS | Mod: HCNC,S$GLB,, | Performed by: FAMILY MEDICINE

## 2022-08-02 PROCEDURE — 99212 OFFICE O/P EST SF 10 MIN: CPT | Mod: S$GLB,,, | Performed by: FAMILY MEDICINE

## 2022-08-02 PROCEDURE — 1101F PT FALLS ASSESS-DOCD LE1/YR: CPT | Mod: CPTII,S$GLB,, | Performed by: FAMILY MEDICINE

## 2022-08-02 NOTE — PATIENT INSTRUCTIONS
"  Topical Treatments:  Moisturized both lower legs  Dressings:   Non applicable  Compression:   Elevate legs ("recliner position") as able.  Wear light compression socks  Activity   Limit activity: Rest several times per day. - Keep off area as much as possible.   Dietary:   As tolerated: 3 well-balanced meals  Increased protein: Boost or Ensure, [employer] Instant Breakfast (purchase sugarfree if Diabetic) Increasing your protein intake is very helpful with wound healing; if you are a kidney or dialysis patient please check with your Nephrologist as to how much protein you are allowed to take in with your condition. Taking a daily Multivitamin with Zinc as well as Vitamin C, 1000 mg minimum per day will also help with healing your wound. If you are on Coumadin, please contact the Coumadin Clinic before beginning a Multivitamin. High protein items that can be added to your diet include, extra helpings of meats, fish, beans, also High protein bars that add as little as 12 gms of protein and up to 32 gms of protein per bar.  Supplement with: Multivitamin with Zinc and Vitamin C 500mg twice a day.  Return Appointment: PRN    Patient instructions: Apply moisturizer twice daily and apply light compression socks daily and remove at night while sleeping.     Home Health Instructions: Patient has been discharged from Wound Care Clinic as of 8/2/22. Instructed patient to apply moisturizer twice daily to both legs and day and wear light compression socks and take off at bed time.    Discharge Instructions:     The clinic is open Mon-Fri from 8:00 a.m. until 5:00 p.m. During business hours call the [facility] at [phone]  and ask for Wound Care Department for any worsening symptoms; fever >101.0, increased pain, increased drainage, foul odor or problems with the dressing. For after hours problems or emergencies please report to the nearest emergency room.      Patient instructed on proper handwashing technique. " Using warm water and soap, apply soap, lather well and vigorously apply friction for a minimum of 20 seconds, rinse well and dry well; wash before and after toileting, before and after woundcare, after sneezing, coughing etc.     Routine Dressing instructions: Patient to keep the dressing clean, dry and intact. Call for any worsening symptoms or problems. [phone].

## 2022-08-02 NOTE — PROGRESS NOTES
[facility]                         Wound Care Clinic                Progress Note    Subjective:       Patient ID: Michela Calvert is a 89 y.o. female.    Chief Complaint: Wound Check    HPI     Pt seen in clinic for a FU visit for a left leg venous ulcer. Wound is now healed. Pt doing well, no new complaints today.    Initial Consult Date: 6/8/22  Wound onset:5/27/22  Referring MD:Dr. Nieto  Physician List:PCP Dr. Nieto  Vascular Status/JENNIFER:    Sensilase or Vascular Studies:  Nutrition Risk/Labs:  Other Labs:  Recent Cultures & Dates:  Radiology/Xray:  Diabetes Status/HbA1c:  Offloading/Immobilization:  Edema/Compression: Has own light compression socks  Tobacco Use:  Other:  Recent Antibiotics:  Recent MD visit:6/17/22 Dr. Puentes  Upcoming MD Visit:12/20/22 Dr. Puentes  Review of Systems   Skin: Positive for wound.        Left leg open wound healed   All other systems reviewed and are negative.        Objective:        Physical Exam  Vitals and nursing note reviewed. Exam conducted with a chaperone present.   Constitutional:       General: She is not in acute distress.     Appearance: Normal appearance. She is not ill-appearing, toxic-appearing or diaphoretic.   Skin:     General: Skin is dry.      Coloration: Skin is not jaundiced or pale.      Findings: Lesion present. No bruising, erythema or rash.      Comments: Left leg open venous ulcer, now healed   Neurological:      General: No focal deficit present.      Mental Status: She is alert and oriented to person, place, and time.   Psychiatric:         Mood and Affect: Mood normal.         Behavior: Behavior normal.         Thought Content: Thought content normal.         Judgment: Judgment normal.         Vitals:    08/02/22 1030   BP: 136/63   Pulse: (!) 54   Resp: 20   Temp: 98.3 °F (36.8 °C)       Assessment:           ICD-10-CM ICD-9-CM   1. Venous ulcer of left leg  I83.029 454.0     "L97.929    2. Peripheral edema  R60.9 782.3   3. Venous stasis dermatitis of both lower extremities  I87.2 454.1            Wound 06/21/22 1115 Venous Ulcer Left posterior;distal;lower Leg (Active)   06/21/22 1115    Pre-existing: Yes   Primary Wound Type: Venous ulcer   Side: Left   Orientation: posterior;distal;lower   Location: Leg   Wound Number:    Ankle-Brachial Index:    Pulses:    Removal Indication and Assessment:    Wound Outcome:    (Retired) Wound Type:    (Retired) Wound Length (cm):    (Retired) Wound Width (cm):    (Retired) Depth (cm):    Wound Description (Comments):    Removal Indications:    Wound Image   08/02/22 1000   Dressing Appearance Intact;Clean 08/02/22 1000   Drainage Amount None 08/02/22 1000   Appearance Epithelialization;Closed/resurfaced 08/02/22 1000   Wound Length (cm) 0 cm 08/02/22 1000   Wound Width (cm) 0 cm 08/02/22 1000   Wound Surface Area (cm^2) 0 cm^2 08/02/22 1000   Care Wound cleanser;Applied:;Moisturizing agent 08/02/22 1000   Dressing Removed 08/02/22 1000           Plan:            MD Orders:  Obtained wound photos and measurements.  Anesthetic:   · No anesthetic needed.   · Topical 4% Lidocaine Cream to wound bed prior to cleansing and/or debridement: none used today  Wound cleansing:   · Mild soap and water to cleanse wound area.  · Saline or wound cleanser to cleanse wound   Debridement:  ·   Topical Treatments:  · Moisturized both lower legs  Dressings:     Compression:   · Elevate legs ("recliner position") as able.  Offloading:  ·    Activity   · Limit activity: Rest several times per day. - Keep off area as much as possible.   Dietary:   · As tolerated: 3 well-balanced meals  · Increased protein: Boost or Ensure, [employer] Instant Breakfast (purchase sugarfree if Diabetic) Increasing your protein intake is very helpful with wound healing; if you are a kidney or dialysis patient please check with your Nephrologist as to how much protein you are allowed to take in " with your condition. Taking a daily Multivitamin with Zinc as well as Vitamin C, 1000 mg minimum per day will also help with healing your wound. If you are on Coumadin, please contact the Coumadin Clinic before beginning a Multivitamin. High protein items that can be added to your diet include, extra helpings of meats, fish, beans, also High protein bars that add as little as 12 gms of protein and up to 32 gms of protein per bar.  · Supplement with: Multivitamin with Zinc and Vitamin C 500mg twice a day.  Return Appointment: PRN  Patient instructions: Apply moisturizer twice daily and apply light compression socks daily and remove at night while sleeping.     Home Health Instructions: Patient has been discharged from Wound Care Clinic as of 8/2/22. Instructed patient to apply moisturizer twice daily to both legs and day and wear light compression socks and take off at bed time.         Michela was seen today for wound check.    Diagnoses and all orders for this visit:    Venous ulcer of left leg  -     Change dressing    Peripheral edema  -     Change dressing    Venous stasis dermatitis of both lower extremities  -     Change dressing

## 2022-08-04 ENCOUNTER — DOCUMENT SCAN (OUTPATIENT)
Dept: HOME HEALTH SERVICES | Facility: HOSPITAL | Age: 87
End: 2022-08-04
Payer: MEDICARE

## 2022-08-13 ENCOUNTER — DOCUMENT SCAN (OUTPATIENT)
Dept: HOME HEALTH SERVICES | Facility: HOSPITAL | Age: 87
End: 2022-08-13
Payer: MEDICARE

## 2022-08-18 DIAGNOSIS — I10 HYPERTENSION: ICD-10-CM

## 2022-09-15 ENCOUNTER — PES CALL (OUTPATIENT)
Dept: ADMINISTRATIVE | Facility: CLINIC | Age: 87
End: 2022-09-15
Payer: MEDICARE

## 2022-09-28 ENCOUNTER — IMMUNIZATION (OUTPATIENT)
Dept: PHARMACY | Facility: CLINIC | Age: 87
End: 2022-09-28
Payer: MEDICARE

## 2022-10-27 NOTE — TELEPHONE ENCOUNTER
Left patient son's a voicemail stating we received the message.    Bactrim Counseling:  I discussed with the patient the risks of sulfa antibiotics including but not limited to GI upset, allergic reaction, drug rash, diarrhea, dizziness, photosensitivity, and yeast infections.  Rarely, more serious reactions can occur including but not limited to aplastic anemia, agranulocytosis, methemoglobinemia, blood dyscrasias, liver or kidney failure, lung infiltrates or desquamative/blistering drug rashes. High Dose Vitamin A Pregnancy And Lactation Text: High dose vitamin A therapy is contraindicated during pregnancy and breast feeding. Doxycycline Pregnancy And Lactation Text: This medication is Pregnancy Category D and not consider safe during pregnancy. It is also excreted in breast milk but is considered safe for shorter treatment courses. Winlevi Pregnancy And Lactation Text: This medication is considered safe during pregnancy and breastfeeding. Detail Level: Zone Topical Clindamycin Pregnancy And Lactation Text: This medication is Pregnancy Category B and is considered safe during pregnancy. It is unknown if it is excreted in breast milk. Minocycline Counseling: Patient advised regarding possible photosensitivity and discoloration of the teeth, skin, lips, tongue and gums.  Patient instructed to avoid sunlight, if possible.  When exposed to sunlight, patients should wear protective clothing, sunglasses, and sunscreen.  The patient was instructed to call the office immediately if the following severe adverse effects occur:  hearing changes, easy bruising/bleeding, severe headache, or vision changes.  The patient verbalized understanding of the proper use and possible adverse effects of minocycline.  All of the patient's questions and concerns were addressed. Tazorac Pregnancy And Lactation Text: This medication is not safe during pregnancy. It is unknown if this medication is excreted in breast milk. Tetracycline Counseling: Patient counseled regarding possible photosensitivity and increased risk for sunburn.  Patient instructed to avoid sunlight, if possible.  When exposed to sunlight, patients should wear protective clothing, sunglasses, and sunscreen.  The patient was instructed to call the office immediately if the following severe adverse effects occur:  hearing changes, easy bruising/bleeding, severe headache, or vision changes.  The patient verbalized understanding of the proper use and possible adverse effects of tetracycline.  All of the patient's questions and concerns were addressed. Patient understands to avoid pregnancy while on therapy due to potential birth defects. Spironolactone Counseling: Patient advised regarding risks of diarrhea, abdominal pain, hyperkalemia, birth defects (for female patients), liver toxicity and renal toxicity. The patient may need blood work to monitor liver and kidney function and potassium levels while on therapy. The patient verbalized understanding of the proper use and possible adverse effects of spironolactone.  All of the patient's questions and concerns were addressed. Bactrim Pregnancy And Lactation Text: This medication is Pregnancy Category D and is known to cause fetal risk.  It is also excreted in breast milk. Birth Control Pills Pregnancy And Lactation Text: This medication should be avoided if pregnant and for the first 30 days post-partum. Winlevi Counseling:  I discussed with the patient the risks of topical clascoterone including but not limited to erythema, scaling, itching, and stinging. Patient voiced their understanding. Azithromycin Pregnancy And Lactation Text: This medication is considered safe during pregnancy and is also secreted in breast milk. Isotretinoin Pregnancy And Lactation Text: This medication is Pregnancy Category X and is considered extremely dangerous during pregnancy. It is unknown if it is excreted in breast milk. Topical Sulfur Applications Counseling: Topical Sulfur Counseling: Patient counseled that this medication may cause skin irritation or allergic reactions.  In the event of skin irritation, the patient was advised to reduce the amount of the drug applied or use it less frequently.   The patient verbalized understanding of the proper use and possible adverse effects of topical sulfur application.  All of the patient's questions and concerns were addressed. Erythromycin Pregnancy And Lactation Text: This medication is Pregnancy Category B and is considered safe during pregnancy. It is also excreted in breast milk. Cleanser Recommendations: Patient was recommended to use a gentle cleanser such as cetaphil or cerave. Use Enhanced Medication Counseling?: No Bpo Recommendations: Patient was advised to use BPO morning and night as tolerated.  Patient was advised that product may bleach towels/pillowcases/clothing. Sarecycline Counseling: Patient advised regarding possible photosensitivity and discoloration of the teeth, skin, lips, tongue and gums.  Patient instructed to avoid sunlight, if possible.  When exposed to sunlight, patients should wear protective clothing, sunglasses, and sunscreen.  The patient was instructed to call the office immediately if the following severe adverse effects occur:  hearing changes, easy bruising/bleeding, severe headache, or vision changes.  The patient verbalized understanding of the proper use and possible adverse effects of sarecycline.  All of the patient's questions and concerns were addressed. Azelaic Acid Pregnancy And Lactation Text: This medication is considered safe during pregnancy and breast feeding. Isotretinoin Counseling: Patient should get monthly blood tests, not donate blood, not drive at night if vision affected, not share medication, and not undergo elective surgery for 6 months after tx completed. Side effects reviewed, pt to contact office should one occur. Sarecycline Pregnancy And Lactation Text: This medication is Pregnancy Category D and not consider safe during pregnancy. It is also excreted in breast milk. Benzoyl Peroxide Pregnancy And Lactation Text: This medication is Pregnancy Category C. It is unknown if benzoyl peroxide is excreted in breast milk. Azithromycin Counseling:  I discussed with the patient the risks of azithromycin including but not limited to GI upset, allergic reaction, drug rash, diarrhea, and yeast infections. Erythromycin Counseling:  I discussed with the patient the risks of erythromycin including but not limited to GI upset, allergic reaction, drug rash, diarrhea, increase in liver enzymes, and yeast infections. Topical Retinoids Recommendations: Patient was advised to use a pea-sized amount of tretinoin all over the face at bedtime.   The patient should begin using product 3x/week and gradually increase to qhs as tolerated. Azelaic Acid Counseling: Patient counseled that medicine may cause skin irritation and to avoid applying near the eyes.  In the event of skin irritation, the patient was advised to reduce the amount of the drug applied or use it less frequently.   The patient verbalized understanding of the proper use and possible adverse effects of azelaic acid.  All of the patient's questions and concerns were addressed. Benzoyl Peroxide Counseling: Patient counseled that medicine may cause skin irritation and bleach clothing.  In the event of skin irritation, the patient was advised to reduce the amount of the drug applied or use it less frequently.   The patient verbalized understanding of the proper use and possible adverse effects of benzoyl peroxide.  All of the patient's questions and concerns were addressed. Topical Sulfur Applications Pregnancy And Lactation Text: This medication is Pregnancy Category C and has an unknown safety profile during pregnancy. It is unknown if this topical medication is excreted in breast milk. Doxycycline Counseling:  Patient counseled regarding possible photosensitivity and increased risk for sunburn.  Patient instructed to avoid sunlight, if possible.  When exposed to sunlight, patients should wear protective clothing, sunglasses, and sunscreen.  The patient was instructed to call the office immediately if the following severe adverse effects occur:  hearing changes, easy bruising/bleeding, severe headache, or vision changes.  The patient verbalized understanding of the proper use and possible adverse effects of doxycycline.  All of the patient's questions and concerns were addressed. Tazorac Counseling:  Patient advised that medication is irritating and drying.  Patient may need to apply sparingly and wash off after an hour before eventually leaving it on overnight.  The patient verbalized understanding of the proper use and possible adverse effects of tazorac.  All of the patient's questions and concerns were addressed. Spironolactone Pregnancy And Lactation Text: This medication can cause feminization of the male fetus and should be avoided during pregnancy. The active metabolite is also found in breast milk. Topical Retinoid counseling:  Patient advised to apply a pea-sized amount only at bedtime and wait 30 minutes after washing their face before applying.  If too drying, patient may add a non-comedogenic moisturizer. The patient verbalized understanding of the proper use and possible adverse effects of retinoids.  All of the patient's questions and concerns were addressed. Topical Retinoid Pregnancy And Lactation Text: This medication is Pregnancy Category C. It is unknown if this medication is excreted in breast milk. Moisturizer Recommendations: Patient was recommended to use a non-comedogenic moisturizer on to damp skin. Dapsone Pregnancy And Lactation Text: This medication is Pregnancy Category C and is not considered safe during pregnancy or breast feeding. High Dose Vitamin A Counseling: Side effects reviewed, pt to contact office should one occur. Dapsone Counseling: I discussed with the patient the risks of dapsone including but not limited to hemolytic anemia, agranulocytosis, rashes, methemoglobinemia, kidney failure, peripheral neuropathy, headaches, GI upset, and liver toxicity.  Patients who start dapsone require monitoring including baseline LFTs and weekly CBCs for the first month, then every month thereafter.  The patient verbalized understanding of the proper use and possible adverse effects of dapsone.  All of the patient's questions and concerns were addressed. Sunscreen Recommendations: Broad-spectrum sunscreen with titanium and zinc. Birth Control Pills Counseling: Birth Control Pill Counseling: I discussed with the patient the potential side effects of OCPs including but not limited to increased risk of stroke, heart attack, thrombophlebitis, deep venous thrombosis, hepatic adenomas, breast changes, GI upset, headaches, and depression.  The patient verbalized understanding of the proper use and possible adverse effects of OCPs. All of the patient's questions and concerns were addressed. Topical Clindamycin Counseling: Patient counseled that this medication may cause skin irritation or allergic reactions.  In the event of skin irritation, the patient was advised to reduce the amount of the drug applied or use it less frequently.   The patient verbalized understanding of the proper use and possible adverse effects of clindamycin.  All of the patient's questions and concerns were addressed.

## 2022-11-16 RX ORDER — VIT C/E/ZN/COPPR/LUTEIN/ZEAXAN 250MG-90MG
CAPSULE ORAL
Qty: 180 CAPSULE | Refills: 3 | OUTPATIENT
Start: 2022-11-16

## 2022-12-20 ENCOUNTER — OFFICE VISIT (OUTPATIENT)
Dept: CARDIOLOGY | Facility: CLINIC | Age: 87
End: 2022-12-20
Payer: MEDICARE

## 2022-12-20 ENCOUNTER — HOSPITAL ENCOUNTER (OUTPATIENT)
Dept: RADIOLOGY | Facility: HOSPITAL | Age: 87
Discharge: HOME | End: 2022-12-20
Attending: INTERNAL MEDICINE
Payer: MEDICARE

## 2022-12-20 VITALS
SYSTOLIC BLOOD PRESSURE: 139 MMHG | HEIGHT: 63 IN | BODY MASS INDEX: 24.61 KG/M2 | HEART RATE: 65 BPM | DIASTOLIC BLOOD PRESSURE: 71 MMHG | WEIGHT: 138.88 LBS

## 2022-12-20 DIAGNOSIS — I48.0 PAROXYSMAL ATRIAL FIBRILLATION: ICD-10-CM

## 2022-12-20 DIAGNOSIS — I35.0 NONRHEUMATIC AORTIC VALVE STENOSIS: Primary | ICD-10-CM

## 2022-12-20 DIAGNOSIS — I10 PRIMARY HYPERTENSION: ICD-10-CM

## 2022-12-20 DIAGNOSIS — I35.0 NONRHEUMATIC AORTIC VALVE STENOSIS: ICD-10-CM

## 2022-12-20 PROCEDURE — 93005 ELECTROCARDIOGRAM TRACING: CPT | Mod: HCNC,PO

## 2022-12-20 PROCEDURE — 71046 X-RAY EXAM CHEST 2 VIEWS: CPT | Mod: TC,HCNC,FY,PO

## 2022-12-20 PROCEDURE — 99214 OFFICE O/P EST MOD 30 MIN: CPT | Mod: HCNC,25,S$GLB, | Performed by: INTERNAL MEDICINE

## 2022-12-20 PROCEDURE — 71046 X-RAY EXAM CHEST 2 VIEWS: CPT | Mod: 26,HCNC,, | Performed by: RADIOLOGY

## 2022-12-20 PROCEDURE — 1159F MED LIST DOCD IN RCRD: CPT | Mod: HCNC,CPTII,S$GLB, | Performed by: INTERNAL MEDICINE

## 2022-12-20 PROCEDURE — 1160F RVW MEDS BY RX/DR IN RCRD: CPT | Mod: HCNC,CPTII,S$GLB, | Performed by: INTERNAL MEDICINE

## 2022-12-20 PROCEDURE — 93010 ELECTROCARDIOGRAM REPORT: CPT | Mod: HCNC,S$GLB,, | Performed by: INTERNAL MEDICINE

## 2022-12-20 PROCEDURE — 1101F PT FALLS ASSESS-DOCD LE1/YR: CPT | Mod: HCNC,CPTII,S$GLB, | Performed by: INTERNAL MEDICINE

## 2022-12-20 PROCEDURE — 3288F FALL RISK ASSESSMENT DOCD: CPT | Mod: HCNC,CPTII,S$GLB, | Performed by: INTERNAL MEDICINE

## 2022-12-20 PROCEDURE — 1160F PR REVIEW ALL MEDS BY PRESCRIBER/CLIN PHARMACIST DOCUMENTED: ICD-10-PCS | Mod: HCNC,CPTII,S$GLB, | Performed by: INTERNAL MEDICINE

## 2022-12-20 PROCEDURE — 1126F PR PAIN SEVERITY QUANTIFIED, NO PAIN PRESENT: ICD-10-PCS | Mod: HCNC,CPTII,S$GLB, | Performed by: INTERNAL MEDICINE

## 2022-12-20 PROCEDURE — 1126F AMNT PAIN NOTED NONE PRSNT: CPT | Mod: HCNC,CPTII,S$GLB, | Performed by: INTERNAL MEDICINE

## 2022-12-20 PROCEDURE — 3288F PR FALLS RISK ASSESSMENT DOCUMENTED: ICD-10-PCS | Mod: HCNC,CPTII,S$GLB, | Performed by: INTERNAL MEDICINE

## 2022-12-20 PROCEDURE — 1159F PR MEDICATION LIST DOCUMENTED IN MEDICAL RECORD: ICD-10-PCS | Mod: HCNC,CPTII,S$GLB, | Performed by: INTERNAL MEDICINE

## 2022-12-20 PROCEDURE — 99999 PR PBB SHADOW E&M-EST. PATIENT-LVL III: CPT | Mod: PBBFAC,HCNC,, | Performed by: INTERNAL MEDICINE

## 2022-12-20 PROCEDURE — 99999 PR PBB SHADOW E&M-EST. PATIENT-LVL III: ICD-10-PCS | Mod: PBBFAC,HCNC,, | Performed by: INTERNAL MEDICINE

## 2022-12-20 PROCEDURE — 93010 EKG 12-LEAD: ICD-10-PCS | Mod: HCNC,S$GLB,, | Performed by: INTERNAL MEDICINE

## 2022-12-20 PROCEDURE — 71046 XR CHEST PA AND LATERAL: ICD-10-PCS | Mod: 26,HCNC,, | Performed by: RADIOLOGY

## 2022-12-20 PROCEDURE — 99214 PR OFFICE/OUTPT VISIT, EST, LEVL IV, 30-39 MIN: ICD-10-PCS | Mod: HCNC,25,S$GLB, | Performed by: INTERNAL MEDICINE

## 2022-12-20 PROCEDURE — 1101F PR PT FALLS ASSESS DOC 0-1 FALLS W/OUT INJ PAST YR: ICD-10-PCS | Mod: HCNC,CPTII,S$GLB, | Performed by: INTERNAL MEDICINE

## 2022-12-20 RX ORDER — AMIODARONE HYDROCHLORIDE 200 MG/1
TABLET ORAL
Qty: 90 TABLET | Refills: 3 | Status: SHIPPED | OUTPATIENT
Start: 2022-12-20 | End: 2023-06-26

## 2022-12-20 RX ORDER — AMLODIPINE BESYLATE 5 MG/1
5 TABLET ORAL DAILY
Qty: 90 TABLET | Refills: 3 | Status: SHIPPED | OUTPATIENT
Start: 2022-12-20 | End: 2023-05-22

## 2022-12-20 NOTE — PROGRESS NOTES
Subjective:    Patient ID:  Michela Calvert is a 89 y.o. female who presents for follow-up of Follow-up      [id number] yo WF with hx of PAF treated with beta blocker and amiodarone. Denies palpitations, weak spells, and syncope  Denies chest pain, SOB, or edema  Does use a walker    Review of Systems   Constitutional: Negative for decreased appetite, fever, malaise/fatigue, weight gain and weight loss.   HENT:  Negative for hearing loss and nosebleeds.    Eyes:  Negative for visual disturbance.   Cardiovascular:  Negative for chest pain, claudication, cyanosis, dyspnea on exertion, irregular heartbeat, leg swelling, near-syncope, orthopnea, palpitations, paroxysmal nocturnal dyspnea and syncope.   Respiratory:  Negative for cough, hemoptysis, shortness of breath, sleep disturbances due to breathing, snoring and wheezing.    Endocrine: Negative for cold intolerance, heat intolerance, polydipsia and polyuria.   Hematologic/Lymphatic: Negative for adenopathy and bleeding problem. Does not bruise/bleed easily.   Skin:  Negative for color change, itching, poor wound healing, rash and suspicious lesions.   Musculoskeletal:  Positive for arthritis and joint pain. Negative for back pain, falls, joint swelling, muscle cramps, muscle weakness and myalgias.   Gastrointestinal:  Negative for bloating, abdominal pain, change in bowel habit, constipation, flatus, heartburn, hematemesis, hematochezia, hemorrhoids, jaundice, melena, nausea and vomiting.   Genitourinary:  Negative for bladder incontinence, decreased libido, frequency, hematuria, hesitancy and urgency.   Neurological:  Negative for brief paralysis, difficulty with concentration, excessive daytime sleepiness, dizziness, focal weakness, headaches, light-headedness, loss of balance, numbness, vertigo and weakness.   Psychiatric/Behavioral:  Negative for altered mental status, depression and memory loss. The patient does not have insomnia and is not nervous/anxious.   "  Allergic/Immunologic: Negative for environmental allergies, hives and persistent infections.      Objective:    Physical Exam  Vitals and nursing note reviewed.   Constitutional:       Appearance: She is well-developed.      Comments: /71 (BP Location: Right arm, Patient Position: Sitting, BP Method: Medium (Automatic))   Pulse 65   Ht 5' 3" (1.6 m)   Wt 63 kg (138 lb 14.2 oz)   BMI 24.60 kg/m²      HENT:      Head: Normocephalic and atraumatic.      Right Ear: External ear normal.      Left Ear: External ear normal.      Nose: Nose normal.   Eyes:      General: Lids are normal. No scleral icterus.        Right eye: No discharge.         Left eye: No discharge.      Conjunctiva/sclera: Conjunctivae normal.      Right eye: No hemorrhage.     Pupils: Pupils are equal, round, and reactive to light.   Neck:      Thyroid: No thyromegaly.      Vascular: No JVD.      Trachea: No tracheal deviation.   Cardiovascular:      Rate and Rhythm: Normal rate and regular rhythm.      Pulses: Intact distal pulses.      Heart sounds: Murmur heard.   Systolic murmur is present with a grade of 2/6.     No friction rub. No gallop.   Pulmonary:      Effort: Pulmonary effort is normal. No respiratory distress.      Breath sounds: Normal breath sounds. No wheezing or rales.   Chest:      Chest wall: No tenderness.   Breasts:     Breasts are symmetrical.   Abdominal:      General: Bowel sounds are normal. There is no distension.      Palpations: Abdomen is soft. There is no hepatomegaly or mass.      Tenderness: There is no abdominal tenderness. There is no guarding or rebound.   Musculoskeletal:         General: No tenderness. Normal range of motion.      Cervical back: Normal range of motion and neck supple.   Lymphadenopathy:      Cervical: No cervical adenopathy.   Skin:     General: Skin is warm and dry.      Coloration: Skin is not pale.      Findings: No erythema or rash.   Neurological:      Mental Status: She is alert " and oriented to person, place, and time.      Cranial Nerves: No cranial nerve deficit.      Coordination: Coordination normal.      Deep Tendon Reflexes: Reflexes are normal and symmetric. Reflexes normal.   Psychiatric:         Behavior: Behavior normal.         Thought Content: Thought content normal.         Judgment: Judgment normal.         Assessment:       1. Nonrheumatic aortic valve stenosis- Mild    2. Paroxysmal atrial fibrillation    3. Primary hypertension         Plan:    BP controlled   Remains in sinus rhythm   Asymptomatic AS   Check CXR because of amio. Has routine eye exams      Orders Placed This Encounter   Procedures    X-Ray Chest PA And Lateral    IN OFFICE EKG 12-LEAD (to [facility])    IN OFFICE EKG 12-LEAD (to [facility])     Follow up in about 6 months (around 6/20/2023).

## 2023-01-18 ENCOUNTER — PES CALL (OUTPATIENT)
Dept: ADMINISTRATIVE | Facility: CLINIC | Age: 88
End: 2023-01-18
Payer: MEDICARE

## 2023-02-07 ENCOUNTER — TELEPHONE (OUTPATIENT)
Dept: FAMILY MEDICINE | Facility: CLINIC | Age: 88
End: 2023-02-07
Payer: MEDICARE

## 2023-02-07 DIAGNOSIS — I10 PRIMARY HYPERTENSION: ICD-10-CM

## 2023-02-07 DIAGNOSIS — E03.9 HYPOTHYROIDISM, UNSPECIFIED TYPE: Primary | ICD-10-CM

## 2023-02-07 DIAGNOSIS — Z00.00 ENCOUNTER FOR MEDICARE ANNUAL WELLNESS EXAM: ICD-10-CM

## 2023-02-07 NOTE — TELEPHONE ENCOUNTER
----- Message from Bridget Paredes sent at 2/7/2023 11:06 AM CST -----  Contact: Son  Type:  Needs Medical Advice    Who Called:  Patient      Would the patient rather a call back or a response via [facility]?  Call    Best Call Back Number:  [phone] (home)     Additional Information:  Patients son states he got a message to schedule patients lipid test and when he called to schedule there is no lipid orders in to schedule     Please add lipid to active

## 2023-02-09 DIAGNOSIS — Z00.00 ENCOUNTER FOR MEDICARE ANNUAL WELLNESS EXAM: ICD-10-CM

## 2023-02-28 ENCOUNTER — OFFICE VISIT (OUTPATIENT)
Dept: PODIATRY | Facility: CLINIC | Age: 88
End: 2023-02-28
Payer: MEDICARE

## 2023-02-28 ENCOUNTER — LAB VISIT (OUTPATIENT)
Dept: LAB | Facility: HOSPITAL | Age: 88
End: 2023-02-28
Attending: FAMILY MEDICINE
Payer: MEDICARE

## 2023-02-28 DIAGNOSIS — I10 PRIMARY HYPERTENSION: ICD-10-CM

## 2023-02-28 DIAGNOSIS — M79.672 PAIN IN BOTH FEET: ICD-10-CM

## 2023-02-28 DIAGNOSIS — E03.9 HYPOTHYROIDISM, UNSPECIFIED TYPE: ICD-10-CM

## 2023-02-28 DIAGNOSIS — M77.42 METATARSALGIA OF BOTH FEET: ICD-10-CM

## 2023-02-28 DIAGNOSIS — M79.671 PAIN IN BOTH FEET: ICD-10-CM

## 2023-02-28 DIAGNOSIS — M77.41 METATARSALGIA OF BOTH FEET: ICD-10-CM

## 2023-02-28 DIAGNOSIS — D36.10 NEUROMA: Primary | ICD-10-CM

## 2023-02-28 LAB
ALBUMIN SERPL BCP-MCNC: 3.6 G/DL (ref 3.5–5.2)
ALP SERPL-CCNC: 80 U/L (ref 55–135)
ALT SERPL W/O P-5'-P-CCNC: 9 U/L (ref 10–44)
ANION GAP SERPL CALC-SCNC: 9 MMOL/L (ref 8–16)
AST SERPL-CCNC: 19 U/L (ref 10–40)
BASOPHILS # BLD AUTO: 0.07 K/UL (ref 0–0.2)
BASOPHILS NFR BLD: 0.7 % (ref 0–1.9)
BILIRUB SERPL-MCNC: 0.4 MG/DL (ref 0.1–1)
BUN SERPL-MCNC: 18 MG/DL (ref 10–30)
CALCIUM SERPL-MCNC: 9.6 MG/DL (ref 8.7–10.5)
CHLORIDE SERPL-SCNC: 106 MMOL/L (ref 95–110)
CHOLEST SERPL-MCNC: 215 MG/DL (ref 120–199)
CHOLEST/HDLC SERPL: 4.1 {RATIO} (ref 2–5)
CO2 SERPL-SCNC: 25 MMOL/L (ref 23–29)
CREAT SERPL-MCNC: 1.1 MG/DL (ref 0.5–1.4)
DIFFERENTIAL METHOD: ABNORMAL
EOSINOPHIL # BLD AUTO: 0.4 K/UL (ref 0–0.5)
EOSINOPHIL NFR BLD: 3.7 % (ref 0–8)
ERYTHROCYTE [DISTWIDTH] IN BLOOD BY AUTOMATED COUNT: 15.3 % (ref 11.5–14.5)
EST. GFR  (NO RACE VARIABLE): 46.9 ML/MIN/1.73 M^2
GLUCOSE SERPL-MCNC: 98 MG/DL (ref 70–110)
HCT VFR BLD AUTO: 39.1 % (ref 37–48.5)
HDLC SERPL-MCNC: 53 MG/DL (ref 40–75)
HDLC SERPL: 24.7 % (ref 20–50)
HGB BLD-MCNC: 12.2 G/DL (ref 12–16)
IMM GRANULOCYTES # BLD AUTO: 0.06 K/UL (ref 0–0.04)
IMM GRANULOCYTES NFR BLD AUTO: 0.6 % (ref 0–0.5)
LDLC SERPL CALC-MCNC: 145 MG/DL (ref 63–159)
LYMPHOCYTES # BLD AUTO: 2.7 K/UL (ref 1–4.8)
LYMPHOCYTES NFR BLD: 26.8 % (ref 18–48)
MCH RBC QN AUTO: 28.8 PG (ref 27–31)
MCHC RBC AUTO-ENTMCNC: 31.2 G/DL (ref 32–36)
MCV RBC AUTO: 92 FL (ref 82–98)
MONOCYTES # BLD AUTO: 0.9 K/UL (ref 0.3–1)
MONOCYTES NFR BLD: 8.6 % (ref 4–15)
NEUTROPHILS # BLD AUTO: 5.9 K/UL (ref 1.8–7.7)
NEUTROPHILS NFR BLD: 59.6 % (ref 38–73)
NONHDLC SERPL-MCNC: 162 MG/DL
NRBC BLD-RTO: 0 /100 WBC
PLATELET # BLD AUTO: 273 K/UL (ref 150–450)
PMV BLD AUTO: 11.8 FL (ref 9.2–12.9)
POTASSIUM SERPL-SCNC: 4.6 MMOL/L (ref 3.5–5.1)
PROT SERPL-MCNC: 6.9 G/DL (ref 6–8.4)
RBC # BLD AUTO: 4.24 M/UL (ref 4–5.4)
SODIUM SERPL-SCNC: 140 MMOL/L (ref 136–145)
TRIGL SERPL-MCNC: 85 MG/DL (ref 30–150)
TSH SERPL DL<=0.005 MIU/L-ACNC: 3.08 UIU/ML (ref 0.4–4)
WBC # BLD AUTO: 9.96 K/UL (ref 3.9–12.7)

## 2023-02-28 PROCEDURE — 99203 OFFICE O/P NEW LOW 30 MIN: CPT | Mod: 25,HCNC,S$GLB, | Performed by: STUDENT IN AN ORGANIZED HEALTH CARE EDUCATION/TRAINING PROGRAM

## 2023-02-28 PROCEDURE — 80053 COMPREHEN METABOLIC PANEL: CPT | Mod: HCNC | Performed by: FAMILY MEDICINE

## 2023-02-28 PROCEDURE — 80061 LIPID PANEL: CPT | Mod: HCNC | Performed by: FAMILY MEDICINE

## 2023-02-28 PROCEDURE — 1159F MED LIST DOCD IN RCRD: CPT | Mod: HCNC,CPTII,S$GLB, | Performed by: STUDENT IN AN ORGANIZED HEALTH CARE EDUCATION/TRAINING PROGRAM

## 2023-02-28 PROCEDURE — 1159F PR MEDICATION LIST DOCUMENTED IN MEDICAL RECORD: ICD-10-PCS | Mod: HCNC,CPTII,S$GLB, | Performed by: STUDENT IN AN ORGANIZED HEALTH CARE EDUCATION/TRAINING PROGRAM

## 2023-02-28 PROCEDURE — 99999 PR PBB SHADOW E&M-EST. PATIENT-LVL I: CPT | Mod: PBBFAC,HCNC,, | Performed by: STUDENT IN AN ORGANIZED HEALTH CARE EDUCATION/TRAINING PROGRAM

## 2023-02-28 PROCEDURE — 85025 COMPLETE CBC W/AUTO DIFF WBC: CPT | Mod: HCNC | Performed by: FAMILY MEDICINE

## 2023-02-28 PROCEDURE — 84443 ASSAY THYROID STIM HORMONE: CPT | Mod: HCNC | Performed by: FAMILY MEDICINE

## 2023-02-28 PROCEDURE — 1160F PR REVIEW ALL MEDS BY PRESCRIBER/CLIN PHARMACIST DOCUMENTED: ICD-10-PCS | Mod: HCNC,CPTII,S$GLB, | Performed by: STUDENT IN AN ORGANIZED HEALTH CARE EDUCATION/TRAINING PROGRAM

## 2023-02-28 PROCEDURE — 36415 COLL VENOUS BLD VENIPUNCTURE: CPT | Mod: HCNC,PO | Performed by: FAMILY MEDICINE

## 2023-02-28 PROCEDURE — 64455 NJX AA&/STRD PLTR COM DG NRV: CPT | Mod: HCNC,LT,S$GLB, | Performed by: STUDENT IN AN ORGANIZED HEALTH CARE EDUCATION/TRAINING PROGRAM

## 2023-02-28 PROCEDURE — 99203 PR OFFICE/OUTPT VISIT, NEW, LEVL III, 30-44 MIN: ICD-10-PCS | Mod: 25,HCNC,S$GLB, | Performed by: STUDENT IN AN ORGANIZED HEALTH CARE EDUCATION/TRAINING PROGRAM

## 2023-02-28 PROCEDURE — 99999 PR PBB SHADOW E&M-EST. PATIENT-LVL I: ICD-10-PCS | Mod: PBBFAC,HCNC,, | Performed by: STUDENT IN AN ORGANIZED HEALTH CARE EDUCATION/TRAINING PROGRAM

## 2023-02-28 PROCEDURE — 1160F RVW MEDS BY RX/DR IN RCRD: CPT | Mod: HCNC,CPTII,S$GLB, | Performed by: STUDENT IN AN ORGANIZED HEALTH CARE EDUCATION/TRAINING PROGRAM

## 2023-02-28 PROCEDURE — 64455 NEUROMA: ICD-10-PCS | Mod: HCNC,LT,S$GLB, | Performed by: STUDENT IN AN ORGANIZED HEALTH CARE EDUCATION/TRAINING PROGRAM

## 2023-02-28 RX ORDER — DEXAMETHASONE SODIUM PHOSPHATE 4 MG/ML
4 INJECTION, SOLUTION INTRA-ARTICULAR; INTRALESIONAL; INTRAMUSCULAR; INTRAVENOUS; SOFT TISSUE
Status: DISCONTINUED | OUTPATIENT
Start: 2023-02-28 | End: 2023-02-28

## 2023-02-28 RX ORDER — DEXAMETHASONE SODIUM PHOSPHATE 4 MG/ML
4 INJECTION, SOLUTION INTRA-ARTICULAR; INTRALESIONAL; INTRAMUSCULAR; INTRAVENOUS; SOFT TISSUE
Status: COMPLETED | OUTPATIENT
Start: 2023-02-28 | End: 2023-02-28

## 2023-02-28 RX ADMIN — DEXAMETHASONE SODIUM PHOSPHATE 4 MG: 4 INJECTION, SOLUTION INTRA-ARTICULAR; INTRALESIONAL; INTRAMUSCULAR; INTRAVENOUS; SOFT TISSUE at 02:02

## 2023-02-28 NOTE — PROGRESS NOTES
Subjective:      Patient ID: Michela Calvert is a 89 y.o. female.    Chief Complaint: No chief complaint on file.    Patient presents today with complaints of pain to both feet with the left worse than the right.  She reports the pain is at the forefoot and feels like a burning and tingling type pain.  She does report some sensation of walking on a rock.  The pain has been getting worse over the last 2 weeks.  Pain is worse with walking and standing.    Review of Systems   Musculoskeletal:         Bilateral foot pain.   Neurological:  Positive for paresthesias.   All other systems reviewed and are negative.        Objective:      Physical Exam  Cardiovascular:      Pulses:           Dorsalis pedis pulses are 1+ on the right side and 1+ on the left side.        Posterior tibial pulses are 1+ on the right side and 1+ on the left side.   Feet:      Comments: There is tenderness to palpation to the 2nd 3rd and 4th metatarsophalangeal joints bilateral.  There is tenderness to palpation at the 2nd and 3rd interspaces bilateral with the left being worse than the right.  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            Assessment:       Encounter Diagnoses   Name Primary?    Neuroma Yes    Metatarsalgia of both feet     Pain in both feet          Plan:       Diagnoses and all orders for this visit:    Neuroma    Metatarsalgia of both feet    Pain in both feet      I counseled the patient on her conditions, their implications and medical management.    I discussed the treatment plan with the patient today.  I believe that she is dealing with interdigital neuromas that are worse on the left foot.  She is also dealing with some metatarsalgia capsulitis due to increased pressures along the lesser metatarsals.    I placed a metatarsal pad in both of her shoes today.  She would like to proceed with a neuroma injection to the left foot.    Patient to follow-up in about 2  months.    Keyur Ames DPM    Neuroma    Date/Time: 2/28/2023 10:40 AM  Performed by: Keyur Ames DPM  Authorized by: Keyur Ames DPM     Consent Done?:  Yes (Verbal)  Indications:  Pain  Site marked: the procedure site was marked    Prep: patient was prepped and draped in usual sterile fashion      Location Left Foot:  [facility]  Needle size:  25 G  Approach:  Dorsal  Lab: fluid sent for laboratory analysis    Patient tolerance:  Patient tolerated the procedure well with no immediate complications  Neuroma    Date/Time: 2/28/2023 10:40 AM  Performed by: Keyur Ames DPM  Authorized by: Keyur Ames DPM     Consent Done?:  Yes (Verbal)  Indications:  Pain  Location Left Foot:  [address]  Needle size:  25 G  Approach:  Dorsal  Patient tolerance:  Patient tolerated the procedure well with no immediate complications

## 2023-03-07 ENCOUNTER — OFFICE VISIT (OUTPATIENT)
Dept: FAMILY MEDICINE | Facility: CLINIC | Age: 88
End: 2023-03-07
Payer: MEDICARE

## 2023-03-07 VITALS
HEART RATE: 65 BPM | RESPIRATION RATE: 18 BRPM | OXYGEN SATURATION: 99 % | SYSTOLIC BLOOD PRESSURE: 136 MMHG | TEMPERATURE: 98 F | HEIGHT: 63 IN | BODY MASS INDEX: 24.02 KG/M2 | DIASTOLIC BLOOD PRESSURE: 70 MMHG | WEIGHT: 135.56 LBS

## 2023-03-07 DIAGNOSIS — N18.31 STAGE 3A CHRONIC KIDNEY DISEASE: ICD-10-CM

## 2023-03-07 DIAGNOSIS — E03.9 HYPOTHYROIDISM, UNSPECIFIED TYPE: ICD-10-CM

## 2023-03-07 DIAGNOSIS — Z00.00 PREVENTATIVE HEALTH CARE: Primary | ICD-10-CM

## 2023-03-07 DIAGNOSIS — I10 PRIMARY HYPERTENSION: ICD-10-CM

## 2023-03-07 DIAGNOSIS — I48.0 PAROXYSMAL ATRIAL FIBRILLATION: ICD-10-CM

## 2023-03-07 DIAGNOSIS — I73.9 PERIPHERAL VASCULAR DISEASE, UNSPECIFIED: ICD-10-CM

## 2023-03-07 PROBLEM — L97.929 VENOUS ULCER OF LEFT LEG: Status: RESOLVED | Noted: 2022-06-08 | Resolved: 2023-03-07

## 2023-03-07 PROBLEM — I83.029 VENOUS ULCER OF LEFT LEG: Status: RESOLVED | Noted: 2022-06-08 | Resolved: 2023-03-07

## 2023-03-07 PROCEDURE — 3288F PR FALLS RISK ASSESSMENT DOCUMENTED: ICD-10-PCS | Mod: HCNC,CPTII,S$GLB, | Performed by: FAMILY MEDICINE

## 2023-03-07 PROCEDURE — 99999 PR PBB SHADOW E&M-EST. PATIENT-LVL III: CPT | Mod: PBBFAC,HCNC,, | Performed by: FAMILY MEDICINE

## 2023-03-07 PROCEDURE — 99397 PR PREVENTIVE VISIT,EST,65 & OVER: ICD-10-PCS | Mod: HCNC,S$GLB,, | Performed by: FAMILY MEDICINE

## 2023-03-07 PROCEDURE — 3288F FALL RISK ASSESSMENT DOCD: CPT | Mod: HCNC,CPTII,S$GLB, | Performed by: FAMILY MEDICINE

## 2023-03-07 PROCEDURE — 99397 PER PM REEVAL EST PAT 65+ YR: CPT | Mod: HCNC,S$GLB,, | Performed by: FAMILY MEDICINE

## 2023-03-07 PROCEDURE — 1160F PR REVIEW ALL MEDS BY PRESCRIBER/CLIN PHARMACIST DOCUMENTED: ICD-10-PCS | Mod: HCNC,CPTII,S$GLB, | Performed by: FAMILY MEDICINE

## 2023-03-07 PROCEDURE — 1126F AMNT PAIN NOTED NONE PRSNT: CPT | Mod: HCNC,CPTII,S$GLB, | Performed by: FAMILY MEDICINE

## 2023-03-07 PROCEDURE — 1159F MED LIST DOCD IN RCRD: CPT | Mod: HCNC,CPTII,S$GLB, | Performed by: FAMILY MEDICINE

## 2023-03-07 PROCEDURE — 1126F PR PAIN SEVERITY QUANTIFIED, NO PAIN PRESENT: ICD-10-PCS | Mod: HCNC,CPTII,S$GLB, | Performed by: FAMILY MEDICINE

## 2023-03-07 PROCEDURE — 1101F PR PT FALLS ASSESS DOC 0-1 FALLS W/OUT INJ PAST YR: ICD-10-PCS | Mod: HCNC,CPTII,S$GLB, | Performed by: FAMILY MEDICINE

## 2023-03-07 PROCEDURE — 99999 PR PBB SHADOW E&M-EST. PATIENT-LVL III: ICD-10-PCS | Mod: PBBFAC,HCNC,, | Performed by: FAMILY MEDICINE

## 2023-03-07 PROCEDURE — 1160F RVW MEDS BY RX/DR IN RCRD: CPT | Mod: HCNC,CPTII,S$GLB, | Performed by: FAMILY MEDICINE

## 2023-03-07 PROCEDURE — 1101F PT FALLS ASSESS-DOCD LE1/YR: CPT | Mod: HCNC,CPTII,S$GLB, | Performed by: FAMILY MEDICINE

## 2023-03-07 PROCEDURE — 1159F PR MEDICATION LIST DOCUMENTED IN MEDICAL RECORD: ICD-10-PCS | Mod: HCNC,CPTII,S$GLB, | Performed by: FAMILY MEDICINE

## 2023-03-07 RX ORDER — BNT162B2 ORIGINAL AND OMICRON BA.4/BA.5 .1125; .1125 MG/2.25ML; MG/2.25ML
INJECTION, SUSPENSION INTRAMUSCULAR
COMMUNITY
Start: 2022-11-15

## 2023-03-07 NOTE — PROGRESS NOTES
Subjective:       Patient ID: Michela Calvert is a 89 y.o. female.    Chief Complaint: Preventative Health Care (Physical, check up)    Patient presents with:  Preventative Health Care: Physical, check up    Here for  f/u on chronic health issues.  Overall feeling well.    S/p HEMIARTHROPLASTY, HIP (Left)  - following with Dr. Leo  She is living at the [facility].  Using walker for ambulation  Aid helps with showering twice a week  Afib, PVD - rate- controlled on pacerone, Eliquis, Lopresor 25mg BID; taking asa 81mg daily; following with cardiology every 6 months  Hypothyroidism - tolerating Synthroid 100mcg daily  Lumbar DDD - using tylenol as needed     Exercising twice a week in class.        Past Medical History:   Diagnosis Date    Atrial fibrillation     Cataracts, bilateral     Chronic pain syndrome 7/25/2018    Closed fracture of neck of left femur 3/8/2019    DDD (degenerative disc disease), lumbar     DDD (degenerative disc disease), lumbar     Heart murmur 01/2017    HLD (hyperlipidemia)     no medication taken    Hypertension 12/27/2018    Hypothyroidism     Lichen sclerosus et atrophicus     Rectal/Vaginal areas    Lumbar spinal stenosis     Lumbar spondylosis     Mitral valve disorder 01/2017    Asymptomatic    Osteopenia     Urinary incontinence        Past Surgical History:   Procedure Laterality Date    APPENDECTOMY      CATARACT EXTRACTION EXTRACAPSULAR W/ INTRAOCULAR LENS IMPLANTATION Bilateral     HEMIARTHROPLASTY OF HIP Left 3/9/2019    Procedure: HEMIARTHROPLASTY, HIP;  Surgeon: Josesito Leo MD;  Location: [facility] OR;  Service: Orthopedics;  Laterality: Left;    HYSTERECTOMY      INSERTION OF DORSAL COLUMN NERVE STIMULATOR FOR TRIAL N/A 7/25/2018    Procedure: INSERTION, DORSAL COLUMN NEUROSTIMULATOR, FOR TRIAL;  Surgeon: Derek Daily MD;  Location: [facility] OR;  Service: Pain Management;  Laterality: N/A;    spinal ablation  12/2017    TONSILLECTOMY         Review of patient's allergies  indicates:   Allergen Reactions    Nitrofuran analogues Nausea Only    Ciprofloxacin Nausea And Vomiting       Social History     Socioeconomic History    Marital status:    Tobacco Use    Smoking status: Never    Smokeless tobacco: Never   Substance and Sexual Activity    Alcohol use: Not Currently     Alcohol/week: 1.0 standard drink     Types: 1 Shots of liquor per week    Drug use: No    Sexual activity: Never       Current Outpatient Medications on File Prior to Visit   Medication Sig Dispense Refill    amiodarone (PACERONE) 200 MG Tab TAKE ONE TABLET BY MOUTH three times a week(M-W-F) IN THE MORNING 90 tablet 3    amLODIPine (NORVASC) 5 MG tablet Take 1 tablet (5 mg total) by mouth once daily. 90 tablet 3    CALCIUM 600 WITH VITAMIN D3 600 mg-12.5 mcg (500 unit) Cap TAKE ONE CAPSULE BY MOUTH ONCE DAILY IN THE EVENING 180 capsule 3    clobetasol 0.05% (TEMOVATE) 0.05 % Oint Apply daily to affected area      ELIQUIS 2.5 mg Tab TAKE ONE TABLET BY MOUTH TWICE DAILY 56 tablet 6    fluticasone propionate (FLONASE) 50 mcg/actuation nasal spray 1 spray (50 mcg total) by Each Nostril route 2 (two) times a day. 16 g 0    hydrocortisone 2.5 % cream APPLY TO THE AFFECTED AREA(S) TWICE DAILY 84 g 2    levothyroxine (SYNTHROID) 100 MCG tablet TAKE ONE TABLET BY MOUTH BEFORE BREAKFAST. Administer on an empty stomach at least 30 minutes before food 90 tablet 3    metoprolol tartrate (LOPRESSOR) 25 MG tablet Take 1 tablet (25 mg total) by mouth 2 (two) times daily. 180 tablet 3    PRESERVISION AREDS-2 250-90-40-1 mg Cap TAKE ONE CAPSULE BY MOUTH TWICE DAILY (In THE morning AND In THE evening) 180 capsule 3    triamcinolone acetonide 0.1% (KENALOG) 0.1 % cream       acetaminophen (TYLENOL) 650 MG TbSR Take 650 mg by mouth every 8 (eight) hours as needed.      PFIZER COVID BIVAL,12Y UP,,PF, 30 mcg/0.3 mL injection        No current facility-administered medications on file prior to visit.       Family History   Problem  "Relation Age of Onset    Colon cancer Neg Hx     Crohn's disease Neg Hx     Stomach cancer Neg Hx     Ulcerative colitis Neg Hx     Esophageal cancer Neg Hx        Review of Systems   Constitutional:  Positive for fatigue. Negative for appetite change, chills, fever and unexpected weight change.   HENT:  Negative for sore throat and trouble swallowing.    Eyes:  Negative for pain and visual disturbance.   Respiratory:  Negative for cough, shortness of breath and wheezing.    Cardiovascular:  Positive for palpitations. Negative for chest pain.   Gastrointestinal:  Negative for abdominal pain, blood in stool, diarrhea, nausea and vomiting.   Genitourinary:  Negative for difficulty urinating, dysuria and hematuria.   Musculoskeletal:  Positive for arthralgias (left hip) and gait problem. Negative for neck pain.   Skin:  Negative for rash and wound.   Neurological:  Positive for weakness. Negative for dizziness, numbness and headaches.   Hematological:  Negative for adenopathy.   Psychiatric/Behavioral:  Negative for dysphoric mood.      Objective:      /70   Pulse 65   Temp 98.1 °F (36.7 °C) (Oral)   Resp 18   Ht 5' 3" (1.6 m)   Wt 61.5 kg (135 lb 9.3 oz)   SpO2 99%   BMI 24.02 kg/m²   Physical Exam  Constitutional:       Appearance: Normal appearance. She is well-developed.   HENT:      Head: Normocephalic.      Mouth/Throat:      Pharynx: No oropharyngeal exudate or posterior oropharyngeal erythema.   Eyes:      Conjunctiva/sclera: Conjunctivae normal.      Pupils: Pupils are equal, round, and reactive to light.   Neck:      Thyroid: No thyromegaly.   Cardiovascular:      Rate and Rhythm: Normal rate and regular rhythm.      Heart sounds: Normal heart sounds, S1 normal and S2 normal. No murmur heard.    No friction rub. No gallop.   Pulmonary:      Effort: Pulmonary effort is normal.      Breath sounds: Normal breath sounds. No wheezing or rales.   Musculoskeletal:      Cervical back: Normal range of " motion and neck supple.      Right lower leg: No edema.      Left lower leg: No edema.   Lymphadenopathy:      Cervical: No cervical adenopathy.   Skin:     General: Skin is warm.      Findings: No rash.   Neurological:      Mental Status: She is alert and oriented to person, place, and time.      Cranial Nerves: No cranial nerve deficit.      Gait: Gait normal.       Results for orders placed or performed in visit on 02/28/23   LIPID PANEL   Result Value Ref Range    Cholesterol 215 (H) 120 - 199 mg/dL    Triglycerides 85 30 - 150 mg/dL    HDL 53 40 - 75 mg/dL    LDL Cholesterol 145.0 63.0 - 159.0 mg/dL    HDL/Cholesterol Ratio 24.7 20.0 - 50.0 %    Total Cholesterol/HDL Ratio 4.1 2.0 - 5.0    Non-HDL Cholesterol 162 mg/dL   TSH   Result Value Ref Range    TSH 3.082 0.400 - 4.000 uIU/mL   CBC Auto Differential   Result Value Ref Range    WBC 9.96 3.90 - 12.70 K/uL    RBC 4.24 4.00 - 5.40 M/uL    Hemoglobin 12.2 12.0 - 16.0 g/dL    Hematocrit 39.1 37.0 - 48.5 %    MCV 92 82 - 98 fL    MCH 28.8 27.0 - 31.0 pg    MCHC 31.2 (L) 32.0 - 36.0 g/dL    RDW 15.3 (H) 11.5 - 14.5 %    Platelets 273 150 - 450 K/uL    MPV 11.8 9.2 - 12.9 fL    Immature Granulocytes 0.6 (H) 0.0 - 0.5 %    Gran # (ANC) 5.9 1.8 - 7.7 K/uL    Immature Grans (Abs) 0.06 (H) 0.00 - 0.04 K/uL    Lymph # 2.7 1.0 - 4.8 K/uL    Mono # 0.9 0.3 - 1.0 K/uL    Eos # 0.4 0.0 - 0.5 K/uL    Baso # 0.07 0.00 - 0.20 K/uL    nRBC 0 0 /100 WBC    Gran % 59.6 38.0 - 73.0 %    Lymph % 26.8 18.0 - 48.0 %    Mono % 8.6 4.0 - 15.0 %    Eosinophil % 3.7 0.0 - 8.0 %    Basophil % 0.7 0.0 - 1.9 %    Differential Method Automated    Comprehensive Metabolic Panel   Result Value Ref Range    Sodium 140 136 - 145 mmol/L    Potassium 4.6 3.5 - 5.1 mmol/L    Chloride 106 95 - 110 mmol/L    CO2 25 23 - 29 mmol/L    Glucose 98 70 - 110 mg/dL    BUN 18 10 - 30 mg/dL    Creatinine 1.1 0.5 - 1.4 mg/dL    Calcium 9.6 8.7 - 10.5 mg/dL    Total Protein 6.9 6.0 - 8.4 g/dL    Albumin 3.6  3.5 - 5.2 g/dL    Total Bilirubin 0.4 0.1 - 1.0 mg/dL    Alkaline Phosphatase 80 55 - 135 U/L    AST 19 10 - 40 U/L    ALT 9 (L) 10 - 44 U/L    Anion Gap 9 8 - 16 mmol/L    eGFR 46.9 (A) >60 mL/min/1.73 m^2         Assessment:       1. Preventative health care    2. Stage 3a chronic kidney disease    3. Paroxysmal atrial fibrillation    4. Peripheral vascular disease, unspecified    5. Primary hypertension    6. Hypothyroidism, unspecified type          Plan:       Preventative health care    Stage 3a chronic kidney disease    Paroxysmal atrial fibrillation    Peripheral vascular disease, unspecified    Primary hypertension    Hypothyroidism, unspecified type          Ok to use Tylenol QHS  Overall stable  Continue present medications  F/u with specialists as planned  Counseled on regular exercise, maintenance of a healthy weight, balanced diet rich in fruits/vegetables and lean protein, and avoidance of unhealthy habits like smoking and excessive alcohol intake.  F/u 12 months or PRN

## 2023-03-11 PROBLEM — I73.9 PERIPHERAL VASCULAR DISEASE, UNSPECIFIED: Status: ACTIVE | Noted: 2023-03-11

## 2023-04-05 ENCOUNTER — PES CALL (OUTPATIENT)
Dept: ADMINISTRATIVE | Facility: CLINIC | Age: 88
End: 2023-04-05
Payer: MEDICARE

## 2023-05-12 ENCOUNTER — OFFICE VISIT (OUTPATIENT)
Dept: URGENT CARE | Facility: CLINIC | Age: 88
End: 2023-05-12
Payer: MEDICARE

## 2023-05-12 VITALS
SYSTOLIC BLOOD PRESSURE: 153 MMHG | DIASTOLIC BLOOD PRESSURE: 80 MMHG | BODY MASS INDEX: 24.02 KG/M2 | WEIGHT: 135.56 LBS | RESPIRATION RATE: 18 BRPM | TEMPERATURE: 98 F | HEIGHT: 63 IN | OXYGEN SATURATION: 97 % | HEART RATE: 77 BPM

## 2023-05-12 DIAGNOSIS — S81.812A SKIN TEAR OF LEFT LOWER LEG WITHOUT COMPLICATION, INITIAL ENCOUNTER: Primary | ICD-10-CM

## 2023-05-12 PROCEDURE — 99203 PR OFFICE/OUTPT VISIT, NEW, LEVL III, 30-44 MIN: ICD-10-PCS | Mod: S$GLB,,, | Performed by: NURSE PRACTITIONER

## 2023-05-12 PROCEDURE — 99203 OFFICE O/P NEW LOW 30 MIN: CPT | Mod: S$GLB,,, | Performed by: NURSE PRACTITIONER

## 2023-05-12 NOTE — PROGRESS NOTES
"Subjective:      Patient ID: Michela Calvert is a 89 y.o. female.    Vitals:  height is 5' 3" (1.6 m) and weight is 61.5 kg (135 lb 9.3 oz). Her temperature is 98 °F (36.7 °C). Her blood pressure is 153/80 (abnormal) and her pulse is 77. Her respiration is 18 and oxygen saturation is 97%.     Chief Complaint: skin tear    Pt present today c/o possible laceration. Pt says she tried to  a chair and it fell on her leg and bruised her lower left leg, happened yesterday afternoon, been putting ointment with mild relief.     Provider note begins below:    Skin tear to left lower extremity.  Patient is on Plavix but is not bleeding at time of admit.  Up-to-date on tetanus.    Laceration   The incident occurred 12 to 24 hours ago. The laceration is located on the Left leg. The laceration mechanism was a metal edge. The pain is at a severity of 0/10. The patient is experiencing no pain. Possible foreign bodies include metal. Her tetanus status is unknown.     Skin:  Positive for laceration. Negative for erythema.    Objective:     Physical Exam   Constitutional: She is oriented to person, place, and time. She appears well-developed. No distress.   HENT:   Head: Normocephalic and atraumatic. Head is without abrasion, without contusion and without laceration.   Ears:   Right Ear: External ear normal.   Left Ear: External ear normal.   Nose: Nose normal.   Mouth/Throat: Oropharynx is clear and moist and mucous membranes are normal.   Eyes: Conjunctivae, EOM and lids are normal. Pupils are equal, round, and reactive to light.   Neck: Trachea normal and phonation normal. Neck supple.   Cardiovascular: Normal rate and regular rhythm.   Pulmonary/Chest: Effort normal.   Musculoskeletal: Normal range of motion.         General: Normal range of motion.   Neurological: She is alert and oriented to person, place, and time.   Skin: Skin is warm, dry, intact and no rash. Capillary refill takes less than 2 seconds. No abrasion, No " "burn, No bruising, No erythema and No ecchymosis        Psychiatric: Her speech is normal and behavior is normal. Judgment and thought content normal.   Nursing note and vitals reviewed.      Assessment:     1. Skin tear of left lower leg without complication, initial encounter        Plan:       Skin tear of left lower leg without complication, initial encounter  -     Laceration Repair        Laceration Repair    Date/Time: 2023 1:00 PM  Performed by: Donovan Nichols NP  Authorized by: Donovan Nichols NP   Consent Done: Yes  Consent: Verbal consent obtained.  Risks and benefits: risks, benefits and alternatives were discussed  Consent given by: patient  Patient understanding: patient states understanding of the procedure being performed  Patient consent: the patient's understanding of the procedure matches consent given  Procedure consent: procedure consent matches procedure scheduled  Patient identity confirmed:  and name  Time out: Immediately prior to procedure a "time out" was called to verify the correct patient, procedure, equipment, support staff and site/side marked as required.  Body area: lower extremity  Location details: left lower leg  Laceration length: 4 cm  Foreign bodies: no foreign bodies  Tendon involvement: none  Nerve involvement: none  Vascular damage: no  Anesthesia: see MAR for details (none)  Preparation: Patient was prepped and draped in the usual sterile fashion.  Irrigation solution: saline  Amount of cleaning: standard  Skin closure: Steri-Strips  Dressing: non-stick sterile dressing  Patient tolerance: Patient tolerated the procedure well with no immediate complications                  "

## 2023-05-12 NOTE — PROCEDURES
"Laceration Repair    Date/Time: 2023 1:00 PM  Performed by: Donovan Nichols NP  Authorized by: Donovan Nichols NP   Consent Done: Yes  Consent: Verbal consent obtained.  Risks and benefits: risks, benefits and alternatives were discussed  Consent given by: patient  Patient understanding: patient states understanding of the procedure being performed  Patient consent: the patient's understanding of the procedure matches consent given  Procedure consent: procedure consent matches procedure scheduled  Patient identity confirmed:  and name  Time out: Immediately prior to procedure a "time out" was called to verify the correct patient, procedure, equipment, support staff and site/side marked as required.  Body area: lower extremity  Location details: left lower leg  Laceration length: 4 cm  Foreign bodies: no foreign bodies  Tendon involvement: none  Nerve involvement: none  Vascular damage: no  Anesthesia: see MAR for details (none)  Preparation: Patient was prepped and draped in the usual sterile fashion.  Irrigation solution: saline  Amount of cleaning: standard  Skin closure: Steri-Strips  Dressing: non-stick sterile dressing  Patient tolerance: Patient tolerated the procedure well with no immediate complications      "

## 2023-05-22 RX ORDER — AMLODIPINE BESYLATE 5 MG/1
TABLET ORAL
Qty: 30 TABLET | Refills: 0 | Status: SHIPPED | OUTPATIENT
Start: 2023-05-22 | End: 2023-09-01 | Stop reason: SDUPTHER

## 2023-05-22 NOTE — TELEPHONE ENCOUNTER
Message sent to [facility] staff to schedule with new cardiologist. 1 month supply of medication requested until established elsewhere.

## 2023-06-14 ENCOUNTER — LAB VISIT (OUTPATIENT)
Dept: LAB | Facility: HOSPITAL | Age: 88
End: 2023-06-14
Attending: INTERNAL MEDICINE
Payer: MEDICARE

## 2023-06-14 ENCOUNTER — OFFICE VISIT (OUTPATIENT)
Dept: FAMILY MEDICINE | Facility: CLINIC | Age: 88
End: 2023-06-14
Payer: MEDICARE

## 2023-06-14 VITALS
OXYGEN SATURATION: 97 % | DIASTOLIC BLOOD PRESSURE: 76 MMHG | HEIGHT: 63 IN | HEART RATE: 60 BPM | TEMPERATURE: 98 F | SYSTOLIC BLOOD PRESSURE: 138 MMHG | BODY MASS INDEX: 23.91 KG/M2 | WEIGHT: 134.94 LBS

## 2023-06-14 DIAGNOSIS — K62.5 RECTAL BLEEDING: Primary | ICD-10-CM

## 2023-06-14 DIAGNOSIS — K59.00 CONSTIPATION, UNSPECIFIED CONSTIPATION TYPE: ICD-10-CM

## 2023-06-14 DIAGNOSIS — K62.5 RECTAL BLEEDING: ICD-10-CM

## 2023-06-14 LAB
BASOPHILS # BLD AUTO: 0.09 K/UL (ref 0–0.2)
BASOPHILS NFR BLD: 0.8 % (ref 0–1.9)
DIFFERENTIAL METHOD: ABNORMAL
EOSINOPHIL # BLD AUTO: 0.3 K/UL (ref 0–0.5)
EOSINOPHIL NFR BLD: 2.6 % (ref 0–8)
ERYTHROCYTE [DISTWIDTH] IN BLOOD BY AUTOMATED COUNT: 15.3 % (ref 11.5–14.5)
HCT VFR BLD AUTO: 39.9 % (ref 37–48.5)
HGB BLD-MCNC: 12.5 G/DL (ref 12–16)
IMM GRANULOCYTES # BLD AUTO: 0.04 K/UL (ref 0–0.04)
IMM GRANULOCYTES NFR BLD AUTO: 0.4 % (ref 0–0.5)
LYMPHOCYTES # BLD AUTO: 2.5 K/UL (ref 1–4.8)
LYMPHOCYTES NFR BLD: 22.9 % (ref 18–48)
MCH RBC QN AUTO: 29 PG (ref 27–31)
MCHC RBC AUTO-ENTMCNC: 31.3 G/DL (ref 32–36)
MCV RBC AUTO: 93 FL (ref 82–98)
MONOCYTES # BLD AUTO: 1.1 K/UL (ref 0.3–1)
MONOCYTES NFR BLD: 10.5 % (ref 4–15)
NEUTROPHILS # BLD AUTO: 6.7 K/UL (ref 1.8–7.7)
NEUTROPHILS NFR BLD: 62.8 % (ref 38–73)
NRBC BLD-RTO: 0 /100 WBC
PLATELET # BLD AUTO: 229 K/UL (ref 150–450)
PMV BLD AUTO: 11.9 FL (ref 9.2–12.9)
RBC # BLD AUTO: 4.31 M/UL (ref 4–5.4)
WBC # BLD AUTO: 10.71 K/UL (ref 3.9–12.7)

## 2023-06-14 PROCEDURE — 99999 PR PBB SHADOW E&M-EST. PATIENT-LVL III: CPT | Mod: PBBFAC,,, | Performed by: INTERNAL MEDICINE

## 2023-06-14 PROCEDURE — 1160F RVW MEDS BY RX/DR IN RCRD: CPT | Mod: CPTII,S$GLB,, | Performed by: INTERNAL MEDICINE

## 2023-06-14 PROCEDURE — 1101F PR PT FALLS ASSESS DOC 0-1 FALLS W/OUT INJ PAST YR: ICD-10-PCS | Mod: CPTII,S$GLB,, | Performed by: INTERNAL MEDICINE

## 2023-06-14 PROCEDURE — 99214 OFFICE O/P EST MOD 30 MIN: CPT | Mod: S$GLB,,, | Performed by: INTERNAL MEDICINE

## 2023-06-14 PROCEDURE — 1160F PR REVIEW ALL MEDS BY PRESCRIBER/CLIN PHARMACIST DOCUMENTED: ICD-10-PCS | Mod: CPTII,S$GLB,, | Performed by: INTERNAL MEDICINE

## 2023-06-14 PROCEDURE — 1101F PT FALLS ASSESS-DOCD LE1/YR: CPT | Mod: CPTII,S$GLB,, | Performed by: INTERNAL MEDICINE

## 2023-06-14 PROCEDURE — 85025 COMPLETE CBC W/AUTO DIFF WBC: CPT | Performed by: INTERNAL MEDICINE

## 2023-06-14 PROCEDURE — 99999 PR PBB SHADOW E&M-EST. PATIENT-LVL III: ICD-10-PCS | Mod: PBBFAC,,, | Performed by: INTERNAL MEDICINE

## 2023-06-14 PROCEDURE — 1159F PR MEDICATION LIST DOCUMENTED IN MEDICAL RECORD: ICD-10-PCS | Mod: CPTII,S$GLB,, | Performed by: INTERNAL MEDICINE

## 2023-06-14 PROCEDURE — 99214 PR OFFICE/OUTPT VISIT, EST, LEVL IV, 30-39 MIN: ICD-10-PCS | Mod: S$GLB,,, | Performed by: INTERNAL MEDICINE

## 2023-06-14 PROCEDURE — 1126F PR PAIN SEVERITY QUANTIFIED, NO PAIN PRESENT: ICD-10-PCS | Mod: CPTII,S$GLB,, | Performed by: INTERNAL MEDICINE

## 2023-06-14 PROCEDURE — 3288F PR FALLS RISK ASSESSMENT DOCUMENTED: ICD-10-PCS | Mod: CPTII,S$GLB,, | Performed by: INTERNAL MEDICINE

## 2023-06-14 PROCEDURE — 36415 COLL VENOUS BLD VENIPUNCTURE: CPT | Mod: PO | Performed by: INTERNAL MEDICINE

## 2023-06-14 PROCEDURE — 1126F AMNT PAIN NOTED NONE PRSNT: CPT | Mod: CPTII,S$GLB,, | Performed by: INTERNAL MEDICINE

## 2023-06-14 PROCEDURE — 3288F FALL RISK ASSESSMENT DOCD: CPT | Mod: CPTII,S$GLB,, | Performed by: INTERNAL MEDICINE

## 2023-06-14 PROCEDURE — 1159F MED LIST DOCD IN RCRD: CPT | Mod: CPTII,S$GLB,, | Performed by: INTERNAL MEDICINE

## 2023-06-14 NOTE — PROGRESS NOTES
Patient ID: Michela Calvert is a 89 y.o. female.    Chief Complaint: Rectal Bleeding (Lasting about a month )        Assessment and Plan      1. Rectal bleeding    2. Constipation, unspecified constipation type    Discussed options including colonoscopy with GI, anoscopy with General surgery versus a more conservative approach with treating constipation and monitoring.  Her and her son chose the more conservative approach as they do not want her to undergo anesthesia.  CBC today, continue Eliquis for now, MiraLax daily for a week then every other day for 1 soft bowel movement daily.  She will start eating prunes and stay better hydrated     HPI     Here with her son.  1 month history of blood on stool and pink toilet water. Never happened before this but she has been constipated on and off over the years. More recently has to strain more. Does not feel lumps on the outside. She is taking miralax for constipation. She usually waits until she has not had BM for 2 days.     Review of Systems   Constitutional:  Negative for fever.   Respiratory:  Negative for shortness of breath.    Cardiovascular:  Negative for chest pain.   Gastrointestinal:  Negative for abdominal pain.      Objective     Vitals:    06/14/23 0928   BP: 138/76   Pulse: 60   Temp: 97.8 °F (36.6 °C)     Wt Readings from Last 3 Encounters:   06/14/23 0928 61.2 kg (134 lb 14.7 oz)   05/12/23 1316 61.5 kg (135 lb 9.3 oz)   03/07/23 1308 61.5 kg (135 lb 9.3 oz)      Body mass index is 23.9 kg/m².   Physical Exam  Cardiovascular:      Rate and Rhythm: Normal rate and regular rhythm.      Heart sounds: No murmur heard.    No gallop.   Pulmonary:      Breath sounds: Normal breath sounds. No wheezing or rhonchi.   Abdominal:      Palpations: Abdomen is soft.      Tenderness: There is no abdominal tenderness.   Genitourinary:     Comments: No external hemorrhoids, digital rectal exam did not reveal any masses from where I could palpate, brown stool on glove  without  overt bleeding.       Orders     Michela was seen today for rectal bleeding.    Diagnoses and all orders for this visit:    Rectal bleeding  -     CBC Auto Differential; Future    Constipation, unspecified constipation type            Hypertension Medications               amLODIPine (NORVASC) 5 MG tablet Take 1 tablet (5 mg total) by mouth once daily. IN THE MORNING    metoprolol tartrate (LOPRESSOR) 25 MG tablet Take 1 tablet (25 mg total) by mouth 2 (two) times daily.              Medication List with Changes/Refills   Current Medications    ACETAMINOPHEN (TYLENOL) 650 MG TBSR    Take 650 mg by mouth every 8 (eight) hours as needed.    AMIODARONE (PACERONE) 200 MG TAB    TAKE ONE TABLET BY MOUTH three times a week(M-W-F) IN THE MORNING    AMLODIPINE (NORVASC) 5 MG TABLET    Take 1 tablet (5 mg total) by mouth once daily. IN THE MORNING    CALCIUM 600 WITH VITAMIN D3 600 MG-12.5 MCG (500 UNIT) CAP    TAKE ONE CAPSULE BY MOUTH ONCE DAILY IN THE EVENING    CLOBETASOL 0.05% (TEMOVATE) 0.05 % OINT    Apply daily to affected area    ELIQUIS 2.5 MG TAB    TAKE ONE TABLET BY MOUTH TWICE DAILY    FLUTICASONE PROPIONATE (FLONASE) 50 MCG/ACTUATION NASAL SPRAY    1 spray (50 mcg total) by Each Nostril route 2 (two) times a day.    HYDROCORTISONE 2.5 % CREAM    APPLY TO THE AFFECTED AREA(S) TWICE DAILY    LEVOTHYROXINE (SYNTHROID) 100 MCG TABLET    TAKE ONE TABLET BY MOUTH BEFORE BREAKFAST. Administer on an empty stomach at least 30 minutes before food    METOPROLOL TARTRATE (LOPRESSOR) 25 MG TABLET    Take 1 tablet (25 mg total) by mouth 2 (two) times daily.    PFIZER COVID BIVAL,12Y UP,,PF, 30 MCG/0.3 ML INJECTION        PRESERVISION AREDS-2 250-90-40-1 MG CAP    TAKE ONE CAPSULE BY MOUTH TWICE DAILY (In THE morning AND In THE evening)    TRIAMCINOLONE ACETONIDE 0.1% (KENALOG) 0.1 % CREAM           I personally reviewed past medical, family and social history.    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

## 2023-06-26 ENCOUNTER — OFFICE VISIT (OUTPATIENT)
Dept: CARDIOLOGY | Facility: CLINIC | Age: 88
End: 2023-06-26
Payer: MEDICARE

## 2023-06-26 VITALS
HEART RATE: 56 BPM | SYSTOLIC BLOOD PRESSURE: 155 MMHG | DIASTOLIC BLOOD PRESSURE: 73 MMHG | BODY MASS INDEX: 24.3 KG/M2 | WEIGHT: 137.13 LBS | HEIGHT: 63 IN

## 2023-06-26 DIAGNOSIS — I10 PRIMARY HYPERTENSION: ICD-10-CM

## 2023-06-26 DIAGNOSIS — I35.0 MODERATE AORTIC STENOSIS: ICD-10-CM

## 2023-06-26 DIAGNOSIS — Z79.899 LONG TERM CURRENT USE OF AMIODARONE: ICD-10-CM

## 2023-06-26 DIAGNOSIS — I48.0 PAROXYSMAL ATRIAL FIBRILLATION: Primary | ICD-10-CM

## 2023-06-26 PROCEDURE — 1159F PR MEDICATION LIST DOCUMENTED IN MEDICAL RECORD: ICD-10-PCS | Mod: CPTII,S$GLB,, | Performed by: INTERNAL MEDICINE

## 2023-06-26 PROCEDURE — 93010 ELECTROCARDIOGRAM REPORT: CPT | Mod: S$GLB,,, | Performed by: INTERNAL MEDICINE

## 2023-06-26 PROCEDURE — 99999 PR PBB SHADOW E&M-EST. PATIENT-LVL III: ICD-10-PCS | Mod: PBBFAC,,, | Performed by: INTERNAL MEDICINE

## 2023-06-26 PROCEDURE — 1159F MED LIST DOCD IN RCRD: CPT | Mod: CPTII,S$GLB,, | Performed by: INTERNAL MEDICINE

## 2023-06-26 PROCEDURE — 99499 RISK ADDL DX/OHS AUDIT: ICD-10-PCS | Mod: S$GLB,,, | Performed by: INTERNAL MEDICINE

## 2023-06-26 PROCEDURE — 3288F FALL RISK ASSESSMENT DOCD: CPT | Mod: CPTII,S$GLB,, | Performed by: INTERNAL MEDICINE

## 2023-06-26 PROCEDURE — 1101F PR PT FALLS ASSESS DOC 0-1 FALLS W/OUT INJ PAST YR: ICD-10-PCS | Mod: CPTII,S$GLB,, | Performed by: INTERNAL MEDICINE

## 2023-06-26 PROCEDURE — 99214 OFFICE O/P EST MOD 30 MIN: CPT | Mod: S$GLB,,, | Performed by: INTERNAL MEDICINE

## 2023-06-26 PROCEDURE — 99999 PR PBB SHADOW E&M-EST. PATIENT-LVL III: CPT | Mod: PBBFAC,,, | Performed by: INTERNAL MEDICINE

## 2023-06-26 PROCEDURE — 3288F PR FALLS RISK ASSESSMENT DOCUMENTED: ICD-10-PCS | Mod: CPTII,S$GLB,, | Performed by: INTERNAL MEDICINE

## 2023-06-26 PROCEDURE — 99499 UNLISTED E&M SERVICE: CPT | Mod: S$GLB,,, | Performed by: INTERNAL MEDICINE

## 2023-06-26 PROCEDURE — 1126F PR PAIN SEVERITY QUANTIFIED, NO PAIN PRESENT: ICD-10-PCS | Mod: CPTII,S$GLB,, | Performed by: INTERNAL MEDICINE

## 2023-06-26 PROCEDURE — 1126F AMNT PAIN NOTED NONE PRSNT: CPT | Mod: CPTII,S$GLB,, | Performed by: INTERNAL MEDICINE

## 2023-06-26 PROCEDURE — 1101F PT FALLS ASSESS-DOCD LE1/YR: CPT | Mod: CPTII,S$GLB,, | Performed by: INTERNAL MEDICINE

## 2023-06-26 PROCEDURE — 99214 PR OFFICE/OUTPT VISIT, EST, LEVL IV, 30-39 MIN: ICD-10-PCS | Mod: S$GLB,,, | Performed by: INTERNAL MEDICINE

## 2023-06-26 PROCEDURE — 93010 EKG 12-LEAD: ICD-10-PCS | Mod: S$GLB,,, | Performed by: INTERNAL MEDICINE

## 2023-06-26 PROCEDURE — 93005 ELECTROCARDIOGRAM TRACING: CPT | Mod: PO

## 2023-06-26 RX ORDER — AMIODARONE HYDROCHLORIDE 100 MG/1
100 TABLET ORAL DAILY
Qty: 90 TABLET | Refills: 1 | Status: SHIPPED | OUTPATIENT
Start: 2023-06-26 | End: 2023-08-15

## 2023-06-26 NOTE — PATIENT INSTRUCTIONS
Decrease amiodarone to 100 mg three times weekly (instead of 200 mg)  Continue everything else as is  Echocardiogram  Return in 2-3 months to discuss Watchman

## 2023-06-26 NOTE — PROGRESS NOTES
Cardiology Clinic Note      Patient: Michela Calvert, 11/3/1929, [id number]  Primary Care Provider: Enzo Nieto MD     Chief Complaint/Reason for Referral: AF, AS     Subjective:       Michela Calvert is a 89 y.o. female who presents for establishment of care. They are accompanied by her son Juan José.    No falls or syncope. Walks with a walker. No pathologic bleeding. Some bruising. Compliant with Eliquis. Breathing is fine. No CP. No edema. No orthopnea. No palpitations.     Focused Past History includes:  PAF on apixaban 2.5 - on amiodarone for since 2018. TFTs and LFTs earlier this year, CXR 12/2022.   AS  TTE 5/2021: EF 60%, AS (2.6/15/0.51/1.52) by report. PASP 32  HTN  CKD3a with C 1.1  Carotid plaque on US 2/2019    Review of Systems  Constitutional: negative for fevers, night sweats, and weight loss  Eyes: negative for visual disturbance, diplopia  Respiratory: negative for cough, hemoptysis, sputum, and wheezing  Cardiovascular: see HPI  Gastrointestinal: negative for abdominal pain, bright red blood per rectum, change in bowel habits, dysphagia, melena, and reflux symptoms  Genitourinary:negative for dysuria, frequency, and hematuria  Hematologic/lymphatic: negative for bleeding, easy bruising, and lymphadenopathy  Musculoskeletal:negative for arthralgias, back pain, and myalgias  Neurological: negative for gait problems, paresthesia, speech problems, vertigo, and weakness  Behavioral/Psych: negative for excessive alcohol consumption, illegal drug usage, and sleep disturbance    ----------------------------  Atrial fibrillation  Cataracts, bilateral  Chronic pain syndrome  Closed fracture of neck of left femur  DDD (degenerative disc disease), lumbar  DDD (degenerative disc disease), lumbar  Heart murmur  HLD (hyperlipidemia)      Comment:  no medication taken  Hypertension  Hypothyroidism  Lichen sclerosus et atrophicus      Comment:  Rectal/Vaginal areas  Lumbar spinal stenosis  Lumbar  spondylosis  Mitral valve disorder      Comment:  Asymptomatic  Osteopenia  Urinary incontinence  ----------------------------  Appendectomy  Cataract extraction extracapsular w/ intraocular lens implantation  Hemiarthroplasty of hip      Comment:  Procedure: HEMIARTHROPLASTY, HIP;  Surgeon: Josesito Leo MD;  [facility] OR;  Service: Orthopedics;                 Laterality: Left;  Hysterectomy  Insertion of dorsal column nerve stimulator for trial      Comment:  Procedure: INSERTION, DORSAL COLUMN NEUROSTIMULATOR, FOR               TRIAL;  Surgeon: Derek Daily MD;  Location: [facility]                OR;  Service: Pain Management;  Laterality: N/A;  Spinal ablation  Tonsillectomy     Family History   Problem Relation Age of Onset    Colon cancer Neg Hx     Crohn's disease Neg Hx     Stomach cancer Neg Hx     Ulcerative colitis Neg Hx     Esophageal cancer Neg Hx      Social History     Tobacco Use    Smoking status: Never    Smokeless tobacco: Never   Substance Use Topics    Alcohol use: Not Currently     Alcohol/week: 1.0 standard drink     Types: 1 Shots of liquor per week    Drug use: No       Current Outpatient Medications   Medication Sig Dispense Refill    acetaminophen (TYLENOL) 650 MG TbSR Take 650 mg by mouth every 8 (eight) hours as needed.      amLODIPine (NORVASC) 5 MG tablet Take 1 tablet (5 mg total) by mouth once daily. IN THE MORNING 30 tablet 0    CALCIUM 600 WITH VITAMIN D3 600 mg-12.5 mcg (500 unit) Cap TAKE ONE CAPSULE BY MOUTH ONCE DAILY IN THE EVENING 180 capsule 3    clobetasol 0.05% (TEMOVATE) 0.05 % Oint Apply daily to affected area      ELIQUIS 2.5 mg Tab TAKE ONE TABLET BY MOUTH TWICE DAILY 56 tablet 6    fluticasone propionate (FLONASE) 50 mcg/actuation nasal spray 1 spray (50 mcg total) by Each Nostril route 2 (two) times a day. 16 g 0    hydrocortisone 2.5 % cream APPLY TO THE AFFECTED AREA(S) TWICE DAILY 84 g 2    levothyroxine (SYNTHROID) 100 MCG tablet TAKE ONE  "TABLET BY MOUTH BEFORE BREAKFAST. Administer on an empty stomach at least 30 minutes before food 90 tablet 3    metoprolol tartrate (LOPRESSOR) 25 MG tablet Take 1 tablet (25 mg total) by mouth 2 (two) times daily. 180 tablet 3    PFIZER COVID BIVAL,12Y UP,,PF, 30 mcg/0.3 mL injection       PRESERVISION AREDS-2 250-90-40-1 mg Cap TAKE ONE CAPSULE BY MOUTH TWICE DAILY (In THE morning AND In THE evening) 180 capsule 3    triamcinolone acetonide 0.1% (KENALOG) 0.1 % cream       amiodarone (PACERONE) 100 MG Tab Take 1 tablet (100 mg total) by mouth once daily. TAKE ONE TABLET BY MOUTH three times a week(M-W-F) IN THE MORNING 90 tablet 1     No current facility-administered medications for this visit.        Objective:      Physical Exam  BP (!) 155/73 (BP Location: Right arm, Patient Position: Sitting, BP Method: Medium (Automatic))   Pulse (!) 56   Ht 5' 3" (1.6 m)   Wt 62.2 kg (137 lb 2 oz)   BMI 24.29 kg/m²   Body surface area is 1.66 meters squared.  Body mass index is 24.29 kg/m².    General appearance: alert, appears stated age, cooperative, and no distress  Head: Normocephalic, without obvious abnormality, atraumatic  Neck: no carotid bruit, no JVD, and supple, symmetrical, trachea midline  Lungs: clear to auscultation bilaterally  Heart: regular rate and rhythm; 3/6 mid to late peaking NANCY URSB with diminished S2  Abdomen: soft, non-tender, no distended  Extremities: extremities atraumatic, no pitting edema  Skin: warm, no cyanosis, no pathologic ecchymosis in exposed portions  Neurologic: Grossly normal. A&O x3      Lab Review   Lab Results   Component Value Date    WBC 10.71 06/14/2023    HGB 12.5 06/14/2023    HCT 39.9 06/14/2023    MCV 93 06/14/2023     06/14/2023         BMP  Lab Results   Component Value Date     02/28/2023    K 4.6 02/28/2023     02/28/2023    CO2 25 02/28/2023    BUN 18 02/28/2023    CREATININE 1.1 02/28/2023    CALCIUM 9.6 02/28/2023    ANIONGAP 9 02/28/2023    " ESTGFRAFRICA 56.9 (A) 05/19/2021    EGFRNONAA 49.4 (A) 05/19/2021       Lab Results   Component Value Date    LABPROT 10.0 05/18/2015    ALBUMIN 3.6 02/28/2023       Lab Results   Component Value Date    ALT 9 (L) 02/28/2023    AST 19 02/28/2023    ALKPHOS 80 02/28/2023    BILITOT 0.4 02/28/2023       Lab Results   Component Value Date    TSH 3.082 02/28/2023       Lab Results   Component Value Date    CHOL 215 (H) 02/28/2023    CHOL 210 (H) 05/19/2021    CHOL 212 (H) 11/05/2019     Lab Results   Component Value Date    HDL 53 02/28/2023    HDL 63 05/19/2021    HDL 68 11/05/2019     Lab Results   Component Value Date    LDLCALC 145.0 02/28/2023    LDLCALC 133.8 05/19/2021    LDLCALC 130.8 11/05/2019     Lab Results   Component Value Date    TRIG 85 02/28/2023    TRIG 66 05/19/2021    TRIG 66 11/05/2019     Lab Results   Component Value Date    CHOLHDL 24.7 02/28/2023    CHOLHDL 30.0 05/19/2021    CHOLHDL 32.1 11/05/2019       EKG today:  Nsr, first degree AVB   Assessment & Plan:      This is a 89 y.o. pleasant and very functional 89 yo WM with PAF on amiodarone and low dose apixaban and aortic stenosis that on exam sounds in the high moderate range. Asymptomatic but with some bruising and balance issues - walks with a walker     1. Paroxysmal atrial fibrillation  IN OFFICE EKG 12-LEAD (to Muse)      2. Moderate aortic stenosis  Echo      3. Primary hypertension        4. Long term current use of amiodarone             Decrease amio to 100 mg TIW from 200 TIW  TFTs, LFTs as above, CXR as above. Recommended she get an eye exam.  Continue metop 25 BID, apixaban 2.5 BID, amlodipine 5  TTE for AS  Briefly touched on LAAO with Watchman and gave literature to read. Will discuss more next visit.    I appreciate the opportunity to participate in Michela Calvert 's care today.  Please follow up with me in 2 months.      Sybil Aguilar MD, FACC  Interventional Cardiology/Structural Heart Disease  [facility]  [facility]  Office: [phone]     Parts of this note were completed using voice recognition software. Please excuse any misspellings or syntax errors and reach out to me with questions.

## 2023-08-15 ENCOUNTER — OFFICE VISIT (OUTPATIENT)
Dept: FAMILY MEDICINE | Facility: CLINIC | Age: 88
End: 2023-08-15
Payer: MEDICARE

## 2023-08-15 ENCOUNTER — CLINICAL SUPPORT (OUTPATIENT)
Dept: CARDIOLOGY | Facility: HOSPITAL | Age: 88
End: 2023-08-15
Attending: INTERNAL MEDICINE
Payer: MEDICARE

## 2023-08-15 VITALS
HEIGHT: 63 IN | HEART RATE: 68 BPM | BODY MASS INDEX: 24.42 KG/M2 | TEMPERATURE: 99 F | SYSTOLIC BLOOD PRESSURE: 132 MMHG | WEIGHT: 137.81 LBS | DIASTOLIC BLOOD PRESSURE: 66 MMHG | RESPIRATION RATE: 18 BRPM | OXYGEN SATURATION: 98 %

## 2023-08-15 VITALS — WEIGHT: 137 LBS | BODY MASS INDEX: 24.27 KG/M2 | HEIGHT: 63 IN

## 2023-08-15 DIAGNOSIS — I10 PRIMARY HYPERTENSION: Primary | ICD-10-CM

## 2023-08-15 DIAGNOSIS — I35.0 MODERATE AORTIC STENOSIS: ICD-10-CM

## 2023-08-15 DIAGNOSIS — E03.9 HYPOTHYROIDISM, UNSPECIFIED TYPE: ICD-10-CM

## 2023-08-15 DIAGNOSIS — I48.0 PAROXYSMAL ATRIAL FIBRILLATION: ICD-10-CM

## 2023-08-15 PROCEDURE — 1159F PR MEDICATION LIST DOCUMENTED IN MEDICAL RECORD: ICD-10-PCS | Mod: HCNC,CPTII,S$GLB, | Performed by: FAMILY MEDICINE

## 2023-08-15 PROCEDURE — 1126F PR PAIN SEVERITY QUANTIFIED, NO PAIN PRESENT: ICD-10-PCS | Mod: HCNC,CPTII,S$GLB, | Performed by: FAMILY MEDICINE

## 2023-08-15 PROCEDURE — 99214 PR OFFICE/OUTPT VISIT, EST, LEVL IV, 30-39 MIN: ICD-10-PCS | Mod: HCNC,S$GLB,, | Performed by: FAMILY MEDICINE

## 2023-08-15 PROCEDURE — 1159F MED LIST DOCD IN RCRD: CPT | Mod: HCNC,CPTII,S$GLB, | Performed by: FAMILY MEDICINE

## 2023-08-15 PROCEDURE — 93306 TTE W/DOPPLER COMPLETE: CPT | Mod: 26,HCNC,, | Performed by: INTERNAL MEDICINE

## 2023-08-15 PROCEDURE — 1126F AMNT PAIN NOTED NONE PRSNT: CPT | Mod: HCNC,CPTII,S$GLB, | Performed by: FAMILY MEDICINE

## 2023-08-15 PROCEDURE — 93306 TTE W/DOPPLER COMPLETE: CPT | Mod: HCNC,PO

## 2023-08-15 PROCEDURE — 1101F PR PT FALLS ASSESS DOC 0-1 FALLS W/OUT INJ PAST YR: ICD-10-PCS | Mod: HCNC,CPTII,S$GLB, | Performed by: FAMILY MEDICINE

## 2023-08-15 PROCEDURE — 99999 PR PBB SHADOW E&M-EST. PATIENT-LVL III: CPT | Mod: PBBFAC,HCNC,, | Performed by: FAMILY MEDICINE

## 2023-08-15 PROCEDURE — 3288F PR FALLS RISK ASSESSMENT DOCUMENTED: ICD-10-PCS | Mod: HCNC,CPTII,S$GLB, | Performed by: FAMILY MEDICINE

## 2023-08-15 PROCEDURE — 3288F FALL RISK ASSESSMENT DOCD: CPT | Mod: HCNC,CPTII,S$GLB, | Performed by: FAMILY MEDICINE

## 2023-08-15 PROCEDURE — 99214 OFFICE O/P EST MOD 30 MIN: CPT | Mod: HCNC,S$GLB,, | Performed by: FAMILY MEDICINE

## 2023-08-15 PROCEDURE — 93306 ECHO (CUPID ONLY): ICD-10-PCS | Mod: 26,HCNC,, | Performed by: INTERNAL MEDICINE

## 2023-08-15 PROCEDURE — 99999 PR PBB SHADOW E&M-EST. PATIENT-LVL III: ICD-10-PCS | Mod: PBBFAC,HCNC,, | Performed by: FAMILY MEDICINE

## 2023-08-15 PROCEDURE — 1101F PT FALLS ASSESS-DOCD LE1/YR: CPT | Mod: HCNC,CPTII,S$GLB, | Performed by: FAMILY MEDICINE

## 2023-08-15 RX ORDER — AMIODARONE HYDROCHLORIDE 200 MG/1
100 TABLET ORAL
COMMUNITY
Start: 2023-07-25 | End: 2024-01-12 | Stop reason: SDUPTHER

## 2023-08-15 NOTE — PROGRESS NOTES
Subjective:       Patient ID: Michela Calvert is a 89 y.o. female.    Chief Complaint: Follow-up    Patient presents with:  Follow-up    Here for  f/u on chronic health issues.  Overall feeling well.    Recent rectal bleeding issue has resolved.    S/p HEMIARTHROPLASTY, HIP (Left)  - following with Dr. Leo  She is living at the [facility].  Using walker for ambulation  Aid helps with showering twice a week  Afib, PVD - rate- controlled on pacerone, Eliquis 2.5mg BID, Lopresor 25mg BID; taking asa 81mg daily; following with cardiology every 6 months  Hypothyroidism - tolerating Synthroid 100mcg daily  Lumbar DDD - using tylenol as needed  HTN - taking amlodipine 5mg daily     Exercising twice a week in class.          Past Medical History:   Diagnosis Date    Atrial fibrillation     Cataracts, bilateral     Chronic pain syndrome 7/25/2018    Closed fracture of neck of left femur 3/8/2019    DDD (degenerative disc disease), lumbar     DDD (degenerative disc disease), lumbar     Heart murmur 01/2017    HLD (hyperlipidemia)     no medication taken    Hypertension 12/27/2018    Hypothyroidism     Lichen sclerosus et atrophicus     Rectal/Vaginal areas    Lumbar spinal stenosis     Lumbar spondylosis     Mitral valve disorder 01/2017    Asymptomatic    Osteopenia     Urinary incontinence        Past Surgical History:   Procedure Laterality Date    APPENDECTOMY      CATARACT EXTRACTION EXTRACAPSULAR W/ INTRAOCULAR LENS IMPLANTATION Bilateral     HEMIARTHROPLASTY OF HIP Left 3/9/2019    Procedure: HEMIARTHROPLASTY, HIP;  Surgeon: Josesito Leo MD;  Location: [facility] OR;  Service: Orthopedics;  Laterality: Left;    HYSTERECTOMY      INSERTION OF DORSAL COLUMN NERVE STIMULATOR FOR TRIAL N/A 7/25/2018    Procedure: INSERTION, DORSAL COLUMN NEUROSTIMULATOR, FOR TRIAL;  Surgeon: Derek Daily MD;  Location: [facility] OR;  Service: Pain Management;  Laterality: N/A;    spinal ablation  12/2017    TONSILLECTOMY         Review of  patient's allergies indicates:   Allergen Reactions    Nitrofuran analogues Nausea Only    Ciprofloxacin Nausea And Vomiting       Social History     Socioeconomic History    Marital status:    Tobacco Use    Smoking status: Never    Smokeless tobacco: Never   Substance and Sexual Activity    Alcohol use: Not Currently     Alcohol/week: 1.0 standard drink of alcohol     Types: 1 Shots of liquor per week    Drug use: No    Sexual activity: Never     Social Determinants of Health     Financial Resource Strain: Low Risk  (7/15/2021)    Overall Financial Resource Strain (CARDIA)     Difficulty of Paying Living Expenses: Not hard at all   Food Insecurity: No Food Insecurity (7/15/2021)    Hunger Vital Sign     Worried About Running Out of Food in the Last Year: Never true     Ran Out of Food in the Last Year: Never true   Transportation Needs: No Transportation Needs (7/15/2021)    PRAPARE - Transportation     Lack of Transportation (Medical): No     Lack of Transportation (Non-Medical): No   Physical Activity: Unknown (7/15/2021)    Exercise Vital Sign     Days of Exercise per Week: 3 days   Stress: Stress Concern Present (7/15/2021)    [employer] - Occupational Stress Questionnaire     Feeling of Stress : To some extent   Social Connections: Moderately Integrated (7/15/2021)    Social Connection and Isolation Panel [NHANES]     Frequency of Communication with Friends and Family: More than three times a week     Frequency of Social Gatherings with Friends and Family: Twice a week     Attends [employer] Services: More than 4 times per year     Active Member of Clubs or Organizations: No     Attends Club or Organization Meetings: Never     Marital Status:        Current Outpatient Medications on File Prior to Visit   Medication Sig Dispense Refill    acetaminophen (TYLENOL) 650 MG TbSR Take 650 mg by mouth every 8 (eight) hours as needed.      amiodarone (PACERONE) 200 MG Tab  Take 200 mg by mouth 3 (three) times a week.      amLODIPine (NORVASC) 5 MG tablet Take 1 tablet (5 mg total) by mouth once daily. IN THE MORNING 30 tablet 0    CALCIUM 600 WITH VITAMIN D3 600 mg-12.5 mcg (500 unit) Cap TAKE ONE CAPSULE BY MOUTH ONCE DAILY IN THE EVENING 180 capsule 3    clobetasol 0.05% (TEMOVATE) 0.05 % Oint Apply daily to affected area      ELIQUIS 2.5 mg Tab TAKE ONE TABLET BY MOUTH TWICE DAILY 56 tablet 6    fluticasone propionate (FLONASE) 50 mcg/actuation nasal spray 1 spray (50 mcg total) by Each Nostril route 2 (two) times a day. 16 g 0    hydrocortisone 2.5 % cream APPLY TO THE AFFECTED AREA(S) TWICE DAILY 84 g 2    levothyroxine (SYNTHROID) 100 MCG tablet TAKE ONE TABLET BY MOUTH BEFORE BREAKFAST. Administer on an empty stomach at least 30 minutes before food 90 tablet 3    metoprolol tartrate (LOPRESSOR) 25 MG tablet Take 1 tablet (25 mg total) by mouth 2 (two) times daily. 180 tablet 3    PRESERVISION AREDS-2 250-90-40-1 mg Cap TAKE ONE CAPSULE BY MOUTH TWICE DAILY (In THE morning AND In THE evening) 180 capsule 3    PFIZER COVID BIVAL,12Y UP,,PF, 30 mcg/0.3 mL injection       triamcinolone acetonide 0.1% (KENALOG) 0.1 % cream       [DISCONTINUED] amiodarone (PACERONE) 100 MG Tab Take 1 tablet (100 mg total) by mouth once daily. TAKE ONE TABLET BY MOUTH three times a week(M-W-F) IN THE MORNING 90 tablet 1     No current facility-administered medications on file prior to visit.       Family History   Problem Relation Age of Onset    Colon cancer Neg Hx     Crohn's disease Neg Hx     Stomach cancer Neg Hx     Ulcerative colitis Neg Hx     Esophageal cancer Neg Hx        Review of Systems   Constitutional:  Positive for fatigue. Negative for appetite change, chills, fever and unexpected weight change.   HENT:  Negative for sore throat and trouble swallowing.    Eyes:  Negative for pain and visual disturbance.   Respiratory:  Negative for cough, shortness of breath and wheezing.   "  Cardiovascular:  Positive for palpitations. Negative for chest pain.   Gastrointestinal:  Negative for abdominal pain, blood in stool, diarrhea, nausea and vomiting.   Genitourinary:  Negative for difficulty urinating, dysuria and hematuria.   Musculoskeletal:  Positive for arthralgias (left hip) and gait problem. Negative for neck pain.   Skin:  Negative for rash and wound.   Neurological:  Positive for weakness. Negative for dizziness, numbness and headaches.   Hematological:  Negative for adenopathy.   Psychiatric/Behavioral:  Negative for dysphoric mood.        Objective:      /66   Pulse 68   Temp 98.6 °F (37 °C) (Oral)   Resp 18   Ht 5' 3" (1.6 m)   Wt 62.5 kg (137 lb 12.6 oz)   SpO2 98%   BMI 24.41 kg/m²   Physical Exam  Constitutional:       Appearance: Normal appearance. She is well-developed.   HENT:      Head: Normocephalic.      Mouth/Throat:      Pharynx: No oropharyngeal exudate or posterior oropharyngeal erythema.   Eyes:      Conjunctiva/sclera: Conjunctivae normal.      Pupils: Pupils are equal, round, and reactive to light.   Neck:      Thyroid: No thyromegaly.   Cardiovascular:      Rate and Rhythm: Normal rate and regular rhythm.      Heart sounds: Normal heart sounds, S1 normal and S2 normal. No murmur heard.     No friction rub. No gallop.   Pulmonary:      Effort: Pulmonary effort is normal.      Breath sounds: Normal breath sounds. No wheezing or rales.   Musculoskeletal:      Cervical back: Normal range of motion and neck supple.      Right lower leg: No edema.      Left lower leg: No edema.   Lymphadenopathy:      Cervical: No cervical adenopathy.   Skin:     General: Skin is warm.      Findings: No rash.   Neurological:      Mental Status: She is alert and oriented to person, place, and time.      Cranial Nerves: No cranial nerve deficit.      Gait: Gait normal.         Results for orders placed or performed in visit on 06/14/23   CBC Auto Differential   Result Value Ref " Range    WBC 10.71 3.90 - 12.70 K/uL    RBC 4.31 4.00 - 5.40 M/uL    Hemoglobin 12.5 12.0 - 16.0 g/dL    Hematocrit 39.9 37.0 - 48.5 %    MCV 93 82 - 98 fL    MCH 29.0 27.0 - 31.0 pg    MCHC 31.3 (L) 32.0 - 36.0 g/dL    RDW 15.3 (H) 11.5 - 14.5 %    Platelets 229 150 - 450 K/uL    MPV 11.9 9.2 - 12.9 fL    Immature Granulocytes 0.4 0.0 - 0.5 %    Gran # (ANC) 6.7 1.8 - 7.7 K/uL    Immature Grans (Abs) 0.04 0.00 - 0.04 K/uL    Lymph # 2.5 1.0 - 4.8 K/uL    Mono # 1.1 (H) 0.3 - 1.0 K/uL    Eos # 0.3 0.0 - 0.5 K/uL    Baso # 0.09 0.00 - 0.20 K/uL    nRBC 0 0 /100 WBC    Gran % 62.8 38.0 - 73.0 %    Lymph % 22.9 18.0 - 48.0 %    Mono % 10.5 4.0 - 15.0 %    Eosinophil % 2.6 0.0 - 8.0 %    Basophil % 0.8 0.0 - 1.9 %    Differential Method Automated          Assessment:       1. Primary hypertension    2. Paroxysmal atrial fibrillation    3. Hypothyroidism, unspecified type            Plan:       Primary hypertension  -     Comprehensive Metabolic Panel; Future; Expected date: 02/11/2024  -     Lipid Panel; Future; Expected date: 02/11/2024    Paroxysmal atrial fibrillation  -     CBC Auto Differential; Future; Expected date: 02/11/2024    Hypothyroidism, unspecified type  -     TSH; Future; Expected date: 02/11/2024            Ok to use Tylenol QHS  Overall stable  Continue present medications  F/u with specialists as planned  Counseled on regular exercise, maintenance of a healthy weight, balanced diet rich in fruits/vegetables and lean protein, and avoidance of unhealthy habits like smoking and excessive alcohol intake.  F/u 7 months with labs or PRN

## 2023-08-16 DIAGNOSIS — E03.4 HYPOTHYROIDISM DUE TO ACQUIRED ATROPHY OF THYROID: ICD-10-CM

## 2023-08-16 LAB
ASCENDING AORTA: 3.16 CM
AV INDEX (PROSTH): 0.48
AV MEAN GRADIENT: 16 MMHG
AV PEAK GRADIENT: 28 MMHG
AV VALVE AREA BY VELOCITY RATIO: 1.5 CM²
AV VALVE AREA: 1.61 CM²
AV VELOCITY RATIO: 0.45
BSA FOR ECHO PROCEDURE: 1.66 M2
CV ECHO LV RWT: 0.55 CM
DOP CALC AO PEAK VEL: 2.66 M/S
DOP CALC AO VTI: 68.1 CM
DOP CALC LVOT AREA: 3.4 CM2
DOP CALC LVOT DIAMETER: 2.07 CM
DOP CALC LVOT PEAK VEL: 1.19 M/S
DOP CALC LVOT STROKE VOLUME: 109.65 CM3
DOP CALC MV VTI: 64 CM
DOP CALCLVOT PEAK VEL VTI: 32.6 CM
E WAVE DECELERATION TIME: 374.36 MSEC
E/A RATIO: 1.11
E/E' RATIO: 41.56 M/S
ECHO LV POSTERIOR WALL: 1.08 CM (ref 0.6–1.1)
FRACTIONAL SHORTENING: 39 % (ref 28–44)
HR MV ECHO: 62 BPM
INTERVENTRICULAR SEPTUM: 1.11 CM (ref 0.6–1.1)
LA MAJOR: 6.09 CM
LA MINOR: 5.54 CM
LA WIDTH: 4.6 CM
LEFT ATRIUM SIZE: 4.79 CM
LEFT ATRIUM VOLUME INDEX: 65.9 ML/M2
LEFT ATRIUM VOLUME: 108.66 CM3
LEFT INTERNAL DIMENSION IN SYSTOLE: 2.39 CM (ref 2.1–4)
LEFT VENTRICLE DIASTOLIC VOLUME INDEX: 39.98 ML/M2
LEFT VENTRICLE DIASTOLIC VOLUME: 65.96 ML
LEFT VENTRICLE MASS INDEX: 84 G/M2
LEFT VENTRICLE SYSTOLIC VOLUME INDEX: 12.1 ML/M2
LEFT VENTRICLE SYSTOLIC VOLUME: 20.03 ML
LEFT VENTRICULAR INTERNAL DIMENSION IN DIASTOLE: 3.9 CM (ref 3.5–6)
LEFT VENTRICULAR MASS: 139.17 G
LV LATERAL E/E' RATIO: 46.75 M/S
LV SEPTAL E/E' RATIO: 37.4 M/S
LVOT MG: 2.95 MMHG
LVOT MV: 0.81 CM/S
MV MEAN GRADIENT: 6 MMHG
MV PEAK A VEL: 1.69 M/S
MV PEAK E VEL: 1.87 M/S
MV PEAK GRADIENT: 12 MMHG
MV VALVE AREA BY CONTINUITY EQUATION: 1.71 CM2
PISA TR MAX VEL: 3.22 M/S
PULM VEIN S/D RATIO: 1.05
PV PEAK D VEL: 0.65 M/S
PV PEAK S VEL: 0.68 M/S
RA MAJOR: 5.56 CM
RA PRESSURE ESTIMATED: 3 MMHG
RIGHT VENTRICULAR END-DIASTOLIC DIMENSION: 3.51 CM
RIGHT VENTRICULAR LENGTH IN DIASTOLE (APICAL 4-CHAMBER VIEW): 6.47 CM
RV MID DIAMA: 2.62 CM
RV TB RVSP: 6 MMHG
RV TISSUE DOPPLER FREE WALL SYSTOLIC VELOCITY 1 (APICAL 4 CHAMBER VIEW): 17 CM/S
SINUS: 2.92 CM
STJ: 2.87 CM
TDI LATERAL: 0.04 M/S
TDI SEPTAL: 0.05 M/S
TDI: 0.05 M/S
TR MAX PG: 41 MMHG
TRICUSPID ANNULAR PLANE SYSTOLIC EXCURSION: 2.41 CM
TV REST PULMONARY ARTERY PRESSURE: 44 MMHG
Z-SCORE OF LEFT VENTRICULAR DIMENSION IN END DIASTOLE: -1.72
Z-SCORE OF LEFT VENTRICULAR DIMENSION IN END SYSTOLE: -1.45

## 2023-08-16 RX ORDER — LEVOTHYROXINE SODIUM 100 UG/1
TABLET ORAL
Qty: 90 TABLET | Refills: 1 | Status: SHIPPED | OUTPATIENT
Start: 2023-08-16 | End: 2024-02-26

## 2023-08-16 NOTE — TELEPHONE ENCOUNTER
Refill Decision Note   Michela Whiteaux  is requesting a refill authorization.  Brief Assessment and Rationale for Refill:  Approve     Medication Therapy Plan:       Medication Reconciliation Completed: No   Comments:     No Care Gaps recommended.     Note composed:4:16 PM 08/16/2023

## 2023-08-16 NOTE — TELEPHONE ENCOUNTER
No care due was identified.  [facility] Embedded Care Due Messages. Reference number: [id number].   8/16/2023 4:08:21 PM CDT

## 2023-09-01 ENCOUNTER — OFFICE VISIT (OUTPATIENT)
Dept: CARDIOLOGY | Facility: CLINIC | Age: 88
End: 2023-09-01
Payer: MEDICARE

## 2023-09-01 VITALS
HEIGHT: 63 IN | DIASTOLIC BLOOD PRESSURE: 66 MMHG | SYSTOLIC BLOOD PRESSURE: 130 MMHG | HEART RATE: 58 BPM | WEIGHT: 139.31 LBS | BODY MASS INDEX: 24.68 KG/M2

## 2023-09-01 DIAGNOSIS — I10 PRIMARY HYPERTENSION: ICD-10-CM

## 2023-09-01 DIAGNOSIS — I35.0 MODERATE AORTIC STENOSIS: ICD-10-CM

## 2023-09-01 DIAGNOSIS — Z79.899 LONG TERM CURRENT USE OF AMIODARONE: ICD-10-CM

## 2023-09-01 DIAGNOSIS — I48.0 PAROXYSMAL ATRIAL FIBRILLATION: Primary | ICD-10-CM

## 2023-09-01 PROCEDURE — 93005 ELECTROCARDIOGRAM TRACING: CPT | Mod: HCNC,PO

## 2023-09-01 PROCEDURE — 99999 PR PBB SHADOW E&M-EST. PATIENT-LVL III: ICD-10-PCS | Mod: PBBFAC,HCNC,, | Performed by: INTERNAL MEDICINE

## 2023-09-01 PROCEDURE — 99999 PR PBB SHADOW E&M-EST. PATIENT-LVL III: CPT | Mod: PBBFAC,HCNC,, | Performed by: INTERNAL MEDICINE

## 2023-09-01 PROCEDURE — 1126F PR PAIN SEVERITY QUANTIFIED, NO PAIN PRESENT: ICD-10-PCS | Mod: HCNC,CPTII,S$GLB, | Performed by: INTERNAL MEDICINE

## 2023-09-01 PROCEDURE — 1159F MED LIST DOCD IN RCRD: CPT | Mod: HCNC,CPTII,S$GLB, | Performed by: INTERNAL MEDICINE

## 2023-09-01 PROCEDURE — 93010 EKG 12-LEAD: ICD-10-PCS | Mod: HCNC,S$GLB,, | Performed by: INTERNAL MEDICINE

## 2023-09-01 PROCEDURE — 1126F AMNT PAIN NOTED NONE PRSNT: CPT | Mod: HCNC,CPTII,S$GLB, | Performed by: INTERNAL MEDICINE

## 2023-09-01 PROCEDURE — 3288F FALL RISK ASSESSMENT DOCD: CPT | Mod: HCNC,CPTII,S$GLB, | Performed by: INTERNAL MEDICINE

## 2023-09-01 PROCEDURE — 1159F PR MEDICATION LIST DOCUMENTED IN MEDICAL RECORD: ICD-10-PCS | Mod: HCNC,CPTII,S$GLB, | Performed by: INTERNAL MEDICINE

## 2023-09-01 PROCEDURE — 99214 PR OFFICE/OUTPT VISIT, EST, LEVL IV, 30-39 MIN: ICD-10-PCS | Mod: HCNC,S$GLB,, | Performed by: INTERNAL MEDICINE

## 2023-09-01 PROCEDURE — 1101F PT FALLS ASSESS-DOCD LE1/YR: CPT | Mod: HCNC,CPTII,S$GLB, | Performed by: INTERNAL MEDICINE

## 2023-09-01 PROCEDURE — 93010 ELECTROCARDIOGRAM REPORT: CPT | Mod: HCNC,S$GLB,, | Performed by: INTERNAL MEDICINE

## 2023-09-01 PROCEDURE — 1101F PR PT FALLS ASSESS DOC 0-1 FALLS W/OUT INJ PAST YR: ICD-10-PCS | Mod: HCNC,CPTII,S$GLB, | Performed by: INTERNAL MEDICINE

## 2023-09-01 PROCEDURE — 3288F PR FALLS RISK ASSESSMENT DOCUMENTED: ICD-10-PCS | Mod: HCNC,CPTII,S$GLB, | Performed by: INTERNAL MEDICINE

## 2023-09-01 PROCEDURE — 99499 UNLISTED E&M SERVICE: CPT | Mod: HCNC,S$GLB,, | Performed by: INTERNAL MEDICINE

## 2023-09-01 PROCEDURE — 99214 OFFICE O/P EST MOD 30 MIN: CPT | Mod: HCNC,S$GLB,, | Performed by: INTERNAL MEDICINE

## 2023-09-01 RX ORDER — METOPROLOL SUCCINATE 25 MG/1
25 TABLET, EXTENDED RELEASE ORAL DAILY
Qty: 90 TABLET | Refills: 3 | Status: SHIPPED | OUTPATIENT
Start: 2023-09-01 | End: 2024-08-31

## 2023-09-01 RX ORDER — AMLODIPINE BESYLATE 5 MG/1
5 TABLET ORAL DAILY
Qty: 90 TABLET | Refills: 3 | Status: SHIPPED | OUTPATIENT
Start: 2023-09-01

## 2023-09-01 NOTE — PROGRESS NOTES
Cardiology Clinic Note      Patient: Michela Calvert, 11/3/1929, [id number]  Primary Care Provider: Enzo Nieto MD     Chief Complaint/Reason for Referral: AF, AS     Subjective:       Michela Calvert is a 89 y.o. female who presents for establishment of care. They are accompanied by her son Mukul.    No falls or syncope. Walks with a walker. No pathologic bleeding. Some bruising. Compliant with Eliquis. Breathing is fine. No CP. No edema. No orthopnea. No palpitations.     Focused Past History includes:  PAF on apixaban 2.5 - on amiodarone for since 2018. TFTs and LFTs earlier this year,   CXR 12/2022 okay  LFTs and TSH fine  AS  TTE 5/2021: EF 60%, AS (2.6/15/0.51/1.52) by report. PASP 32  TTE 8/2023: EF 65-70%. Mild AS (2.7/16/1.6/0.48). mod calcific MS (6 @ 62). PASP 41. Sev LAE. Normal RV.   HTN  CKD3a with C 1.1  Carotid plaque on US 2/2019    Review of Systems  Constitutional: negative for fevers, night sweats, and weight loss  Eyes: negative for visual disturbance, diplopia  Respiratory: negative for cough, hemoptysis, sputum, and wheezing  Cardiovascular: see HPI  Gastrointestinal: negative for abdominal pain, bright red blood per rectum, change in bowel habits, dysphagia, melena, and reflux symptoms  Genitourinary:negative for dysuria, frequency, and hematuria  Hematologic/lymphatic: negative for bleeding, easy bruising, and lymphadenopathy  Musculoskeletal:negative for arthralgias, back pain, and myalgias  Neurological: negative for gait problems, paresthesia, speech problems, vertigo, and weakness  Behavioral/Psych: negative for excessive alcohol consumption, illegal drug usage, and sleep disturbance    ----------------------------  Atrial fibrillation  Cataracts, bilateral  Chronic pain syndrome  Closed fracture of neck of left femur  DDD (degenerative disc disease), lumbar  DDD (degenerative disc disease), lumbar  Heart murmur  HLD (hyperlipidemia)      Comment:  no medication  taken  Hypertension  Hypothyroidism  Lichen sclerosus et atrophicus      Comment:  Rectal/Vaginal areas  Lumbar spinal stenosis  Lumbar spondylosis  Mitral valve disorder      Comment:  Asymptomatic  Osteopenia  Urinary incontinence  ----------------------------  Appendectomy  Cataract extraction extracapsular w/ intraocular lens implantation  Hemiarthroplasty of hip      Comment:  Procedure: HEMIARTHROPLASTY, HIP;  Surgeon: Josesito Leo MD;  Location: [facility] OR;  Service: Orthopedics;                 Laterality: Left;  Hysterectomy  Insertion of dorsal column nerve stimulator for trial      Comment:  Procedure: INSERTION, DORSAL COLUMN NEUROSTIMULATOR, FOR               TRIAL;  Surgeon: Derek Daily MD;  Location: [facility]                OR;  Service: Pain Management;  Laterality: N/A;  Spinal ablation  Tonsillectomy     Family History   Problem Relation Age of Onset    Colon cancer Neg Hx     Crohn's disease Neg Hx     Stomach cancer Neg Hx     Ulcerative colitis Neg Hx     Esophageal cancer Neg Hx      Social History     Tobacco Use    Smoking status: Never    Smokeless tobacco: Never   Substance Use Topics    Alcohol use: Not Currently     Alcohol/week: 1.0 standard drink of alcohol     Types: 1 Shots of liquor per week    Drug use: No       Current Outpatient Medications   Medication Sig Dispense Refill    acetaminophen (TYLENOL) 650 MG TbSR Take 650 mg by mouth every 8 (eight) hours as needed.      amiodarone (PACERONE) 200 MG Tab Take 100 mg by mouth 3 (three) times a week.      CALCIUM 600 WITH VITAMIN D3 600 mg-12.5 mcg (500 unit) Cap TAKE ONE CAPSULE BY MOUTH ONCE DAILY IN THE EVENING 180 capsule 3    clobetasol 0.05% (TEMOVATE) 0.05 % Oint Apply daily to affected area      fluticasone propionate (FLONASE) 50 mcg/actuation nasal spray 1 spray (50 mcg total) by Each Nostril route 2 (two) times a day. 16 g 0    hydrocortisone 2.5 % cream APPLY TO THE AFFECTED AREA(S) TWICE DAILY 84 g 2     "levothyroxine (SYNTHROID) 100 MCG tablet TAKE ONE TABLET BY MOUTH BEFORE BREAKFAST. Administer on an empty stomach at least 30 minutes before food 90 tablet 1    PFIZER COVID BIVAL,12Y UP,,PF, 30 mcg/0.3 mL injection       PRESERVISION AREDS-2 250-90-40-1 mg Cap TAKE ONE CAPSULE BY MOUTH TWICE DAILY (In THE morning AND In THE evening) 180 capsule 3    triamcinolone acetonide 0.1% (KENALOG) 0.1 % cream       amLODIPine (NORVASC) 5 MG tablet Take 1 tablet (5 mg total) by mouth once daily. 90 tablet 3    apixaban (ELIQUIS) 2.5 mg Tab Take 1 tablet (2.5 mg total) by mouth 2 (two) times daily. 180 tablet 1    metoprolol succinate (TOPROL-XL) 25 MG 24 hr tablet Take 1 tablet (25 mg total) by mouth once daily. 90 tablet 3     No current facility-administered medications for this visit.        Objective:      Physical Exam  /66 (BP Location: Right arm, Patient Position: Sitting, BP Method: Medium (Automatic))   Pulse (!) 58   Ht 5' 3" (1.6 m)   Wt 63.2 kg (139 lb 5.3 oz)   BMI 24.68 kg/m²   Body surface area is 1.68 meters squared.  Body mass index is 24.68 kg/m².    General appearance: alert, appears stated age, cooperative, and no distress  Head: Normocephalic, without obvious abnormality, atraumatic  Neck: no carotid bruit, no JVD, and supple, symmetrical, trachea midline  Lungs: clear to auscultation bilaterally  Heart: regular rate and rhythm; 3/6 mid peaking NANCY URSB with diminished S2  Abdomen: soft, non-tender, no distended  Extremities: extremities atraumatic, no pitting edema  Skin: warm, no cyanosis, no pathologic ecchymosis in exposed portions  Neurologic: Grossly normal. A&O x3      Lab Review   Lab Results   Component Value Date    WBC 10.71 06/14/2023    HGB 12.5 06/14/2023    HCT 39.9 06/14/2023    MCV 93 06/14/2023     06/14/2023         BMP  Lab Results   Component Value Date     02/28/2023    K 4.6 02/28/2023     02/28/2023    CO2 25 02/28/2023    BUN 18 02/28/2023    " CREATININE 1.1 02/28/2023    CALCIUM 9.6 02/28/2023    ANIONGAP 9 02/28/2023    ESTGFRAFRICA 56.9 (A) 05/19/2021    EGFRNONAA 49.4 (A) 05/19/2021       Lab Results   Component Value Date    LABPROT 10.0 05/18/2015    ALBUMIN 3.6 02/28/2023       Lab Results   Component Value Date    ALT 9 (L) 02/28/2023    AST 19 02/28/2023    ALKPHOS 80 02/28/2023    BILITOT 0.4 02/28/2023       Lab Results   Component Value Date    TSH 3.082 02/28/2023       Lab Results   Component Value Date    CHOL 215 (H) 02/28/2023    CHOL 210 (H) 05/19/2021    CHOL 212 (H) 11/05/2019     Lab Results   Component Value Date    HDL 53 02/28/2023    HDL 63 05/19/2021    HDL 68 11/05/2019     Lab Results   Component Value Date    LDLCALC 145.0 02/28/2023    LDLCALC 133.8 05/19/2021    LDLCALC 130.8 11/05/2019     Lab Results   Component Value Date    TRIG 85 02/28/2023    TRIG 66 05/19/2021    TRIG 66 11/05/2019     Lab Results   Component Value Date    CHOLHDL 24.7 02/28/2023    CHOLHDL 30.0 05/19/2021    CHOLHDL 32.1 11/05/2019       EKG today:  Nsr, first degree AVB   Assessment & Plan:      This is a 89 y.o. pleasant and very functional 89 yo WM with PAF on amiodarone and low dose apixaban and mild to moderate aortic stenosis. Asymptomatic but with some bruising and balance issues - walks with a walker     1. Paroxysmal atrial fibrillation  IN OFFICE EKG 12-LEAD (to Muse)    metoprolol succinate (TOPROL-XL) 25 MG 24 hr tablet    apixaban (ELIQUIS) 2.5 mg Tab      2. Lumbar spondylosis        3. Primary hypertension  metoprolol succinate (TOPROL-XL) 25 MG 24 hr tablet    amLODIPine (NORVASC) 5 MG tablet      4. Moderate aortic stenosis        5. Long term current use of amiodarone             Continue amio 100 mg TIW. Given her severe LAE will plan to continue indefinitely with close monitoring for toxicity  TFTs, LFTs in 6 months with CXR. Eye exam in Feb 2024  Continue metop (change to succcinate 25 QD), apixaban 2.5 BID, amlodipine 5  Not  interested in LAAO at this time    I appreciate the opportunity to participate in Michela Calvert 's care today.  Please follow up with me in 6 months.      Sybil Aguilar MD, [facility]  Interventional Cardiology/Structural Heart Disease  [facility]  Office: [phone]     Parts of this note were completed using voice recognition software. Please excuse any misspellings or syntax errors and reach out to me with questions.

## 2023-09-01 NOTE — PATIENT INSTRUCTIONS
Change the metoprolol to long-acting - 25 mg once daily  Everything else remains as is  Return in 6 months

## 2023-10-04 NOTE — TELEPHONE ENCOUNTER
Spoke with the patient and follow up appointment scheduled for 6/7 to discuss bilateral hip and leg pain.   No

## 2023-10-31 DIAGNOSIS — L29.9 ITCHING: ICD-10-CM

## 2023-10-31 NOTE — TELEPHONE ENCOUNTER
No care due was identified.  [facility] Embedded Care Due Messages. Reference number: [id number].   10/31/2023 4:02:32 PM CDT

## 2023-11-01 RX ORDER — HYDROCORTISONE 25 MG/G
CREAM TOPICAL
Qty: 84 G | Refills: 2 | Status: SHIPPED | OUTPATIENT
Start: 2023-11-01

## 2023-11-01 NOTE — TELEPHONE ENCOUNTER
Refill Decision Note   Michela Nehal  is requesting a refill authorization.  Brief Assessment and Rationale for Refill:  Approve     Medication Therapy Plan:         Comments:     Note composed:8:04 AM 11/01/2023

## 2023-11-15 RX ORDER — VIT C/E/ZN/COPPR/LUTEIN/ZEAXAN 250MG-90MG
1 CAPSULE ORAL 2 TIMES DAILY
Qty: 180 CAPSULE | Refills: 3 | Status: SHIPPED | OUTPATIENT
Start: 2023-11-15

## 2023-12-26 ENCOUNTER — OFFICE VISIT (OUTPATIENT)
Dept: URGENT CARE | Facility: CLINIC | Age: 88
End: 2023-12-26
Payer: MEDICARE

## 2023-12-26 VITALS
DIASTOLIC BLOOD PRESSURE: 52 MMHG | BODY MASS INDEX: 24.63 KG/M2 | SYSTOLIC BLOOD PRESSURE: 104 MMHG | TEMPERATURE: 98 F | HEIGHT: 63 IN | HEART RATE: 96 BPM | RESPIRATION RATE: 18 BRPM | WEIGHT: 139 LBS | OXYGEN SATURATION: 96 %

## 2023-12-26 DIAGNOSIS — R50.9 FEVER, UNSPECIFIED FEVER CAUSE: ICD-10-CM

## 2023-12-26 DIAGNOSIS — J06.9 VIRAL URI: Primary | ICD-10-CM

## 2023-12-26 LAB
CTP QC/QA: YES
CTP QC/QA: YES
POC MOLECULAR INFLUENZA A AGN: NEGATIVE
POC MOLECULAR INFLUENZA B AGN: NEGATIVE
SARS-COV-2 AG RESP QL IA.RAPID: NEGATIVE

## 2023-12-26 PROCEDURE — 99213 OFFICE O/P EST LOW 20 MIN: CPT | Mod: S$GLB,,, | Performed by: NURSE PRACTITIONER

## 2023-12-26 PROCEDURE — 87811 SARS-COV-2 COVID19 W/OPTIC: CPT | Mod: QW,S$GLB,, | Performed by: NURSE PRACTITIONER

## 2023-12-26 PROCEDURE — 87811 SARS CORONAVIRUS 2 ANTIGEN POCT, MANUAL READ: ICD-10-PCS | Mod: QW,S$GLB,, | Performed by: NURSE PRACTITIONER

## 2023-12-26 PROCEDURE — 87502 INFLUENZA DNA AMP PROBE: CPT | Mod: QW,S$GLB,, | Performed by: NURSE PRACTITIONER

## 2023-12-26 PROCEDURE — 87502 POCT INFLUENZA A/B MOLECULAR: ICD-10-PCS | Mod: QW,S$GLB,, | Performed by: NURSE PRACTITIONER

## 2023-12-26 PROCEDURE — 99213 PR OFFICE/OUTPT VISIT, EST, LEVL III, 20-29 MIN: ICD-10-PCS | Mod: S$GLB,,, | Performed by: NURSE PRACTITIONER

## 2023-12-26 NOTE — PATIENT INSTRUCTIONS

## 2023-12-26 NOTE — PROGRESS NOTES
"Subjective:      Patient ID: Michela Calvert is a 89 y.o. female.    Vitals:  height is 5' 3" (1.6 m) and weight is 63 kg (139 lb). Her oral temperature is 98.3 °F (36.8 °C). Her blood pressure is 104/52 (abnormal) and her pulse is 96. Her respiration is 18 and oxygen saturation is 96%.     Chief Complaint: Fever    Symptoms began 12/24/23. They include fever (doesn't have a thermometer), nasal congestion and fatigue.+    Provider note begins below:  Patient denies measured fever, cp, sob, N/V or abdominal pain. She is awake and alert. NAD. Afebrile.    Fever   This is a new problem. The current episode started in the past 7 days. The problem occurs constantly. The problem has been unchanged. Her temperature was unmeasured prior to arrival. The temperature was taken using an oral thermometer. Associated symptoms include congestion and a sore throat. Pertinent negatives include no abdominal pain, chest pain, coughing, diarrhea, ear pain, headaches, nausea, rash, urinary pain, vomiting or wheezing. She has tried acetaminophen (Tylenol) for the symptoms. The treatment provided mild relief.       Constitution: Positive for fatigue and fever. Negative for chills and sweating.   HENT:  Positive for congestion, postnasal drip, sinus pressure and sore throat. Negative for ear pain, drooling, facial swelling, nosebleeds, foreign body in nose, sinus pain, trouble swallowing and voice change.    Neck: Negative for neck pain, neck stiffness, painful lymph nodes and neck swelling.   Cardiovascular: Negative.  Negative for chest trauma, chest pain, leg swelling, palpitations, sob on exertion and passing out.   Eyes: Negative.  Negative for eye itching, eye pain, eye redness, photophobia, double vision, blurred vision and eyelid swelling.   Respiratory:  Negative for chest tightness, cough, sputum production, bloody sputum, shortness of breath, stridor, wheezing and asthma.    Gastrointestinal: Negative.  Negative for abdominal " pain, abdominal bloating, nausea, vomiting, constipation, diarrhea and heartburn.   Endocrine: negative. cold intolerance and excessive thirst.   Genitourinary: Negative.  Negative for dysuria, frequency, urgency, flank pain and hematuria.   Musculoskeletal:  Negative for pain, trauma, joint pain, joint swelling, abnormal ROM of joint, back pain, muscle cramps and muscle ache.   Skin: Negative.  Negative for rash, wound and hives.   Allergic/Immunologic: Negative.  Negative for seasonal allergies, food allergies, eczema, asthma, hives, itching and sneezing.   Neurological: Negative.  Negative for dizziness, light-headedness, passing out, loss of balance, headaches, disorientation, altered mental status, loss of consciousness and seizures.   Hematologic/Lymphatic: Negative.  Negative for swollen lymph nodes.   Psychiatric/Behavioral: Negative.  Negative for altered mental status, disorientation, confusion and nervous/anxious. The patient is not nervous/anxious.       Objective:     Physical Exam   Constitutional: She is oriented to person, place, and time. She appears well-developed. She is cooperative.  Non-toxic appearance. She does not appear ill. No distress.   HENT:   Head: Normocephalic and atraumatic.   Ears:   Right Ear: Hearing, tympanic membrane, external ear and ear canal normal. impacted cerumen  Left Ear: Hearing, tympanic membrane, external ear and ear canal normal. impacted cerumen  Nose: Congestion present. No mucosal edema, rhinorrhea or nasal deformity. No epistaxis. Right sinus exhibits no maxillary sinus tenderness and no frontal sinus tenderness. Left sinus exhibits no maxillary sinus tenderness and no frontal sinus tenderness.   Mouth/Throat: Uvula is midline and mucous membranes are normal. No trismus in the jaw. Normal dentition. No uvula swelling. Posterior oropharyngeal erythema (Mild) present. No oropharyngeal exudate or posterior oropharyngeal edema.   Eyes: Conjunctivae and lids are  normal. No scleral icterus.   Neck: Trachea normal and phonation normal. Neck supple. No edema present. No erythema present. No neck rigidity present.   Cardiovascular: Normal rate, regular rhythm, normal heart sounds and normal pulses.   Pulmonary/Chest: Effort normal and breath sounds normal. No stridor. No respiratory distress. She has no decreased breath sounds. She has no wheezes. She has no rhonchi. She has no rales. She exhibits no tenderness.   Abdominal: Normal appearance. There is no abdominal tenderness.   Musculoskeletal: Normal range of motion.         General: No deformity. Normal range of motion.   Lymphadenopathy:     She has no cervical adenopathy.   Neurological: no focal deficit. She is alert, oriented to person, place, and time and at baseline. She exhibits normal muscle tone. Coordination normal.   Skin: Skin is warm, dry, intact, not diaphoretic and not pale.   Psychiatric: Her speech is normal and behavior is normal. Mood, judgment and thought content normal.   Nursing note and vitals reviewed.  The following results have been reviewed with the patient:  LABS-  Results for orders placed or performed in visit on 12/26/23   POCT Influenza A/B MOLECULAR   Result Value Ref Range    POC Molecular Influenza A Ag Negative Negative, Not Reported    POC Molecular Influenza B Ag Negative Negative, Not Reported     Acceptable Yes    SARS Coronavirus 2 Antigen, POCT Manual Read   Result Value Ref Range    SARS Coronavirus 2 Antigen Negative Negative     Acceptable Yes         IMAGING-  No results found.    Assessment:     1. Viral URI    2. Fever, unspecified fever cause        Plan:   FOLLOWUP  Follow up if symptoms worsen or fail to improve, for PLEASE CONTACT PCP OR CONTACT THE EMERGENCY ROOM..     PATIENT INSTRUCTIONS  Patient Instructions   INSTRUCTIONS:  - Rest.  - Drink plenty of fluids.  - Take Tylenol and/or Ibuprofen as directed as needed for fever/pain.  Do not  take more than the recommended dose.  - follow up with your PCP within the next 1-2 weeks as needed.  - You must understand that you have received an Urgent Care treatment only and that you may be released before all of your medical problems are known or treated.   - You, the patient, will arrange for follow up care as instructed.   - If your condition worsens or fails to improve we recommend that you receive another evaluation at the ER immediately or contact your PCP to discuss your concerns.   - You can call [phone] or [phone] to help schedule an appointment with the appropriate provider.     -If you smoke cigarettes, it would be beneficial for you to stop.         THANK YOU FOR ALLOWING ME TO PARTICIPATE IN YOUR HEALTHCARE,     Sylvester Man NP     Viral URI    Fever, unspecified fever cause  -     POCT Influenza A/B MOLECULAR  -     SARS Coronavirus 2 Antigen, POCT Manual Read

## 2024-01-12 DIAGNOSIS — I48.0 PAROXYSMAL ATRIAL FIBRILLATION: Primary | ICD-10-CM

## 2024-01-12 RX ORDER — AMIODARONE HYDROCHLORIDE 200 MG/1
100 TABLET ORAL
Qty: 20 TABLET | Refills: 3 | Status: SHIPPED | OUTPATIENT
Start: 2024-01-12

## 2024-01-12 RX ORDER — AMIODARONE HYDROCHLORIDE 200 MG/1
TABLET ORAL
Qty: 90 TABLET | Refills: 3 | OUTPATIENT
Start: 2024-01-12

## 2024-01-30 DIAGNOSIS — M85.80 OSTEOPENIA, UNSPECIFIED LOCATION: ICD-10-CM

## 2024-01-30 RX ORDER — BIOTIN 5 MG
CAPSULE ORAL
Qty: 180 CAPSULE | Refills: 3 | Status: SHIPPED | OUTPATIENT
Start: 2024-01-30

## 2024-02-26 DIAGNOSIS — E03.4 HYPOTHYROIDISM DUE TO ACQUIRED ATROPHY OF THYROID: ICD-10-CM

## 2024-02-26 DIAGNOSIS — I48.0 PAROXYSMAL ATRIAL FIBRILLATION: ICD-10-CM

## 2024-02-26 RX ORDER — APIXABAN 2.5 MG/1
2.5 TABLET, FILM COATED ORAL 2 TIMES DAILY
Qty: 180 TABLET | Refills: 1 | Status: SHIPPED | OUTPATIENT
Start: 2024-02-26

## 2024-02-26 RX ORDER — LEVOTHYROXINE SODIUM 100 UG/1
TABLET ORAL
Qty: 90 TABLET | Refills: 3 | Status: SHIPPED | OUTPATIENT
Start: 2024-02-26

## 2024-02-26 NOTE — TELEPHONE ENCOUNTER
Refill Routing Note   Medication(s) are not appropriate for processing by [facility] for the following reason(s):        Required labs outdated-TSH    [facility] action(s):  Defer               Appointments  past 12m or future 3m with PCP    Date Provider   Last Visit   8/15/2023 Enzo Nieto MD   Next Visit   3/15/2024 Enzo Nieto MD   ED visits in past 90 days: 0        Note composed:10:41 AM 02/26/2024

## 2024-02-26 NOTE — TELEPHONE ENCOUNTER
No care due was identified.  [facility] Embedded Care Due Messages. Reference number: [id number].   2/26/2024 8:05:13 AM CST

## 2024-03-04 ENCOUNTER — OFFICE VISIT (OUTPATIENT)
Dept: FAMILY MEDICINE | Facility: CLINIC | Age: 89
End: 2024-03-04
Payer: MEDICARE

## 2024-03-04 ENCOUNTER — LAB VISIT (OUTPATIENT)
Dept: LAB | Facility: HOSPITAL | Age: 89
End: 2024-03-04
Attending: FAMILY MEDICINE
Payer: MEDICARE

## 2024-03-04 VITALS
RESPIRATION RATE: 18 BRPM | BODY MASS INDEX: 24.34 KG/M2 | HEART RATE: 111 BPM | OXYGEN SATURATION: 99 % | SYSTOLIC BLOOD PRESSURE: 122 MMHG | WEIGHT: 137.38 LBS | DIASTOLIC BLOOD PRESSURE: 60 MMHG | HEIGHT: 63 IN

## 2024-03-04 DIAGNOSIS — S81.801A WOUND OF RIGHT LOWER EXTREMITY, INITIAL ENCOUNTER: ICD-10-CM

## 2024-03-04 DIAGNOSIS — I10 PRIMARY HYPERTENSION: ICD-10-CM

## 2024-03-04 DIAGNOSIS — K59.00 CONSTIPATION, UNSPECIFIED CONSTIPATION TYPE: ICD-10-CM

## 2024-03-04 DIAGNOSIS — I48.0 PAROXYSMAL ATRIAL FIBRILLATION: ICD-10-CM

## 2024-03-04 DIAGNOSIS — E03.9 HYPOTHYROIDISM, UNSPECIFIED TYPE: ICD-10-CM

## 2024-03-04 DIAGNOSIS — Z00.00 PREVENTATIVE HEALTH CARE: Primary | ICD-10-CM

## 2024-03-04 LAB
ALBUMIN SERPL BCP-MCNC: 3.7 G/DL (ref 3.5–5.2)
ALP SERPL-CCNC: 80 U/L (ref 55–135)
ALT SERPL W/O P-5'-P-CCNC: 14 U/L (ref 10–44)
ANION GAP SERPL CALC-SCNC: 11 MMOL/L (ref 8–16)
AST SERPL-CCNC: 20 U/L (ref 10–40)
BASOPHILS # BLD AUTO: 0.08 K/UL (ref 0–0.2)
BASOPHILS NFR BLD: 0.7 % (ref 0–1.9)
BILIRUB SERPL-MCNC: 0.4 MG/DL (ref 0.1–1)
BUN SERPL-MCNC: 20 MG/DL (ref 10–30)
CALCIUM SERPL-MCNC: 10 MG/DL (ref 8.7–10.5)
CHLORIDE SERPL-SCNC: 109 MMOL/L (ref 95–110)
CHOLEST SERPL-MCNC: 196 MG/DL (ref 120–199)
CHOLEST/HDLC SERPL: 4.1 {RATIO} (ref 2–5)
CO2 SERPL-SCNC: 23 MMOL/L (ref 23–29)
CREAT SERPL-MCNC: 1 MG/DL (ref 0.5–1.4)
DIFFERENTIAL METHOD BLD: ABNORMAL
EOSINOPHIL # BLD AUTO: 0.3 K/UL (ref 0–0.5)
EOSINOPHIL NFR BLD: 2.9 % (ref 0–8)
ERYTHROCYTE [DISTWIDTH] IN BLOOD BY AUTOMATED COUNT: 16 % (ref 11.5–14.5)
EST. GFR  (NO RACE VARIABLE): 52.2 ML/MIN/1.73 M^2
GLUCOSE SERPL-MCNC: 96 MG/DL (ref 70–110)
HCT VFR BLD AUTO: 39.3 % (ref 37–48.5)
HDLC SERPL-MCNC: 48 MG/DL (ref 40–75)
HDLC SERPL: 24.5 % (ref 20–50)
HGB BLD-MCNC: 12.3 G/DL (ref 12–16)
IMM GRANULOCYTES # BLD AUTO: 0.06 K/UL (ref 0–0.04)
IMM GRANULOCYTES NFR BLD AUTO: 0.6 % (ref 0–0.5)
LDLC SERPL CALC-MCNC: 133.2 MG/DL (ref 63–159)
LYMPHOCYTES # BLD AUTO: 2.5 K/UL (ref 1–4.8)
LYMPHOCYTES NFR BLD: 23 % (ref 18–48)
MCH RBC QN AUTO: 28.1 PG (ref 27–31)
MCHC RBC AUTO-ENTMCNC: 31.3 G/DL (ref 32–36)
MCV RBC AUTO: 90 FL (ref 82–98)
MONOCYTES # BLD AUTO: 1.2 K/UL (ref 0.3–1)
MONOCYTES NFR BLD: 10.8 % (ref 4–15)
NEUTROPHILS # BLD AUTO: 6.7 K/UL (ref 1.8–7.7)
NEUTROPHILS NFR BLD: 62 % (ref 38–73)
NONHDLC SERPL-MCNC: 148 MG/DL
NRBC BLD-RTO: 0 /100 WBC
PLATELET # BLD AUTO: 257 K/UL (ref 150–450)
PMV BLD AUTO: 11.5 FL (ref 9.2–12.9)
POTASSIUM SERPL-SCNC: 4.8 MMOL/L (ref 3.5–5.1)
PROT SERPL-MCNC: 6.9 G/DL (ref 6–8.4)
RBC # BLD AUTO: 4.37 M/UL (ref 4–5.4)
SODIUM SERPL-SCNC: 143 MMOL/L (ref 136–145)
TRIGL SERPL-MCNC: 74 MG/DL (ref 30–150)
TSH SERPL DL<=0.005 MIU/L-ACNC: 1.2 UIU/ML (ref 0.4–4)
WBC # BLD AUTO: 10.76 K/UL (ref 3.9–12.7)

## 2024-03-04 PROCEDURE — 80061 LIPID PANEL: CPT | Mod: HCNC | Performed by: FAMILY MEDICINE

## 2024-03-04 PROCEDURE — 99397 PER PM REEVAL EST PAT 65+ YR: CPT | Mod: HCNC,S$GLB,, | Performed by: FAMILY MEDICINE

## 2024-03-04 PROCEDURE — 36415 COLL VENOUS BLD VENIPUNCTURE: CPT | Mod: HCNC,PO | Performed by: FAMILY MEDICINE

## 2024-03-04 PROCEDURE — 85025 COMPLETE CBC W/AUTO DIFF WBC: CPT | Mod: HCNC | Performed by: FAMILY MEDICINE

## 2024-03-04 PROCEDURE — 80053 COMPREHEN METABOLIC PANEL: CPT | Mod: HCNC | Performed by: FAMILY MEDICINE

## 2024-03-04 PROCEDURE — 1160F RVW MEDS BY RX/DR IN RCRD: CPT | Mod: HCNC,CPTII,S$GLB, | Performed by: FAMILY MEDICINE

## 2024-03-04 PROCEDURE — 84443 ASSAY THYROID STIM HORMONE: CPT | Mod: HCNC | Performed by: FAMILY MEDICINE

## 2024-03-04 PROCEDURE — 1101F PT FALLS ASSESS-DOCD LE1/YR: CPT | Mod: HCNC,CPTII,S$GLB, | Performed by: FAMILY MEDICINE

## 2024-03-04 PROCEDURE — 3288F FALL RISK ASSESSMENT DOCD: CPT | Mod: HCNC,CPTII,S$GLB, | Performed by: FAMILY MEDICINE

## 2024-03-04 PROCEDURE — 1159F MED LIST DOCD IN RCRD: CPT | Mod: HCNC,CPTII,S$GLB, | Performed by: FAMILY MEDICINE

## 2024-03-04 PROCEDURE — 99999 PR PBB SHADOW E&M-EST. PATIENT-LVL III: CPT | Mod: PBBFAC,HCNC,, | Performed by: FAMILY MEDICINE

## 2024-03-04 PROCEDURE — 1126F AMNT PAIN NOTED NONE PRSNT: CPT | Mod: HCNC,CPTII,S$GLB, | Performed by: FAMILY MEDICINE

## 2024-03-04 RX ORDER — TETANUS TOXOID, REDUCED DIPHTHERIA TOXOID AND ACELLULAR PERTUSSIS VACCINE, ADSORBED 5; 2.5; 8; 8; 2.5 [IU]/.5ML; [IU]/.5ML; UG/.5ML; UG/.5ML; UG/.5ML
SUSPENSION INTRAMUSCULAR
COMMUNITY
Start: 2023-09-08 | End: 2024-05-23 | Stop reason: ALTCHOICE

## 2024-03-04 RX ORDER — INFLUENZA A VIRUS A/VICTORIA/4897/2022 IVR-238 (H1N1) ANTIGEN (PROPIOLACTONE INACTIVATED), INFLUENZA A VIRUS A/DARWIN/6/2021 IVR-227 (H3N2) ANTIGEN (PROPIOLACTONE INACTIVATED), INFLUENZA B VIRUS B/AUSTRIA/1359417/2021 BVR-26 ANTIGEN (PROPIOLACTONE INACTIVATED), INFLUENZA B VIRUS B/PHUKET/3073/2013 BVR-1B ANTIGEN (PROPIOLACTONE INACTIVATED) 15; 15; 15; 15 UG/.5ML; UG/.5ML; UG/.5ML; UG/.5ML
INJECTION, SUSPENSION INTRAMUSCULAR
COMMUNITY
Start: 2023-10-12 | End: 2024-05-23 | Stop reason: ALTCHOICE

## 2024-03-04 NOTE — PROGRESS NOTES
Subjective:       Patient ID: Michela Calvert is a 89 y.o. female.    Chief Complaint: Sore on leg and Constipation    Here for f/u on chronic health issues      Here for  f/u on chronic health issues.  Overall feeling well.     Scraped right lower leg 10 days ago. This has a scab and is healing well.     S/p HEMIARTHROPLASTY, HIP (Left)  - following with Dr. Leo  She is living at the [facility].  Using walker for ambulation  Aid helps with showering twice a week  Afib, PVD - rate- controlled on pacerone, Eliquis 2.5mg BID, Toprol 25mg QD; taking asa 81mg daily; following with cardiology every 6 months  Hypothyroidism - tolerating Synthroid 100mcg daily  Lumbar DDD - using tylenol as needed  HTN - taking amlodipine 5mg daily    Dealing with some regular constipation.           Past Medical History:   Diagnosis Date    Atrial fibrillation     Cataracts, bilateral     Chronic pain syndrome 7/25/2018    Closed fracture of neck of left femur 3/8/2019    DDD (degenerative disc disease), lumbar     DDD (degenerative disc disease), lumbar     Heart murmur 01/2017    HLD (hyperlipidemia)     no medication taken    Hypertension 12/27/2018    Hypothyroidism     Lichen sclerosus et atrophicus     Rectal/Vaginal areas    Lumbar spinal stenosis     Lumbar spondylosis     Mitral valve disorder 01/2017    Asymptomatic    Osteopenia     Urinary incontinence        Past Surgical History:   Procedure Laterality Date    APPENDECTOMY      CATARACT EXTRACTION EXTRACAPSULAR W/ INTRAOCULAR LENS IMPLANTATION Bilateral     HEMIARTHROPLASTY OF HIP Left 3/9/2019    Procedure: HEMIARTHROPLASTY, HIP;  Surgeon: Josesito Leo MD;  Location: [facility] OR;  Service: Orthopedics;  Laterality: Left;    HYSTERECTOMY      INSERTION OF DORSAL COLUMN NERVE STIMULATOR FOR TRIAL N/A 7/25/2018    Procedure: INSERTION, DORSAL COLUMN NEUROSTIMULATOR, FOR TRIAL;  Surgeon: Derek Daily MD;  Location: [facility] OR;  Service: Pain Management;  Laterality: N/A;     spinal ablation  12/2017    TONSILLECTOMY         Review of patient's allergies indicates:   Allergen Reactions    Nitrofuran analogues Nausea Only    Ciprofloxacin Nausea And Vomiting       Social History     Socioeconomic History    Marital status:    Tobacco Use    Smoking status: Never    Smokeless tobacco: Never   Substance and Sexual Activity    Alcohol use: Not Currently     Alcohol/week: 1.0 standard drink of alcohol     Types: 1 Shots of liquor per week    Drug use: No    Sexual activity: Never     Social Determinants of Health     Financial Resource Strain: Low Risk  (7/15/2021)    Overall Financial Resource Strain (CARDIA)     Difficulty of Paying Living Expenses: Not hard at all   Food Insecurity: No Food Insecurity (7/15/2021)    Hunger Vital Sign     Worried About Running Out of Food in the Last Year: Never true     Ran Out of Food in the Last Year: Never true   Transportation Needs: No Transportation Needs (7/15/2021)    PRAPARE - Transportation     Lack of Transportation (Medical): No     Lack of Transportation (Non-Medical): No   Physical Activity: Unknown (7/15/2021)    Exercise Vital Sign     Days of Exercise per Week: 3 days   Stress: Stress Concern Present (7/15/2021)    [place] [employer] - Occupational Stress Questionnaire     Feeling of Stress : To some extent   Social Connections: Moderately Integrated (7/15/2021)    Social Connection and Isolation Panel [NHANES]     Frequency of Communication with Friends and Family: More than three times a week     Frequency of Social Gatherings with Friends and Family: Twice a week     Attends Rastafarian Services: More than 4 times per year     Active Member of Clubs or Organizations: No     Attends Club or Organization Meetings: Never     Marital Status:        Current Outpatient Medications on File Prior to Visit   Medication Sig Dispense Refill    AFLURIA QUAD 8449-7800,6MO UP, 60 mcg (15 mcg x 4)/0.5 mL Susp        amiodarone (PACERONE) 200 MG Tab Take 0.5 tablets (100 mg total) by mouth 3 (three) times a week. 20 tablet 3    amLODIPine (NORVASC) 5 MG tablet Take 1 tablet (5 mg total) by mouth once daily. 90 tablet 3    calcium carbonate-vitamin D3 (CALCIUM 600 WITH VITAMIN D3) 600 mg-12.5 mcg (500 unit) Cap TAKE ONE CAPSULE BY MOUTH ONCE DAILY IN THE EVENING 180 capsule 3    clobetasol 0.05% (TEMOVATE) 0.05 % Oint Apply daily to affected area      ELIQUIS 2.5 mg Tab Take 1 tablet (2.5 mg total) by mouth 2 (two) times daily. 180 tablet 1    levothyroxine (SYNTHROID) 100 MCG tablet TAKE ONE TABLET BY MOUTH BEFORE BREAKFAST. Administer on an empty stomach at least 30 minutes before food 90 tablet 3    metoprolol succinate (TOPROL-XL) 25 MG 24 hr tablet Take 1 tablet (25 mg total) by mouth once daily. 90 tablet 3    PRESERVISION AREDS-2 250-90-40-1 mg Cap TAKE ONE CAPSULE BY MOUTH TWICE DAILY 180 capsule 3    triamcinolone acetonide 0.1% (KENALOG) 0.1 % cream       acetaminophen (TYLENOL) 650 MG TbSR Take 650 mg by mouth every 8 (eight) hours as needed.      BOOSTRIX TDAP 2.5-8-5 Lf-mcg-Lf/0.5mL Syrg injection       fluticasone propionate (FLONASE) 50 mcg/actuation nasal spray 1 spray (50 mcg total) by Each Nostril route 2 (two) times a day. (Patient not taking: Reported on 12/26/2023) 16 g 0    hydrocortisone 2.5 % cream APPLY TO THE AFFECTED AREA(S) TWICE DAILY (Patient not taking: Reported on 12/26/2023) 84 g 2    PFIZER COVID BIVAL,12Y UP,,PF, 30 mcg/0.3 mL injection        No current facility-administered medications on file prior to visit.       Family History   Problem Relation Age of Onset    Colon cancer Neg Hx     Crohn's disease Neg Hx     Stomach cancer Neg Hx     Ulcerative colitis Neg Hx     Esophageal cancer Neg Hx        Review of Systems   Constitutional:  Negative for appetite change, chills, fever and unexpected weight change.   HENT:  Negative for sore throat and trouble swallowing.    Eyes:  Negative for pain  "and visual disturbance.   Respiratory:  Negative for cough, shortness of breath and wheezing.    Cardiovascular:  Negative for chest pain and palpitations.   Gastrointestinal:  Positive for constipation. Negative for abdominal pain, blood in stool, diarrhea, nausea and vomiting.   Genitourinary:  Negative for difficulty urinating, dysuria and hematuria.   Musculoskeletal:  Negative for arthralgias, gait problem and neck pain.   Skin:  Negative for rash and wound.   Neurological:  Negative for dizziness, weakness, numbness and headaches.   Hematological:  Negative for adenopathy.   Psychiatric/Behavioral:  Negative for dysphoric mood.        Objective:      /60   Pulse (!) 111   Resp 18   Ht 5' 3" (1.6 m)   Wt 62.3 kg (137 lb 5.6 oz)   SpO2 99%   BMI 24.33 kg/m²   Physical Exam  Constitutional:       Appearance: Normal appearance. She is well-developed.   HENT:      Head: Normocephalic.      Mouth/Throat:      Pharynx: No oropharyngeal exudate or posterior oropharyngeal erythema.   Eyes:      Conjunctiva/sclera: Conjunctivae normal.      Pupils: Pupils are equal, round, and reactive to light.   Neck:      Thyroid: No thyromegaly.   Cardiovascular:      Rate and Rhythm: Normal rate and regular rhythm.      Heart sounds: Normal heart sounds, S1 normal and S2 normal. No murmur heard.     No friction rub. No gallop.   Pulmonary:      Effort: Pulmonary effort is normal.      Breath sounds: Normal breath sounds. No wheezing or rales.   Musculoskeletal:      Cervical back: Normal range of motion and neck supple.      Right lower leg: No edema.      Left lower leg: No edema.        Legs:    Lymphadenopathy:      Cervical: No cervical adenopathy.   Skin:     General: Skin is warm.      Findings: No rash.   Neurological:      Mental Status: She is alert and oriented to person, place, and time.      Cranial Nerves: No cranial nerve deficit.      Gait: Gait normal.           Labs pending    Assessment:       1. " Preventative health care    2. Primary hypertension    3. Paroxysmal atrial fibrillation    4. Hypothyroidism, unspecified type    5. Constipation, unspecified constipation type    6. Wound of right lower extremity, initial encounter        Plan:       Preventative health care    Primary hypertension    Paroxysmal atrial fibrillation    Hypothyroidism, unspecified type    Constipation, unspecified constipation type    Wound of right lower extremity, initial encounter        Ok to use Tylenol QHS  Ok to take Miralax daily  Overall stable  Continue present medications  F/u with specialists as planned  Counseled on regular exercise, maintenance of a healthy weight, balanced diet rich in fruits/vegetables and lean protein, and avoidance of unhealthy habits like smoking and excessive alcohol intake.

## 2024-05-21 ENCOUNTER — PATIENT MESSAGE (OUTPATIENT)
Dept: CARDIOLOGY | Facility: CLINIC | Age: 89
End: 2024-05-21

## 2024-05-21 ENCOUNTER — OFFICE VISIT (OUTPATIENT)
Dept: CARDIOLOGY | Facility: CLINIC | Age: 89
End: 2024-05-21
Payer: MEDICARE

## 2024-05-21 ENCOUNTER — HOSPITAL ENCOUNTER (OUTPATIENT)
Dept: RADIOLOGY | Facility: HOSPITAL | Age: 89
Discharge: HOME | End: 2024-05-21
Attending: INTERNAL MEDICINE
Payer: MEDICARE

## 2024-05-21 VITALS
WEIGHT: 139.31 LBS | DIASTOLIC BLOOD PRESSURE: 78 MMHG | SYSTOLIC BLOOD PRESSURE: 135 MMHG | BODY MASS INDEX: 25.64 KG/M2 | HEIGHT: 62 IN | HEART RATE: 116 BPM

## 2024-05-21 DIAGNOSIS — I05.0 MODERATE MITRAL STENOSIS: ICD-10-CM

## 2024-05-21 DIAGNOSIS — Z79.899 LONG TERM CURRENT USE OF AMIODARONE: ICD-10-CM

## 2024-05-21 DIAGNOSIS — I48.0 PAROXYSMAL ATRIAL FIBRILLATION WITH RVR: ICD-10-CM

## 2024-05-21 DIAGNOSIS — I50.33 ACUTE ON CHRONIC HEART FAILURE WITH PRESERVED EJECTION FRACTION (HFPEF): Primary | ICD-10-CM

## 2024-05-21 DIAGNOSIS — I10 PRIMARY HYPERTENSION: ICD-10-CM

## 2024-05-21 DIAGNOSIS — I35.0 MODERATE AORTIC STENOSIS: ICD-10-CM

## 2024-05-21 PROBLEM — I73.9 PERIPHERAL VASCULAR DISEASE, UNSPECIFIED: Status: RESOLVED | Noted: 2023-03-11 | Resolved: 2024-05-21

## 2024-05-21 LAB
OHS QRS DURATION: 86 MS
OHS QTC CALCULATION: 443 MS

## 2024-05-21 PROCEDURE — 71046 X-RAY EXAM CHEST 2 VIEWS: CPT | Mod: 26,,, | Performed by: RADIOLOGY

## 2024-05-21 PROCEDURE — 99215 OFFICE O/P EST HI 40 MIN: CPT | Mod: S$GLB,,, | Performed by: INTERNAL MEDICINE

## 2024-05-21 PROCEDURE — 93005 ELECTROCARDIOGRAM TRACING: CPT | Mod: HCNC,PO

## 2024-05-21 PROCEDURE — 1159F MED LIST DOCD IN RCRD: CPT | Mod: CPTII,S$GLB,, | Performed by: INTERNAL MEDICINE

## 2024-05-21 PROCEDURE — 1126F AMNT PAIN NOTED NONE PRSNT: CPT | Mod: CPTII,S$GLB,, | Performed by: INTERNAL MEDICINE

## 2024-05-21 PROCEDURE — 1101F PT FALLS ASSESS-DOCD LE1/YR: CPT | Mod: CPTII,S$GLB,, | Performed by: INTERNAL MEDICINE

## 2024-05-21 PROCEDURE — 93010 ELECTROCARDIOGRAM REPORT: CPT | Mod: S$GLB,,, | Performed by: INTERNAL MEDICINE

## 2024-05-21 PROCEDURE — 3288F FALL RISK ASSESSMENT DOCD: CPT | Mod: CPTII,S$GLB,, | Performed by: INTERNAL MEDICINE

## 2024-05-21 PROCEDURE — 71046 X-RAY EXAM CHEST 2 VIEWS: CPT | Mod: TC,HCNC,FY,PO

## 2024-05-21 PROCEDURE — 99999 PR PBB SHADOW E&M-EST. PATIENT-LVL IV: CPT | Mod: PBBFAC,HCNC,, | Performed by: INTERNAL MEDICINE

## 2024-05-21 RX ORDER — AMIODARONE HYDROCHLORIDE 200 MG/1
TABLET ORAL
Qty: 90 TABLET | Refills: 1 | Status: SHIPPED | OUTPATIENT
Start: 2024-05-21 | End: 2024-08-29

## 2024-05-21 RX ORDER — TORSEMIDE 10 MG/1
10 TABLET ORAL DAILY
Qty: 30 TABLET | Refills: 2 | Status: SHIPPED | OUTPATIENT
Start: 2024-05-21 | End: 2025-05-21

## 2024-05-21 NOTE — PROGRESS NOTES
Cardiology Clinic Note      Patient: Michela Calvert, 11/3/1929, [id number]  Primary Care Provider: Enzo Nieto MD     Chief Complaint/Reason for Referral: AF, AS     Subjective:       Michela Calvert is a 89 y.o. female who presents for establishment of care. They are accompanied by her son May.      No chest discomfort, no melena, hematochezia, hematemesis, hematuria or hemoptysis. Some dyspnea on exertion, not limiting. No falls. Some hematochezia. Compliant with apixaban; no aspirin. No palpitations even though her heart rate today is 116.  Notes edema for about a month    Focused Past History includes:  PAF on apixaban 2.5 - on amiodarone for since 2018. TFTs and LFTs earlier this year,   CXR 12/2022 okay  LFTs and TSH fine  AS  TTE 5/2021: EF 60%, AS (2.6/15/0.51/1.52) by report. PASP 32  TTE 8/2023: EF 65-70%. Mild AS (2.7/16/1.6/0.48). mod calcific MS (6 @ 62). PASP 41. Sev LAE. Normal RV.   HTN  CKD3a with C 1.1  Carotid plaque on US 2/2019    Review of Systems  Constitutional: negative for fevers, night sweats, and weight loss  Eyes: negative for visual disturbance, diplopia  Respiratory: negative for cough, hemoptysis, sputum, and wheezing  Cardiovascular: see HPI  Gastrointestinal: negative for abdominal pain, bright red blood per rectum, change in bowel habits, dysphagia, melena, and reflux symptoms  Genitourinary:negative for dysuria, frequency, and hematuria  Hematologic/lymphatic: negative for bleeding, easy bruising, and lymphadenopathy  Musculoskeletal:negative for arthralgias, back pain, and myalgias  Neurological: negative for gait problems, paresthesia, speech problems, vertigo, and weakness  Behavioral/Psych: negative for excessive alcohol consumption, illegal drug usage, and sleep disturbance    -------------------------------------    Atrial fibrillation    Cataracts, bilateral    Chronic pain syndrome    Closed fracture of neck of left femur    DDD (degenerative disc disease),  lumbar    DDD (degenerative disc disease), lumbar    Heart murmur    HLD (hyperlipidemia)    no medication taken    Hypertension    Hypothyroidism    Lichen sclerosus et atrophicus    Rectal/Vaginal areas    Lumbar spinal stenosis    Lumbar spondylosis    Mitral valve disorder    Asymptomatic    Osteopenia    Urinary incontinence     ----------------------------    Appendectomy    Cataract extraction extracapsular w/ intraocular lens implantation    Hemiarthroplasty of hip    Procedure: HEMIARTHROPLASTY, HIP;  Surgeon: Josesito Leo MD;  Location: [facility] OR;  Service: Orthopedics;  Laterality: Left;    Hysterectomy    Insertion of dorsal column nerve stimulator for trial    Procedure: INSERTION, DORSAL COLUMN NEUROSTIMULATOR, FOR TRIAL;  Surgeon: Derek Daily MD;  Location: [facility] OR;  Service: Pain Management;  Laterality: N/A;    Spinal ablation    Tonsillectomy        Family History   Problem Relation Name Age of Onset    Colon cancer Neg Hx      Crohn's disease Neg Hx      Stomach cancer Neg Hx      Ulcerative colitis Neg Hx      Esophageal cancer Neg Hx       Social History     Tobacco Use    Smoking status: Never    Smokeless tobacco: Never   Substance Use Topics    Alcohol use: Not Currently     Alcohol/week: 1.0 standard drink of alcohol     Types: 1 Shots of liquor per week    Drug use: No       Current Outpatient Medications   Medication Sig Dispense Refill    acetaminophen (TYLENOL) 650 MG TbSR Take 650 mg by mouth every 8 (eight) hours as needed.      AFLURIA QUAD 9431-7450,6MO UP, 60 mcg (15 mcg x 4)/0.5 mL Susp       amLODIPine (NORVASC) 5 MG tablet Take 1 tablet (5 mg total) by mouth once daily. 90 tablet 3    BOOSTRIX TDAP 2.5-8-5 Lf-mcg-Lf/0.5mL Syrg injection       calcium carbonate-vitamin D3 (CALCIUM 600 WITH VITAMIN D3) 600 mg-12.5 mcg (500 unit) Cap TAKE ONE CAPSULE BY MOUTH ONCE DAILY IN THE EVENING 180 capsule 3    clobetasol 0.05% (TEMOVATE) 0.05 % Oint Apply daily to affected area       "ELIQUIS 2.5 mg Tab Take 1 tablet (2.5 mg total) by mouth 2 (two) times daily. 180 tablet 1    fluticasone propionate (FLONASE) 50 mcg/actuation nasal spray 1 spray (50 mcg total) by Each Nostril route 2 (two) times a day. 16 g 0    hydrocortisone 2.5 % cream APPLY TO THE AFFECTED AREA(S) TWICE DAILY 84 g 2    levothyroxine (SYNTHROID) 100 MCG tablet TAKE ONE TABLET BY MOUTH BEFORE BREAKFAST. Administer on an empty stomach at least 30 minutes before food 90 tablet 3    metoprolol succinate (TOPROL-XL) 25 MG 24 hr tablet Take 1 tablet (25 mg total) by mouth once daily. 90 tablet 3    PFIZER COVID BIVAL,12Y UP,,PF, 30 mcg/0.3 mL injection       PRESERVISION AREDS-2 250-90-40-1 mg Cap TAKE ONE CAPSULE BY MOUTH TWICE DAILY 180 capsule 3    triamcinolone acetonide 0.1% (KENALOG) 0.1 % cream       amiodarone (PACERONE) 200 MG Tab Take 2 tablets (400 mg total) by mouth 2 (two) times daily for 10 days, THEN 1 tablet (200 mg total) once daily. 90 tablet 1    torsemide (DEMADEX) 10 MG Tab Take 1 tablet (10 mg total) by mouth once daily. 30 tablet 2     No current facility-administered medications for this visit.        Objective:      Physical Exam  /78 (BP Location: Left arm, Patient Position: Sitting, BP Method: Medium (Automatic))   Pulse (!) 116   Ht 5' 2" (1.575 m)   Wt 63.2 kg (139 lb 5.3 oz)   BMI 25.48 kg/m²   Body surface area is 1.66 meters squared.  Body mass index is 25.48 kg/m².    General appearance: alert, appears stated age, cooperative, and no distress  Head: Normocephalic, without obvious abnormality, atraumatic  Neck: no carotid bruit, no JVD, and supple, symmetrical, trachea midline  Lungs: clear to auscultation bilaterally  Heart: irregularly irregular; 3/6 mid peaking NANCY URSB with diminished S2  Abdomen: soft, non-tender, no distended  Extremities: extremities atraumatic, 2+ pitting edema  Skin: warm, no cyanosis, no pathologic ecchymosis in exposed portions  Neurologic: Grossly normal. A&O " x3      Lab Review   Lab Results   Component Value Date    WBC 10.76 03/04/2024    HGB 12.3 03/04/2024    HCT 39.3 03/04/2024    MCV 90 03/04/2024     03/04/2024         BMP  Lab Results   Component Value Date     03/04/2024    K 4.8 03/04/2024     03/04/2024    CO2 23 03/04/2024    BUN 20 03/04/2024    CREATININE 1.0 03/04/2024    CALCIUM 10.0 03/04/2024    ANIONGAP 11 03/04/2024    ESTGFRAFRICA 56.9 (A) 05/19/2021    EGFRNONAA 49.4 (A) 05/19/2021       Lab Results   Component Value Date    LABPROT 10.0 05/18/2015    ALBUMIN 3.7 03/04/2024       Lab Results   Component Value Date    ALT 14 03/04/2024    AST 20 03/04/2024    ALKPHOS 80 03/04/2024    BILITOT 0.4 03/04/2024       Lab Results   Component Value Date    TSH 1.201 03/04/2024       Lab Results   Component Value Date    CHOL 196 03/04/2024    CHOL 215 (H) 02/28/2023    CHOL 210 (H) 05/19/2021     Lab Results   Component Value Date    HDL 48 03/04/2024    HDL 53 02/28/2023    HDL 63 05/19/2021     Lab Results   Component Value Date    LDLCALC 133.2 03/04/2024    LDLCALC 145.0 02/28/2023    LDLCALC 133.8 05/19/2021     Lab Results   Component Value Date    TRIG 74 03/04/2024    TRIG 85 02/28/2023    TRIG 66 05/19/2021     Lab Results   Component Value Date    CHOLHDL 24.5 03/04/2024    CHOLHDL 24.7 02/28/2023    CHOLHDL 30.0 05/19/2021     EKG today:  AF, RVR     Assessment & Plan:      This is a 89 y.o. pleasant and functional 89 yo white female who is frail and was previously maintained in normal rhythm on 100 mg thrice weekly of oral amiodarone.  Today she is back in atrial fibrillation with rapid ventricular rate.  She is also edematous and mildly dyspneic.  Given her valvular disease she will have very low tolerance for atrial fibrillation and RVR     1. Acute on chronic heart failure with preserved ejection fraction (HFpEF)        2. Paroxysmal atrial fibrillation with RVR  Echo    amiodarone (PACERONE) 200 MG Tab    Basic metabolic  panel    X-Ray Chest PA And Lateral      3. Moderate aortic stenosis        4. Moderate mitral stenosis        5. Long term current use of amiodarone        6. Primary hypertension               Increase amiodarone to 400 mg twice daily for 10 days then 200 mg once daily indefinitely. Given her severe LAE will plan to continue indefinitely with close monitoring for toxicity  DCCV in 4 weeks if she does not convert on her own  TFTs, LFTs up-to-date, next in September 2024   Two-view chest x-ray today, eye exam later  TTE  Start torsemide 10 mg once daily and check BMP in 1 week   Continue metop succinate 25 QD, apixaban 2.5 BID, amlodipine 5  Not interested in LAAO at this time    I appreciate the opportunity to participate in Michela Calvert 's care today.  Please follow up with me in 2-3 months.      Sybil Aguilar MD, [facility]  Interventional Cardiology/Structural Heart Disease  [facility]  Office: [phone]     Parts of this note were completed using voice recognition software. Please excuse any misspellings or syntax errors and reach out to me with questions.

## 2024-05-21 NOTE — PATIENT INSTRUCTIONS
INCREASE amiodarone:  400 mg twice daily for 10 days  Then 200 mg once daily  Continue metoprolol and apixaban (Eliquis) without change  Start torsemide (water pill) 10 mg once daily in the morning  Blood work in a week  Cardioversion in 4 weeks  Echocardiogram   Chest x-ray   Return in 2-3 months with PA    Cardioversion  June 10th at 8:00 am; Arrival time 6:30 am     Arrive for your procedure at:  [facility]      FASTING:  You MAY NOT have anything to eat or drink AFTER MIDNIGHT.  If your procedure is scheduled in the afternoon, you may have a LIGHT BREAKFAST BEFORE 6:00 A.M.  For example: Two slices of toast; black coffee or black tea.    MEDICATIONS:  You may take your regular morning medications with a small sip of water.     Hold or adjust the following:  Fluid pills.  Diabetes medications.  Please hold GLP-1 Drugs such as Semiglutide, Ozempic, Wegovy, and Monjaro 1 week prior to procedure.      Continue: Coumadin, Plavix, Effient, Aspirin, Anti-coagulants, Blood thinners    Please refer to pre-op instructions received from [facility].

## 2024-05-22 ENCOUNTER — PATIENT MESSAGE (OUTPATIENT)
Dept: FAMILY MEDICINE | Facility: CLINIC | Age: 89
End: 2024-05-22
Payer: MEDICARE

## 2024-05-22 ENCOUNTER — TELEPHONE (OUTPATIENT)
Dept: FAMILY MEDICINE | Facility: CLINIC | Age: 89
End: 2024-05-22
Payer: MEDICARE

## 2024-05-22 NOTE — TELEPHONE ENCOUNTER
----- Message from Jodie Swift sent at 5/22/2024  2:55 PM CDT -----  Contact: Mya [phone]  Type: Needs Medical Advice  Who Called:  Pts son May     Best Call Back Number: [phone]  Additional Information:   Pt was seen by cardio and is in afib/ Son would like to get pt set up for home health he is requesting a call back pls to further discuss. Thank you.

## 2024-05-23 ENCOUNTER — OFFICE VISIT (OUTPATIENT)
Dept: FAMILY MEDICINE | Facility: CLINIC | Age: 89
End: 2024-05-23
Payer: MEDICARE

## 2024-05-23 ENCOUNTER — TELEPHONE (OUTPATIENT)
Dept: FAMILY MEDICINE | Facility: CLINIC | Age: 89
End: 2024-05-23
Payer: MEDICARE

## 2024-05-23 VITALS
SYSTOLIC BLOOD PRESSURE: 122 MMHG | BODY MASS INDEX: 25.19 KG/M2 | HEART RATE: 116 BPM | WEIGHT: 136.88 LBS | TEMPERATURE: 98 F | OXYGEN SATURATION: 98 % | DIASTOLIC BLOOD PRESSURE: 74 MMHG | HEIGHT: 62 IN

## 2024-05-23 DIAGNOSIS — N18.31 STAGE 3A CHRONIC KIDNEY DISEASE: ICD-10-CM

## 2024-05-23 DIAGNOSIS — I50.33 ACUTE ON CHRONIC HEART FAILURE WITH PRESERVED EJECTION FRACTION (HFPEF): ICD-10-CM

## 2024-05-23 DIAGNOSIS — I48.0 PAROXYSMAL ATRIAL FIBRILLATION: ICD-10-CM

## 2024-05-23 DIAGNOSIS — J00 NASOPHARYNGITIS: Primary | ICD-10-CM

## 2024-05-23 DIAGNOSIS — I73.9 PERIPHERAL VASCULAR DISEASE, UNSPECIFIED: ICD-10-CM

## 2024-05-23 PROCEDURE — 3288F FALL RISK ASSESSMENT DOCD: CPT | Mod: CPTII,S$GLB,, | Performed by: NURSE PRACTITIONER

## 2024-05-23 PROCEDURE — 1126F AMNT PAIN NOTED NONE PRSNT: CPT | Mod: CPTII,S$GLB,, | Performed by: NURSE PRACTITIONER

## 2024-05-23 PROCEDURE — 1101F PT FALLS ASSESS-DOCD LE1/YR: CPT | Mod: CPTII,S$GLB,, | Performed by: NURSE PRACTITIONER

## 2024-05-23 PROCEDURE — 1159F MED LIST DOCD IN RCRD: CPT | Mod: CPTII,S$GLB,, | Performed by: NURSE PRACTITIONER

## 2024-05-23 PROCEDURE — 99213 OFFICE O/P EST LOW 20 MIN: CPT | Mod: S$GLB,,, | Performed by: NURSE PRACTITIONER

## 2024-05-23 PROCEDURE — 99999 PR PBB SHADOW E&M-EST. PATIENT-LVL V: CPT | Mod: PBBFAC,,, | Performed by: NURSE PRACTITIONER

## 2024-05-23 PROCEDURE — 1160F RVW MEDS BY RX/DR IN RCRD: CPT | Mod: CPTII,S$GLB,, | Performed by: NURSE PRACTITIONER

## 2024-05-23 RX ORDER — BENZONATATE 200 MG/1
200 CAPSULE ORAL 3 TIMES DAILY PRN
Qty: 30 CAPSULE | Refills: 0 | Status: SHIPPED | OUTPATIENT
Start: 2024-05-23 | End: 2024-06-02

## 2024-05-23 RX ORDER — FLUTICASONE PROPIONATE 50 MCG
1 SPRAY, SUSPENSION (ML) NASAL 2 TIMES DAILY
Qty: 16 G | Refills: 5 | Status: SHIPPED | OUTPATIENT
Start: 2024-05-23

## 2024-05-23 RX ORDER — CALCIUM CARBONATE 500(1250)
TABLET ORAL
COMMUNITY
Start: 2024-05-13

## 2024-05-23 NOTE — TELEPHONE ENCOUNTER
----- Message from Francoise Milligan sent at 5/23/2024  8:49 AM CDT -----  Contact: Son  Type:  Same Day Appointment Request    Caller is requesting a same day appointment.  Caller declined first available appointment listed below.    Name of Caller:Son    When is the first available appointment?July 1 st     Symptoms:coughing with congestive heart failure and a fib    Best Call Back Number:[phone]    Additional Information: Please call to advise

## 2024-05-23 NOTE — TELEPHONE ENCOUNTER
Please inquire of son if they are requesting any other home health services beside weekly nurse checks. Also, which company are they interested in using?

## 2024-05-23 NOTE — PROGRESS NOTES
Subjective:       Patient ID: Michela Calvert is a 89 y.o. female.    Chief Complaint: Congestive Heart Failure    HPI here with son today. started with nasal congestion, post nasal drip and cough 3 days ago. No fever.     Saw cardiologist, Dr Aguilar. on 5/21/24 and started torsemide for worsening dyspnea, Afib and CHF. Amiodorone was also increased. Pt is scheduled for cardioversion on 6/10/24. Family requests home health services for worsening CHF and med management.     Past Medical History:   Diagnosis Date    Atrial fibrillation     Cataracts, bilateral     Chronic pain syndrome 7/25/2018    Closed fracture of neck of left femur 3/8/2019    DDD (degenerative disc disease), lumbar     DDD (degenerative disc disease), lumbar     Heart murmur 01/2017    HLD (hyperlipidemia)     no medication taken    Hypertension 12/27/2018    Hypothyroidism     Lichen sclerosus et atrophicus     Rectal/Vaginal areas    Lumbar spinal stenosis     Lumbar spondylosis     Mitral valve disorder 01/2017    Asymptomatic    Osteopenia     Urinary incontinence        Past Surgical History:   Procedure Laterality Date    APPENDECTOMY      CATARACT EXTRACTION EXTRACAPSULAR W/ INTRAOCULAR LENS IMPLANTATION Bilateral     HEMIARTHROPLASTY OF HIP Left 3/9/2019    Procedure: HEMIARTHROPLASTY, HIP;  Surgeon: Josesito Leo MD;  Location: [facility] OR;  Service: Orthopedics;  Laterality: Left;    HYSTERECTOMY      INSERTION OF DORSAL COLUMN NERVE STIMULATOR FOR TRIAL N/A 7/25/2018    Procedure: INSERTION, DORSAL COLUMN NEUROSTIMULATOR, FOR TRIAL;  Surgeon: Derek Daily MD;  Location: [facility] OR;  Service: Pain Management;  Laterality: N/A;    spinal ablation  12/2017    TONSILLECTOMY         Review of patient's allergies indicates:   Allergen Reactions    Nitrofuran analogues Nausea Only    Ciprofloxacin Nausea And Vomiting       Social History     Socioeconomic History    Marital status:    Tobacco Use    Smoking status: Never    Smokeless  tobacco: Never   Substance and Sexual Activity    Alcohol use: Not Currently     Alcohol/week: 1.0 standard drink of alcohol     Types: 1 Shots of liquor per week    Drug use: No    Sexual activity: Never     Social Determinants of Health     Financial Resource Strain: Low Risk  (7/15/2021)    Overall Financial Resource Strain (CARDIA)     Difficulty of Paying Living Expenses: Not hard at all   Food Insecurity: No Food Insecurity (7/15/2021)    Hunger Vital Sign     Worried About Running Out of Food in the Last Year: Never true     Ran Out of Food in the Last Year: Never true   Transportation Needs: No Transportation Needs (7/15/2021)    PRAPARE - Transportation     Lack of Transportation (Medical): No     Lack of Transportation (Non-Medical): No   Physical Activity: Unknown (7/15/2021)    Exercise Vital Sign     Days of Exercise per Week: 3 days   Stress: Stress Concern Present (7/15/2021)    [employer] - Occupational Stress Questionnaire     Feeling of Stress : To some extent       Current Outpatient Medications on File Prior to Visit   Medication Sig Dispense Refill    acetaminophen (TYLENOL) 650 MG TbSR Take 650 mg by mouth every 8 (eight) hours as needed.      amiodarone (PACERONE) 200 MG Tab Take 2 tablets (400 mg total) by mouth 2 (two) times daily for 10 days, THEN 1 tablet (200 mg total) once daily. 90 tablet 1    amLODIPine (NORVASC) 5 MG tablet Take 1 tablet (5 mg total) by mouth once daily. 90 tablet 3    calcium carbonate (OS-ARCHIE) 500 mg calcium (1,250 mg) tablet TAKE ONE CAPSULE BY MOUTH ONCE DAILY IN THE EVENING      calcium carbonate-vitamin D3 (CALCIUM 600 WITH VITAMIN D3) 600 mg-12.5 mcg (500 unit) Cap TAKE ONE CAPSULE BY MOUTH ONCE DAILY IN THE EVENING 180 capsule 3    ELIQUIS 2.5 mg Tab Take 1 tablet (2.5 mg total) by mouth 2 (two) times daily. 180 tablet 1    hydrocortisone 2.5 % cream APPLY TO THE AFFECTED AREA(S) TWICE DAILY 84 g 2    levothyroxine (SYNTHROID) 100  "MCG tablet TAKE ONE TABLET BY MOUTH BEFORE BREAKFAST. Administer on an empty stomach at least 30 minutes before food 90 tablet 3    metoprolol succinate (TOPROL-XL) 25 MG 24 hr tablet Take 1 tablet (25 mg total) by mouth once daily. 90 tablet 3    PRESERVISION AREDS-2 250-90-40-1 mg Cap TAKE ONE CAPSULE BY MOUTH TWICE DAILY 180 capsule 3    torsemide (DEMADEX) 10 MG Tab Take 1 tablet (10 mg total) by mouth once daily. 30 tablet 2    triamcinolone acetonide 0.1% (KENALOG) 0.1 % cream       [DISCONTINUED] fluticasone propionate (FLONASE) 50 mcg/actuation nasal spray 1 spray (50 mcg total) by Each Nostril route 2 (two) times a day. 16 g 0    clobetasol 0.05% (TEMOVATE) 0.05 % Oint Apply daily to affected area (Patient not taking: Reported on 5/23/2024)      PFIZER COVID BIVAL,12Y UP,,PF, 30 mcg/0.3 mL injection  (Patient not taking: Reported on 5/23/2024)      [DISCONTINUED] AFLURIA QUAD 5101-4555,6MO UP, 60 mcg (15 mcg x 4)/0.5 mL Susp  (Patient not taking: Reported on 5/23/2024)      [DISCONTINUED] BOOSTRIX TDAP 2.5-8-5 Lf-mcg-Lf/0.5mL Syrg injection  (Patient not taking: Reported on 5/23/2024)       No current facility-administered medications on file prior to visit.       Family History   Problem Relation Name Age of Onset    Colon cancer Neg Hx      Crohn's disease Neg Hx      Stomach cancer Neg Hx      Ulcerative colitis Neg Hx      Esophageal cancer Neg Hx         Review of Systems   HENT:  Positive for congestion and postnasal drip.    Respiratory:  Positive for cough and shortness of breath.        Objective:      /74   Pulse (!) 116   Temp 98.3 °F (36.8 °C)   Ht 5' 2" (1.575 m)   Wt 62.1 kg (136 lb 14.5 oz)   SpO2 98%   BMI 25.04 kg/m²   Physical Exam  Vitals and nursing note reviewed.   Constitutional:       General: She is not in acute distress.     Appearance: Normal appearance. She is well-groomed.   HENT:      Head: Normocephalic.      Right Ear: Tympanic membrane, ear canal and external ear " normal. No decreased hearing noted.      Left Ear: Tympanic membrane, ear canal and external ear normal. No decreased hearing noted.      Nose: Nose normal.      Mouth/Throat:      Lips: Pink.      Mouth: Mucous membranes are moist.      Pharynx: Oropharynx is clear. No oropharyngeal exudate or posterior oropharyngeal erythema.   Eyes:      Extraocular Movements: Extraocular movements intact.      Conjunctiva/sclera: Conjunctivae normal.      Pupils: Pupils are equal, round, and reactive to light.   Cardiovascular:      Rate and Rhythm: Normal rate. Rhythm irregular.      Heart sounds: Normal heart sounds.   Pulmonary:      Effort: Pulmonary effort is normal.      Breath sounds: Normal breath sounds.   Chest:      Chest wall: No tenderness.   Abdominal:      General: Bowel sounds are normal.      Palpations: Abdomen is soft.      Tenderness: There is no abdominal tenderness.   Musculoskeletal:         General: No swelling or tenderness. Normal range of motion.      Cervical back: Normal range of motion and neck supple.      Right lower leg: No edema.      Left lower leg: No edema.   Lymphadenopathy:      Cervical: No cervical adenopathy.   Skin:     General: Skin is warm and dry.      Comments: Venous stasis dermatitis to bilateral lower legs   Neurological:      General: No focal deficit present.      Mental Status: She is alert and oriented to person, place, and time. Mental status is at baseline.      Gait: Gait abnormal (gait assisted by walker).   Psychiatric:         Mood and Affect: Mood normal.         Behavior: Behavior normal.         Assessment:       1. Nasopharyngitis    2. Paroxysmal atrial fibrillation    3. Stage 3a chronic kidney disease    4. Peripheral vascular disease, unspecified    5. Acute on chronic heart failure with preserved ejection fraction (HFpEF)        Plan:       Nasopharyngitis  -     benzonatate (TESSALON) 200 MG capsule; Take 1 capsule (200 mg total) by mouth 3 (three) times daily  as needed for Cough.  Dispense: 30 capsule; Refill: 0    Paroxysmal atrial fibrillation  -     Ambulatory referral/consult to Home Health; Future; Expected date: 05/24/2024    Stage 3a chronic kidney disease    Peripheral vascular disease, unspecified  -     Ambulatory referral/consult to Home Health; Future; Expected date: 05/24/2024    Acute on chronic heart failure with preserved ejection fraction (HFpEF)  -     Ambulatory referral/consult to Home Health; Future; Expected date: 05/24/2024    Other orders  -     fluticasone propionate (FLONASE) 50 mcg/actuation nasal spray; 1 spray (50 mcg total) by Each Nostril route 2 (two) times a day.  Dispense: 16 g; Refill: 5        Nasopharyngitis: tessalon prn cough. Flonase and saline nasal spray.  Humidifier at night. Robitussin DM prn cough/congestion.  RTC if no improvement over next 5 days    Home health ordered for chronic disease/med management.

## 2024-05-29 ENCOUNTER — HOSPITAL ENCOUNTER (OUTPATIENT)
Dept: CARDIOLOGY | Facility: HOSPITAL | Age: 89
Discharge: HOME | End: 2024-05-29
Attending: INTERNAL MEDICINE
Payer: MEDICARE

## 2024-05-29 ENCOUNTER — TELEPHONE (OUTPATIENT)
Dept: CARDIOLOGY | Facility: CLINIC | Age: 89
End: 2024-05-29
Payer: MEDICARE

## 2024-05-29 VITALS — BODY MASS INDEX: 25.03 KG/M2 | WEIGHT: 136 LBS | HEIGHT: 62 IN

## 2024-05-29 DIAGNOSIS — I48.0 PAROXYSMAL ATRIAL FIBRILLATION WITH RVR: ICD-10-CM

## 2024-05-29 PROCEDURE — 93306 TTE W/DOPPLER COMPLETE: CPT | Mod: 26,,, | Performed by: INTERNAL MEDICINE

## 2024-05-29 PROCEDURE — 93306 TTE W/DOPPLER COMPLETE: CPT | Mod: PO

## 2024-05-29 NOTE — TELEPHONE ENCOUNTER
----- Message from Destin Miller sent at 5/29/2024  1:39 PM CDT -----  Type: Needs Medical Advice  Who Called:  pt son, Juan José  Symptoms (please be specific):  said he need to speak to the office--said he need to schedule a EKG for his mother--please call and advise  Best Call Back Number: [phone]  Additional Information: thank you

## 2024-05-29 NOTE — TELEPHONE ENCOUNTER
Spoke with pts son who states he would like pt to have an EKG done before procedure on 6/10. Would like the EKG done on 6/7    Message sent to provider to determine if EKG is needed or not

## 2024-06-03 ENCOUNTER — TELEPHONE (OUTPATIENT)
Dept: CARDIOLOGY | Facility: CLINIC | Age: 89
End: 2024-06-03
Payer: MEDICARE

## 2024-06-03 DIAGNOSIS — I50.33 ACUTE ON CHRONIC HEART FAILURE WITH PRESERVED EJECTION FRACTION (HFPEF): Primary | ICD-10-CM

## 2024-06-03 NOTE — TELEPHONE ENCOUNTER
Son wants to know if we could do one on Friday afternoon--he doesn't want to bring her in for the cardioversion if he doesn't have to

## 2024-06-03 NOTE — TELEPHONE ENCOUNTER
----- Message from Sybil Aguilar MD sent at 6/3/2024  1:19 PM CDT -----  Contact: Son Juan José  Yes, we will check another EKG before the cardioversion.  ----- Message -----  From: Michelle Mota MA  Sent: 5/30/2024   3:47 PM CDT  To: Sybil Aguilar MD    Please advise  ----- Message -----  From: Tian Conraden  Sent: 5/30/2024   3:40 PM CDT  To: Jeff Devine Staff    Pts son called yesterday and still has not heard back from anyone but I did get a little more details  Type: Needs Medical Advice  Who Called:  Juan José   Best Call Back Number: [phone]  Additional Information: One of the pts sons May has some concerns about some of his moms test results and would like for the doctor to possibly order his mom an EKG.  Please call Juan José back at the number listed above so he can share this information with his brother and thanks

## 2024-06-04 LAB
ASCENDING AORTA: 2.88 CM
AV INDEX (PROSTH): 0.31
AV MEAN GRADIENT: 14 MMHG
AV PEAK GRADIENT: 25 MMHG
AV VALVE AREA BY VELOCITY RATIO: 0.88 CM²
AV VALVE AREA: 0.98 CM²
AV VELOCITY RATIO: 0.28
BSA FOR ECHO PROCEDURE: 1.64 M2
CV ECHO LV RWT: 0.65 CM
DOP CALC AO PEAK VEL: 2.5 M/S
DOP CALC AO VTI: 49.9 CM
DOP CALC LVOT AREA: 3.2 CM2
DOP CALC LVOT DIAMETER: 2.01 CM
DOP CALC LVOT PEAK VEL: 0.69 M/S
DOP CALC LVOT STROKE VOLUME: 48.84 CM3
DOP CALC MV VTI: 47.8 CM
DOP CALCLVOT PEAK VEL VTI: 15.4 CM
E/E' RATIO: 36.2 M/S
ECHO LV POSTERIOR WALL: 1.12 CM (ref 0.6–1.1)
FRACTIONAL SHORTENING: 41 % (ref 28–44)
HR MV ECHO: 79 BPM
INTERVENTRICULAR SEPTUM: 1.06 CM (ref 0.6–1.1)
LEFT ATRIUM SIZE: 4.48 CM
LEFT ATRIUM VOLUME INDEX MOD: 50.8 ML/M2
LEFT ATRIUM VOLUME MOD: 82.32 CM3
LEFT INTERNAL DIMENSION IN SYSTOLE: 2.02 CM (ref 2.1–4)
LEFT VENTRICLE DIASTOLIC VOLUME INDEX: 29.71 ML/M2
LEFT VENTRICLE DIASTOLIC VOLUME: 48.13 ML
LEFT VENTRICLE MASS INDEX: 70 G/M2
LEFT VENTRICLE SYSTOLIC VOLUME INDEX: 8.1 ML/M2
LEFT VENTRICLE SYSTOLIC VOLUME: 13.11 ML
LEFT VENTRICULAR INTERNAL DIMENSION IN DIASTOLE: 3.42 CM (ref 3.5–6)
LEFT VENTRICULAR MASS: 113.43 G
LV LATERAL E/E' RATIO: 36.2 M/S
LV SEPTAL E/E' RATIO: 36.2 M/S
LVOT MG: 1.11 MMHG
LVOT MV: 0.5 CM/S
LVOT STOKE VOLUME INDEX: 29.8 ML/M2
MV MEAN GRADIENT: 6 MMHG
MV PEAK E VEL: 1.81 M/S
MV PEAK GRADIENT: 15 MMHG
MV VALVE AREA BY CONTINUITY EQUATION: 1.02 CM2
OHS CV RV/LV RATIO: 0.98 CM
PISA TR MAX VEL: 3.39 M/S
RA PRESSURE ESTIMATED: 3 MMHG
RA VOL SYS: 62.16 ML
RIGHT ATRIAL AREA: 21.1 CM2
RIGHT VENTRICULAR END-DIASTOLIC DIMENSION: 3.36 CM
RIGHT VENTRICULAR LENGTH IN DIASTOLE (APICAL 4-CHAMBER VIEW): 5.61 CM
RV MID DIAMA: 2.21 CM
RV TB RVSP: 6 MMHG
RV TISSUE DOPPLER FREE WALL SYSTOLIC VELOCITY 1 (APICAL 4 CHAMBER VIEW): 13.87 CM/S
SINUS: 2.79 CM
STJ: 2.78 CM
TDI LATERAL: 0.05 M/S
TDI SEPTAL: 0.05 M/S
TDI: 0.05 M/S
TR MAX PG: 46 MMHG
TRICUSPID ANNULAR PLANE SYSTOLIC EXCURSION: 1.59 CM
TV REST PULMONARY ARTERY PRESSURE: 49 MMHG
Z-SCORE OF LEFT VENTRICULAR DIMENSION IN END DIASTOLE: -2.93
Z-SCORE OF LEFT VENTRICULAR DIMENSION IN END SYSTOLE: -2.7

## 2024-06-07 ENCOUNTER — HOSPITAL ENCOUNTER (OUTPATIENT)
Dept: CARDIOLOGY | Facility: HOSPITAL | Age: 89
Discharge: HOME | End: 2024-06-07
Attending: INTERNAL MEDICINE
Payer: MEDICARE

## 2024-06-07 ENCOUNTER — TELEPHONE (OUTPATIENT)
Dept: FAMILY MEDICINE | Facility: CLINIC | Age: 89
End: 2024-06-07
Payer: MEDICARE

## 2024-06-07 ENCOUNTER — TELEPHONE (OUTPATIENT)
Dept: CARDIOLOGY | Facility: CLINIC | Age: 89
End: 2024-06-07
Payer: MEDICARE

## 2024-06-07 DIAGNOSIS — I50.33 ACUTE ON CHRONIC HEART FAILURE WITH PRESERVED EJECTION FRACTION (HFPEF): ICD-10-CM

## 2024-06-07 LAB
OHS QRS DURATION: 86 MS
OHS QTC CALCULATION: 474 MS

## 2024-06-07 PROCEDURE — 93010 ELECTROCARDIOGRAM REPORT: CPT | Mod: ,,, | Performed by: INTERNAL MEDICINE

## 2024-06-07 PROCEDURE — 93005 ELECTROCARDIOGRAM TRACING: CPT | Mod: PO

## 2024-06-07 NOTE — TELEPHONE ENCOUNTER
Called pt regarding EKG results and if they would like to have the RADHA/DCCV on Monday. Pt's son would like to cancel the procedure on Monday due to the pt being asymptomatic. [facility] notified.

## 2024-06-07 NOTE — TELEPHONE ENCOUNTER
Due to abnormal kidney function and age, pt should only take 1/2 tablet (2.5mg) of xyzal 1-2 times per week. Also, Xyzal can cause drowsiness.

## 2024-06-07 NOTE — TELEPHONE ENCOUNTER
----- Message from Cheryl Trivedi sent at 6/7/2024  9:58 AM CDT -----  Regarding: Needs return call  Type: Needs Medical Advice  Who Called:  Matt    Best Call Back Number: [phone]    Additional Information: Son would like to talk to the office, he wants to talk to someone regarding her appt on Monday, please call to advise

## 2024-06-13 ENCOUNTER — TELEPHONE (OUTPATIENT)
Dept: CARDIOLOGY | Facility: CLINIC | Age: 89
End: 2024-06-13
Payer: MEDICARE

## 2024-06-13 NOTE — TELEPHONE ENCOUNTER
----- Message from Sybil Aguilar MD sent at 6/3/2024  1:19 PM CDT -----  Contact: Son Juan José  Yes, we will check another EKG before the cardioversion.  ----- Message -----  From: Michelle Mota MA  Sent: 5/30/2024   3:47 PM CDT  To: Sybil Aguilar MD    Please advise  ----- Message -----  From: Tian Conraden  Sent: 5/30/2024   3:40 PM CDT  To: Jeff Devine Staff    Pts son called yesterday and still has not heard back from anyone but I did get a little more details  Type: Needs Medical Advice  Who Called:  Juan José   Best Call Back Number: [phone]  Additional Information: One of the pts sons May has some concerns about some of his moms test results and would like for the doctor to possibly order his mom an EKG.  Please call Juan José back at the number listed above so he can share this information with his brother and thanks

## 2024-08-07 ENCOUNTER — TELEPHONE (OUTPATIENT)
Dept: FAMILY MEDICINE | Facility: CLINIC | Age: 89
End: 2024-08-07
Payer: MEDICARE

## 2024-08-07 DIAGNOSIS — H35.30 MACULAR DEGENERATION, UNSPECIFIED LATERALITY, UNSPECIFIED TYPE: Primary | ICD-10-CM

## 2024-08-07 RX ORDER — VIT C/E/ZN/COPPR/LUTEIN/ZEAXAN 250MG-90MG
1 CAPSULE ORAL 2 TIMES DAILY
Qty: 180 CAPSULE | Refills: 3 | Status: SHIPPED | OUTPATIENT
Start: 2024-08-07

## 2024-08-07 RX ORDER — VIT C/E/ZN/COPPR/LUTEIN/ZEAXAN 250MG-90MG
1 CAPSULE ORAL 2 TIMES DAILY
Qty: 180 CAPSULE | Refills: 3 | Status: SHIPPED | OUTPATIENT
Start: 2024-08-07 | End: 2024-08-07 | Stop reason: SDUPTHER

## 2024-08-19 ENCOUNTER — OFFICE VISIT (OUTPATIENT)
Dept: FAMILY MEDICINE | Facility: CLINIC | Age: 89
End: 2024-08-19
Payer: MEDICARE

## 2024-08-19 VITALS
DIASTOLIC BLOOD PRESSURE: 66 MMHG | RESPIRATION RATE: 18 BRPM | BODY MASS INDEX: 24.22 KG/M2 | HEART RATE: 84 BPM | OXYGEN SATURATION: 98 % | WEIGHT: 131.63 LBS | SYSTOLIC BLOOD PRESSURE: 136 MMHG | HEIGHT: 62 IN

## 2024-08-19 DIAGNOSIS — N39.0 URINARY TRACT INFECTION WITHOUT HEMATURIA, SITE UNSPECIFIED: Primary | ICD-10-CM

## 2024-08-19 PROCEDURE — 1101F PT FALLS ASSESS-DOCD LE1/YR: CPT | Mod: HCNC,CPTII,S$GLB, | Performed by: FAMILY MEDICINE

## 2024-08-19 PROCEDURE — 1126F AMNT PAIN NOTED NONE PRSNT: CPT | Mod: HCNC,CPTII,S$GLB, | Performed by: FAMILY MEDICINE

## 2024-08-19 PROCEDURE — 1160F RVW MEDS BY RX/DR IN RCRD: CPT | Mod: HCNC,CPTII,S$GLB, | Performed by: FAMILY MEDICINE

## 2024-08-19 PROCEDURE — 1159F MED LIST DOCD IN RCRD: CPT | Mod: HCNC,CPTII,S$GLB, | Performed by: FAMILY MEDICINE

## 2024-08-19 PROCEDURE — 99999 PR PBB SHADOW E&M-EST. PATIENT-LVL III: CPT | Mod: PBBFAC,HCNC,, | Performed by: FAMILY MEDICINE

## 2024-08-19 PROCEDURE — 87086 URINE CULTURE/COLONY COUNT: CPT | Mod: HCNC | Performed by: FAMILY MEDICINE

## 2024-08-19 PROCEDURE — 99213 OFFICE O/P EST LOW 20 MIN: CPT | Mod: HCNC,S$GLB,, | Performed by: FAMILY MEDICINE

## 2024-08-19 PROCEDURE — 3288F FALL RISK ASSESSMENT DOCD: CPT | Mod: HCNC,CPTII,S$GLB, | Performed by: FAMILY MEDICINE

## 2024-08-19 RX ORDER — SULFAMETHOXAZOLE AND TRIMETHOPRIM 800; 160 MG/1; MG/1
1 TABLET ORAL 2 TIMES DAILY
Qty: 10 TABLET | Refills: 0 | Status: ON HOLD | OUTPATIENT
Start: 2024-08-19 | End: 2024-08-24

## 2024-08-19 RX ORDER — MUPIROCIN 20 MG/G
OINTMENT TOPICAL 2 TIMES DAILY
Status: ON HOLD | COMMUNITY
Start: 2024-08-15

## 2024-08-19 NOTE — PROGRESS NOTES
Subjective:       Patient ID: Michela Calvert is a 89 y.o. female.    Chief Complaint: Urinary Tract Infection (Possible UTI having burning and frequency, for bout 5 days.  Took Azo)    Onset 5 days ago with urinary frequency and burning.  Symptoms consistent with previous UTI  Using Azo.        Past Medical History:   Diagnosis Date    Anticoagulant long-term use     Atrial fibrillation     Cataracts, bilateral     CHF (congestive heart failure)     Chronic pain syndrome 07/25/2018    Closed fracture of neck of left femur 03/08/2019    DDD (degenerative disc disease), lumbar     DDD (degenerative disc disease), lumbar     Heart murmur 01/2017    HLD (hyperlipidemia)     no medication taken    Hypertension 12/27/2018    Hypothyroidism     Lichen sclerosus et atrophicus     Rectal/Vaginal areas    Lumbar spinal stenosis     Lumbar spondylosis     Mitral valve disorder 01/2017    Asymptomatic    Osteopenia     Urinary incontinence        Past Surgical History:   Procedure Laterality Date    APPENDECTOMY      CATARACT EXTRACTION EXTRACAPSULAR W/ INTRAOCULAR LENS IMPLANTATION Bilateral     HEMIARTHROPLASTY OF HIP Left 3/9/2019    Procedure: HEMIARTHROPLASTY, HIP;  Surgeon: Josesito Leo MD;  Location: [facility] OR;  Service: Orthopedics;  Laterality: Left;    HYSTERECTOMY      INSERTION OF DORSAL COLUMN NERVE STIMULATOR FOR TRIAL N/A 7/25/2018    Procedure: INSERTION, DORSAL COLUMN NEUROSTIMULATOR, FOR TRIAL;  Surgeon: Derek Daily MD;  Location: [facility] OR;  Service: Pain Management;  Laterality: N/A;    spinal ablation  12/2017    TONSILLECTOMY         Review of patient's allergies indicates:   Allergen Reactions    Nitrofuran analogues Nausea Only    Ciprofloxacin Nausea And Vomiting       Social History     Socioeconomic History    Marital status:    Tobacco Use    Smoking status: Never    Smokeless tobacco: Never   Substance and Sexual Activity    Alcohol use: Not Currently     Alcohol/week: 1.0 standard drink  of alcohol     Types: 1 Shots of liquor per week    Drug use: No    Sexual activity: Never     Social Determinants of Health     Financial Resource Strain: Low Risk  (7/15/2021)    Overall Financial Resource Strain (CARDIA)     Difficulty of Paying Living Expenses: Not hard at all   Food Insecurity: No Food Insecurity (7/15/2021)    Hunger Vital Sign     Worried About Running Out of Food in the Last Year: Never true     Ran Out of Food in the Last Year: Never true   Transportation Needs: No Transportation Needs (7/15/2021)    PRAPARE - Transportation     Lack of Transportation (Medical): No     Lack of Transportation (Non-Medical): No   Physical Activity: Unknown (7/15/2021)    Exercise Vital Sign     Days of Exercise per Week: 3 days   Stress: Stress Concern Present (7/15/2021)    [employer] - Occupational Stress Questionnaire     Feeling of Stress : To some extent       Current Outpatient Medications on File Prior to Visit   Medication Sig Dispense Refill    acetaminophen (TYLENOL) 650 MG TbSR Take 650 mg by mouth every 8 (eight) hours as needed.      amiodarone (PACERONE) 200 MG Tab Take 2 tablets (400 mg total) by mouth 2 (two) times daily for 10 days, THEN 1 tablet (200 mg total) once daily. 90 tablet 1    amLODIPine (NORVASC) 5 MG tablet Take 1 tablet (5 mg total) by mouth once daily. 90 tablet 3    calcium carbonate (OS-ARCHIE) 500 mg calcium (1,250 mg) tablet TAKE ONE CAPSULE BY MOUTH ONCE DAILY IN THE EVENING      calcium carbonate-vitamin D3 (CALCIUM 600 WITH VITAMIN D3) 600 mg-12.5 mcg (500 unit) Cap TAKE ONE CAPSULE BY MOUTH ONCE DAILY IN THE EVENING 180 capsule 3    clobetasol 0.05% (TEMOVATE) 0.05 % Oint       ELIQUIS 2.5 mg Tab Take 1 tablet (2.5 mg total) by mouth 2 (two) times daily. 180 tablet 1    hydrocortisone 2.5 % cream APPLY TO THE AFFECTED AREA(S) TWICE DAILY 84 g 2    levothyroxine (SYNTHROID) 100 MCG tablet TAKE ONE TABLET BY MOUTH BEFORE BREAKFAST. Administer on  "an empty stomach at least 30 minutes before food 90 tablet 3    mupirocin (BACTROBAN) 2 % ointment Apply topically 2 (two) times daily.      vit C,O-Zu-qtskw-lutein-zeaxan (PRESERVISION AREDS-2) 250-90-40-1 mg Cap Take 1 capsule by mouth 2 (two) times daily. 180 capsule 3    fluticasone propionate (FLONASE) 50 mcg/actuation nasal spray 1 spray (50 mcg total) by Each Nostril route 2 (two) times a day. (Patient not taking: Reported on 8/19/2024) 16 g 5    PFIZER COVID BIVAL,12Y UP,,PF, 30 mcg/0.3 mL injection  (Patient not taking: Reported on 5/23/2024)      triamcinolone acetonide 0.1% (KENALOG) 0.1 % cream  (Patient not taking: Reported on 8/19/2024)       No current facility-administered medications on file prior to visit.       Family History   Problem Relation Name Age of Onset    Colon cancer Neg Hx      Crohn's disease Neg Hx      Stomach cancer Neg Hx      Ulcerative colitis Neg Hx      Esophageal cancer Neg Hx         Review of Systems   Constitutional:  Negative for appetite change, chills, fever and unexpected weight change.   HENT:  Negative for sore throat and trouble swallowing.    Eyes:  Negative for pain and visual disturbance.   Respiratory:  Negative for cough, shortness of breath and wheezing.    Cardiovascular:  Negative for chest pain and palpitations.   Gastrointestinal:  Negative for abdominal pain, blood in stool, diarrhea, nausea and vomiting.   Genitourinary:  Positive for dysuria and frequency. Negative for difficulty urinating and hematuria.   Musculoskeletal:  Negative for arthralgias, gait problem and neck pain.   Skin:  Negative for rash and wound.   Neurological:  Negative for dizziness, weakness, numbness and headaches.   Hematological:  Negative for adenopathy.   Psychiatric/Behavioral:  Negative for dysphoric mood.        Objective:      /66   Pulse 84   Resp 18   Ht 5' 2" (1.575 m)   Wt 59.7 kg (131 lb 9.8 oz)   SpO2 98%   BMI 24.07 kg/m²   Physical Exam  Constitutional: "       Appearance: Normal appearance. She is well-developed.   HENT:      Head: Normocephalic.      Mouth/Throat:      Pharynx: No oropharyngeal exudate or posterior oropharyngeal erythema.   Eyes:      Conjunctiva/sclera: Conjunctivae normal.      Pupils: Pupils are equal, round, and reactive to light.   Neck:      Thyroid: No thyromegaly.   Cardiovascular:      Rate and Rhythm: Normal rate and regular rhythm.      Heart sounds: Normal heart sounds, S1 normal and S2 normal. No murmur heard.     No friction rub. No gallop.   Pulmonary:      Effort: Pulmonary effort is normal.      Breath sounds: Normal breath sounds. No wheezing or rales.   Abdominal:      Tenderness: There is abdominal tenderness in the suprapubic area.   Musculoskeletal:      Cervical back: Normal range of motion and neck supple.      Right lower leg: No edema.      Left lower leg: No edema.   Lymphadenopathy:      Cervical: No cervical adenopathy.   Skin:     General: Skin is warm.      Findings: No rash.   Neurological:      Mental Status: She is alert and oriented to person, place, and time.      Cranial Nerves: No cranial nerve deficit.      Gait: Gait normal.           Assessment:       1. Urinary tract infection without hematuria, site unspecified        Plan:       Urinary tract infection without hematuria, site unspecified  -     Cancel: POCT Urinalysis(Instrument)  -     Urine culture; Future; Expected date: 08/19/2024  -     sulfamethoxazole-trimethoprim 800-160mg (BACTRIM DS) 800-160 mg Tab; Take 1 tablet by mouth 2 (two) times daily. for 5 days  Dispense: 10 tablet; Refill: 0      Increase fluids  Continue Azo PRN  Counseled on regular exercise, maintenance of a healthy weight, balanced diet rich in fruits/vegetables and lean protein, and avoidance of unhealthy habits like smoking and excessive alcohol intake.

## 2024-08-20 DIAGNOSIS — I48.0 PAROXYSMAL ATRIAL FIBRILLATION: ICD-10-CM

## 2024-08-20 DIAGNOSIS — I10 PRIMARY HYPERTENSION: ICD-10-CM

## 2024-08-20 LAB
BACTERIA UR CULT: NORMAL
BACTERIA UR CULT: NORMAL

## 2024-08-20 RX ORDER — METOPROLOL SUCCINATE 25 MG/1
25 TABLET, EXTENDED RELEASE ORAL EVERY MORNING
Qty: 90 TABLET | Refills: 3 | Status: ON HOLD | OUTPATIENT
Start: 2024-08-20

## 2024-08-20 RX ORDER — TORSEMIDE 10 MG/1
10 TABLET ORAL DAILY
Qty: 30 TABLET | Refills: 3 | Status: ON HOLD | OUTPATIENT
Start: 2024-08-20 | End: 2025-08-20

## 2024-08-22 DIAGNOSIS — I48.0 PAROXYSMAL ATRIAL FIBRILLATION: ICD-10-CM
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
## 2024-08-24 PROBLEM — I50.30 HEART FAILURE WITH PRESERVED EJECTION FRACTION: Chronic | Status: ACTIVE | Noted: 2024-08-24

## 2024-08-24 PROBLEM — Z71.89 ACP (ADVANCE CARE PLANNING): Status: ACTIVE | Noted: 2024-08-24

## 2024-08-25 PROBLEM — R54 AGE-RELATED PHYSICAL DEBILITY: Status: ACTIVE | Noted: 2018-12-27

## 2024-08-26 PROBLEM — R00.1 BRADYCARDIA: Status: ACTIVE | Noted: 2024-08-26

## 2024-08-28 PROBLEM — I49.5 TACHY-BRADY SYNDROME: Status: ACTIVE | Noted: 2024-08-28

## 2024-08-28 NOTE — TELEPHONE ENCOUNTER
----- Message from Marcial Greene sent at 8/23/2024  9:17 AM CDT -----  Regarding: pharmacy  Contact: [facility]  Type:  Pharmacy Calling to Clarify an RX    Name of Caller:  [facility] - [place] - [address]  [address]  [place] [address]  Phone: [phone] Fax: [phone]      Pharmacy Name:  Prescription Name:ELIQUIS 2.5 mg Tab  What do they need to clarify?:new order  Best Call Back Number:  Additional Information:  
----- Message from Sima Washington sent at 8/22/2024 11:11 AM CDT -----  Regarding: refill  Type: RX Refill Request     Who Called:Nancie Lala      RX Name and Strength:ELIQUIS 2.5 mg Tab      Preferred Pharmacy with phone number:[facility] - [place], [place] - [address]   Phone: [phone]           Would the patient rather a call back or a response via [facility]?call     Best Call Back Number:[phone]     Additional Information:  
Still admitted  
No

## 2024-08-29 DIAGNOSIS — R00.1 BRADYCARDIA: Primary | ICD-10-CM

## 2024-08-29 DIAGNOSIS — Z95.0 STATUS POST PLACEMENT OF CARDIAC PACEMAKER: ICD-10-CM

## 2024-09-05 ENCOUNTER — HOSPITAL ENCOUNTER (OUTPATIENT)
Dept: CARDIOLOGY | Facility: HOSPITAL | Age: 89
Discharge: HOME | End: 2024-09-05
Attending: INTERNAL MEDICINE
Payer: MEDICARE

## 2024-09-05 ENCOUNTER — TELEPHONE (OUTPATIENT)
Dept: CARDIOLOGY | Facility: CLINIC | Age: 89
End: 2024-09-05
Payer: MEDICARE

## 2024-09-05 DIAGNOSIS — R00.1 BRADYCARDIA: ICD-10-CM

## 2024-09-05 DIAGNOSIS — Z95.0 STATUS POST PLACEMENT OF CARDIAC PACEMAKER: ICD-10-CM

## 2024-09-05 NOTE — TELEPHONE ENCOUNTER
----- Message from Jodie Swift sent at 9/5/2024 10:10 AM CDT -----  Contact: May [phone]  Type: Needs Medical Advice  Who Called:  Pts Son May     Best Call Back Number: [phone]    Additional Information:May has a question about postponing Dr Madrigal's appt on 09/17 out. He stated pt has phy rehab at [facility] and he would like to discuss this w/ Dr Duff nurse.Pls call back.

## 2024-09-05 NOTE — TELEPHONE ENCOUNTER
Son states he would rather mother see ep since pt has cardiologist for AFIB.    will see dr jimenez as cardiologist rather than est care with [facility]

## 2024-09-19 ENCOUNTER — NURSE TRIAGE (OUTPATIENT)
Dept: ADMINISTRATIVE | Facility: CLINIC | Age: 89
End: 2024-09-19
Payer: MEDICARE

## 2024-09-19 ENCOUNTER — TELEPHONE (OUTPATIENT)
Dept: FAMILY MEDICINE | Facility: CLINIC | Age: 89
End: 2024-09-19

## 2024-09-19 ENCOUNTER — OFFICE VISIT (OUTPATIENT)
Dept: FAMILY MEDICINE | Facility: CLINIC | Age: 89
End: 2024-09-19
Payer: MEDICARE

## 2024-09-19 VITALS
HEART RATE: 60 BPM | TEMPERATURE: 98 F | BODY MASS INDEX: 23.85 KG/M2 | SYSTOLIC BLOOD PRESSURE: 136 MMHG | DIASTOLIC BLOOD PRESSURE: 70 MMHG | OXYGEN SATURATION: 97 % | WEIGHT: 129.63 LBS | HEIGHT: 62 IN

## 2024-09-19 DIAGNOSIS — L22 CANDIDAL DIAPER DERMATITIS: ICD-10-CM

## 2024-09-19 DIAGNOSIS — B37.2 CANDIDAL DIAPER DERMATITIS: ICD-10-CM

## 2024-09-19 DIAGNOSIS — K59.04 CHRONIC IDIOPATHIC CONSTIPATION: Primary | ICD-10-CM

## 2024-09-19 PROCEDURE — 1159F MED LIST DOCD IN RCRD: CPT | Mod: HCNC,CPTII,S$GLB, | Performed by: NURSE PRACTITIONER

## 2024-09-19 PROCEDURE — 1160F RVW MEDS BY RX/DR IN RCRD: CPT | Mod: HCNC,CPTII,S$GLB, | Performed by: NURSE PRACTITIONER

## 2024-09-19 PROCEDURE — 1126F AMNT PAIN NOTED NONE PRSNT: CPT | Mod: HCNC,CPTII,S$GLB, | Performed by: NURSE PRACTITIONER

## 2024-09-19 PROCEDURE — 1101F PT FALLS ASSESS-DOCD LE1/YR: CPT | Mod: HCNC,CPTII,S$GLB, | Performed by: NURSE PRACTITIONER

## 2024-09-19 PROCEDURE — 99999 PR PBB SHADOW E&M-EST. PATIENT-LVL III: CPT | Mod: PBBFAC,HCNC,, | Performed by: NURSE PRACTITIONER

## 2024-09-19 PROCEDURE — 3288F FALL RISK ASSESSMENT DOCD: CPT | Mod: HCNC,CPTII,S$GLB, | Performed by: NURSE PRACTITIONER

## 2024-09-19 PROCEDURE — 99213 OFFICE O/P EST LOW 20 MIN: CPT | Mod: HCNC,S$GLB,, | Performed by: NURSE PRACTITIONER

## 2024-09-19 PROCEDURE — 1111F DSCHRG MED/CURRENT MED MERGE: CPT | Mod: HCNC,CPTII,S$GLB, | Performed by: NURSE PRACTITIONER

## 2024-09-19 RX ORDER — NYSTATIN 100000 U/G
OINTMENT TOPICAL 4 TIMES DAILY
Qty: 60 G | Refills: 0 | Status: SHIPPED | OUTPATIENT
Start: 2024-09-19 | End: 2024-10-03

## 2024-09-19 NOTE — TELEPHONE ENCOUNTER
Son states that he missed call from who he believes was the  nurse. Chart reviewed. States that he already spoke with Abril Sánchez NP and will just wait for the nurse to callback. Encounter routed to provider.     Reason for Disposition   [1] Follow-up call to recent contact AND [2] information only call, no triage required    Protocols used: Information Only Call - No Triage-A-

## 2024-09-19 NOTE — PROGRESS NOTES
Subjective:       Patient ID: Michela Calvert is a 89 y.o. female.    Chief Complaint: Constipation and Rectal Pain    HPI here with son. reports constipation and rectal pain X 3 wks. discharged from [facility] (formerly [facility] rehab facility this morning. Pt reports she was constipated the whole time she was admitted. Had milk of magnesia and suppositories and nothing worked. Pt reports she digs out small pieces of stool every day. Cannot pass stool on her own. Reports stool is soft and noticed blood in the stool today. Reports abdominal bloating. No vomiting or abdominal pain. Normal appetite. Reports soreness to rectum. Has been using cortisone cream to manually disimpact herself. Has chronic History of constipation. Has tried stool softeners, miralax with no relief.    Past Medical History:   Diagnosis Date    Anticoagulant long-term use     Atrial fibrillation     Cataracts, bilateral     CHF (congestive heart failure)     Chronic pain syndrome 07/25/2018    Closed fracture of neck of left femur 03/08/2019    DDD (degenerative disc disease), lumbar     DDD (degenerative disc disease), lumbar     Heart murmur 01/2017    HLD (hyperlipidemia)     no medication taken    Hypertension 12/27/2018    Hypothyroidism     Lichen sclerosus et atrophicus     Rectal/Vaginal areas    Lumbar spinal stenosis     Lumbar spondylosis     Mitral valve disorder 01/2017    Asymptomatic    Osteopenia     Urinary incontinence        Past Surgical History:   Procedure Laterality Date    A-V CARDIAC PACEMAKER INSERTION  8/29/2024    Procedure: Dual Chamber PPM (Biotronik);  Surgeon: Scott Holloway MD;  Location: [facility] CATH;  Service: Cardiology;;    APPENDECTOMY      CATARACT EXTRACTION EXTRACAPSULAR W/ INTRAOCULAR LENS IMPLANTATION Bilateral     HEMIARTHROPLASTY OF HIP Left 3/9/2019    Procedure: HEMIARTHROPLASTY, HIP;  Surgeon: Josesito Leo MD;  Location: [facility] OR;  Service: Orthopedics;  Laterality: Left;    HYSTERECTOMY      INSERTION OF DORSAL  COLUMN NERVE STIMULATOR FOR TRIAL N/A 7/25/2018    Procedure: INSERTION, DORSAL COLUMN NEUROSTIMULATOR, FOR TRIAL;  Surgeon: Derek Daily MD;  Location: [facility] OR;  Service: Pain Management;  Laterality: N/A;    spinal ablation  12/2017    TONSILLECTOMY         Review of patient's allergies indicates:   Allergen Reactions    Nitrofuran analogues Nausea Only    Ciprofloxacin Nausea And Vomiting       Social History     Socioeconomic History    Marital status:    Tobacco Use    Smoking status: Never    Smokeless tobacco: Never   Substance and Sexual Activity    Alcohol use: Not Currently     Alcohol/week: 1.0 standard drink of alcohol     Types: 1 Shots of liquor per week    Drug use: No    Sexual activity: Never     Social Determinants of Health     Financial Resource Strain: Low Risk  (7/15/2021)    Overall Financial Resource Strain (CARDIA)     Difficulty of Paying Living Expenses: Not hard at all   Food Insecurity: No Food Insecurity (8/26/2024)    Hunger Vital Sign     Worried About Running Out of Food in the Last Year: Never true     Ran Out of Food in the Last Year: Never true   Transportation Needs: No Transportation Needs (8/26/2024)    TRANSPORTATION NEEDS     Transportation : No   Physical Activity: Unknown (7/15/2021)    Exercise Vital Sign     Days of Exercise per Week: 3 days   Stress: Stress Concern Present (7/15/2021)    Citizen of the [place] of Occupational Health - Occupational Stress Questionnaire     Feeling of Stress : To some extent   Housing Stability: Low Risk  (8/26/2024)    Housing Stability Vital Sign     Unable to Pay for Housing in the Last Year: No     Homeless in the Last Year: No       Current Outpatient Medications on File Prior to Visit   Medication Sig Dispense Refill    acetaminophen (TYLENOL) 650 MG TbSR Take 650 mg by mouth every 8 (eight) hours as needed.      amiodarone (PACERONE) 200 MG Tab Take 1 tablet (200 mg total) by mouth once daily.      amLODIPine (NORVASC) 5 MG  "tablet Take 1 tablet (5 mg total) by mouth once daily. 90 tablet 3    apixaban (ELIQUIS) 2.5 mg Tab Take 1 tablet (2.5 mg total) by mouth 2 (two) times daily. CAN RESTART ON 9/4 180 tablet 1    calcium carbonate (OS-ARCHIE) 500 mg calcium (1,250 mg) tablet TAKE ONE CAPSULE BY MOUTH ONCE DAILY IN THE EVENING      calcium carbonate-vitamin D3 (CALCIUM 600 WITH VITAMIN D3) 600 mg-12.5 mcg (500 unit) Cap TAKE ONE CAPSULE BY MOUTH ONCE DAILY IN THE EVENING 180 capsule 3    fluticasone propionate (FLONASE) 50 mcg/actuation nasal spray 1 spray (50 mcg total) by Each Nostril route 2 (two) times a day. 16 g 5    hydrocortisone 2.5 % cream APPLY TO THE AFFECTED AREA(S) TWICE DAILY 84 g 2    levothyroxine (SYNTHROID) 100 MCG tablet TAKE ONE TABLET BY MOUTH BEFORE BREAKFAST. Administer on an empty stomach at least 30 minutes before food 90 tablet 3    metoprolol succinate (TOPROL-XL) 25 MG 24 hr tablet Take 1 tablet (25 mg total) by mouth once daily.      mupirocin (BACTROBAN) 2 % ointment Apply topically 2 (two) times daily.      senna-docusate 8.6-50 mg (PERICOLACE) 8.6-50 mg per tablet Take 1 tablet by mouth 2 (two) times daily.      torsemide (DEMADEX) 10 MG Tab Take 1 tablet (10 mg total) by mouth once daily. 30 tablet 3    vit C,D-Gi-hayfp-lutein-zeaxan (PRESERVISION AREDS-2) 250-90-40-1 mg Cap Take 1 capsule by mouth 2 (two) times daily. 180 capsule 3     No current facility-administered medications on file prior to visit.       Family History   Problem Relation Name Age of Onset    Colon cancer Neg Hx      Crohn's disease Neg Hx      Stomach cancer Neg Hx      Ulcerative colitis Neg Hx      Esophageal cancer Neg Hx         Review of Systems   Gastrointestinal:  Positive for blood in stool, constipation and rectal pain.       Objective:      /70   Pulse 60   Temp 98.2 °F (36.8 °C)   Ht 5' 2" (1.575 m)   Wt 58.8 kg (129 lb 10.1 oz)   SpO2 97%   BMI 23.71 kg/m²   Physical Exam  Vitals and nursing note reviewed. " Exam conducted with a chaperone present.   Constitutional:       General: She is not in acute distress.     Appearance: Normal appearance. She is well-groomed.   HENT:      Head: Normocephalic.      Right Ear: External ear normal.      Left Ear: External ear normal.      Nose: Nose normal.      Mouth/Throat:      Lips: Pink.      Mouth: Mucous membranes are moist.      Pharynx: Oropharynx is clear.   Eyes:      Extraocular Movements: Extraocular movements intact.      Conjunctiva/sclera: Conjunctivae normal.      Pupils: Pupils are equal, round, and reactive to light.   Cardiovascular:      Rate and Rhythm: Normal rate and regular rhythm.      Heart sounds: Murmur heard.   Pulmonary:      Effort: Pulmonary effort is normal.      Breath sounds: Normal breath sounds.   Chest:      Chest wall: No tenderness.   Abdominal:      General: Bowel sounds are normal.      Palpations: Abdomen is soft.      Tenderness: There is no abdominal tenderness.   Genitourinary:     Rectum: Normal anal tone.      Comments: Erythematous plaque with maceration and satellite papules to perianal area. Soft, formed stool noted to rectum during rectal exam    Musculoskeletal:         General: No swelling or tenderness. Normal range of motion.      Cervical back: Normal range of motion and neck supple.      Right lower leg: No edema.      Left lower leg: No edema.   Lymphadenopathy:      Cervical: No cervical adenopathy.   Skin:     General: Skin is warm and dry.   Neurological:      General: No focal deficit present.      Mental Status: She is alert and oriented to person, place, and time. Mental status is at baseline.      Gait: Gait is intact.   Psychiatric:         Mood and Affect: Mood normal.         Behavior: Behavior normal.         Assessment:       1. Chronic idiopathic constipation    2. Candidal diaper dermatitis        Plan:       Chronic idiopathic constipation  -     linaCLOtide (LINZESS) 145 mcg Cap capsule; Take 1 capsule (145  mcg total) by mouth before breakfast.  Dispense: 30 capsule; Refill: 2    Candidal diaper dermatitis  -     nystatin (MYCOSTATIN) ointment; Apply topically 4 (four) times daily. for 14 days  Dispense: 60 g; Refill: 0        4-5 small pieces of stool removed from rectum during manual rectal exam. Pt encouraged to use 1 fleet enema if unable to pass the stool this evening. Son instructed to mix nystatin ointment with desitin ointment for 1:1 ratio. Pt advised to generously apply the ointment qid around rectum. RTC in 1 month for follow up or sooner if no improvement.    Will start linzess for constipation. Pt advised to take every other day if she experiences diarrhea.

## 2024-09-19 NOTE — TELEPHONE ENCOUNTER
----- Message from Kimberlee Upton sent at 9/19/2024  3:56 PM CDT -----  Type: Needs Medical Advice  Who Called:  pt son   Best Call Back Number: [phone]    Additional Information: Pt son is calling the office needs to speak with the nurse regarding script.Please call back and advise.

## 2024-09-20 PROCEDURE — G0180 MD CERTIFICATION HHA PATIENT: HCPCS | Mod: ,,, | Performed by: FAMILY MEDICINE

## 2024-09-30 ENCOUNTER — CLINICAL SUPPORT (OUTPATIENT)
Dept: CARDIOLOGY | Facility: HOSPITAL | Age: 89
End: 2024-09-30
Payer: MEDICARE

## 2024-09-30 ENCOUNTER — HOSPITAL ENCOUNTER (OUTPATIENT)
Dept: CARDIOLOGY | Facility: HOSPITAL | Age: 89
Discharge: HOME | End: 2024-09-30
Attending: INTERNAL MEDICINE
Payer: MEDICARE

## 2024-09-30 DIAGNOSIS — R00.1 BRADYCARDIA, UNSPECIFIED: ICD-10-CM

## 2024-09-30 PROCEDURE — 93296 REM INTERROG EVL PM/IDS: CPT | Mod: HCNC,PO | Performed by: INTERNAL MEDICINE

## 2024-09-30 PROCEDURE — 93294 REM INTERROG EVL PM/LDLS PM: CPT | Mod: HCNC,,, | Performed by: INTERNAL MEDICINE

## 2024-10-02 ENCOUNTER — HOSPITAL ENCOUNTER (OUTPATIENT)
Dept: CARDIOLOGY | Facility: HOSPITAL | Age: 89
Discharge: HOME | End: 2024-10-02
Attending: INTERNAL MEDICINE
Payer: MEDICARE

## 2024-10-03 LAB
OHS CV AF BURDEN PERCENT: < 1
OHS CV DC REMOTE DEVICE TYPE: NORMAL
OHS CV RV PACING PERCENT: 1 %

## 2024-10-11 ENCOUNTER — EXTERNAL HOME HEALTH (OUTPATIENT)
Dept: HOME HEALTH SERVICES | Facility: HOSPITAL | Age: 89
End: 2024-10-11
Payer: MEDICARE

## 2024-10-17 DIAGNOSIS — I48.0 PAROXYSMAL ATRIAL FIBRILLATION: Primary | ICD-10-CM

## 2024-10-17 RX ORDER — AMIODARONE HYDROCHLORIDE 200 MG/1
200 TABLET ORAL DAILY
Qty: 90 TABLET | Refills: 0
Start: 2024-10-17 | End: 2025-10-17

## 2024-10-17 NOTE — TELEPHONE ENCOUNTER
----- Message from Dena sent at 10/17/2024 10:37 AM CDT -----  Regarding: Refill Request  Type:  RX Refill Request    Who Called: Jojo   Refill or New Rx:Refill  RX Name and Strength:amiodarone (PACERONE) 200 MG Tab  How is the patient currently taking it? (ex. 1XDay):  Is this a 30 day or 90 day RX:  Preferred Pharmacy with phone number:[facility] and [place] [address]   Phone: [phone]  Fax: [phone]  Local or Mail Order:Local         Ordering Provider:Sybil   Would the patient rather a call back or a response via [facility]? Call back   Best Call Back Number:[phone]  Additional Information: Rx needs by end of day please

## 2024-10-22 ENCOUNTER — DOCUMENT SCAN (OUTPATIENT)
Dept: HOME HEALTH SERVICES | Facility: HOSPITAL | Age: 89
End: 2024-10-22
Payer: MEDICARE

## 2024-10-22 ENCOUNTER — TELEPHONE (OUTPATIENT)
Dept: FAMILY MEDICINE | Facility: CLINIC | Age: 89
End: 2024-10-22
Payer: MEDICARE

## 2024-10-22 NOTE — TELEPHONE ENCOUNTER
----- Message from Rachel sent at 10/22/2024 11:29 AM CDT -----  Type:  Sooner Appointment Request    Caller is requesting a sooner appointment.  Caller declined first available appointment listed below.  Caller will not accept being placed on the waitlist and is requesting a message be sent to doctor.  Name of Caller:Angella/Fausto     When is the first available appointment?11/8/24  Symptoms:f/u    Would the patient rather a call back or a response via [facility]? call  Best Call Back Number: [phone]      Additional Information:  Please call back to advise. Thank you!

## 2024-10-23 ENCOUNTER — TELEPHONE (OUTPATIENT)
Dept: FAMILY MEDICINE | Facility: CLINIC | Age: 89
End: 2024-10-23
Payer: MEDICARE

## 2024-10-23 ENCOUNTER — OFFICE VISIT (OUTPATIENT)
Dept: FAMILY MEDICINE | Facility: CLINIC | Age: 89
End: 2024-10-23
Payer: MEDICARE

## 2024-10-23 ENCOUNTER — TELEPHONE (OUTPATIENT)
Dept: CARDIOLOGY | Facility: CLINIC | Age: 89
End: 2024-10-23
Payer: MEDICARE

## 2024-10-23 VITALS
SYSTOLIC BLOOD PRESSURE: 146 MMHG | HEIGHT: 62 IN | WEIGHT: 124 LBS | DIASTOLIC BLOOD PRESSURE: 74 MMHG | BODY MASS INDEX: 22.82 KG/M2 | HEART RATE: 59 BPM | OXYGEN SATURATION: 98 %

## 2024-10-23 DIAGNOSIS — S80.12XA HEMATOMA OF LEFT LOWER LEG: ICD-10-CM

## 2024-10-23 DIAGNOSIS — N18.32 STAGE 3B CHRONIC KIDNEY DISEASE: ICD-10-CM

## 2024-10-23 DIAGNOSIS — D50.0 CHRONIC BLOOD LOSS ANEMIA: ICD-10-CM

## 2024-10-23 DIAGNOSIS — I48.0 PAROXYSMAL ATRIAL FIBRILLATION: ICD-10-CM

## 2024-10-23 DIAGNOSIS — Z87.81 STATUS POST FRACTURE OF LEFT HIP: Primary | ICD-10-CM

## 2024-10-23 PROCEDURE — 99999 PR PBB SHADOW E&M-EST. PATIENT-LVL III: CPT | Mod: PBBFAC,HCNC,, | Performed by: FAMILY MEDICINE

## 2024-10-23 NOTE — TELEPHONE ENCOUNTER
----- Message from Donaldo sent at 10/23/2024  7:37 AM CDT -----  Type: Needs Medical Advice    Who Called:  Pt's son     Best Call Back Number: [phone]    Additional Information: Pt's son wants return call about meds the he is unsure of her being on and wants to speak to someone this morning before dorian. Please call back to advise, Thanks!

## 2024-10-23 NOTE — PROGRESS NOTES
Subjective:       Patient ID: Michela Calvert is a 89 y.o. female.    Chief Complaint: Hospital Follow Up ([facility] Leg pain)    Here for f/u on recent ER visit on 10/19    ER notes:  89-year-old female who is on Eliquis presents emergency department for reports of bruising and ecchymosis to her left lower extremity.  Patient notes that there is an area of soreness to the lateral aspect of her left thigh. Patient denies any known injury. Patient has subsequently developed ecchymosis over the left knee and extending distally.  Workup in the emergency department including routine labs and ultrasound does not show any concerning findings.  Feel the patient likely bumped the left lateral thigh and ruptured a venous structure.  Subsequently patient developed bleeding, hematoma formation due to the Eliquis.  [place] has now pulled the discoloration downward.  Ultimately I do not feel the patient has an emergent medical condition.  Hematoma soft.  Tended warm compresses but otherwise symptoms should resolve spontaneously over several weeks.  Patient and son who is at bedside appeared to be comfortable with this plan   US, labs and xrays were unremarkable.  She was diagnosed with a leg hematoma.  She also had a mild anemia noted.    Doing some PT with HH prsently    Using a wheelchair today      S/p HEMIARTHROPLASTY, HIP (Left)  - following with Dr. Leo  She is living at the [place].  Using walker for ambulation  Aid helps with showering twice a week  Afib, PVD - rate- controlled on pacerone, Eliquis 2.5mg BID, Toprol 25mg QD; taking asa 81mg daily; following with cardiology every 6 months  Hypothyroidism - tolerating Synthroid 100mcg daily  Lumbar DDD - using tylenol as needed  HTN - taking amlodipine 5mg daily    Dealing with some regular constipation.           Past Medical History:   Diagnosis Date    Anticoagulant long-term use     Atrial fibrillation     Cataracts, bilateral     CHF (congestive heart failure)      Chronic pain syndrome 07/25/2018    Closed fracture of neck of left femur 03/08/2019    DDD (degenerative disc disease), lumbar     DDD (degenerative disc disease), lumbar     Heart murmur 01/2017    HLD (hyperlipidemia)     no medication taken    Hypertension 12/27/2018    Hypothyroidism     Lichen sclerosus et atrophicus     Rectal/Vaginal areas    Lumbar spinal stenosis     Lumbar spondylosis     Mitral valve disorder 01/2017    Asymptomatic    Osteopenia     Urinary incontinence        Past Surgical History:   Procedure Laterality Date    A-V CARDIAC PACEMAKER INSERTION  8/29/2024    Procedure: Dual Chamber PPM (Biotronik);  Surgeon: Scott Holloway MD;  Location: [facility] CATH;  Service: Cardiology;;    APPENDECTOMY      CATARACT EXTRACTION EXTRACAPSULAR W/ INTRAOCULAR LENS IMPLANTATION Bilateral     HEMIARTHROPLASTY OF HIP Left 3/9/2019    Procedure: HEMIARTHROPLASTY, HIP;  Surgeon: Josesito Leo MD;  Location: [facility] OR;  Service: Orthopedics;  Laterality: Left;    HYSTERECTOMY      INSERTION OF DORSAL COLUMN NERVE STIMULATOR FOR TRIAL N/A 7/25/2018    Procedure: INSERTION, DORSAL COLUMN NEUROSTIMULATOR, FOR TRIAL;  Surgeon: Derek Daily MD;  Location: [facility] OR;  Service: Pain Management;  Laterality: N/A;    spinal ablation  12/2017    TONSILLECTOMY         Review of patient's allergies indicates:   Allergen Reactions    Nitrofuran analogues Nausea Only    Ciprofloxacin Nausea And Vomiting       Social History     Socioeconomic History    Marital status:    Tobacco Use    Smoking status: Never    Smokeless tobacco: Never   Substance and Sexual Activity    Alcohol use: Not Currently     Alcohol/week: 1.0 standard drink of alcohol     Types: 1 Shots of liquor per week    Drug use: No    Sexual activity: Never     Social Drivers of Health     Financial Resource Strain: Low Risk  (7/15/2021)    Overall Financial Resource Strain (CARDIA)     Difficulty of Paying Living Expenses: Not hard at all   Food  Insecurity: No Food Insecurity (8/26/2024)    Hunger Vital Sign     Worried About Running Out of Food in the Last Year: Never true     Ran Out of Food in the Last Year: Never true   Transportation Needs: No Transportation Needs (8/26/2024)    TRANSPORTATION NEEDS     Transportation : No   Physical Activity: Unknown (7/15/2021)    Exercise Vital Sign     Days of Exercise per Week: 3 days   Stress: Stress Concern Present (7/15/2021)    [place] of Occupational Health - Occupational Stress Questionnaire     Feeling of Stress : To some extent   Housing Stability: Low Risk  (8/26/2024)    Housing Stability Vital Sign     Unable to Pay for Housing in the Last Year: No     Homeless in the Last Year: No       Current Outpatient Medications on File Prior to Visit   Medication Sig Dispense Refill    acetaminophen (TYLENOL) 650 MG TbSR Take 650 mg by mouth every 8 (eight) hours as needed.      amiodarone (PACERONE) 200 MG Tab Take 1 tablet (200 mg total) by mouth once daily. 90 tablet 0    amLODIPine (NORVASC) 5 MG tablet Take 1 tablet (5 mg total) by mouth once daily. 90 tablet 3    apixaban (ELIQUIS) 2.5 mg Tab Take 1 tablet (2.5 mg total) by mouth 2 (two) times daily. CAN RESTART ON 9/4 180 tablet 1    calcium carbonate (OS-ARCHIE) 500 mg calcium (1,250 mg) tablet TAKE ONE CAPSULE BY MOUTH ONCE DAILY IN THE EVENING      calcium carbonate-vitamin D3 (CALCIUM 600 WITH VITAMIN D3) 600 mg-12.5 mcg (500 unit) Cap TAKE ONE CAPSULE BY MOUTH ONCE DAILY IN THE EVENING 180 capsule 3    levothyroxine (SYNTHROID) 100 MCG tablet TAKE ONE TABLET BY MOUTH BEFORE BREAKFAST. Administer on an empty stomach at least 30 minutes before food 90 tablet 3    linaCLOtide (LINZESS) 145 mcg Cap capsule Take 1 capsule (145 mcg total) by mouth before breakfast. 30 capsule 2    metoprolol succinate (TOPROL-XL) 25 MG 24 hr tablet Take 1 tablet (25 mg total) by mouth once daily.      torsemide (DEMADEX) 10 MG Tab Take 1 tablet (10 mg total) by mouth  once daily. 30 tablet 3    vit C,B-Fv-pgxij-lutein-zeaxan (PRESERVISION AREDS-2) 250-90-40-1 mg Cap Take 1 capsule by mouth 2 (two) times daily. 180 capsule 3    fluticasone propionate (FLONASE) 50 mcg/actuation nasal spray 1 spray (50 mcg total) by Each Nostril route 2 (two) times a day. (Patient not taking: Reported on 10/23/2024) 16 g 5    hydrocortisone 2.5 % cream APPLY TO THE AFFECTED AREA(S) TWICE DAILY (Patient not taking: Reported on 10/23/2024) 84 g 2    mupirocin (BACTROBAN) 2 % ointment Apply topically 2 (two) times daily. (Patient not taking: Reported on 10/23/2024)      nystatin (MYCOSTATIN) ointment Apply topically 4 (four) times daily. for 14 days 60 g 0    senna-docusate 8.6-50 mg (PERICOLACE) 8.6-50 mg per tablet Take 1 tablet by mouth 2 (two) times daily. (Patient not taking: Reported on 10/23/2024)       No current facility-administered medications on file prior to visit.       Family History   Problem Relation Name Age of Onset    Colon cancer Neg Hx      Crohn's disease Neg Hx      Stomach cancer Neg Hx      Ulcerative colitis Neg Hx      Esophageal cancer Neg Hx         Review of Systems   Constitutional:  Negative for appetite change, chills, fever and unexpected weight change.   HENT:  Negative for sore throat and trouble swallowing.    Eyes:  Negative for pain and visual disturbance.   Respiratory:  Negative for cough, shortness of breath and wheezing.    Cardiovascular:  Negative for chest pain and palpitations.   Gastrointestinal:  Positive for constipation. Negative for abdominal pain, blood in stool, diarrhea, nausea and vomiting.   Genitourinary:  Negative for difficulty urinating, dysuria and hematuria.   Musculoskeletal:  Negative for arthralgias, gait problem and neck pain.   Skin:  Negative for rash and wound.   Neurological:  Negative for dizziness, weakness, numbness and headaches.   Hematological:  Negative for adenopathy.   Psychiatric/Behavioral:  Negative for dysphoric mood.   "      Objective:      BP (!) 146/74   Pulse (!) 59   Ht 5' 2" (1.575 m)   Wt 56.2 kg (124 lb)   SpO2 98%   BMI 22.68 kg/m²     Physical Exam  Constitutional:       Appearance: Normal appearance. She is well-developed.   HENT:      Head: Normocephalic.      Mouth/Throat:      Pharynx: No oropharyngeal exudate or posterior oropharyngeal erythema.   Eyes:      Conjunctiva/sclera: Conjunctivae normal.      Pupils: Pupils are equal, round, and reactive to light.   Neck:      Thyroid: No thyromegaly.   Cardiovascular:      Rate and Rhythm: Normal rate and regular rhythm.      Heart sounds: Normal heart sounds, S1 normal and S2 normal. No murmur heard.     No friction rub. No gallop.   Pulmonary:      Effort: Pulmonary effort is normal.      Breath sounds: Normal breath sounds. No wheezing or rales.   Musculoskeletal:      Cervical back: Normal range of motion and neck supple.      Right lower leg: No edema.      Left lower leg: No edema.        Legs:    Lymphadenopathy:      Cervical: No cervical adenopathy.   Skin:     General: Skin is warm.      Findings: No rash.   Neurological:      Mental Status: She is alert and oriented to person, place, and time.      Cranial Nerves: No cranial nerve deficit.      Gait: Gait normal.           ER records reviewed    Assessment:       1. Status post fracture of left hip    2. Hematoma of left lower leg    3. Stage 3b chronic kidney disease    4. Chronic blood loss anemia    5. Paroxysmal atrial fibrillation          Plan:       Status post fracture of left hip  -     WHEELCHAIR FOR HOME USE    Hematoma of left lower leg  -     WHEELCHAIR FOR HOME USE    Stage 3b chronic kidney disease    Chronic blood loss anemia  -     Comprehensive Metabolic Panel; Future; Expected date: 01/21/2025  -     CBC Auto Differential; Future; Expected date: 01/21/2025    Paroxysmal atrial fibrillation        Reassurance regarding resolving leg hematoma  Continue HH PT; will also work on some left " upper leg soft tissue therapy  Ok to hold Eliquis for 5 days  Ok to use Tylenol BID for 5 days  Ok to take Miralax daily  Overall stable  Continue other present medications  F/u with specialists as planned  Counseled on regular exercise, maintenance of a healthy weight, balanced diet rich in fruits/vegetables and lean protein, and avoidance of unhealthy habits like smoking and excessive alcohol intake.

## 2024-10-23 NOTE — TELEPHONE ENCOUNTER
Patient son wants to know if they can come in early cause the patient has a little pain. Informed patient son that I would ask provider first and then give a call back.

## 2024-10-23 NOTE — TELEPHONE ENCOUNTER
Spoke to son: First he asked if his mother could stop the Eliquis now that she has a pacemaker; explained that the pacemaker doesn't change that she has a fib and she still needs Eliquis for stroke prevention; pt has leg wound with a lot of bruising and he is asking if she should hold her Eliquis; advised to keep appt with Dr Davila today and he will make decision if wound requires Eliquis to be held

## 2024-10-23 NOTE — TELEPHONE ENCOUNTER
----- Message from Esther sent at 10/23/2024  7:55 AM CDT -----  Regarding: Earlier appt/ Pt not feeling well  Type:  Needs Medical Advice    Who Called: Pt Son    Would the patient rather a call back or a response via [facility]? Call back    Best Call Back Number: [phone]    Additional Information: Son is requesting a sooner appt, Pt have an appt for today at 11am. Thank you

## 2024-10-23 NOTE — TELEPHONE ENCOUNTER
----- Message from Esther sent at 10/23/2024  8:10 AM CDT -----  Regarding: Call back        Type:  Needs Medical Advice    Who Called: Pt Son     Would the patient rather a call back or a response via [facility]? Call back    Best Call Back Number: [phone]    Additional Information: Son requesting a call back. Thank you

## 2024-10-29 ENCOUNTER — TELEPHONE (OUTPATIENT)
Dept: FAMILY MEDICINE | Facility: CLINIC | Age: 89
End: 2024-10-29
Payer: MEDICARE

## 2024-10-30 DIAGNOSIS — I48.0 PAROXYSMAL ATRIAL FIBRILLATION: ICD-10-CM

## 2024-10-30 RX ORDER — AMIODARONE HYDROCHLORIDE 200 MG/1
200 TABLET ORAL DAILY
Qty: 90 TABLET | Refills: 1 | Status: SHIPPED | OUTPATIENT
Start: 2024-10-30 | End: 2025-10-30

## 2024-10-31 ENCOUNTER — OFFICE VISIT (OUTPATIENT)
Dept: FAMILY MEDICINE | Facility: CLINIC | Age: 89
End: 2024-10-31
Payer: MEDICARE

## 2024-10-31 VITALS
DIASTOLIC BLOOD PRESSURE: 74 MMHG | SYSTOLIC BLOOD PRESSURE: 148 MMHG | HEIGHT: 62 IN | WEIGHT: 132.69 LBS | HEART RATE: 62 BPM | RESPIRATION RATE: 18 BRPM | BODY MASS INDEX: 24.42 KG/M2 | OXYGEN SATURATION: 98 %

## 2024-10-31 DIAGNOSIS — S80.12XA HEMATOMA OF LEFT LOWER LEG: Primary | ICD-10-CM

## 2024-10-31 PROCEDURE — 99213 OFFICE O/P EST LOW 20 MIN: CPT | Mod: HCNC,S$GLB,, | Performed by: FAMILY MEDICINE

## 2024-10-31 PROCEDURE — 1160F RVW MEDS BY RX/DR IN RCRD: CPT | Mod: HCNC,CPTII,S$GLB, | Performed by: FAMILY MEDICINE

## 2024-10-31 PROCEDURE — 3288F FALL RISK ASSESSMENT DOCD: CPT | Mod: HCNC,CPTII,S$GLB, | Performed by: FAMILY MEDICINE

## 2024-10-31 PROCEDURE — 1159F MED LIST DOCD IN RCRD: CPT | Mod: HCNC,CPTII,S$GLB, | Performed by: FAMILY MEDICINE

## 2024-10-31 PROCEDURE — 99999 PR PBB SHADOW E&M-EST. PATIENT-LVL III: CPT | Mod: PBBFAC,HCNC,, | Performed by: FAMILY MEDICINE

## 2024-10-31 PROCEDURE — 1101F PT FALLS ASSESS-DOCD LE1/YR: CPT | Mod: HCNC,CPTII,S$GLB, | Performed by: FAMILY MEDICINE

## 2024-10-31 PROCEDURE — 1125F AMNT PAIN NOTED PAIN PRSNT: CPT | Mod: HCNC,CPTII,S$GLB, | Performed by: FAMILY MEDICINE

## 2024-11-13 ENCOUNTER — DOCUMENT SCAN (OUTPATIENT)
Dept: HOME HEALTH SERVICES | Facility: HOSPITAL | Age: 89
End: 2024-11-13
Payer: MEDICARE

## 2024-11-19 ENCOUNTER — TELEPHONE (OUTPATIENT)
Dept: FAMILY MEDICINE | Facility: CLINIC | Age: 89
End: 2024-11-19

## 2024-11-19 ENCOUNTER — DOCUMENT SCAN (OUTPATIENT)
Dept: HOME HEALTH SERVICES | Facility: HOSPITAL | Age: 89
End: 2024-11-19
Payer: MEDICARE

## 2024-11-19 DIAGNOSIS — H91.90 HEARING LOSS, UNSPECIFIED HEARING LOSS TYPE, UNSPECIFIED LATERALITY: Primary | ICD-10-CM

## 2024-11-19 NOTE — TELEPHONE ENCOUNTER
----- Message from Marleni sent at 11/19/2024 10:37 AM CST -----  Regarding: Patient Callback  Name of Who is Calling:   Son   WILLIS DEY [[id number]]  What is the request in detail:   Patient needs a hearing test referral     Dr BUCKNER  Fax #- [phone]     Can the clinic reply by [facility]:   No     What Number to Call Back if not in [facility]:  Telephone Information:  Mobile          [phone]

## 2024-11-22 ENCOUNTER — PATIENT MESSAGE (OUTPATIENT)
Dept: PODIATRY | Facility: CLINIC | Age: 89
End: 2024-11-22
Payer: MEDICARE

## 2024-11-22 ENCOUNTER — DOCUMENT SCAN (OUTPATIENT)
Dept: HOME HEALTH SERVICES | Facility: HOSPITAL | Age: 89
End: 2024-11-22
Payer: MEDICARE

## 2024-11-25 ENCOUNTER — DOCUMENT SCAN (OUTPATIENT)
Dept: HOME HEALTH SERVICES | Facility: HOSPITAL | Age: 89
End: 2024-11-25
Payer: MEDICARE

## 2024-11-26 ENCOUNTER — LAB VISIT (OUTPATIENT)
Dept: LAB | Facility: HOSPITAL | Age: 89
End: 2024-11-26
Attending: FAMILY MEDICINE
Payer: MEDICARE

## 2024-11-26 ENCOUNTER — OFFICE VISIT (OUTPATIENT)
Dept: GASTROENTEROLOGY | Facility: CLINIC | Age: 89
End: 2024-11-26
Payer: MEDICARE

## 2024-11-26 VITALS — BODY MASS INDEX: 22.88 KG/M2 | HEIGHT: 62 IN | WEIGHT: 124.31 LBS

## 2024-11-26 DIAGNOSIS — K62.5 RECTAL BLEEDING: ICD-10-CM

## 2024-11-26 DIAGNOSIS — D64.9 ANEMIA, UNSPECIFIED TYPE: ICD-10-CM

## 2024-11-26 DIAGNOSIS — K59.04 CHRONIC IDIOPATHIC CONSTIPATION: Primary | ICD-10-CM

## 2024-11-26 LAB
ERYTHROCYTE [DISTWIDTH] IN BLOOD BY AUTOMATED COUNT: 16.1 % (ref 11.5–14.5)
HCT VFR BLD AUTO: 36 % (ref 37–48.5)
HGB BLD-MCNC: 11.7 G/DL (ref 12–16)
MCH RBC QN AUTO: 32.2 PG (ref 27–31)
MCHC RBC AUTO-ENTMCNC: 32.5 G/DL (ref 32–36)
MCV RBC AUTO: 99 FL (ref 82–98)
PLATELET # BLD AUTO: 247 K/UL (ref 150–450)
PMV BLD AUTO: 11.4 FL (ref 9.2–12.9)
RBC # BLD AUTO: 3.63 M/UL (ref 4–5.4)
WBC # BLD AUTO: 12.49 K/UL (ref 3.9–12.7)

## 2024-11-26 PROCEDURE — 85027 COMPLETE CBC AUTOMATED: CPT | Mod: HCNC

## 2024-11-26 PROCEDURE — 36415 COLL VENOUS BLD VENIPUNCTURE: CPT | Mod: HCNC,PO

## 2024-11-26 PROCEDURE — 99999 PR PBB SHADOW E&M-EST. PATIENT-LVL IV: CPT | Mod: PBBFAC,HCNC,,

## 2024-11-26 NOTE — PATIENT INSTRUCTIONS
- Recommend high fiber diet (20 grams of fiber daily)/OTC fiber supplements daily as directed.  -Recommend taking an OTC stool softener such as Colace as directed to avoid hard stools and straining with bowel movements PRN  -on time dose of Dulcolax OTC

## 2024-11-26 NOTE — PROGRESS NOTES
Subjective:       Patient ID: Michela Calvert is a 89 y.o. female Body mass index is 22.74 kg/m².    Chief Complaint: Constipation    This patient is new to me.  Established patient of Meredith Duran NP.    Past treatments: Lactulose, Glycolax     Current resident at the [facility].  Patient's son present and assisting with history.    Constipation  This is a chronic problem. The current episode started more than 1 year ago. The problem has been gradually worsening since onset. Stool frequency: Currently having 1 BM every 3-4 days; straining and feels as though something is blocking stool from getting out. The stool is described as formed and blood tinged. The patient is on a high fiber diet. She Does not exercise regularly. Associated symptoms include hematochezia (Chronic; notices a blob of stool with moderate amount of BRBPR in stool, on tissue paper, and in toilet bowl with BMs; denies bleeding between BMs or rectal pain; uses hydrocortisone p.r.n. with improvement). Pertinent negatives include no abdominal pain, bloating, diarrhea, fecal incontinence, fever, melena, nausea, rectal pain, vomiting or weight loss. Risk factors: Denies change in medication, recent illness, dietary change. She has tried laxatives, manual disimpaction, enemas, fiber and stool softeners (Currently taking Linzess 145 mcg once daily; past tx: Metamucil, OTC laxative (unsure of name), lactulose, Glycolax, milk of magnesia, laxatives suppositories) for the symptoms. The treatment provided mild relief. There is no history of abdominal surgery, endocrine disease, inflammatory bowel disease, irritable bowel syndrome, metabolic disease, neurologic disease, neuromuscular disease, psychiatric history or radiation treatment.     Review of Systems   Constitutional:  Positive for fatigue. Negative for activity change, appetite change, chills, diaphoresis, fever, unexpected weight change and weight loss.   HENT:  Negative for  sore throat and trouble swallowing.    Respiratory:  Negative for cough, choking and shortness of breath.    Cardiovascular:  Negative for chest pain.   Gastrointestinal:  Positive for anal bleeding, blood in stool, constipation and hematochezia (Chronic; notices a blob of stool with moderate amount of BRBPR in stool, on tissue paper, and in toilet bowl with BMs; denies bleeding between BMs or rectal pain; uses hydrocortisone p.r.n. with improvement). Negative for abdominal distention, abdominal pain, bloating, diarrhea, melena, nausea, rectal pain and vomiting.       No LMP recorded. Patient has had a hysterectomy.  Past Medical History:   Diagnosis Date    Anticoagulant long-term use     Atrial fibrillation     Cataracts, bilateral     CHF (congestive heart failure)     Chronic pain syndrome 07/25/2018    Closed fracture of neck of left femur 03/08/2019    DDD (degenerative disc disease), lumbar     DDD (degenerative disc disease), lumbar     Heart murmur 01/2017    HLD (hyperlipidemia)     no medication taken    Hypertension 12/27/2018    Hypothyroidism     Lichen sclerosus et atrophicus     Rectal/Vaginal areas    Lumbar spinal stenosis     Lumbar spondylosis     Mitral valve disorder 01/2017    Asymptomatic    Osteopenia     Urinary incontinence      Past Surgical History:   Procedure Laterality Date    A-V CARDIAC PACEMAKER INSERTION  8/29/2024    Procedure: Dual Chamber PPM (Biotronik);  Surgeon: Scott Holloway MD;  Location: [facility] CATH;  Service: Cardiology;;    APPENDECTOMY      CATARACT EXTRACTION EXTRACAPSULAR W/ INTRAOCULAR LENS IMPLANTATION Bilateral     HEMIARTHROPLASTY OF HIP Left 3/9/2019    Procedure: HEMIARTHROPLASTY, HIP;  Surgeon: Josesito Leo MD;  Location: [facility] OR;  Service: Orthopedics;  Laterality: Left;    HYSTERECTOMY      INSERTION OF DORSAL COLUMN NERVE STIMULATOR FOR TRIAL N/A 7/25/2018    Procedure: INSERTION, DORSAL COLUMN NEUROSTIMULATOR, FOR TRIAL;  Surgeon: Derek Daily MD;   Location: [facility] OR;  Service: Pain Management;  Laterality: N/A;    spinal ablation  12/2017    TONSILLECTOMY       Family History   Problem Relation Name Age of Onset    Colon cancer Neg Hx      Crohn's disease Neg Hx      Stomach cancer Neg Hx      Ulcerative colitis Neg Hx      Esophageal cancer Neg Hx       Social History     Tobacco Use    Smoking status: Never    Smokeless tobacco: Never   Substance Use Topics    Alcohol use: Not Currently     Alcohol/week: 1.0 standard drink of alcohol     Types: 1 Shots of liquor per week    Drug use: No     Wt Readings from Last 10 Encounters:   11/26/24 56.4 kg (124 lb 5.4 oz)   10/31/24 60.2 kg (132 lb 11.5 oz)   10/23/24 56.2 kg (124 lb)   10/19/24 58 kg (127 lb 13.9 oz)   09/19/24 58.8 kg (129 lb 10.1 oz)   08/30/24 58.8 kg (129 lb 11.2 oz)   08/19/24 59.7 kg (131 lb 9.8 oz)   05/29/24 61.7 kg (136 lb)   05/23/24 62.1 kg (136 lb 14.5 oz)   05/21/24 63.2 kg (139 lb 5.3 oz)     Lab Results   Component Value Date    WBC 12.49 11/26/2024    HGB 11.7 (L) 11/26/2024    HCT 36.0 (L) 11/26/2024    MCV 99 (H) 11/26/2024     11/26/2024     CMP  Sodium   Date Value Ref Range Status   10/19/2024 139 136 - 145 mmol/L Final     Potassium   Date Value Ref Range Status   10/19/2024 4.1 3.5 - 5.1 mmol/L Final     Comment:     Anion Gap reference range revised on 4/28/2023     Chloride   Date Value Ref Range Status   10/19/2024 102 95 - 110 mmol/L Final     CO2   Date Value Ref Range Status   10/19/2024 30 22 - 31 mmol/L Final     Glucose   Date Value Ref Range Status   10/19/2024 86 70 - 110 mg/dL Final     Comment:     The ADA recommends the following guidelines for fasting glucose:    Normal:       less than 100 mg/dL    Prediabetes:  100 mg/dL to 125 mg/dL    Diabetes:     126 mg/dL or higher       BUN   Date Value Ref Range Status   10/19/2024 35 (H) 7 - 18 mg/dL Final     Creatinine   Date Value Ref Range Status   10/19/2024 1.37 0.50 - 1.40 mg/dL Final     Calcium   Date  Value Ref Range Status   10/19/2024 8.6 8.4 - 10.2 mg/dL Final     Total Protein   Date Value Ref Range Status   10/19/2024 7.0 6.0 - 8.4 g/dL Final     Albumin   Date Value Ref Range Status   10/19/2024 4.0 3.5 - 5.2 g/dL Final     Total Bilirubin   Date Value Ref Range Status   10/19/2024 0.6 0.2 - 1.3 mg/dL Final     Alkaline Phosphatase   Date Value Ref Range Status   10/19/2024 75 38 - 145 U/L Final     AST   Date Value Ref Range Status   10/19/2024 34 14 - 36 U/L Final     ALT   Date Value Ref Range Status   10/19/2024 18 0 - 35 U/L Final     Anion Gap   Date Value Ref Range Status   10/19/2024 7 5 - 12 mmol/L Final     Comment:     Anion Gap reference range revised on 4/28/2023     eGFR if    Date Value Ref Range Status   05/19/2021 56.9 (A) >60 mL/min/1.73 m^2 Final     eGFR if non    Date Value Ref Range Status   05/19/2021 49.4 (A) >60 mL/min/1.73 m^2 Final     Comment:     Calculation used to obtain the estimated glomerular filtration  rate (eGFR) is the CKD-EPI equation.        Lab Results   Component Value Date    TSH 3.600 08/25/2024     Reviewed prior medical records including office visit with Meredith Duran NP 05/23/19, radiology report of chest x-ray 08/29/2024, KUB 05/23/2019 & endoscopy history (see surgical history).    Objective:      Physical Exam  Vitals and nursing note reviewed.   Constitutional:       General: She is not in acute distress.     Appearance: Normal appearance. She is not ill-appearing.   HENT:      Mouth/Throat:      Lips: Pink. No lesions.   Cardiovascular:      Heart sounds: Normal heart sounds.   Pulmonary:      Effort: Pulmonary effort is normal. No respiratory distress.      Breath sounds: Normal breath sounds.   Abdominal:      General: Bowel sounds are normal. There is no distension or abdominal bruit. There are no signs of injury.      Palpations: Abdomen is soft. There is no shifting dullness, fluid wave, hepatomegaly, splenomegaly or  mass.      Tenderness: There is no abdominal tenderness. There is no guarding or rebound. Negative signs include Moya's sign, Rovsing's sign and McBurney's sign.   Skin:     General: Skin is warm and dry.      Coloration: Skin is not jaundiced or pale.   Neurological:      Mental Status: She is alert and oriented to person, place, and time.   Psychiatric:         Attention and Perception: Attention normal.         Mood and Affect: Mood normal.         Speech: Speech normal.         Behavior: Behavior normal.         Assessment:       1. Chronic idiopathic constipation    2. Rectal bleeding    3. Anemia, unspecified type        Plan:       Chronic idiopathic constipation  - discussed about Colonoscopy, including the risks and benefits of colonoscopy, patient verbalized understanding but patient declined colonoscopy.  Recommend daily exercise as tolerated, adequate water intake (six 8-oz glasses of water daily), and high fiber diet. OTC fiber supplements are recommended if diet does not reach daily fiber goal (20-30 grams daily), such as Metamucil, Citrucel, or FiberCon (take as directed, separate from other oral medications by >2 hours).  -INCREASE: linaCLOtide (LINZESS) 290 mcg Cap capsule; Take 1 capsule (290 mcg total) by mouth before breakfast.  Dispense: 90 capsule; Refill: 0    Rectal bleeding  - discussed about Colonoscopy, including the risks and benefits of colonoscopy, patient verbalized understanding but patient declined colonoscopy.  - You may resume normal activity as long as you feel well.  - Avoid/minimize NSAIDs such as ibuprofen (Advil, Motrin) and naproxen (Aleve and Naprosyn).  - Avoid alcohol.  -     CBC Without Differential; Future; Expected date: 11/26/2024  - avoid straining with bowel movements; try using an OTC stool softener as directed and increase fiber in diet (20-30 grams daily)/OTC fiber supplement such as metamucil (take as directed)  - recommend SITZ baths  - possible referral to  colorectal surgery if symptoms persist    Anemia, unspecified type  -     CBC Without Differential; Future; Expected date: 11/26/2024  - follow-up with PCP for further evaluation and management    Follow up in about 4 weeks (around 12/24/2024), or if symptoms worsen or fail to improve.      If no improvement in symptoms or symptoms worsen, call/follow-up at clinic or go to ER.        Total time spent on the encounter includes face to face time and non-face to face time preparing to see the patient (eg, review of tests), Obtaining and/or reviewing separately obtained history, Documenting clinical information in the electronic or other health record, Independently interpreting results (not separately reported) and communicating results to the patient/family/caregiver, or Care coordination (not separately reported).     A dictation software program was used for this note. Please expect some simple typographical  errors in this note.

## 2024-12-06 ENCOUNTER — HOSPITAL ENCOUNTER (OUTPATIENT)
Dept: CARDIOLOGY | Facility: HOSPITAL | Age: 89
Discharge: HOME | End: 2024-12-06
Attending: INTERNAL MEDICINE
Payer: MEDICARE

## 2024-12-06 ENCOUNTER — OFFICE VISIT (OUTPATIENT)
Dept: CARDIOLOGY | Facility: CLINIC | Age: 89
End: 2024-12-06
Payer: MEDICARE

## 2024-12-06 VITALS
SYSTOLIC BLOOD PRESSURE: 115 MMHG | BODY MASS INDEX: 22.8 KG/M2 | HEIGHT: 62 IN | HEART RATE: 92 BPM | WEIGHT: 123.88 LBS | DIASTOLIC BLOOD PRESSURE: 71 MMHG

## 2024-12-06 DIAGNOSIS — I05.0 MODERATE MITRAL STENOSIS: ICD-10-CM

## 2024-12-06 DIAGNOSIS — I48.0 PAROXYSMAL ATRIAL FIBRILLATION: Primary | ICD-10-CM

## 2024-12-06 DIAGNOSIS — I10 PRIMARY HYPERTENSION: ICD-10-CM

## 2024-12-06 DIAGNOSIS — I49.5 TACHY-BRADY SYNDROME: ICD-10-CM

## 2024-12-06 DIAGNOSIS — Z95.0 STATUS POST PLACEMENT OF CARDIAC PACEMAKER: ICD-10-CM

## 2024-12-06 DIAGNOSIS — I48.3 TYPICAL ATRIAL FLUTTER: ICD-10-CM

## 2024-12-06 DIAGNOSIS — Z79.899 LONG TERM CURRENT USE OF AMIODARONE: ICD-10-CM

## 2024-12-06 DIAGNOSIS — R00.1 BRADYCARDIA: ICD-10-CM

## 2024-12-06 DIAGNOSIS — I50.30 HEART FAILURE WITH PRESERVED EJECTION FRACTION, UNSPECIFIED HF CHRONICITY: Chronic | ICD-10-CM

## 2024-12-06 DIAGNOSIS — I50.33 ACUTE ON CHRONIC HEART FAILURE WITH PRESERVED EJECTION FRACTION (HFPEF): ICD-10-CM

## 2024-12-06 DIAGNOSIS — I35.0 MODERATE AORTIC STENOSIS: ICD-10-CM

## 2024-12-06 PROCEDURE — 99999 PR PBB SHADOW E&M-EST. PATIENT-LVL III: CPT | Mod: PBBFAC,HCNC,, | Performed by: INTERNAL MEDICINE

## 2024-12-06 PROCEDURE — 93005 ELECTROCARDIOGRAM TRACING: CPT | Mod: HCNC,PO

## 2024-12-06 PROCEDURE — 93280 PM DEVICE PROGR EVAL DUAL: CPT | Mod: 26,HCNC,, | Performed by: INTERNAL MEDICINE

## 2024-12-06 PROCEDURE — 93280 PM DEVICE PROGR EVAL DUAL: CPT | Mod: HCNC,PO

## 2024-12-06 NOTE — PROGRESS NOTES
SUBJECTIVE:    Patient ID:  Michela Calvert is a 89 y.o. female who presents for follow up regarding pacemaker.      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 Aguilar    Patient presented to [facility] secondary to significant bradycardia.  She was found to be in a junctional bradycardia.  She is on amiodarone and AV charisma blocking agents to control atrial fibrillation.  She underwent a dual-chamber pacemaker placement in August 20, 2024.    12/06/2024  Overall feels well.  Tolerating oral anticoagulation.  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    LFTs and TFTs within normal limits recently    Device interrogation today reveals:  Lead parameters: stable   Pacing: RA 88% & RV 3%  Atrial arrhythmias:          - AT/AF burden: 1% - with  ms. Longest 6 hours consistent with atrial flutter  Ventricular arrhythmias: none  Battery status/longetivity: ok  Reprogramming performed today: none     I personally reviewed the ECG/telemetry strip today. My interpretation is atrial flutter with controlled ventricular rates    Past Medical History:   Diagnosis Date    Anticoagulant long-term use     Atrial fibrillation     Cataracts, bilateral     CHF (congestive heart failure)     Chronic pain syndrome 07/25/2018    Closed fracture of neck of left femur 03/08/2019    DDD (degenerative disc disease), lumbar     DDD (degenerative disc disease), lumbar     Heart murmur 01/2017    HLD (hyperlipidemia)     no medication taken    Hypertension 12/27/2018    Hypothyroidism     Lichen sclerosus et atrophicus     Rectal/Vaginal areas    Lumbar spinal stenosis     Lumbar spondylosis     Mitral valve disorder 01/2017    Asymptomatic    Osteopenia     Urinary incontinence        Past Surgical History:   Procedure  Laterality Date    A-V CARDIAC PACEMAKER INSERTION  8/29/2024    Procedure: Dual Chamber PPM (Biotronik);  Surgeon: Scott Holloway MD;  Location: [facility] CATH;  Service: Cardiology;;    APPENDECTOMY      CATARACT EXTRACTION EXTRACAPSULAR W/ INTRAOCULAR LENS IMPLANTATION Bilateral     HEMIARTHROPLASTY OF HIP Left 3/9/2019    Procedure: HEMIARTHROPLASTY, HIP;  Surgeon: Josesito Leo MD;  Location: [facility] OR;  Service: Orthopedics;  Laterality: Left;    HYSTERECTOMY      INSERTION OF DORSAL COLUMN NERVE STIMULATOR FOR TRIAL N/A 7/25/2018    Procedure: INSERTION, DORSAL COLUMN NEUROSTIMULATOR, FOR TRIAL;  Surgeon: Derek Daily MD;  Location: [facility] OR;  Service: Pain Management;  Laterality: N/A;    spinal ablation  12/2017    TONSILLECTOMY         Family History   Problem Relation Name Age of Onset    Colon cancer Neg Hx      Crohn's disease Neg Hx      Stomach cancer Neg Hx      Ulcerative colitis Neg Hx      Esophageal cancer Neg Hx         Social History     Socioeconomic History    Marital status:    Tobacco Use    Smoking status: Never    Smokeless tobacco: Never   Substance and Sexual Activity    Alcohol use: Not Currently     Alcohol/week: 1.0 standard drink of alcohol     Types: 1 Shots of liquor per week    Drug use: No    Sexual activity: Never     Social Drivers of Health     Financial Resource Strain: Low Risk  (7/15/2021)    Overall Financial Resource Strain (CARDIA)     Difficulty of Paying Living Expenses: Not hard at all   Food Insecurity: No Food Insecurity (8/26/2024)    Hunger Vital Sign     Worried About Running Out of Food in the Last Year: Never true     Ran Out of Food in the Last Year: Never true   Transportation Needs: No Transportation Needs (8/26/2024)    TRANSPORTATION NEEDS     Transportation : No   Physical Activity: Unknown (7/15/2021)    Exercise Vital Sign     Days of Exercise per Week: 3 days   Stress: Stress Concern Present (7/15/2021)    [employer] - Occupational Stress Questionnaire     Feeling of Stress : To some extent   Housing Stability: Low Risk  (8/26/2024)    Housing Stability Vital Sign     Unable to Pay for Housing in the Last Year: No     Homeless in the Last Year: No       Review of patient's allergies indicates:   Allergen Reactions    Nitrofuran analogues Nausea Only    Ciprofloxacin Nausea And Vomiting       Review of Systems   Constitutional: Negative for chills and fever.   HENT:  Negative for congestion and hearing loss.    Eyes:  Negative for blurred vision and double vision.   Cardiovascular:         See HPI   Respiratory:  Negative for cough, hemoptysis and shortness of breath.    Endocrine: Negative for cold intolerance and heat intolerance.   Musculoskeletal:  Negative for joint pain and joint swelling.   Gastrointestinal:  Negative for abdominal pain, nausea and vomiting.   Genitourinary:  Negative for dysuria and hematuria.   Neurological:  Negative for focal weakness and headaches.   Psychiatric/Behavioral:  Negative for altered mental status.    All other systems reviewed and are negative.           OBJECTIVE:   There were no vitals filed for this visit.    BP Readings from Last 5 Encounters:   10/31/24 (!) 148/74   10/23/24 (!) 146/74   10/19/24 (!) 160/72   09/19/24 136/70   08/30/24 (!) 142/86        Physical Exam  Vitals and nursing note reviewed.   Constitutional:       General: She is not in acute distress.     Appearance: She is well-developed. She is not diaphoretic.   HENT:      Head: Normocephalic and atraumatic.   Eyes:      General: No scleral icterus.        Right eye: No discharge.         Left eye: No discharge.   Cardiovascular:      Rate and Rhythm: Normal rate. Rhythm irregular. No extrasystoles are present.     Pulses: Normal pulses and intact distal pulses.           Radial pulses are 2+ on the right side and 2+ on the left side.      Heart sounds: Normal heart sounds, S1 normal and S2 normal. Heart sounds  not distant. No opening snap. No murmur heard.     No friction rub. No gallop. No S3 or S4 sounds.   Pulmonary:      Effort: Pulmonary effort is normal. No respiratory distress.      Breath sounds: No wheezing or rales.   Abdominal:      General: Bowel sounds are normal. There is no distension.      Palpations: Abdomen is soft.      Tenderness: There is no abdominal tenderness.   Musculoskeletal:         General: No deformity. Normal range of motion.      Cervical back: Normal range of motion and neck supple.   Neurological:      Mental Status: She is alert.             Current Outpatient Medications   Medication Instructions    acetaminophen (TYLENOL) 650 mg, Every 8 hours PRN    amiodarone (PACERONE) 200 mg, Oral, Daily    amLODIPine (NORVASC) 5 mg, Oral, Daily    apixaban (ELIQUIS) 2.5 mg, Oral, 2 times daily, CAN RESTART ON 9/4    calcium carbonate (OS-ARCHIE) 500 mg calcium (1,250 mg) tablet TAKE ONE CAPSULE BY MOUTH ONCE DAILY IN THE EVENING    calcium carbonate-vitamin D3 (CALCIUM 600 WITH VITAMIN D3) 600 mg-12.5 mcg (500 unit) Cap TAKE ONE CAPSULE BY MOUTH ONCE DAILY IN THE EVENING    fluticasone propionate (FLONASE) 50 mcg, Each Nostril, 2 times daily    hydrocortisone 2.5 % cream APPLY TO THE AFFECTED AREA(S) TWICE DAILY    levothyroxine (SYNTHROID) 100 MCG tablet TAKE ONE TABLET BY MOUTH BEFORE BREAKFAST. Administer on an empty stomach at least 30 minutes before food    linaCLOtide (LINZESS) 290 mcg, Oral, Before breakfast    metoprolol succinate (TOPROL-XL) 25 mg, Oral, Daily    mupirocin (BACTROBAN) 2 % ointment 2 times daily    nystatin (MYCOSTATIN) ointment Topical (Top), 4 times daily    senna-docusate 8.6-50 mg (PERICOLACE) 8.6-50 mg per tablet 1 tablet, Oral, 2 times daily    torsemide (DEMADEX) 10 mg, Oral, Daily    vit C,P-Dh-zfhkb-lutein-zeaxan (PRESERVISION AREDS-2) 250-90-40-1 mg Cap 1 capsule, Oral, 2 times daily       Lipid Panel:   Lab Results   Component Value Date    CHOL 196 03/04/2024     HDL 48 03/04/2024    LDLCALC 133.2 03/04/2024    TRIG 74 03/04/2024    CHOLHDL 24.5 03/04/2024         The ASCVD Risk score (Mady ALDRIDGE, et al., 2019) failed to calculate for the following reasons:    The 2019 ASCVD risk score is only valid for ages 40 to 79    Most Recent EKG Results  Results for orders placed or performed during the hospital encounter of 08/23/24   EKG 12-lead    Collection Time: 08/30/24  3:24 PM   Result Value Ref Range    QRS Duration 140 ms    [facility] QTC Calculation 489 ms    Narrative    Test Reason : Z48.812,    Vent. Rate : 064 BPM     Atrial Rate : 053 BPM     P-R Int : 000 ms          QRS Dur : 140 ms      QT Int : 474 ms       P-R-T Axes : 000 -87 052 degrees     QTc Int : 489 ms    Atrial paced rhythm with occasional non-captured atrial impulses and  ventricular paced complexes  Left axis deviation  Nonspecific intraventricular block  Inferior infarct ,age undetermined  Anterolateral infarct ,age undetermined  Abnormal ECG  When compared with ECG of 25-AUG-2024 10:19,  Current undetermined rhythm precludes rhythm comparison, needs review  Questionable change in QRS duration  Anterolateral infarct is now Present  Inferior infarct is now Present  Confirmed by Jonnathan Delgado ([id number]) on 9/4/2024 11:20:07 AM    Referred By: [email or web address]   SELF           Confirmed By:Jonnathan Delgado       Most Recent Echocardiogram Results  Results for orders placed during the hospital encounter of 08/23/24    Echo Saline Bubble? No; Ultrasound enhancing contrast? No    Interpretation Summary    Left Ventricle: The left ventricle is normal in size. Mildly increased wall thickness. There is hyperdynamic systolic function with a visually estimated ejection fraction of 70 - 75%.    Right Ventricle: Normal right ventricular cavity size. Wall thickness is normal. Systolic function is normal.    Left Atrium: Left atrium is mildly dilated.    Aortic Valve: There is moderate aortic valve sclerosis. Moderately restricted  motion. There is moderate stenosis. Aortic valve area by VTI is 1.83 cm². Aortic valve peak velocity is 2.64 m/s. Mean gradient is 16 mmHg. The dimensionless index is 0.58.    Mitral Valve: There is severe mitral annular calcification present. There is no stenosis. The mean pressure gradient across the mitral valve is 3 mmHg at a heart rate of  bpm.    Tricuspid Valve: There is moderate regurgitation.    Pulmonary Artery: There is mild to moderate pulmonary hypertension. The estimated pulmonary artery systolic pressure is 45 mmHg.    IVC/SVC: Normal venous pressure at 3 mmHg.      Most Recent Nuclear Stress Test Results  No results found for this or any previous visit.      Most Recent Cardiac PET Stress Test Results  No results found for this or any previous visit.      Most Recent Cardiovascular Angiogram results  No results found for this or any previous visit.      Other Most Recent Cardiology Results  Results for orders placed in visit on 09/30/24    Cardiac device check - Remote alert        All pertinent data including labs, imaging, EKGs, and studies listed above were reviewed.  All EKG tracing in [facility] were personally interpreted by this provider.    ASSESSMENT:   Michela Calvert is a 89 y.o. female who presents for evaluation of pacemaker and atrial fibrillation/flutter.  Very low burden of atrial arrhythmias.  Patient went into atrial flutter during the visit today.  She states symptomatic.  Her heart rates are controlled.  She is tolerating oral anticoagulation.  Device parameters within normal limits.    1. Paroxysmal atrial fibrillation        2. Typical atrial flutter        3. Primary hypertension  IN OFFICE EKG 12-LEAD (to Muse)      4. Moderate aortic stenosis  IN OFFICE EKG 12-LEAD (to Muse)      5. Heart failure with preserved ejection fraction, unspecified HF chronicity        6. Long term current use of amiodarone        7. Tachy-jimi syndrome        8. Moderate mitral stenosis        9. Acute  on chronic heart failure with preserved ejection fraction (HFpEF)            PLAN FOR TREATMENT OF ABOVE DIAGNOSES:     Continue Eliquis 2.5 mg b.i.d..  Can hold for 2-3 days prior to dermatologic procedure with resumption as soon as possible thereafter  Continue amiodarone 200 mg daily  Continue Toprol-XL 25 mg daily  Continue Norvasc 5 mg daily  Continue torsemide 10 mg daily    F/u in 6 months    Jamie Holloway MD  Cardiac Electrophysiology

## 2024-12-10 LAB
OHS CV AF BURDEN PERCENT: < 1
OHS CV DC REMOTE DEVICE TYPE: NORMAL
OHS CV RV PACING PERCENT: 3 %

## 2024-12-12 DIAGNOSIS — L29.9 ITCHING: ICD-10-CM

## 2024-12-12 RX ORDER — HYDROCORTISONE 25 MG/G
CREAM TOPICAL
Qty: 84 G | Refills: 3 | Status: SHIPPED | OUTPATIENT
Start: 2024-12-12

## 2024-12-12 NOTE — TELEPHONE ENCOUNTER
Refill Decision Note   Michela Nehal  is requesting a refill authorization.  Brief Assessment and Rationale for Refill:  Approve     Medication Therapy Plan:         Comments:     Note composed:12:30 PM 12/12/2024

## 2024-12-12 NOTE — TELEPHONE ENCOUNTER
No care due was identified.  [facility] Embedded Care Due Messages. Reference number: [id number].   12/12/2024 9:15:45 AM CST

## 2024-12-19 DIAGNOSIS — I48.0 PAROXYSMAL ATRIAL FIBRILLATION: ICD-10-CM

## 2024-12-19 DIAGNOSIS — I10 PRIMARY HYPERTENSION: ICD-10-CM

## 2024-12-21 RX ORDER — TORSEMIDE 10 MG/1
10 TABLET ORAL
Qty: 30 TABLET | Refills: 3 | Status: SHIPPED | OUTPATIENT
Start: 2024-12-21

## 2024-12-24 ENCOUNTER — EXTERNAL HOME HEALTH (OUTPATIENT)
Dept: HOME HEALTH SERVICES | Facility: HOSPITAL | Age: 89
End: 2024-12-24
Payer: MEDICARE

## 2024-12-30 ENCOUNTER — CLINICAL SUPPORT (OUTPATIENT)
Dept: CARDIOLOGY | Facility: HOSPITAL | Age: 89
End: 2024-12-30
Payer: MEDICARE

## 2024-12-30 ENCOUNTER — HOSPITAL ENCOUNTER (OUTPATIENT)
Dept: CARDIOLOGY | Facility: HOSPITAL | Age: 89
Discharge: HOME | End: 2024-12-30
Attending: INTERNAL MEDICINE
Payer: MEDICARE

## 2024-12-30 DIAGNOSIS — R00.1 BRADYCARDIA, UNSPECIFIED: ICD-10-CM

## 2024-12-30 LAB
OHS CV AF BURDEN PERCENT: < 1
OHS CV DC REMOTE DEVICE TYPE: NORMAL
OHS CV ICD SHOCK: NO
OHS CV RV PACING PERCENT: 5 %

## 2024-12-30 PROCEDURE — 93296 REM INTERROG EVL PM/IDS: CPT | Mod: HCNC,PO | Performed by: INTERNAL MEDICINE

## 2024-12-30 PROCEDURE — 93294 REM INTERROG EVL PM/LDLS PM: CPT | Mod: HCNC,,, | Performed by: INTERNAL MEDICINE

## 2025-01-30 DIAGNOSIS — Z00.00 ENCOUNTER FOR MEDICARE ANNUAL WELLNESS EXAM: ICD-10-CM

## 2025-01-31 ENCOUNTER — PATIENT MESSAGE (OUTPATIENT)
Dept: GASTROENTEROLOGY | Facility: CLINIC | Age: OVER 89
End: 2025-01-31
Payer: MEDICARE

## 2025-02-03 DIAGNOSIS — K59.04 CHRONIC IDIOPATHIC CONSTIPATION: ICD-10-CM

## 2025-02-03 NOTE — TELEPHONE ENCOUNTER
I am glad the medication seems to be working.  Please let the patient know [facility].  Sincerely,  Maki Odonnell NP

## 2025-02-19 ENCOUNTER — PATIENT MESSAGE (OUTPATIENT)
Dept: FAMILY MEDICINE | Facility: CLINIC | Age: OVER 89
End: 2025-02-19
Payer: MEDICARE

## 2025-02-19 ENCOUNTER — OFFICE VISIT (OUTPATIENT)
Dept: GASTROENTEROLOGY | Facility: CLINIC | Age: OVER 89
End: 2025-02-19
Payer: MEDICARE

## 2025-02-19 ENCOUNTER — PATIENT MESSAGE (OUTPATIENT)
Dept: GASTROENTEROLOGY | Facility: CLINIC | Age: OVER 89
End: 2025-02-19

## 2025-02-19 ENCOUNTER — TELEPHONE (OUTPATIENT)
Dept: GASTROENTEROLOGY | Facility: CLINIC | Age: OVER 89
End: 2025-02-19

## 2025-02-19 ENCOUNTER — HOSPITAL ENCOUNTER (OUTPATIENT)
Dept: RADIOLOGY | Facility: HOSPITAL | Age: OVER 89
Discharge: HOME | End: 2025-02-19
Attending: STUDENT IN AN ORGANIZED HEALTH CARE EDUCATION/TRAINING PROGRAM
Payer: MEDICARE

## 2025-02-19 VITALS — BODY MASS INDEX: 22.66 KG/M2 | HEIGHT: 62 IN

## 2025-02-19 DIAGNOSIS — K59.09 CHRONIC CONSTIPATION: Primary | ICD-10-CM

## 2025-02-19 DIAGNOSIS — K59.00 OBSTIPATION: ICD-10-CM

## 2025-02-19 DIAGNOSIS — R11.0 NAUSEA: ICD-10-CM

## 2025-02-19 DIAGNOSIS — R63.0 APPETITE LOSS: ICD-10-CM

## 2025-02-19 DIAGNOSIS — K59.00 OBSTIPATION: Primary | ICD-10-CM

## 2025-02-19 DIAGNOSIS — K59.04 CHRONIC IDIOPATHIC CONSTIPATION: ICD-10-CM

## 2025-02-19 DIAGNOSIS — Z87.81 STATUS POST FRACTURE OF LEFT HIP: ICD-10-CM

## 2025-02-19 DIAGNOSIS — I48.0 PAROXYSMAL ATRIAL FIBRILLATION: ICD-10-CM

## 2025-02-19 PROCEDURE — 74018 RADEX ABDOMEN 1 VIEW: CPT | Mod: TC,HCNC,FY,PO

## 2025-02-19 PROCEDURE — 99999 PR PBB SHADOW E&M-EST. PATIENT-LVL III: CPT | Mod: PBBFAC,HCNC,, | Performed by: STUDENT IN AN ORGANIZED HEALTH CARE EDUCATION/TRAINING PROGRAM

## 2025-02-19 RX ORDER — BISACODYL 10 MG/1
10 SUPPOSITORY RECTAL DAILY PRN
Qty: 1 SUPPOSITORY | Refills: 0 | Status: SHIPPED | OUTPATIENT
Start: 2025-02-19

## 2025-02-19 NOTE — PROGRESS NOTES
Subjective:       Patient ID: Michela Calvert is a 89 y.o. female Body mass index is 22.66 kg/m².    Chief Complaint: Constipation    This patient is new to me.    HPI:  Presents today for further evaluation of obstipation. Reports she has not had a bowel movement in 8 days, despite taking Linzess 290mcg daily. She was recently increased from Linzess 145 to 290 and was initially having good results with the 290 dose. She has also been taking metamucil every day. She took two dulcolax this morning. Did not take Linzess today.     She denies abdominal pain or vomiting. Her appetite has been decreased but she is still eating and tolerating food. Reports she has overall felt unwell and nauseated over the last couple weeks. She has also been walking less the last couple of weeks due to leg wounds/vascular insufficiency.         Review of Systems   Gastrointestinal:  Positive for constipation and nausea (mild). Negative for abdominal distention, abdominal pain, anal bleeding, blood in stool, diarrhea, rectal pain and vomiting.       No LMP recorded. Patient has had a hysterectomy.  Past Medical History:   Diagnosis Date    Anticoagulant long-term use     Atrial fibrillation     Cataracts, bilateral     CHF (congestive heart failure)     Chronic pain syndrome 07/25/2018    Closed fracture of neck of left femur 03/08/2019    DDD (degenerative disc disease), lumbar     DDD (degenerative disc disease), lumbar     Heart murmur 01/2017    HLD (hyperlipidemia)     no medication taken    Hypertension 12/27/2018    Hypothyroidism     Lichen sclerosus et atrophicus     Rectal/Vaginal areas    Lumbar spinal stenosis     Lumbar spondylosis     Mitral valve disorder 01/2017    Asymptomatic    Osteopenia     Urinary incontinence      Past Surgical History:   Procedure Laterality Date    A-V CARDIAC PACEMAKER INSERTION  8/29/2024    Procedure: Dual Chamber PPM (Biotronik);  Surgeon: Scott Holloway MD;  Location: [facility];   Service: Cardiology;;    APPENDECTOMY      CATARACT EXTRACTION EXTRACAPSULAR W/ INTRAOCULAR LENS IMPLANTATION Bilateral     HEMIARTHROPLASTY OF HIP Left 3/9/2019    Procedure: HEMIARTHROPLASTY, HIP;  Surgeon: Josesito Leo MD;  Location: [facility] OR;  Service: Orthopedics;  Laterality: Left;    HYSTERECTOMY      INSERTION OF DORSAL COLUMN NERVE STIMULATOR FOR TRIAL N/A 7/25/2018    Procedure: INSERTION, DORSAL COLUMN NEUROSTIMULATOR, FOR TRIAL;  Surgeon: Derek Daily MD;  Location: [facility] OR;  Service: Pain Management;  Laterality: N/A;    spinal ablation  12/2017    TONSILLECTOMY       Family History   Problem Relation Name Age of Onset    Colon cancer Neg Hx      Crohn's disease Neg Hx      Stomach cancer Neg Hx      Ulcerative colitis Neg Hx      Esophageal cancer Neg Hx       Social History[1]  Wt Readings from Last 10 Encounters:   02/19/25 56.2 kg (123 lb 14.4 oz)   12/06/24 56.2 kg (123 lb 14.4 oz)   11/26/24 56.4 kg (124 lb 5.4 oz)   10/31/24 60.2 kg (132 lb 11.5 oz)   10/23/24 56.2 kg (124 lb)   10/19/24 58 kg (127 lb 13.9 oz)   09/19/24 58.8 kg (129 lb 10.1 oz)   08/30/24 58.8 kg (129 lb 11.2 oz)   08/19/24 59.7 kg (131 lb 9.8 oz)   05/29/24 61.7 kg (136 lb)     Lab Results   Component Value Date    WBC 10.46 02/19/2025    HGB 12.2 02/19/2025    HCT 38.6 02/19/2025    MCV 96 02/19/2025     02/19/2025     CMP  Sodium   Date Value Ref Range Status   02/19/2025 136 136 - 145 mmol/L Final     Potassium   Date Value Ref Range Status   02/19/2025 4.4 3.5 - 5.1 mmol/L Final     Chloride   Date Value Ref Range Status   02/19/2025 99 95 - 110 mmol/L Final     CO2   Date Value Ref Range Status   02/19/2025 27 23 - 29 mmol/L Final     Glucose   Date Value Ref Range Status   02/19/2025 94 70 - 110 mg/dL Final     BUN   Date Value Ref Range Status   02/19/2025 21 10 - 30 mg/dL Final     Creatinine   Date Value Ref Range Status   02/19/2025 1.6 (H) 0.5 - 1.4 mg/dL Final     Calcium   Date Value Ref Range Status  "  02/19/2025 9.4 8.7 - 10.5 mg/dL Final     Total Protein   Date Value Ref Range Status   02/19/2025 6.9 6.0 - 8.4 g/dL Final     Albumin   Date Value Ref Range Status   02/19/2025 3.8 3.5 - 5.2 g/dL Final     Total Bilirubin   Date Value Ref Range Status   02/19/2025 0.4 0.1 - 1.0 mg/dL Final     Comment:     For infants and newborns, interpretation of results should be based  on gestational age, weight and in agreement with clinical  observations.    Premature Infant recommended reference ranges:  Up to 24 hours.............<8.0 mg/dL  Up to 48 hours............<12.0 mg/dL  3-5 days..................<15.0 mg/dL  6-29 days.................<15.0 mg/dL       Alkaline Phosphatase   Date Value Ref Range Status   02/19/2025 73 40 - 150 U/L Final     AST   Date Value Ref Range Status   02/19/2025 44 (H) 10 - 40 U/L Final     ALT   Date Value Ref Range Status   02/19/2025 17 10 - 44 U/L Final     Anion Gap   Date Value Ref Range Status   02/19/2025 10 8 - 16 mmol/L Final     eGFR if    Date Value Ref Range Status   05/19/2021 56.9 (A) >60 mL/min/1.73 m^2 Final     eGFR if non    Date Value Ref Range Status   05/19/2021 49.4 (A) >60 mL/min/1.73 m^2 Final     Comment:     Calculation used to obtain the estimated glomerular filtration  rate (eGFR) is the CKD-EPI equation.        No results found for: "AMYLASE"  No results found for: "LIPASE"  No results found for: "LIPASERES"  Lab Results   Component Value Date    TSH 3.600 08/25/2024         Objective:      Physical Exam  Constitutional:       General: She is not in acute distress.     Appearance: She is not ill-appearing or toxic-appearing.   Abdominal:      General: There is no distension.      Palpations: Abdomen is soft.      Tenderness: There is no abdominal tenderness. There is no guarding or rebound.   Neurological:      Mental Status: She is alert.         Assessment:       1. Obstipation    2. Chronic idiopathic constipation    3. " Nausea    4. Appetite loss        Plan:       Obstipation  -     X-Ray KUB; Future; Expected date: 02/19/2025  -     bisacodyL (DULCOLAX, BISACODYL,) 10 mg Supp; Place 1 suppository (10 mg total) rectally daily as needed (use as directed).  Dispense: 1 suppository; Refill: 0    Chronic idiopathic constipation    Nausea    Appetite loss      -Abdominal X-ray today  -Pending results of the X-ray, we will plan to proceed with Miralax bowel prep today and dulcolax suppository. Written instructions provided.  -Provided strict instructions to report to the nearest ED if patient develops abdominal pain, nausea, or vomiting.  -After bowel prep completed, resume Linzess 290mcg the following day. Would also add daily Miralax and stop Metamucil.  -Instructed patient and her son to call the office tomorrow with update.      Farooq Anguiano DO             [1]   Social History  Tobacco Use    Smoking status: Never    Smokeless tobacco: Never   Substance Use Topics    Alcohol use: Not Currently     Alcohol/week: 1.0 standard drink of alcohol     Types: 1 Shots of liquor per week    Drug use: No

## 2025-02-19 NOTE — TELEPHONE ENCOUNTER
----- Message from Magali sent at 2/19/2025  4:12 PM CST -----  Type:  Patient Returning CallWho Called:  pt's German Left Message for Patient:  Does the patient know what this is regarding?:  yesBest Call Back Number:  [phone] Information:   Please call back to advise. Thanks!

## 2025-02-19 NOTE — TELEPHONE ENCOUNTER
Dr. Anguiano brought me a message with orders stating to have patient do a fleet's enema now, have family use rubber gloves and lots of lubricant as they may have to remove some stool from her then if the fleet's don't work then use the Ducolax as instructed earlier today at appointment as well as drinking the miralax, if at any time pain or fever, bleeding discomfort occurs take patient to the ER for evaluation, all of this was called to the patient's son, son verbalized understanding of this.  Chelsea Mota LPN and Janette Lee LPN overheard these instructions as well.

## 2025-02-21 ENCOUNTER — OFFICE VISIT (OUTPATIENT)
Dept: URGENT CARE | Facility: CLINIC | Age: OVER 89
End: 2025-02-21
Payer: MEDICARE

## 2025-02-21 ENCOUNTER — TELEPHONE (OUTPATIENT)
Dept: FAMILY MEDICINE | Facility: CLINIC | Age: OVER 89
End: 2025-02-21
Payer: MEDICARE

## 2025-02-21 VITALS
HEIGHT: 62 IN | DIASTOLIC BLOOD PRESSURE: 80 MMHG | TEMPERATURE: 98 F | SYSTOLIC BLOOD PRESSURE: 122 MMHG | RESPIRATION RATE: 16 BRPM | WEIGHT: 123 LBS | OXYGEN SATURATION: 96 % | BODY MASS INDEX: 22.63 KG/M2 | HEART RATE: 72 BPM

## 2025-02-21 DIAGNOSIS — N18.32 STAGE 3B CHRONIC KIDNEY DISEASE: ICD-10-CM

## 2025-02-21 DIAGNOSIS — K59.09 CHRONIC CONSTIPATION: ICD-10-CM

## 2025-02-21 DIAGNOSIS — R39.9 UTI SYMPTOMS: ICD-10-CM

## 2025-02-21 DIAGNOSIS — N30.00 ACUTE CYSTITIS WITHOUT HEMATURIA: Primary | ICD-10-CM

## 2025-02-21 LAB
BILIRUBIN, UA POC OHS: NEGATIVE
BLOOD, UA POC OHS: NEGATIVE
CLARITY, UA POC OHS: CLEAR
COLOR, UA POC OHS: ABNORMAL
GLUCOSE, UA POC OHS: 100
KETONES, UA POC OHS: NEGATIVE
LEUKOCYTES, UA POC OHS: NEGATIVE
NITRITE, UA POC OHS: POSITIVE
PH, UA POC OHS: 6.5
PROTEIN, UA POC OHS: NEGATIVE
SPECIFIC GRAVITY, UA POC OHS: 1.01
UROBILINOGEN, UA POC OHS: 1

## 2025-02-21 PROCEDURE — 87086 URINE CULTURE/COLONY COUNT: CPT | Mod: HCNC | Performed by: EMERGENCY MEDICINE

## 2025-02-21 RX ORDER — CEFDINIR 300 MG/1
300 CAPSULE ORAL DAILY
Qty: 7 CAPSULE | Refills: 0 | Status: SHIPPED | OUTPATIENT
Start: 2025-02-21 | End: 2025-02-28

## 2025-02-21 NOTE — TELEPHONE ENCOUNTER
----- Message from Isac sent at 2/21/2025  8:51 AM CST -----  Contact: May  Type: Needs Medical AdviceWho Called:  May - [facility] Back Number: [phone] Information: May is requesting a call back from a nurse regarding the patient and a possible UTI, and a possible order for a urinalysis before her home health nurse gets there today.

## 2025-02-21 NOTE — TELEPHONE ENCOUNTER
Pt's  would like you to put in urinalysis order for the home health nurse to collect urine because wife been having urinary frequency this morning. Also they said you only put in for home health nurse and they said they need physical therapy and occupational therapy also.

## 2025-02-21 NOTE — PATIENT INSTRUCTIONS
Increase water intake. Cranberry products/juices may also help.     Take antibiotic with food.     While on antibiotics, take a daily probiotic such as Align or Culturelle or eat yogurt with live cultures (Activia is one example). This will lessen the chances of stomach upset.     If we sent out a urine culture, results typically return in 2-3 days. Results will go to your portal and you will receive a call from the clinic to discuss your results as well.    Frequent causes of urinary tract infection include the followin) Improper wiping after a bowel movement. Always wipe front to back.   2) Holding urine in. Always try to empty bladder when you feel the need to do so.     If you are prone to urinary tract infections, consider a daily supplement such as cranberry juice/pills or a product called D-mannose. This may reduce the frequency of infections.     You must understand that you've received an Urgent Care treatment only and that you may be released before all your medical problems are known or treated. You, the patient, will arrange for follow up care as instructed.    Follow up with your PCP or specialty clinic as directed if not improved or as needed. You can call [phone] to schedule an appointment with the appropriate provider.      You, the patient, will arrange for follow up care as instructed.     If your condition worsens or fails to improve we recommend that you receive another evaluation at the ER immediately or contact your PCP to discuss your concerns.     Patient aware of treatment plan and verbalized understanding.

## 2025-02-21 NOTE — PROGRESS NOTES
"Subjective:      Patient ID: Michela Calvert is a 89 y.o. female.    Vitals:  height is 5' 2" (1.575 m) and weight is 55.8 kg (123 lb). Her oral temperature is 98.1 °F (36.7 °C). Her blood pressure is 122/80 and her pulse is 72. Her respiration is 16 and oxygen saturation is 96%.     Chief Complaint: UTI Symptoms    Patient reports urinary frequency and dysuria x 3-4 days. No fever. No chills. She is concerned she has a UTI. Took AZO this AM, mild relief.     Of note, was in ED 2 days ago with constipation, received enema and was able to pass some stool, has had some loose stool since then. Started new bowel regimen after GI doc visit earlier this week since constipation has been more of an issue of late since she has started using wheelchair almost full time. On Linzess, newly recommended to take Miralax.     Is also on calcium for bone health.    Matt Olvera provides a lot of the history. Patient is a resident at [facility].     Urinary Tract Infection   This is a new problem. The current episode started in the past 7 days. The problem occurs every urination. The problem has been gradually worsening. The quality of the pain is described as burning. There has been no fever. Associated symptoms include frequency, nausea and constipation. Pertinent negatives include no behavior changes, chills or vomiting.       Constitution: Negative for chills.   Gastrointestinal:  Positive for nausea and constipation. Negative for vomiting.   Genitourinary:  Positive for dysuria and frequency.      Objective:     Physical Exam   Constitutional: No distress.      Comments:Here with matt Olvera    Patient using wheelchair, hard of hearing     Cardiovascular: Normal rate and regular rhythm.   Murmur heard.  Pulmonary/Chest: Effort normal. No respiratory distress.   Abdominal: Normal appearance. She exhibits distension. Soft. There is no abdominal tenderness.   Neurological: She is alert.   Nursing note and vitals " reviewed.    Results for orders placed or performed in visit on 25   POCT Urinalysis(Instrument)    Collection Time: 25 10:16 AM   Result Value Ref Range    Color, POC UA Katelyn (A) Yellow, Straw, Colorless    Clarity, POC UA Clear Clear    Glucose, POC  (A) Negative    Bilirubin, POC UA Negative Negative    Ketones, POC UA Negative Negative    Spec Grav POC UA 1.010 1.005 - 1.030    Blood, POC UA Negative Negative    pH, POC UA 6.5 5.0 - 8.0    Protein, POC UA Negative Negative    Urobilinogen, POC UA 1.0 <=1.0    Nitrite, POC UA Positive (A) Negative    WBC, POC UA Negative Negative        Assessment:     1. Acute cystitis without hematuria    2. UTI symptoms    3. Chronic constipation    4. Stage 3b chronic kidney disease        Plan:     Cefdinir renally dosed due to CrCl 19.    Also could have false positive nitrites due to AZO but s/s concerning for UTI. Glucosuria likely from AZO - had normal serum glucose in ED 2 days ago.    Cx sent. Continue bowel regimen for constipation.    Acute cystitis without hematuria  -     Urine Culture High Risk  -     cefdinir (OMNICEF) 300 MG capsule; Take 1 capsule (300 mg total) by mouth once daily. for 7 days  Dispense: 7 capsule; Refill: 0    UTI symptoms  -     POCT Urinalysis(Instrument)    Chronic constipation    Stage 3b chronic kidney disease        Patient Instructions   Increase water intake. Cranberry products/juices may also help.     Take antibiotic with food.     While on antibiotics, take a daily probiotic such as Align or Culturelle or eat yogurt with live cultures (Activia is one example). This will lessen the chances of stomach upset.     If we sent out a urine culture, results typically return in 2-3 days. Results will go to your portal and you will receive a call from the clinic to discuss your results as well.    Frequent causes of urinary tract infection include the followin) Improper wiping after a bowel movement. Always wipe front  to back.   2) Holding urine in. Always try to empty bladder when you feel the need to do so.     If you are prone to urinary tract infections, consider a daily supplement such as cranberry juice/pills or a product called D-mannose. This may reduce the frequency of infections.     You must understand that you've received an Urgent Care treatment only and that you may be released before all your medical problems are known or treated. You, the patient, will arrange for follow up care as instructed.    Follow up with your PCP or specialty clinic as directed if not improved or as needed. You can call [phone] to schedule an appointment with the appropriate provider.      You, the patient, will arrange for follow up care as instructed.     If your condition worsens or fails to improve we recommend that you receive another evaluation at the ER immediately or contact your PCP to discuss your concerns.     Patient aware of treatment plan and verbalized understanding.

## 2025-02-22 LAB — BACTERIA UR CULT: ABNORMAL

## 2025-02-24 ENCOUNTER — RESULTS FOLLOW-UP (OUTPATIENT)
Dept: URGENT CARE | Facility: CLINIC | Age: OVER 89
End: 2025-02-24

## 2025-02-25 PROBLEM — I38 VHD (VALVULAR HEART DISEASE): Status: ACTIVE | Noted: 2025-02-25

## 2025-02-25 PROBLEM — K59.00 CONSTIPATION: Status: ACTIVE | Noted: 2025-02-25

## 2025-02-25 PROBLEM — I27.20 PULMONARY HYPERTENSION: Status: ACTIVE | Noted: 2025-02-25

## 2025-02-25 PROBLEM — I50.32 CHRONIC DIASTOLIC CHF (CONGESTIVE HEART FAILURE): Status: ACTIVE | Noted: 2025-02-25

## 2025-02-25 PROBLEM — I31.39 PERICARDIAL EFFUSION: Status: ACTIVE | Noted: 2025-02-25

## 2025-02-25 PROBLEM — N18.9 ACUTE KIDNEY INJURY SUPERIMPOSED ON CHRONIC KIDNEY DISEASE: Status: ACTIVE | Noted: 2019-02-12

## 2025-02-26 PROBLEM — Z79.899 PATIENT TAKING UNKNOWN MEDICATIONS: Status: RESOLVED | Noted: 2021-07-15 | Resolved: 2025-02-26

## 2025-02-26 PROBLEM — R74.8 ELEVATED CK: Status: ACTIVE | Noted: 2025-02-26

## 2025-02-26 PROBLEM — I87.8 VENOUS STASIS: Status: ACTIVE | Noted: 2025-02-26

## 2025-02-26 PROBLEM — Z73.6 LIMITATION OF ACTIVITIES DUE TO DISABILITY: Status: ACTIVE | Noted: 2025-02-26

## 2025-02-26 PROBLEM — Z95.0 PACEMAKER: Status: ACTIVE | Noted: 2025-02-26

## 2025-02-27 ENCOUNTER — TELEPHONE (OUTPATIENT)
Dept: CARDIOLOGY | Facility: CLINIC | Age: OVER 89
End: 2025-02-27
Payer: MEDICARE

## 2025-02-27 NOTE — TELEPHONE ENCOUNTER
----- Message from Dianna Ponce PA-C sent at 2/27/2025 12:08 PM CST -----  Please call patient to set up hospital follow up with Luigi or myself in 2-4 weeks. Patient still in hospital- possibly few more days.

## 2025-03-06 ENCOUNTER — OUTPATIENT CASE MANAGEMENT (OUTPATIENT)
Dept: ADMINISTRATIVE | Facility: OTHER | Age: OVER 89
End: 2025-03-06
Payer: MEDICARE

## 2025-03-20 DIAGNOSIS — M85.80 OSTEOPENIA, UNSPECIFIED LOCATION: ICD-10-CM

## 2025-03-20 DIAGNOSIS — K59.09 OTHER CONSTIPATION: ICD-10-CM

## 2025-03-20 DIAGNOSIS — I10 PRIMARY HYPERTENSION: Primary | ICD-10-CM

## 2025-03-20 DIAGNOSIS — E03.4 HYPOTHYROIDISM DUE TO ACQUIRED ATROPHY OF THYROID: ICD-10-CM

## 2025-03-20 DIAGNOSIS — I48.0 PAROXYSMAL ATRIAL FIBRILLATION: ICD-10-CM

## 2025-03-20 RX ORDER — AMIODARONE HYDROCHLORIDE 200 MG/1
200 TABLET ORAL DAILY
Qty: 90 TABLET | Refills: 0 | Status: SHIPPED | OUTPATIENT
Start: 2025-03-20 | End: 2026-03-20

## 2025-03-20 RX ORDER — LEVOTHYROXINE SODIUM 100 UG/1
TABLET ORAL
Qty: 90 TABLET | Refills: 0 | Status: SHIPPED | OUTPATIENT
Start: 2025-03-20

## 2025-03-20 RX ORDER — AMOXICILLIN 250 MG
1 CAPSULE ORAL 2 TIMES DAILY
Start: 2025-03-20

## 2025-03-20 RX ORDER — MAG HYDROX/ALUMINUM HYD/SIMETH 400-400-40
SUSPENSION, ORAL (FINAL DOSE FORM) ORAL
Qty: 180 CAPSULE | Refills: 0 | Status: SHIPPED | OUTPATIENT
Start: 2025-03-20

## 2025-03-20 NOTE — TELEPHONE ENCOUNTER
Requested Prescriptions     Pending Prescriptions Disp Refills    CALCIUM 600 WITH VITAMIN D3 600 mg-12.5 mcg (500 unit) Cap [Pharmacy Med Name: Calcium with Vit D3 600 mg (as carbonate)-12.5 mcg (500 unit) capsule] 180 capsule 0     Sig: TAKE ONE CAPSULE BY MOUTH ONCE DAILY IN THE EVENING    levothyroxine (SYNTHROID) 100 MCG tablet [Pharmacy Med Name: levothyroxine 100 mcg tablet] 90 tablet 0     Sig: TAKE ONE TABLET BY MOUTH BEFORE breakfast. administer on an empty stomach at least 30 minutes before food   LOV:10/31/2024

## 2025-03-20 NOTE — TELEPHONE ENCOUNTER
No care due was identified.  [facility] Embedded Care Due Messages. Reference number: [id number].   3/20/2025 12:58:47 PM CDT

## 2025-03-31 ENCOUNTER — HOSPITAL ENCOUNTER (OUTPATIENT)
Dept: CARDIOLOGY | Facility: HOSPITAL | Age: OVER 89
Discharge: HOME | End: 2025-03-31
Attending: INTERNAL MEDICINE
Payer: MEDICARE

## 2025-03-31 ENCOUNTER — CLINICAL SUPPORT (OUTPATIENT)
Dept: CARDIOLOGY | Facility: HOSPITAL | Age: OVER 89
End: 2025-03-31
Payer: MEDICARE

## 2025-03-31 DIAGNOSIS — R00.1 BRADYCARDIA, UNSPECIFIED: ICD-10-CM

## 2025-03-31 LAB
OHS CV AF BURDEN PERCENT: < 1
OHS CV DC REMOTE DEVICE TYPE: NORMAL
OHS CV RV PACING PERCENT: 4 %

## 2025-03-31 PROCEDURE — 93294 REM INTERROG EVL PM/LDLS PM: CPT | Mod: HCNC,,, | Performed by: INTERNAL MEDICINE

## 2025-03-31 PROCEDURE — 93296 REM INTERROG EVL PM/IDS: CPT | Mod: HCNC,PO | Performed by: INTERNAL MEDICINE

## 2025-04-01 ENCOUNTER — OFFICE VISIT (OUTPATIENT)
Dept: FAMILY MEDICINE | Facility: CLINIC | Age: OVER 89
End: 2025-04-01
Payer: MEDICARE

## 2025-04-01 ENCOUNTER — OFFICE VISIT (OUTPATIENT)
Dept: GASTROENTEROLOGY | Facility: CLINIC | Age: OVER 89
End: 2025-04-01
Payer: MEDICARE

## 2025-04-01 ENCOUNTER — TELEPHONE (OUTPATIENT)
Dept: FAMILY MEDICINE | Facility: CLINIC | Age: OVER 89
End: 2025-04-01

## 2025-04-01 VITALS — HEIGHT: 62 IN | BODY MASS INDEX: 25 KG/M2

## 2025-04-01 VITALS
TEMPERATURE: 98 F | DIASTOLIC BLOOD PRESSURE: 66 MMHG | HEIGHT: 62 IN | OXYGEN SATURATION: 99 % | HEART RATE: 71 BPM | WEIGHT: 136.69 LBS | SYSTOLIC BLOOD PRESSURE: 120 MMHG | BODY MASS INDEX: 25.15 KG/M2

## 2025-04-01 DIAGNOSIS — L03.115 CELLULITIS OF RIGHT LEG: Primary | ICD-10-CM

## 2025-04-01 DIAGNOSIS — I83.019 VENOUS STASIS ULCERS OF BOTH LOWER EXTREMITIES: ICD-10-CM

## 2025-04-01 DIAGNOSIS — Z86.39 HISTORY OF HYPOTHYROIDISM: ICD-10-CM

## 2025-04-01 DIAGNOSIS — E03.9 HYPOTHYROIDISM, UNSPECIFIED TYPE: ICD-10-CM

## 2025-04-01 DIAGNOSIS — I83.029 VENOUS STASIS ULCERS OF BOTH LOWER EXTREMITIES: ICD-10-CM

## 2025-04-01 DIAGNOSIS — L97.919 VENOUS STASIS ULCERS OF BOTH LOWER EXTREMITIES: ICD-10-CM

## 2025-04-01 DIAGNOSIS — I73.9 PERIPHERAL VASCULAR DISEASE, UNSPECIFIED: ICD-10-CM

## 2025-04-01 DIAGNOSIS — N18.31 STAGE 3A CHRONIC KIDNEY DISEASE: ICD-10-CM

## 2025-04-01 DIAGNOSIS — L97.929 VENOUS STASIS ULCERS OF BOTH LOWER EXTREMITIES: ICD-10-CM

## 2025-04-01 DIAGNOSIS — I87.2 VENOUS STASIS DERMATITIS OF BOTH LOWER EXTREMITIES: ICD-10-CM

## 2025-04-01 DIAGNOSIS — K59.04 CHRONIC IDIOPATHIC CONSTIPATION: Primary | ICD-10-CM

## 2025-04-01 PROCEDURE — 99214 OFFICE O/P EST MOD 30 MIN: CPT | Mod: HCNC,S$GLB,, | Performed by: NURSE PRACTITIONER

## 2025-04-01 PROCEDURE — 99999 PR PBB SHADOW E&M-EST. PATIENT-LVL IV: CPT | Mod: PBBFAC,HCNC,,

## 2025-04-01 PROCEDURE — 99214 OFFICE O/P EST MOD 30 MIN: CPT | Mod: HCNC,S$GLB,,

## 2025-04-01 PROCEDURE — 1160F RVW MEDS BY RX/DR IN RCRD: CPT | Mod: HCNC,CPTII,S$GLB, | Performed by: NURSE PRACTITIONER

## 2025-04-01 PROCEDURE — 1126F AMNT PAIN NOTED NONE PRSNT: CPT | Mod: HCNC,CPTII,S$GLB, | Performed by: NURSE PRACTITIONER

## 2025-04-01 PROCEDURE — 3288F FALL RISK ASSESSMENT DOCD: CPT | Mod: HCNC,CPTII,S$GLB, | Performed by: NURSE PRACTITIONER

## 2025-04-01 PROCEDURE — 1101F PT FALLS ASSESS-DOCD LE1/YR: CPT | Mod: HCNC,CPTII,S$GLB, | Performed by: NURSE PRACTITIONER

## 2025-04-01 PROCEDURE — 1159F MED LIST DOCD IN RCRD: CPT | Mod: HCNC,CPTII,S$GLB, | Performed by: NURSE PRACTITIONER

## 2025-04-01 PROCEDURE — 1160F RVW MEDS BY RX/DR IN RCRD: CPT | Mod: HCNC,CPTII,S$GLB,

## 2025-04-01 PROCEDURE — 99999 PR PBB SHADOW E&M-EST. PATIENT-LVL IV: CPT | Mod: PBBFAC,HCNC,, | Performed by: NURSE PRACTITIONER

## 2025-04-01 PROCEDURE — 1159F MED LIST DOCD IN RCRD: CPT | Mod: HCNC,CPTII,S$GLB,

## 2025-04-01 PROCEDURE — 1100F PTFALLS ASSESS-DOCD GE2>/YR: CPT | Mod: HCNC,CPTII,S$GLB,

## 2025-04-01 PROCEDURE — 3288F FALL RISK ASSESSMENT DOCD: CPT | Mod: HCNC,CPTII,S$GLB,

## 2025-04-01 PROCEDURE — 1126F AMNT PAIN NOTED NONE PRSNT: CPT | Mod: HCNC,CPTII,S$GLB,

## 2025-04-01 RX ORDER — APIXABAN 2.5 MG/1
2.5 TABLET, FILM COATED ORAL 2 TIMES DAILY
COMMUNITY

## 2025-04-01 RX ORDER — DOXYCYCLINE HYCLATE 100 MG
100 TABLET ORAL 2 TIMES DAILY
Qty: 14 TABLET | Refills: 0 | Status: SHIPPED | OUTPATIENT
Start: 2025-04-01 | End: 2025-04-08

## 2025-04-01 RX ORDER — MUPIROCIN 20 MG/G
OINTMENT TOPICAL 2 TIMES DAILY
Qty: 30 G | Refills: 3 | Status: SHIPPED | OUTPATIENT
Start: 2025-04-01

## 2025-04-01 NOTE — PROGRESS NOTES
"Subjective:       Patient ID: Michela Calvert is a 89 y.o. female Body mass index is 25 kg/m².    Chief Complaint: Constipation and Hospital Follow Up    Established patient of Dr. Anguiano, Meredith Duran, YAN, & myself.     Current resident at the [facility].  Patient's son present and assisting with history.    Reviewed hospital discharge summary report from 03/03/25,  "Admission Date: 2/25/2025  Hospital Length of Stay: 0 days  Discharge Date and Time:  03/03/2025 9:55 AM  Attending Physician: Jonny Bonilla MD   Discharging Provider: Jonny Bonilla MD  Primary Care Provider: Enzo Nieto MD     Primary Care Team: Networked reference to record PCT      HPI: This is a 89-year-old female with past medical history particularly significant for hypertension, dyslipidemia, chronic diastolic CHF, valvular heart disease, pulmonary hypertension, paroxysmal AFib, CKD 3, hypothyroidism, who presented to our ED for further evaluation of constipation with last normal bowel movement 4 days ago.  Patient was seen in our ED on 02/19/2025 and received a Rabito Rocket Booster enema.  It is unclear what medication the patient is currently for constipation.  Patient had small hard stool earlier today.  Patient was also recently diagnosed with UTI and is on cefdinir.       Patient denies chest pain, shortness on breath, palpitation, cough, sputum, nausea, vomiting, abdominal pain diarrhea, dysuria, fever or chills.    In the ED, workup showed creatinine 1.86, .    CT chest, abdomen and pelvis showed large hiatal hernia, cholelithiasis without cholecystitis, diverticulosis without diverticulitis, and pericardial effusion. Patient is ordered 238 g of polyethylene glycol per GI recommendations.  Will start him on gentle IV fluid infusion overnight.    Will admit her to the hospital for further management.     * No surgery found *       Hospital Course: The patient was admitted to cardiac " "telemetry for further treatment of constipation.  GI was consulted to assist in management. Bowel regimen was continued with eventual successful BM.  The patient then refused BI miralax recommended by GI. Cardiology was consulted to assess pericardial effusion noted on CTA.  Dr. Crenshaw recommended continuation of amiodarone and beta-blocker for AFib but hold Eliquis. He plans to have Dr. Holloway re-evaluate in clinic.  Wound care was consulted for venous stasis wounds to bilateral lower extremities.  PT and OT recommended SNF placement, which was arranged by SS.      The patient was cleared for discharge by GI and cardiology. The patient feels sufficiently improved to proceed to SNF. The patient will be discharged to SNF in Good condition. She was told to return to the ER for any worsened constipation, chest pain, shortness of breath, fever, or any other concern. She will have close followup with the [facility], PCP, GI, and cardiology. The patient, her son, and Brad Crenshaw and Jina are in agreement with the discharge plan."    Constipation  This is a chronic (Went to the ER twice in February due to constipation) problem. The current episode started more than 1 year ago. The problem has been gradually improving since onset. Her stool frequency is 1 time per day. The stool is described as formed. The patient is on a high fiber diet. She Does not exercise regularly. Associated symptoms include hematochezia (in the past has noticed blood in toilet bowl after BMs; denies rectal pain or bleeding between BMs). Pertinent negatives include no abdominal pain, bloating, diarrhea, fecal incontinence, fever, melena, nausea, rectal pain, vomiting or weight loss. Risk factors: Denies change in medication, recent illness, dietary change. She has tried laxatives, manual disimpaction, enemas, fiber and stool softeners (Currently taking Linzess 290 mcg daily and MiraLax b.i.d.; past tx: Metamucil, OTC laxative (unsure of name), " lactulose, Glycolax, milk of magnesia, laxatives suppositories, & Rabito Rocket Booster enema in ER) for the symptoms. The treatment provided significant relief. There is no history of abdominal surgery, endocrine disease, inflammatory bowel disease, irritable bowel syndrome, metabolic disease, neurologic disease, neuromuscular disease, psychiatric history or radiation treatment.     Review of Systems   Constitutional:  Positive for fatigue. Negative for activity change, appetite change, chills, diaphoresis, fever, unexpected weight change and weight loss.   HENT:  Negative for sore throat and trouble swallowing.    Respiratory:  Negative for cough, choking and shortness of breath.    Cardiovascular:  Negative for chest pain.   Gastrointestinal:  Positive for anal bleeding, blood in stool, constipation and hematochezia (in the past has noticed blood in toilet bowl after BMs; denies rectal pain or bleeding between BMs). Negative for abdominal distention, abdominal pain, bloating, diarrhea, melena, nausea, rectal pain and vomiting.       No LMP recorded. Patient has had a hysterectomy.  Past Medical History:   Diagnosis Date    Anemia     Anticoagulant long-term use     Aortic stenosis     Atrial fibrillation     Cataracts, bilateral     CHF (congestive heart failure)     chronic diastolic    Cholelithiasis     Chronic pain syndrome 07/25/2018    CKD (chronic kidney disease), stage III     Closed fracture of neck of left femur 03/08/2019    DDD (degenerative disc disease), lumbar     DDD (degenerative disc disease), lumbar     Hard of hearing     Heart murmur 01/2017    Hiatal hernia     HLD (hyperlipidemia)     no medication taken    Hypertension 12/27/2018    Hypothyroidism     Lichen sclerosus et atrophicus     Rectal/Vaginal areas    Lumbar spinal stenosis     Lumbar spondylosis     Mitral valve disorder 01/2017    Asymptomatic    Osteopenia     Paroxysmal atrial fibrillation     Pulmonary hypertension     Urinary  incontinence     VHD (valvular heart disease)      Past Surgical History:   Procedure Laterality Date    A-V CARDIAC PACEMAKER INSERTION  8/29/2024    Procedure: Dual Chamber PPM (Biotronik);  Surgeon: Scott Holloway MD;  Location: [facility] CATH;  Service: Cardiology;;    APPENDECTOMY      CATARACT EXTRACTION EXTRACAPSULAR W/ INTRAOCULAR LENS IMPLANTATION Bilateral     HEMIARTHROPLASTY OF HIP Left 3/9/2019    Procedure: HEMIARTHROPLASTY, HIP;  Surgeon: Josesito Leo MD;  Location: [facility] OR;  Service: Orthopedics;  Laterality: Left;    HYSTERECTOMY      INSERTION OF DORSAL COLUMN NERVE STIMULATOR FOR TRIAL N/A 7/25/2018    Procedure: INSERTION, DORSAL COLUMN NEUROSTIMULATOR, FOR TRIAL;  Surgeon: Derek Daily MD;  [facility]: [facility];  Service: Pain Management;  Laterality: N/A;    spinal ablation  12/2017    TONSILLECTOMY       Family History   Problem Relation Name Age of Onset    Colon cancer Neg Hx      Crohn's disease Neg Hx      Stomach cancer Neg Hx      Ulcerative colitis Neg Hx      Esophageal cancer Neg Hx       Social History     Tobacco Use    Smoking status: Never     Passive exposure: Never    Smokeless tobacco: Never   Substance Use Topics    Alcohol use: Not Currently    Drug use: No     Wt Readings from Last 10 Encounters:   04/01/25 62 kg (136 lb 11 oz)   03/03/25 57.3 kg (126 lb 5.2 oz)   02/21/25 55.8 kg (123 lb)   02/19/25 56.2 kg (123 lb 14.4 oz)   12/06/24 56.2 kg (123 lb 14.4 oz)   11/26/24 56.4 kg (124 lb 5.4 oz)   10/31/24 60.2 kg (132 lb 11.5 oz)   10/23/24 56.2 kg (124 lb)   10/19/24 58 kg (127 lb 13.9 oz)   09/19/24 58.8 kg (129 lb 10.1 oz)     Lab Results   Component Value Date    WBC 8.64 03/02/2025    HGB 11.5 (L) 03/02/2025    HCT 34.9 (L) 03/02/2025    MCV 92 03/02/2025     03/02/2025     CMP  Sodium   Date Value Ref Range Status   03/02/2025 136 136 - 145 mmol/L Final     Potassium   Date Value Ref Range Status   03/02/2025 4.1 3.5 - 5.1 mmol/L Final     Comment:     Anion  Gap reference range revised on 4/28/2023     Chloride   Date Value Ref Range Status   03/02/2025 106 95 - 110 mmol/L Final     CO2   Date Value Ref Range Status   03/02/2025 21 (L) 23 - 29 mmol/L Final     Glucose   Date Value Ref Range Status   03/02/2025 85 70 - 110 mg/dL Final     Comment:     The ADA recommends the following guidelines for fasting glucose:    Normal:       less than 100 mg/dL    Prediabetes:  100 mg/dL to 125 mg/dL    Diabetes:     126 mg/dL or higher       BUN   Date Value Ref Range Status   03/02/2025 25 10 - 30 mg/dL Final     Creatinine   Date Value Ref Range Status   03/02/2025 1.58 (H) 0.50 - 1.40 mg/dL Final     Calcium   Date Value Ref Range Status   03/02/2025 8.7 8.7 - 10.5 mg/dL Final     Total Protein   Date Value Ref Range Status   03/02/2025 6.3 6.0 - 8.4 g/dL Final     Albumin   Date Value Ref Range Status   03/02/2025 3.5 3.5 - 5.2 g/dL Final     Total Bilirubin   Date Value Ref Range Status   03/02/2025 0.4 0.2 - 1.0 mg/dL Final     Alkaline Phosphatase   Date Value Ref Range Status   03/02/2025 65 40 - 150 U/L Final     AST   Date Value Ref Range Status   03/02/2025 29 10 - 40 U/L Final     ALT   Date Value Ref Range Status   03/02/2025 12 10 - 44 U/L Final     Anion Gap   Date Value Ref Range Status   03/02/2025 9 8 - 16 mmol/L Final     Comment:     Anion Gap reference range revised on 4/28/2023     eGFR if    Date Value Ref Range Status   05/19/2021 56.9 (A) >60 mL/min/1.73 m^2 Final     eGFR if non    Date Value Ref Range Status   05/19/2021 49.4 (A) >60 mL/min/1.73 m^2 Final     Comment:     Calculation used to obtain the estimated glomerular filtration  rate (eGFR) is the CKD-EPI equation.        Lab Results   Component Value Date    TSH 70.882 (H) 02/26/2025     Reviewed prior medical records including office visit with Meredith Duran NP 05/23/19, and Dr. Anguiano 02/19/25, radiology report of chest x-ray 08/29/2024, KUB 05/23/2019 &  endoscopy history (see surgical history).    Objective:      Physical Exam  Vitals and nursing note reviewed.   Constitutional:       General: She is not in acute distress.     Appearance: Normal appearance. She is not ill-appearing.   HENT:      Mouth/Throat:      Lips: Pink. No lesions.   Cardiovascular:      Heart sounds: Normal heart sounds.   Pulmonary:      Effort: Pulmonary effort is normal. No respiratory distress.      Breath sounds: Normal breath sounds.   Abdominal:      General: Bowel sounds are normal. There is no distension or abdominal bruit. There are no signs of injury.      Palpations: Abdomen is soft. There is no shifting dullness, fluid wave, hepatomegaly, splenomegaly or mass.      Tenderness: There is no abdominal tenderness. There is no guarding or rebound. Negative signs include Moya's sign, Rovsing's sign and McBurney's sign.   Skin:     General: Skin is warm and dry.      Coloration: Skin is not jaundiced or pale.   Neurological:      Mental Status: She is alert and oriented to person, place, and time.   Psychiatric:         Attention and Perception: Attention normal.         Mood and Affect: Mood normal.         Speech: Speech normal.         Behavior: Behavior normal.         Assessment:       1. Chronic idiopathic constipation    2. History of hypothyroidism          Plan:       Chronic idiopathic constipation  - discussed about Colonoscopy, including the risks and benefits of colonoscopy, patient verbalized understanding but patient declined colonoscopy due to risks.  Recommend daily exercise as tolerated, adequate water intake (six 8-oz glasses of water daily), and high fiber diet. OTC fiber supplements are recommended if diet does not reach daily fiber goal (20-30 grams daily), such as Metamucil, Citrucel, or FiberCon (take as directed, separate from other oral medications by >2 hours).  -REFILL: linaCLOtide (LINZESS) 290 mcg Cap capsule; Take 1 capsule (290 mcg total) by mouth  before breakfast.  Dispense: 90 capsule; Refill: 3  -continue MiraLax 17 g b.i.d. morning and evening    History of hypothyroidism   - follow-up with PCP for continued evaluation and management  - reached out to PCP NP and blood work will be repeated in 1 month    Follow up in about 2 months (around 6/1/2025), or if symptoms worsen or fail to improve.      If no improvement in symptoms or symptoms worsen, call/follow-up at clinic or go to ER.        Total time spent on the encounter includes face to face time and non-face to face time preparing to see the patient (eg, review of tests), Obtaining and/or reviewing separately obtained history, Documenting clinical information in the electronic or other health record, Independently interpreting results (not separately reported) and communicating results to the patient/family/caregiver, or Care coordination (not separately reported).     A Glad [employer] software program was used for this note. Please expect some simple typographical  errors in this note.

## 2025-04-01 NOTE — PROGRESS NOTES
Subjective:       Patient ID: Michela Calvert is a 89 y.o. female.    Chief Complaint: Follow-up (Hospital follow up ) and Blister (Patient has blister like wounds on both legs )    Follow-up     here with son for wounds to bilateral legs for the past few weeks. Discharged from [facility] 3 weeks ago. Struck right lower leg on the wheelchair 2 wks ago and wound will not heal. Also reports swelling and blisters to both legs. Currently receiving home health/OT/PT with [facility].    Past Medical History:   Diagnosis Date    Anemia     Anticoagulant long-term use     Aortic stenosis     Atrial fibrillation     Cataracts, bilateral     CHF (congestive heart failure)     chronic diastolic    Cholelithiasis     Chronic pain syndrome 07/25/2018    CKD (chronic kidney disease), stage III     Closed fracture of neck of left femur 03/08/2019    DDD (degenerative disc disease), lumbar     DDD (degenerative disc disease), lumbar     Hard of hearing     Heart murmur 01/2017    Hiatal hernia     HLD (hyperlipidemia)     no medication taken    Hypertension 12/27/2018    Hypothyroidism     Lichen sclerosus et atrophicus     Rectal/Vaginal areas    Lumbar spinal stenosis     Lumbar spondylosis     Mitral valve disorder 01/2017    Asymptomatic    Osteopenia     Paroxysmal atrial fibrillation     Pulmonary hypertension     Urinary incontinence     VHD (valvular heart disease)        Past Surgical History:   Procedure Laterality Date    A-V CARDIAC PACEMAKER INSERTION  8/29/2024    Procedure: Dual Chamber PPM (Biotronik);  Surgeon: Scott Holloway MD;  Location: [facility] CATH;  Service: Cardiology;;    APPENDECTOMY      CATARACT EXTRACTION EXTRACAPSULAR W/ INTRAOCULAR LENS IMPLANTATION Bilateral     HEMIARTHROPLASTY OF HIP Left 3/9/2019    Procedure: HEMIARTHROPLASTY, HIP;  Surgeon: Josesito Leo MD;  Location: [facility] OR;  Service: Orthopedics;  Laterality: Left;    HYSTERECTOMY      INSERTION OF DORSAL COLUMN NERVE STIMULATOR FOR TRIAL  "N/A 7/25/2018    Procedure: INSERTION, DORSAL COLUMN NEUROSTIMULATOR, FOR TRIAL;  Surgeon: Derek Daily MD;  Location: [facility] OR;  Service: Pain Management;  Laterality: N/A;    spinal ablation  12/2017    TONSILLECTOMY         Review of patient's allergies indicates:   Allergen Reactions    Nitrofuran analogues Nausea Only    Ciprofloxacin Nausea And Vomiting       Social History[1]    Medications Ordered Prior to Encounter[2]    Family History   Problem Relation Name Age of Onset    Colon cancer Neg Hx      Crohn's disease Neg Hx      Stomach cancer Neg Hx      Ulcerative colitis Neg Hx      Esophageal cancer Neg Hx         Review of Systems    Objective:      /66 (Patient Position: Sitting)   Pulse 71   Temp 98.3 °F (36.8 °C) (Oral)   Ht 5' 2" (1.575 m)   Wt 62 kg (136 lb 11 oz)   SpO2 99%   BMI 25.00 kg/m²   Physical Exam  Vitals and nursing note reviewed.   Constitutional:       General: She is not in acute distress.     Appearance: Normal appearance. She is well-groomed.   HENT:      Head: Normocephalic.      Right Ear: External ear normal.      Left Ear: External ear normal.      Nose: Nose normal.      Mouth/Throat:      Lips: Pink.      Mouth: Mucous membranes are moist.      Pharynx: Oropharynx is clear.   Eyes:      Extraocular Movements: Extraocular movements intact.      Conjunctiva/sclera: Conjunctivae normal.      Pupils: Pupils are equal, round, and reactive to light.   Cardiovascular:      Rate and Rhythm: Normal rate and regular rhythm.      Heart sounds: Murmur heard.   Pulmonary:      Effort: Pulmonary effort is normal.      Breath sounds: Normal breath sounds.   Chest:      Chest wall: No tenderness.   Abdominal:      General: Bowel sounds are normal.      Palpations: Abdomen is soft.      Tenderness: There is no abdominal tenderness.   Musculoskeletal:         General: No swelling or tenderness. Normal range of motion.      Cervical back: Normal range of motion and neck supple. "      Right lower leg: No edema.      Left lower leg: No edema.   Lymphadenopathy:      Cervical: No cervical adenopathy.   Skin:     General: Skin is warm and dry.      Comments: Erythema, hyperpigmentation, flaking and multiple superficial ulcerations to bilateral lower legs. Superficial skin tear to right lower leg with surrounding erythema.   Neurological:      General: No focal deficit present.      Mental Status: She is alert and oriented to person, place, and time. Mental status is at baseline.      Gait: Gait abnormal (wheelchair).   Psychiatric:         Mood and Affect: Mood normal.         Behavior: Behavior normal.                 Assessment:       1. Cellulitis of right leg    2. Venous stasis ulcers of both lower extremities    3. Venous stasis dermatitis of both lower extremities    4. Stage 3a chronic kidney disease    5. Peripheral vascular disease, unspecified        Plan:       Cellulitis of right leg  -     doxycycline (VIBRA-TABS) 100 MG tablet; Take 1 tablet (100 mg total) by mouth 2 (two) times daily. for 7 days  Dispense: 14 tablet; Refill: 0  -     mupirocin (BACTROBAN) 2 % ointment; Apply topically 2 (two) times daily.  Dispense: 30 g; Refill: 3  -     SUBSEQUENT HOME HEALTH ORDERS    Venous stasis ulcers of both lower extremities  -     SUBSEQUENT HOME HEALTH ORDERS    Venous stasis dermatitis of both lower extremities  -     SUBSEQUENT HOME HEALTH ORDERS    Stage 3a chronic kidney disease    Peripheral vascular disease, unspecified        Doxycycline X 7 days. Keep legs elevated when sitting. Pt encouraged to be more mobile and try to walk more. Wound care ordered.         [1]   Social History  Socioeconomic History    Marital status:    Tobacco Use    Smoking status: Never     Passive exposure: Never    Smokeless tobacco: Never   Substance and Sexual Activity    Alcohol use: Not Currently    Drug use: No    Sexual activity: Never     Social Drivers of Health     Financial Resource  Strain: Medium Risk (2/26/2025)    Overall Financial Resource Strain (CARDIA)     Difficulty of Paying Living Expenses: Somewhat hard   Food Insecurity: No Food Insecurity (2/26/2025)    Hunger Vital Sign     Worried About Running Out of Food in the Last Year: Never true     Ran Out of Food in the Last Year: Never true   Transportation Needs: No Transportation Needs (8/26/2024)    TRANSPORTATION NEEDS     Transportation : No   Physical Activity: Unknown (7/15/2021)    Exercise Vital Sign     Days of Exercise per Week: 3 days   Stress: Stress Concern Present (2/26/2025)    [employer] - Occupational Stress Questionnaire     Feeling of Stress : To some extent   Housing Stability: Low Risk  (2/26/2025)    Housing Stability Vital Sign     Unable to Pay for Housing in the Last Year: No     Homeless in the Last Year: No   [2]   Current Outpatient Medications on File Prior to Visit   Medication Sig Dispense Refill    acetaminophen (TYLENOL) 650 MG TbSR Take 650 mg by mouth every 8 (eight) hours as needed.      amiodarone (PACERONE) 200 MG Tab Take 1 tablet (200 mg total) by mouth once daily. 90 tablet 0    amLODIPine (NORVASC) 5 MG tablet Take 1 tablet (5 mg total) by mouth once daily. 90 tablet 3    bisacodyL (DULCOLAX, BISACODYL,) 10 mg Supp Place 1 suppository (10 mg total) rectally daily as needed (use as directed). 1 suppository 0    levothyroxine (SYNTHROID) 100 MCG tablet TAKE ONE TABLET BY MOUTH BEFORE breakfast. administer on an empty stomach at least 30 minutes before food 90 tablet 0    metoprolol succinate (TOPROL-XL) 25 MG 24 hr tablet Take 1 tablet (25 mg total) by mouth once daily.      CALCIUM 600 WITH VITAMIN D3 600 mg-12.5 mcg (500 unit) Cap TAKE ONE CAPSULE BY MOUTH ONCE DAILY IN THE EVENING 180 capsule 0    fluticasone propionate (FLONASE) 50 mcg/actuation nasal spray 1 spray (50 mcg total) by Each Nostril route 2 (two) times a day. 16 g 5    hydrocortisone 2.5 % cream APPLY  TO THE AFFECTED AREA(S) topically TWICE DAILY 84 g 3    linaCLOtide (LINZESS) 290 mcg Cap capsule Take 1 capsule (290 mcg total) by mouth before breakfast. 90 capsule 2    nystatin (MYCOSTATIN) ointment Apply topically 4 (four) times daily. for 14 days 60 g 0    polyethylene glycol (GLYCOLAX) 17 gram/dose powder Take 17 g by mouth 2 (two) times daily as needed for Constipation. 20 each 0    senna-docusate 8.6-50 mg (PERICOLACE) 8.6-50 mg per tablet Take 1 tablet by mouth 2 (two) times daily.      torsemide (DEMADEX) 10 MG Tab TAKE ONE TABLET BY MOUTH EVERY DAY 30 tablet 3    vit C,Q-Wf-twlul-lutein-zeaxan (PRESERVISION AREDS-2) 250-90-40-1 mg Cap Take 1 capsule by mouth 2 (two) times daily. 180 capsule 3    [DISCONTINUED] mupirocin (BACTROBAN) 2 % ointment Apply topically 2 (two) times daily.       No current facility-administered medications on file prior to visit.

## 2025-04-01 NOTE — TELEPHONE ENCOUNTER
----- Message from Abril Sánchez NP sent at 4/1/2025 12:54 PM CDT -----  Regarding: labs  Please call pt son and schedule TSH and T4 to be done in 1 month at [facility]

## 2025-04-09 ENCOUNTER — TELEPHONE (OUTPATIENT)
Dept: FAMILY MEDICINE | Facility: CLINIC | Age: OVER 89
End: 2025-04-09
Payer: MEDICARE

## 2025-04-09 NOTE — TELEPHONE ENCOUNTER
----- Message from Jodie sent at 4/9/2025 11:38 AM CDT -----  Contact: Laneshia [phone]  Type: Needs Medical AdviceWho Called:  Laneshia w/ [facility] Best Call Back Number: [phone] Information: nurse stated she was weighing pt and when pt stepped off of the scale, she attempted to sit in her chair but missed the chair and has a soft fall. Pt has new skin tear on left arm/ No bumps or busies. They are providing wound care for pt and have assisted with thi skin care.

## 2025-04-10 ENCOUNTER — EXTERNAL HOME HEALTH (OUTPATIENT)
Dept: HOME HEALTH SERVICES | Facility: HOSPITAL | Age: OVER 89
End: 2025-04-10
Payer: MEDICARE

## 2025-04-14 ENCOUNTER — DOCUMENT SCAN (OUTPATIENT)
Dept: HOME HEALTH SERVICES | Facility: HOSPITAL | Age: OVER 89
End: 2025-04-14
Payer: MEDICARE

## 2025-04-16 ENCOUNTER — TELEPHONE (OUTPATIENT)
Dept: FAMILY MEDICINE | Facility: CLINIC | Age: OVER 89
End: 2025-04-16
Payer: MEDICARE

## 2025-04-16 DIAGNOSIS — I10 PRIMARY HYPERTENSION: ICD-10-CM

## 2025-04-16 RX ORDER — AMLODIPINE BESYLATE 5 MG/1
5 TABLET ORAL EVERY MORNING
Qty: 90 TABLET | Refills: 3 | Status: SHIPPED | OUTPATIENT
Start: 2025-04-16

## 2025-04-16 NOTE — TELEPHONE ENCOUNTER
No care due was identified.  Health [facility] Embedded Care Due Messages. Reference number: [id number].   4/16/2025 12:22:10 PM CDT

## 2025-04-16 NOTE — TELEPHONE ENCOUNTER
----- Message from [employer] sent at 4/16/2025 12:00 PM CDT -----  Type: Needs Medical AdviceWho Called:  ayse ( son )Best Call Back Number: [phone] Information: son states he needs to talk to nurse about a medication , please call to further assist thank you .

## 2025-04-16 NOTE — TELEPHONE ENCOUNTER
Refill Routing Note   Medication(s) are not appropriate for processing by [facility] for the following reason(s):        No active prescription written by provider    [facility] action(s):  Defer             Appointments  past 12m or future 3m with PCP    Date Provider   Last Visit   10/31/2024 Enzo Nieto MD   Next Visit   Visit date not found Enzo Nieto MD   ED visits in past 90 days: 1        Note composed:12:36 PM 04/16/2025

## 2025-04-22 PROCEDURE — G0179 MD RECERTIFICATION HHA PT: HCPCS | Mod: ,,, | Performed by: FAMILY MEDICINE

## 2025-04-28 ENCOUNTER — LAB VISIT (OUTPATIENT)
Dept: LAB | Facility: HOSPITAL | Age: OVER 89
End: 2025-04-28
Payer: MEDICARE

## 2025-04-28 DIAGNOSIS — E03.9 HYPOTHYROIDISM, UNSPECIFIED TYPE: ICD-10-CM

## 2025-04-28 LAB
T4 FREE SERPL-MCNC: 1.27 NG/DL (ref 0.71–1.51)
TSH SERPL-ACNC: 16.31 UIU/ML (ref 0.4–4)

## 2025-04-28 PROCEDURE — 84443 ASSAY THYROID STIM HORMONE: CPT | Mod: HCNC

## 2025-04-28 PROCEDURE — 84439 ASSAY OF FREE THYROXINE: CPT | Mod: HCNC

## 2025-04-28 PROCEDURE — 36415 COLL VENOUS BLD VENIPUNCTURE: CPT | Mod: HCNC,PN

## 2025-04-29 ENCOUNTER — RESULTS FOLLOW-UP (OUTPATIENT)
Dept: FAMILY MEDICINE | Facility: CLINIC | Age: OVER 89
End: 2025-04-29

## 2025-04-29 ENCOUNTER — TELEPHONE (OUTPATIENT)
Dept: FAMILY MEDICINE | Facility: CLINIC | Age: OVER 89
End: 2025-04-29
Payer: MEDICARE

## 2025-04-29 DIAGNOSIS — E03.9 HYPOTHYROIDISM, UNSPECIFIED TYPE: Primary | ICD-10-CM

## 2025-04-29 NOTE — TELEPHONE ENCOUNTER
Spoke with pt son, May, and discussed thyroid labs. TSH elevated, but improved from 2 mos ago. T4 normal. Son reports pt is feeling great. Pt quit taking her thyroid med for a while but resumed the medication 2 mos ago. Son states pt has been taking levothyroxine first thing in the morning but does have coffee right after. Son advised that levothyroxine should be taken 1 hour prior to foods, coffee or other meds. Can take levothyroxine with water. Son verbalized understanding and will inform pt to avoid food and liquid within 1 hour of levothyroxine. Repeat thyroid labs in 1 month

## 2025-05-15 ENCOUNTER — DOCUMENT SCAN (OUTPATIENT)
Dept: HOME HEALTH SERVICES | Facility: HOSPITAL | Age: OVER 89
End: 2025-05-15
Payer: MEDICARE

## 2025-05-19 ENCOUNTER — DOCUMENT SCAN (OUTPATIENT)
Dept: HOME HEALTH SERVICES | Facility: HOSPITAL | Age: OVER 89
End: 2025-05-19
Payer: MEDICARE

## 2025-05-22 ENCOUNTER — OFFICE VISIT (OUTPATIENT)
Dept: FAMILY MEDICINE | Facility: CLINIC | Age: OVER 89
End: 2025-05-22
Payer: MEDICARE

## 2025-05-22 ENCOUNTER — TELEPHONE (OUTPATIENT)
Dept: FAMILY MEDICINE | Facility: CLINIC | Age: OVER 89
End: 2025-05-22
Payer: MEDICARE

## 2025-05-22 ENCOUNTER — LAB VISIT (OUTPATIENT)
Dept: LAB | Facility: HOSPITAL | Age: OVER 89
End: 2025-05-22
Attending: FAMILY MEDICINE
Payer: MEDICARE

## 2025-05-22 ENCOUNTER — PATIENT MESSAGE (OUTPATIENT)
Dept: FAMILY MEDICINE | Facility: CLINIC | Age: OVER 89
End: 2025-05-22
Payer: MEDICARE

## 2025-05-22 VITALS
SYSTOLIC BLOOD PRESSURE: 130 MMHG | OXYGEN SATURATION: 97 % | HEART RATE: 75 BPM | HEIGHT: 62 IN | DIASTOLIC BLOOD PRESSURE: 74 MMHG | TEMPERATURE: 98 F | BODY MASS INDEX: 23.32 KG/M2 | WEIGHT: 126.75 LBS

## 2025-05-22 DIAGNOSIS — E03.9 HYPOTHYROIDISM, UNSPECIFIED TYPE: ICD-10-CM

## 2025-05-22 DIAGNOSIS — N39.0 URINARY TRACT INFECTION WITHOUT HEMATURIA, SITE UNSPECIFIED: Primary | ICD-10-CM

## 2025-05-22 LAB
BILIRUBIN, UA POC OHS: NEGATIVE
BLOOD, UA POC OHS: ABNORMAL
CLARITY, UA POC OHS: CLEAR
COLOR, UA POC OHS: YELLOW
GLUCOSE, UA POC OHS: NEGATIVE
KETONES, UA POC OHS: NEGATIVE
LEUKOCYTES, UA POC OHS: ABNORMAL
NITRITE, UA POC OHS: POSITIVE
PH, UA POC OHS: 7
PROTEIN, UA POC OHS: NEGATIVE
SPECIFIC GRAVITY, UA POC OHS: 1.01
T4 FREE SERPL-MCNC: 1.03 NG/DL (ref 0.71–1.51)
TSH SERPL-ACNC: 15.61 UIU/ML (ref 0.4–4)
UROBILINOGEN, UA POC OHS: 0.2

## 2025-05-22 PROCEDURE — 1159F MED LIST DOCD IN RCRD: CPT | Mod: CPTII,S$GLB,, | Performed by: NURSE PRACTITIONER

## 2025-05-22 PROCEDURE — 99213 OFFICE O/P EST LOW 20 MIN: CPT | Mod: S$GLB,,, | Performed by: NURSE PRACTITIONER

## 2025-05-22 PROCEDURE — 36415 COLL VENOUS BLD VENIPUNCTURE: CPT | Mod: PO

## 2025-05-22 PROCEDURE — 1101F PT FALLS ASSESS-DOCD LE1/YR: CPT | Mod: CPTII,S$GLB,, | Performed by: NURSE PRACTITIONER

## 2025-05-22 PROCEDURE — 99999 PR PBB SHADOW E&M-EST. PATIENT-LVL IV: CPT | Mod: PBBFAC,,, | Performed by: NURSE PRACTITIONER

## 2025-05-22 PROCEDURE — 3288F FALL RISK ASSESSMENT DOCD: CPT | Mod: CPTII,S$GLB,, | Performed by: NURSE PRACTITIONER

## 2025-05-22 PROCEDURE — 87086 URINE CULTURE/COLONY COUNT: CPT | Performed by: NURSE PRACTITIONER

## 2025-05-22 PROCEDURE — 84443 ASSAY THYROID STIM HORMONE: CPT

## 2025-05-22 PROCEDURE — 81003 URINALYSIS AUTO W/O SCOPE: CPT | Mod: QW,S$GLB,, | Performed by: NURSE PRACTITIONER

## 2025-05-22 PROCEDURE — 1126F AMNT PAIN NOTED NONE PRSNT: CPT | Mod: CPTII,S$GLB,, | Performed by: NURSE PRACTITIONER

## 2025-05-22 PROCEDURE — 1160F RVW MEDS BY RX/DR IN RCRD: CPT | Mod: CPTII,S$GLB,, | Performed by: NURSE PRACTITIONER

## 2025-05-22 PROCEDURE — 84439 ASSAY OF FREE THYROXINE: CPT

## 2025-05-22 RX ORDER — CEFDINIR 300 MG/1
300 CAPSULE ORAL 2 TIMES DAILY
Qty: 14 CAPSULE | Refills: 0 | Status: SHIPPED | OUTPATIENT
Start: 2025-05-22 | End: 2025-05-29

## 2025-05-22 NOTE — TELEPHONE ENCOUNTER
----- Message from Kathleen sent at 5/22/2025 10:19 AM CDT -----  Contact: son  Type:  Needs Medical AdviceWho Called: son May Symptoms (please be specific): needs lab work order for possible UTI or dehydration. Pts son is requesting to speak to nurse Would the patient rather a call back or a response via [facility]? callBe Call Back Number: [phone] Information: please advise and thank you.

## 2025-05-22 NOTE — PROGRESS NOTES
Subjective:       Patient ID: Michela Calvert is a 89 y.o. female.    Chief Complaint: Urinary Tract Infection    HPI  Burning with urination X 1 week. No fever. No back pain. No abdominal pain.     Past Medical History:   Diagnosis Date    Anemia     Anticoagulant long-term use     Aortic stenosis     Atrial fibrillation     Cataracts, bilateral     CHF (congestive heart failure)     chronic diastolic    Cholelithiasis     Chronic constipation     Chronic pain syndrome 07/25/2018    CKD (chronic kidney disease), stage III     Closed fracture of neck of left femur 03/08/2019    DDD (degenerative disc disease), lumbar     DDD (degenerative disc disease), lumbar     Hard of hearing     Heart murmur 01/2017    Hiatal hernia     HLD (hyperlipidemia)     no medication taken    Hypertension 12/27/2018    Hypothyroidism     Lichen sclerosus et atrophicus     Rectal/Vaginal areas    Lumbar spinal stenosis     Lumbar spondylosis     Mitral valve disorder 01/2017    Asymptomatic    Osteopenia     Paroxysmal atrial fibrillation     Pulmonary hypertension     Urinary incontinence     VHD (valvular heart disease)        Past Surgical History:   Procedure Laterality Date    A-V CARDIAC PACEMAKER INSERTION  8/29/2024    Procedure: Dual Chamber PPM (Biotronik);  Surgeon: Scott Hloloway MD;  Location: [facility] CATH;  Service: Cardiology;;    APPENDECTOMY      CATARACT EXTRACTION EXTRACAPSULAR W/ INTRAOCULAR LENS IMPLANTATION Bilateral     HEMIARTHROPLASTY OF HIP Left 3/9/2019    Procedure: HEMIARTHROPLASTY, HIP;  Surgeon: Josesito Leo MD;  Location: [facility] OR;  Service: Orthopedics;  Laterality: Left;    HYSTERECTOMY      INSERTION OF DORSAL COLUMN NERVE STIMULATOR FOR TRIAL N/A 7/25/2018    Procedure: INSERTION, DORSAL COLUMN NEUROSTIMULATOR, FOR TRIAL;  Surgeon: Derek Daily MD;  Location: [facility] OR;  Service: Pain Management;  Laterality: N/A;    spinal ablation  12/2017    TONSILLECTOMY         Review of patient's allergies  "indicates:   Allergen Reactions    Nitrofuran analogues Nausea Only    Ciprofloxacin Nausea And Vomiting       Social History[1]    Medications Ordered Prior to Encounter[2]    Family History   Problem Relation Name Age of Onset    Colon cancer Neg Hx      Crohn's disease Neg Hx      Stomach cancer Neg Hx      Ulcerative colitis Neg Hx      Esophageal cancer Neg Hx         Review of Systems   Genitourinary:  Positive for dysuria.       Objective:      /74   Pulse 75   Temp 98.4 °F (36.9 °C) (Oral)   Ht 5' 2" (1.575 m)   Wt 57.5 kg (126 lb 12.2 oz)   SpO2 97%   BMI 23.19 kg/m²   Physical Exam  Vitals and nursing note reviewed.   Constitutional:       General: She is not in acute distress.     Appearance: Normal appearance. She is well-groomed.   HENT:      Head: Normocephalic.      Right Ear: External ear normal.      Left Ear: External ear normal.      Nose: Nose normal.      Mouth/Throat:      Lips: Pink.      Mouth: Mucous membranes are moist.      Pharynx: Oropharynx is clear. No oropharyngeal exudate or posterior oropharyngeal erythema.   Eyes:      Extraocular Movements: Extraocular movements intact.      Conjunctiva/sclera: Conjunctivae normal.      Pupils: Pupils are equal, round, and reactive to light.   Cardiovascular:      Rate and Rhythm: Normal rate and regular rhythm.      Heart sounds: Murmur heard.   Pulmonary:      Effort: Pulmonary effort is normal.      Breath sounds: Normal breath sounds.   Chest:      Chest wall: No tenderness.   Abdominal:      General: Bowel sounds are normal.      Palpations: Abdomen is soft.      Tenderness: There is no abdominal tenderness. There is no right CVA tenderness or left CVA tenderness.   Musculoskeletal:         General: No swelling or tenderness. Normal range of motion.      Cervical back: Normal range of motion and neck supple.      Right lower leg: No edema.      Left lower leg: No edema.   Lymphadenopathy:      Cervical: No cervical adenopathy. "   Skin:     General: Skin is warm and dry.   Neurological:      General: No focal deficit present.      Mental Status: She is alert and oriented to person, place, and time. Mental status is at baseline.      Gait: Gait abnormal (wheelchair).   Psychiatric:         Mood and Affect: Mood normal.         Behavior: Behavior normal.         Assessment:       1. Urinary tract infection without hematuria, site unspecified        Plan:       Urinary tract infection without hematuria, site unspecified  -     Urine Culture High Risk; Future; Expected date: 05/22/2025  -     POCT Urinalysis(Instrument)    Other orders  -     cefdinir (OMNICEF) 300 MG capsule; Take 1 capsule (300 mg total) by mouth 2 (two) times daily. for 7 days  Dispense: 14 capsule; Refill: 0        Start omnicef. Increase water intake. Urine culture sent. D mannose for UTI prevention. RTC 2 wks.         [1]   Social History  Socioeconomic History    Marital status:    Tobacco Use    Smoking status: Never     Passive exposure: Never    Smokeless tobacco: Never   Substance and Sexual Activity    Alcohol use: Not Currently    Drug use: No    Sexual activity: Never     Social Drivers of Health     Financial Resource Strain: Medium Risk (2/26/2025)    Overall Financial Resource Strain (CARDIA)     Difficulty of Paying Living Expenses: Somewhat hard   Food Insecurity: No Food Insecurity (2/26/2025)    Hunger Vital Sign     Worried About Running Out of Food in the Last Year: Never true     Ran Out of Food in the Last Year: Never true   Transportation Needs: No Transportation Needs (8/26/2024)    TRANSPORTATION NEEDS     Transportation : No   Physical Activity: Unknown (7/15/2021)    Exercise Vital Sign     Days of Exercise per Week: 3 days   Stress: Stress Concern Present (2/26/2025)    [employer] [place] [employer] - Occupational Stress Questionnaire     Feeling of Stress : To some extent   Housing Stability: Low Risk  (2/26/2025)    Housing  Stability Vital Sign     Unable to Pay for Housing in the Last Year: No     Homeless in the Last Year: No   [2]   Current Outpatient Medications on File Prior to Visit   Medication Sig Dispense Refill    acetaminophen (TYLENOL) 650 MG TbSR Take 650 mg by mouth every 8 (eight) hours as needed.      amiodarone (PACERONE) 200 MG Tab Take 1 tablet (200 mg total) by mouth once daily. 90 tablet 0    amLODIPine (NORVASC) 5 MG tablet TAKE ONE TABLET BY MOUTH EVERY MORNING 90 tablet 3    bisacodyL (DULCOLAX, BISACODYL,) 10 mg Supp Place 1 suppository (10 mg total) rectally daily as needed (use as directed). 1 suppository 0    CALCIUM 600 WITH VITAMIN D3 600 mg-12.5 mcg (500 unit) Cap TAKE ONE CAPSULE BY MOUTH ONCE DAILY IN THE EVENING 180 capsule 0    ELIQUIS 2.5 mg Tab Take 2.5 mg by mouth 2 (two) times daily.      fluticasone propionate (FLONASE) 50 mcg/actuation nasal spray 1 spray (50 mcg total) by Each Nostril route 2 (two) times a day. 16 g 5    hydrocortisone 2.5 % cream APPLY TO THE AFFECTED AREA(S) topically TWICE DAILY 84 g 3    levothyroxine (SYNTHROID) 100 MCG tablet TAKE ONE TABLET BY MOUTH BEFORE breakfast. administer on an empty stomach at least 30 minutes before food 90 tablet 0    linaCLOtide (LINZESS) 290 mcg Cap capsule Take 1 capsule (290 mcg total) by mouth before breakfast. 90 capsule 3    metoprolol succinate (TOPROL-XL) 25 MG 24 hr tablet Take 1 tablet (25 mg total) by mouth once daily.      mupirocin (BACTROBAN) 2 % ointment Apply topically 2 (two) times daily. 30 g 3    polyethylene glycol (GLYCOLAX) 17 gram/dose powder Take 17 g by mouth 2 (two) times daily as needed for Constipation. 20 each 0    senna-docusate 8.6-50 mg (PERICOLACE) 8.6-50 mg per tablet Take 1 tablet by mouth 2 (two) times daily.      torsemide (DEMADEX) 10 MG Tab TAKE ONE TABLET BY MOUTH EVERY DAY 30 tablet 3    vit C,E-Vd-xsqdz-lutein-zeaxan (PRESERVISION AREDS-2) 250-90-40-1 mg Cap Take 1 capsule by mouth 2 (two) times daily.  180 capsule 3    nystatin (MYCOSTATIN) ointment Apply topically 4 (four) times daily. for 14 days 60 g 0     No current facility-administered medications on file prior to visit.

## 2025-05-23 ENCOUNTER — RESULTS FOLLOW-UP (OUTPATIENT)
Dept: FAMILY MEDICINE | Facility: CLINIC | Age: OVER 89
End: 2025-05-23
Payer: MEDICARE

## 2025-05-23 ENCOUNTER — TELEPHONE (OUTPATIENT)
Dept: FAMILY MEDICINE | Facility: CLINIC | Age: OVER 89
End: 2025-05-23
Payer: MEDICARE

## 2025-05-23 DIAGNOSIS — E03.9 HYPOTHYROIDISM, UNSPECIFIED TYPE: Primary | ICD-10-CM

## 2025-05-23 LAB — BACTERIA UR CULT: NORMAL

## 2025-05-23 RX ORDER — LEVOTHYROXINE SODIUM 112 UG/1
112 TABLET ORAL
Qty: 30 TABLET | Refills: 5 | Status: SHIPPED | OUTPATIENT
Start: 2025-05-23 | End: 2026-05-23

## 2025-05-26 ENCOUNTER — RESULTS FOLLOW-UP (OUTPATIENT)
Dept: FAMILY MEDICINE | Facility: CLINIC | Age: OVER 89
End: 2025-05-26

## 2025-05-26 NOTE — TELEPHONE ENCOUNTER
The labs have been ordered to be drawn in 3 months. Please fax order to [facility] with a note that labs are to be drawn in 3 months.

## 2025-05-29 ENCOUNTER — DOCUMENT SCAN (OUTPATIENT)
Dept: HOME HEALTH SERVICES | Facility: HOSPITAL | Age: OVER 89
End: 2025-05-29
Payer: MEDICARE

## 2025-05-29 DIAGNOSIS — I48.0 PAROXYSMAL ATRIAL FIBRILLATION: ICD-10-CM

## 2025-05-29 DIAGNOSIS — I10 PRIMARY HYPERTENSION: ICD-10-CM

## 2025-05-29 RX ORDER — TORSEMIDE 10 MG/1
10 TABLET ORAL EVERY MORNING
Qty: 28 TABLET | Refills: 0 | Status: SHIPPED | OUTPATIENT
Start: 2025-05-29

## 2025-06-06 ENCOUNTER — HOSPITAL ENCOUNTER (OUTPATIENT)
Dept: CARDIOLOGY | Facility: HOSPITAL | Age: OVER 89
Discharge: HOME | End: 2025-06-06
Attending: INTERNAL MEDICINE
Payer: MEDICARE

## 2025-06-06 ENCOUNTER — OFFICE VISIT (OUTPATIENT)
Dept: CARDIOLOGY | Facility: CLINIC | Age: OVER 89
End: 2025-06-06
Payer: MEDICARE

## 2025-06-06 VITALS
WEIGHT: 123.44 LBS | HEIGHT: 62 IN | HEART RATE: 73 BPM | BODY MASS INDEX: 22.71 KG/M2 | DIASTOLIC BLOOD PRESSURE: 61 MMHG | SYSTOLIC BLOOD PRESSURE: 120 MMHG

## 2025-06-06 DIAGNOSIS — R00.1 BRADYCARDIA: ICD-10-CM

## 2025-06-06 DIAGNOSIS — I10 ESSENTIAL HYPERTENSION: ICD-10-CM

## 2025-06-06 DIAGNOSIS — Z95.0 PACEMAKER: ICD-10-CM

## 2025-06-06 DIAGNOSIS — I35.0 MODERATE AORTIC STENOSIS: ICD-10-CM

## 2025-06-06 DIAGNOSIS — I48.91 ATRIAL FIBRILLATION, UNSPECIFIED TYPE: Primary | ICD-10-CM

## 2025-06-06 DIAGNOSIS — I49.5 TACHY-BRADY SYNDROME: ICD-10-CM

## 2025-06-06 DIAGNOSIS — I48.0 PAROXYSMAL ATRIAL FIBRILLATION: ICD-10-CM

## 2025-06-06 DIAGNOSIS — I48.3 TYPICAL ATRIAL FLUTTER: ICD-10-CM

## 2025-06-06 DIAGNOSIS — I50.32 CHRONIC HEART FAILURE WITH PRESERVED EJECTION FRACTION: ICD-10-CM

## 2025-06-06 DIAGNOSIS — I31.39 PERICARDIAL EFFUSION: ICD-10-CM

## 2025-06-06 DIAGNOSIS — I27.20 PULMONARY HYPERTENSION: ICD-10-CM

## 2025-06-06 DIAGNOSIS — Z95.0 STATUS POST PLACEMENT OF CARDIAC PACEMAKER: ICD-10-CM

## 2025-06-06 LAB
OHS QRS DURATION: 84 MS
OHS QTC CALCULATION: 465 MS

## 2025-06-06 PROCEDURE — 1126F AMNT PAIN NOTED NONE PRSNT: CPT | Mod: CPTII,S$GLB,, | Performed by: INTERNAL MEDICINE

## 2025-06-06 PROCEDURE — 1101F PT FALLS ASSESS-DOCD LE1/YR: CPT | Mod: CPTII,S$GLB,, | Performed by: INTERNAL MEDICINE

## 2025-06-06 PROCEDURE — 93005 ELECTROCARDIOGRAM TRACING: CPT | Mod: PO

## 2025-06-06 PROCEDURE — 93280 PM DEVICE PROGR EVAL DUAL: CPT | Mod: PO

## 2025-06-06 PROCEDURE — 1159F MED LIST DOCD IN RCRD: CPT | Mod: CPTII,S$GLB,, | Performed by: INTERNAL MEDICINE

## 2025-06-06 PROCEDURE — 99999 PR PBB SHADOW E&M-EST. PATIENT-LVL III: CPT | Mod: PBBFAC,,, | Performed by: INTERNAL MEDICINE

## 2025-06-06 PROCEDURE — 3288F FALL RISK ASSESSMENT DOCD: CPT | Mod: CPTII,S$GLB,, | Performed by: INTERNAL MEDICINE

## 2025-06-06 PROCEDURE — 1160F RVW MEDS BY RX/DR IN RCRD: CPT | Mod: CPTII,S$GLB,, | Performed by: INTERNAL MEDICINE

## 2025-06-06 PROCEDURE — 99215 OFFICE O/P EST HI 40 MIN: CPT | Mod: 25,S$GLB,, | Performed by: INTERNAL MEDICINE

## 2025-06-06 RX ORDER — AMIODARONE HYDROCHLORIDE 100 MG/1
100 TABLET ORAL DAILY
Qty: 90 TABLET | Refills: 3 | Status: SHIPPED | OUTPATIENT
Start: 2025-06-06 | End: 2026-06-06

## 2025-06-06 NOTE — PROGRESS NOTES
SUBJECTIVE:    Patient ID:  Michela Calvert is a 89 y.o. female who was referred to electrophysiology clinic as a new patient for evaluation of PAF and pacemaker.      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  She was found to be in a junctional bradycardia.  She is on amiodarone and AV charisma blocking agents to control atrial fibrillation.  She underwent a dual-chamber pacemaker placement in August 20, 2024.     12/06/2024  Overall feels well.  Tolerating oral anticoagulation.  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    06/06/2025  Overall, she is doing well.  She does have some fatigue.  Hospitalization 2/26-3/3/35:  Admitted for constipation.  She is noted to have a small-to-moderate size pericardial effusion for which Cardiology was consulted.  This was monitor.  No pericardiocentesis was performed as she had no evidence of tamponade.  Echo 2/26/25: EF 60-65%.  Mild-to-moderate aortic stenosis.  TSH elevated 15.6.  Levothyroxine is being adjusted by PCP.  TSH has improved from 71 back in February of this year.  Off anticoagulation since 3/25 during hospitalization.    Device interrogation:  Atrial pacing 51% RV pacing 4%.  No atrial arrhythmic since 12/6/24 which lasted 1 hour. Stable lead parameters.     I personally reviewed the ECG/rhythm strip today. My interpretation is sinus rhythm 69 bpm.  QRS 84 milliseconds.   ms.     Past Medical History:   Diagnosis Date    Anemia     Anticoagulant long-term use     Aortic stenosis     Atrial fibrillation     Cataracts, bilateral     CHF (congestive heart failure)     chronic diastolic    Cholelithiasis     Chronic constipation     Chronic  pain syndrome 07/25/2018    CKD (chronic kidney disease), stage III     Closed fracture of neck of left femur 03/08/2019    DDD (degenerative disc disease), lumbar     DDD (degenerative disc disease), lumbar     Hard of hearing     Heart murmur 01/2017    Hiatal hernia     HLD (hyperlipidemia)     no medication taken    Hypertension 12/27/2018    Hypothyroidism     Lichen sclerosus et atrophicus     Rectal/Vaginal areas    Lumbar spinal stenosis     Lumbar spondylosis     Mitral valve disorder 01/2017    Asymptomatic    Osteopenia     Paroxysmal atrial fibrillation     Pulmonary hypertension     Urinary incontinence     VHD (valvular heart disease)        Past Surgical History:   Procedure Laterality Date    A-V CARDIAC PACEMAKER INSERTION  8/29/2024    Procedure: Dual Chamber PPM (Biotronik);  Surgeon: Scott Holloway MD;  Location: [facility] CATH;  Service: Cardiology;;    APPENDECTOMY      CATARACT EXTRACTION EXTRACAPSULAR W/ INTRAOCULAR LENS IMPLANTATION Bilateral     HEMIARTHROPLASTY OF HIP Left 3/9/2019    Procedure: HEMIARTHROPLASTY, HIP;  Surgeon: Josesito Leo MD;  Location: [facility] OR;  Service: Orthopedics;  Laterality: Left;    HYSTERECTOMY      INSERTION OF DORSAL COLUMN NERVE STIMULATOR FOR TRIAL N/A 7/25/2018    Procedure: INSERTION, DORSAL COLUMN NEUROSTIMULATOR, FOR TRIAL;  Surgeon: Derek Daily MD;  Location: [facility] OR;  Service: Pain Management;  Laterality: N/A;    spinal ablation  12/2017    TONSILLECTOMY         Family History   Problem Relation Name Age of Onset    Colon cancer Neg Hx      Crohn's disease Neg Hx      Stomach cancer Neg Hx      Ulcerative colitis Neg Hx      Esophageal cancer Neg Hx         Social History     Socioeconomic History    Marital status:    Tobacco Use    Smoking status: Never     Passive exposure: Never    Smokeless tobacco: Never   Substance and Sexual Activity    Alcohol use: Not Currently    Drug use: No    Sexual activity: Never     Social Drivers of  "Health     Financial Resource Strain: Medium Risk (2/26/2025)    Overall Financial Resource Strain (CARDIA)     Difficulty of Paying Living Expenses: Somewhat hard   Food Insecurity: No Food Insecurity (2/26/2025)    Hunger Vital Sign     Worried About Running Out of Food in the Last Year: Never true     Ran Out of Food in the Last Year: Never true   Transportation Needs: No Transportation Needs (8/26/2024)    TRANSPORTATION NEEDS     Transportation : No   Physical Activity: Unknown (7/15/2021)    Exercise Vital Sign     Days of Exercise per Week: 3 days   Stress: Stress Concern Present (2/26/2025)    [employer] [place] [employer] - Occupational Stress Questionnaire     Feeling of Stress : To some extent   Housing Stability: Low Risk  (2/26/2025)    Housing Stability Vital Sign     Unable to Pay for Housing in the Last Year: No     Homeless in the Last Year: No       Review of patient's allergies indicates:   Allergen Reactions    Nitrofuran analogues Nausea Only    Ciprofloxacin Nausea And Vomiting       Review of Systems   All other systems reviewed and are negative.           OBJECTIVE:     Vitals:    06/06/25 0850   BP: 120/61   BP Location: Left arm   Patient Position: Sitting   Pulse: 73   Weight: 56 kg (123 lb 7.3 oz)   Height: 5' 2" (1.575 m)       BP Readings from Last 5 Encounters:   06/06/25 120/61   05/22/25 130/74   04/01/25 120/66   03/03/25 127/63   02/21/25 122/80        Physical Exam  Vitals reviewed.   Constitutional:       General: She is not in acute distress.     Appearance: Normal appearance. She is not ill-appearing.   HENT:      Head: Normocephalic and atraumatic.   Eyes:      Extraocular Movements: Extraocular movements intact.      Conjunctiva/sclera: Conjunctivae normal.   Cardiovascular:      Rate and Rhythm: Normal rate and regular rhythm.   Pulmonary:      Effort: Pulmonary effort is normal. No respiratory distress.   Musculoskeletal:         General: No swelling or " deformity.   Skin:     Findings: No erythema or rash.   Neurological:      Mental Status: She is alert.             Current Outpatient Medications   Medication Instructions    acetaminophen (TYLENOL) 650 mg, Every 8 hours PRN    amiodarone (PACERONE) 200 mg, Oral, Daily    amLODIPine (NORVASC) 5 mg, Oral, Every morning    bisacodyL (DULCOLAX (BISACODYL)) 10 mg, Rectal, Daily PRN    CALCIUM 600 WITH VITAMIN D3 600 mg-12.5 mcg (500 unit) Cap TAKE ONE CAPSULE BY MOUTH ONCE DAILY IN THE EVENING    ELIQUIS 2.5 mg, 2 times daily    fluticasone propionate (FLONASE) 50 mcg, Each Nostril, 2 times daily    hydrocortisone 2.5 % cream APPLY TO THE AFFECTED AREA(S) topically TWICE DAILY    levothyroxine (SYNTHROID) 112 mcg, Oral, Before breakfast    linaCLOtide (LINZESS) 290 mcg, Oral, Before breakfast    metoprolol succinate (TOPROL-XL) 25 mg, Oral, Daily    mupirocin (BACTROBAN) 2 % ointment Topical (Top), 2 times daily    nystatin (MYCOSTATIN) ointment Topical (Top), 4 times daily    polyethylene glycol (GLYCOLAX) 17 g, Oral, 2 times daily PRN    senna-docusate 8.6-50 mg (PERICOLACE) 8.6-50 mg per tablet 1 tablet, Oral, 2 times daily    torsemide (DEMADEX) 10 mg, Oral, Every morning    vit C,I-Yd-paqtq-lutein-zeaxan (PRESERVISION AREDS-2) 250-90-40-1 mg Cap 1 capsule, Oral, 2 times daily       Lipid Panel:   Lab Results   Component Value Date    CHOL 196 03/04/2024    HDL 48 03/04/2024    LDLCALC 133.2 03/04/2024    TRIG 74 03/04/2024    CHOLHDL 24.5 03/04/2024         The ASCVD Risk score (Mady DK, et al., 2019) failed to calculate for the following reasons:    The 2019 ASCVD risk score is only valid for ages 40 to 79    Most Recent EKG Results  Results for orders placed or performed during the hospital encounter of 02/25/25   EKG 12-lead    Collection Time: 02/26/25 12:05 AM   Result Value Ref Range    QRS Duration 88 ms    [facility] QTC Calculation 440 ms    Narrative    Test Reason : I31.39,    Vent. Rate :  60 BPM     Atrial  Rate :  60 BPM     P-R Int : 310 ms          QRS Dur :  88 ms      QT Int : 440 ms       P-R-T Axes :    -30  38 degrees    QTcB Int : 440 ms    Atrial-paced rhythm with prolonged AV conduction  Left axis deviation  Possible Anterior infarct ,age undetermined  Abnormal ECG  When compared with ECG of 06-Dec-2024 10:16,  Electronic atrial pacemaker has replaced Atrial fibrillation  Vent. rate has decreased by  30 bpm  Confirmed by Gilson Crenshaw ([id number]) on 2/26/2025 9:37:37 PM    Referred By: AAAREFERRAL SELF           Confirmed By: Gilson Crenshaw       Most Recent Echocardiogram Results  Results for orders placed during the hospital encounter of 02/25/25    Echo    Interpretation Summary    Left Ventricle: There is mild concentric hypertrophy. There is normal systolic function with a visually estimated ejection fraction of 60 - 65%.    Right Ventricle: The right ventricle is normal in size. Systolic function is normal. Pacemaker lead present in the ventricle.    Right Atrium: Multiple leads present in the right atrium.    Aortic Valve: There is mild to moderate stenosis. Aortic valve area by VTI is 1.4 cm². Aortic valve peak velocity is 2.7 m/s. Mean gradient is 16 mmHg. The dimensionless index is 0.46.    Mitral Valve: There is mild stenosis. The mean pressure gradient across the mitral valve is 6 mmHg at a heart rate of  bpm. The estimated mitral valve area by pressure half time is 1.56 cm2. There is mild regurgitation.    Tricuspid Valve: There is mild regurgitation.    Pulmonary Artery: The estimated pulmonary artery systolic pressure is 33 mmHg.    IVC/SVC: Intermediate venous pressure at 8 mmHg.    Pericardium: There is a small to moderate circumferential effusion. No indication of cardiac tamponade.      Most Recent Nuclear Stress Test Results  No results found for this or any previous visit.      Most Recent Cardiac PET Stress Test Results  No results found for this or any previous visit.      Most  Recent Cardiovascular Angiogram results  No results found for this or any previous visit.      Other Most Recent Cardiology Results  Results for orders placed in visit on 03/31/25    Cardiac device check - Remote        All pertinent data including labs, imaging, EKGs, and studies listed above were reviewed.  All EKG tracing in [facility] were personally interpreted by this provider.    ASSESSMENT:   Michela Calvert is a 89 y.o. female who presents for evaluation of atrial fibrillation/flutter and pericardial effusion.  No significant atrial arrhythmias per device check.  Hospitalization 3 months prior at which time she was incidentally noted to have a pericardial effusion without tamponade.  She was discharged off of anticoagulation.    1. Atrial fibrillation, unspecified type  IN OFFICE EKG 12-LEAD (to Muse)      2. Paroxysmal atrial fibrillation  amiodarone (PACERONE) 100 MG Tab      3. Essential hypertension        4. Chronic heart failure with preserved ejection fraction        5. Pacemaker        6. Pericardial effusion        7. Moderate aortic stenosis        8. Tachy-jimi syndrome        9. Typical atrial flutter        10. Pulmonary hypertension          PLAN FOR TREATMENT OF ABOVE DIAGNOSES:     Pericardial effusion:  Incidentally noted on echocardiogram 2/25.  No tamponade.  No history of pericardiocentesis.  Repeat echocardiogram to evaluate    AF/AFL:  Currently in sinus rhythm   Decrease amiodarone to 100 mg per day  Monitoring of TSH.  Levothyroxine adjustments per PCP.    Monitor LFTs.  Echocardiogram.  If effusion is stable or reduced, we will likely resume anticoagulation with close monitoring.    Dual chamber PPM: Device interrogation performed today. Normal device function. Stable lead parameters.  Continue routine device checks  Monitoring for signs/symptoms of malfunction or infection    HFpEF:  Euvolemic  Continue beta blockers, torsemide, low-sodium diet    Mild-to-moderate aortic stenosis:  stable  Routine echo surveillance annually or if symptoms develop    HTN: at goal  Continue amlodipine, Toprol, torsemide  Avoid NSAID use  Recommend a healthy diet (DASH, low sodium diet) and exercise (at least 150 min/wk)  Discussed importance of maintaining a health BMI (18.5-24.9 kg/m2)    Pulmonary hypertension: WHO II  Blood pressure control, monitor valvular disease, monitor volume status.  Continue torsemide      F/u in 6 months.  Sooner if needed.      Jamie Holloway MD  [facility]    This note was partially generated using voice recognition and generative artificial intelligence software. While every effort has been made to ensure its accuracy, transcription errors may exist. Please reach out to me with any questions or if clarification is needed.

## 2025-06-07 PROBLEM — I50.30 (HFPEF) HEART FAILURE WITH PRESERVED EJECTION FRACTION: Status: ACTIVE | Noted: 2024-05-21

## 2025-06-07 PROBLEM — H35.3123 NONEXUDATIVE AGE-RELATED MACULAR DEGENERATION, LEFT EYE, ADVANCED ATROPHIC WITHOUT SUBFOVEAL INVOLVEMENT: Chronic | Status: ACTIVE | Noted: 2021-07-15

## 2025-06-07 LAB
OHS CV AF BURDEN PERCENT: < 1
OHS CV DC REMOTE DEVICE TYPE: NORMAL
OHS CV RV PACING PERCENT: 4 %

## 2025-06-09 ENCOUNTER — TELEPHONE (OUTPATIENT)
Dept: CARDIOLOGY | Facility: CLINIC | Age: OVER 89
End: 2025-06-09
Payer: MEDICARE

## 2025-06-09 NOTE — TELEPHONE ENCOUNTER
----- Message from Scott Holloway MD sent at 6/7/2025  4:59 PM CDT -----  Regarding: Anticoagulation and echo  Could be contact the patient and see if she is taking anticoagulation.  It appears she was taken off of blood thinners in early March after being found to have a pericardial effusion.  I would like to update her echocardiogram if we could get her scheduled.  I placed an order.  If she is off of anticoagulation I would continue to hold at this time until we complete the echo.  Thank you.

## 2025-06-10 DIAGNOSIS — K59.04 CHRONIC IDIOPATHIC CONSTIPATION: ICD-10-CM

## 2025-06-10 NOTE — TELEPHONE ENCOUNTER
Copied from CRM #[id number]. Topic: Medications - Medication Question  >> Eliezer 10, 2025 10:43 AM Kathleen wrote:  Type:  Needs Medical Advice    Who Called: May RALPH  Symptoms (please be specific): pt needs to seoak to nurse regarding a script tht was cancelled,   Would the patient rather a call back or a response via [facility]? call  Best Call Back Number:   Additional Information: please advise and thank you.

## 2025-06-10 NOTE — TELEPHONE ENCOUNTER
Called to follow up to inform Rx was sent to correct pharmacy, pt son May voiced understanding no further questions/concerns at this time

## 2025-06-11 ENCOUNTER — DOCUMENT SCAN (OUTPATIENT)
Dept: HOME HEALTH SERVICES | Facility: HOSPITAL | Age: OVER 89
End: 2025-06-11
Payer: MEDICARE

## 2025-06-13 ENCOUNTER — HOSPITAL ENCOUNTER (OUTPATIENT)
Dept: CARDIOLOGY | Facility: HOSPITAL | Age: OVER 89
Discharge: HOME | End: 2025-06-13
Attending: INTERNAL MEDICINE
Payer: MEDICARE

## 2025-06-13 ENCOUNTER — OFFICE VISIT (OUTPATIENT)
Dept: FAMILY MEDICINE | Facility: CLINIC | Age: OVER 89
End: 2025-06-13
Payer: MEDICARE

## 2025-06-13 VITALS
HEIGHT: 62 IN | SYSTOLIC BLOOD PRESSURE: 132 MMHG | OXYGEN SATURATION: 95 % | BODY MASS INDEX: 22.55 KG/M2 | HEART RATE: 69 BPM | WEIGHT: 122.56 LBS | DIASTOLIC BLOOD PRESSURE: 78 MMHG | TEMPERATURE: 98 F

## 2025-06-13 VITALS — WEIGHT: 122 LBS | BODY MASS INDEX: 22.45 KG/M2 | HEIGHT: 62 IN

## 2025-06-13 DIAGNOSIS — I31.39 PERICARDIAL EFFUSION: ICD-10-CM

## 2025-06-13 DIAGNOSIS — N39.0 URINARY TRACT INFECTION WITHOUT HEMATURIA, SITE UNSPECIFIED: Primary | ICD-10-CM

## 2025-06-13 LAB
AORTIC SIZE INDEX (SOV): 1.8 CM/M2
AORTIC SIZE INDEX: 1.9 CM/M2
ASCENDING AORTA: 3 CM
AV INDEX (PROSTH): 0.4
AV MEAN GRADIENT: 21 MMHG
AV PEAK GRADIENT: 34 MMHG
AV VALVE AREA BY VELOCITY RATIO: 1.2 CM²
AV VALVE AREA: 1.4 CM²
AV VELOCITY RATIO: 0.34
BSA FOR ECHO PROCEDURE: 1.56 M2
CV ECHO LV RWT: 0.65 CM
DOP CALC AO PEAK VEL: 2.9 M/S
DOP CALC AO VTI: 75.4 CM
DOP CALC LVOT AREA: 3.5 CM2
DOP CALC LVOT DIAMETER: 2.1 CM
DOP CALC LVOT PEAK VEL: 1 M/S
DOP CALC LVOT STROKE VOLUME: 103.2 CM3
DOP CALC MV VTI: 71.4 CM
DOP CALCLVOT PEAK VEL VTI: 29.8 CM
E WAVE DECELERATION TIME: 359 MSEC
E/A RATIO: 1.35
E/E' RATIO: 45 M/S
ECHO LV POSTERIOR WALL: 1.2 CM (ref 0.6–1.1)
FRACTIONAL SHORTENING: 40.5 % (ref 28–44)
INTERVENTRICULAR SEPTUM: 1.3 CM (ref 0.6–1.1)
LEFT ATRIUM AREA SYSTOLIC (APICAL 2 CHAMBER): 24.99 CM2
LEFT ATRIUM AREA SYSTOLIC (APICAL 4 CHAMBER): 25.16 CM2
LEFT ATRIUM SIZE: 4.5 CM
LEFT ATRIUM VOLUME INDEX MOD: 53 ML/M2
LEFT ATRIUM VOLUME MOD: 82 ML
LEFT INTERNAL DIMENSION IN SYSTOLE: 2.2 CM (ref 2.1–4)
LEFT VENTRICLE DIASTOLIC VOLUME INDEX: 37.42 ML/M2
LEFT VENTRICLE DIASTOLIC VOLUME: 58 ML
LEFT VENTRICLE END SYSTOLIC VOLUME APICAL 2 CHAMBER: 80.71 ML
LEFT VENTRICLE END SYSTOLIC VOLUME APICAL 4 CHAMBER: 82.74 ML
LEFT VENTRICLE MASS INDEX: 101.1 G/M2
LEFT VENTRICLE SYSTOLIC VOLUME INDEX: 11 ML/M2
LEFT VENTRICLE SYSTOLIC VOLUME: 17 ML
LEFT VENTRICULAR INTERNAL DIMENSION IN DIASTOLE: 3.7 CM (ref 3.5–6)
LEFT VENTRICULAR MASS: 156.7 G
LV LATERAL E/E' RATIO: 44.5 M/S
LV SEPTAL E/E' RATIO: 44.5 M/S
LVED V (TEICH): 57.59 ML
LVES V (TEICH): 16.72 ML
LVOT MG: 2.33 MMHG
LVOT MV: 0.73 CM/S
MV MEAN GRADIENT: 9 MMHG
MV PEAK A VEL: 1.32 M/S
MV PEAK E VEL: 1.78 M/S
MV PEAK GRADIENT: 20 MMHG
MV VALVE AREA BY CONTINUITY EQUATION: 1.44 CM2
OHS CV RV/LV RATIO: 1.08 CM
PISA TR MAX VEL: 3.8 M/S
RA VOL SYS: 52.11 ML
RIGHT ATRIAL AREA: 18.6 CM2
RIGHT ATRIUM END SYSTOLIC VOLUME APICAL 4 CHAMBER INDEX BSA: 31.66 ML/M2
RIGHT ATRIUM VOLUME AREA LENGTH APICAL 4 CHAMBER: 49.07 ML
RIGHT VENTRICLE DIASTOLIC BASEL DIMENSION: 4 CM
RIGHT VENTRICULAR END-DIASTOLIC DIMENSION: 4.03 CM
SINUS: 2.82 CM
STJ: 2.9 CM
TDI LATERAL: 0.04 M/S
TDI SEPTAL: 0.04 M/S
TDI: 0.04 M/S
TR MAX PG: 56 MMHG
Z-SCORE OF LEFT VENTRICULAR DIMENSION IN END DIASTOLE: -1.93
Z-SCORE OF LEFT VENTRICULAR DIMENSION IN END SYSTOLE: -1.86

## 2025-06-13 PROCEDURE — 93306 TTE W/DOPPLER COMPLETE: CPT | Mod: PO

## 2025-06-13 PROCEDURE — 99999 PR PBB SHADOW E&M-EST. PATIENT-LVL IV: CPT | Mod: PBBFAC,,, | Performed by: NURSE PRACTITIONER

## 2025-06-13 NOTE — PROGRESS NOTES
Subjective:       Patient ID: Michela Calvert is a 89 y.o. female.    Chief Complaint: Follow-up    HPI  Here with son Gerri for Follow up for UTI. Denies any urinary symptoms.  Completed cefdinir 2 wks ago. Feeling good.  Past Medical History:   Diagnosis Date    Anemia     Anticoagulant long-term use     Aortic stenosis     Atrial fibrillation     Cataracts, bilateral     CHF (congestive heart failure)     chronic diastolic    Cholelithiasis     Chronic constipation     Chronic pain syndrome 07/25/2018    CKD (chronic kidney disease), stage III     Closed fracture of neck of left femur 03/08/2019    DDD (degenerative disc disease), lumbar     DDD (degenerative disc disease), lumbar     Hard of hearing     Heart murmur 01/2017    Hiatal hernia     HLD (hyperlipidemia)     no medication taken    Hypertension 12/27/2018    Hypothyroidism     Lichen sclerosus et atrophicus     Rectal/Vaginal areas    Lumbar spinal stenosis     Lumbar spondylosis     Mitral valve disorder 01/2017    Asymptomatic    Osteopenia     Paroxysmal atrial fibrillation     Pulmonary hypertension     Urinary incontinence     VHD (valvular heart disease)        Past Surgical History:   Procedure Laterality Date    A-V CARDIAC PACEMAKER INSERTION  8/29/2024    Procedure: Dual Chamber PPM (Biotronik);  Surgeon: Scott Holloway MD;  Location: [facility] CATH;  Service: Cardiology;;    APPENDECTOMY      CATARACT EXTRACTION EXTRACAPSULAR W/ INTRAOCULAR LENS IMPLANTATION Bilateral     HEMIARTHROPLASTY OF HIP Left 3/9/2019    Procedure: HEMIARTHROPLASTY, HIP;  Surgeon: Josesito Leo MD;  Location: [facility] OR;  Service: Orthopedics;  Laterality: Left;    HYSTERECTOMY      INSERTION OF DORSAL COLUMN NERVE STIMULATOR FOR TRIAL N/A 7/25/2018    Procedure: INSERTION, DORSAL COLUMN NEUROSTIMULATOR, FOR TRIAL;  Surgeon: Derek Daily MD;  Location: [facility] OR;  Service: Pain Management;  Laterality: N/A;    spinal ablation  12/2017    TONSILLECTOMY         Review  "of patient's allergies indicates:   Allergen Reactions    Nitrofuran analogues Nausea Only    Ciprofloxacin Nausea And Vomiting       Social History[1]    Medications Ordered Prior to Encounter[2]    Family History   Problem Relation Name Age of Onset    Colon cancer Neg Hx      Crohn's disease Neg Hx      Stomach cancer Neg Hx      Ulcerative colitis Neg Hx      Esophageal cancer Neg Hx         Review of Systems   Constitutional: Negative.    HENT: Negative.     Eyes: Negative.    Respiratory: Negative.     Cardiovascular: Negative.    Gastrointestinal: Negative.    Endocrine: Negative.    Genitourinary: Negative.    Musculoskeletal: Negative.    Skin: Negative.    Neurological: Negative.    Psychiatric/Behavioral: Negative.         Objective:      /78 (Patient Position: Sitting)   Pulse 69   Temp 97.9 °F (36.6 °C) (Oral)   Ht 5' 2" (1.575 m)   Wt 55.6 kg (122 lb 9.2 oz)   SpO2 95%   BMI 22.42 kg/m²   Physical Exam  Vitals and nursing note reviewed.   Constitutional:       General: She is not in acute distress.     Appearance: Normal appearance. She is well-groomed.   HENT:      Head: Normocephalic.      Right Ear: External ear normal.      Left Ear: External ear normal.      Nose: Nose normal.      Mouth/Throat:      Lips: Pink.      Mouth: Mucous membranes are moist.      Pharynx: Oropharynx is clear.   Eyes:      Extraocular Movements: Extraocular movements intact.      Conjunctiva/sclera: Conjunctivae normal.      Pupils: Pupils are equal, round, and reactive to light.   Cardiovascular:      Rate and Rhythm: Normal rate and regular rhythm.      Heart sounds: Normal heart sounds.   Pulmonary:      Effort: Pulmonary effort is normal.      Breath sounds: Normal breath sounds.   Chest:      Chest wall: No tenderness.   Abdominal:      General: Bowel sounds are normal.      Palpations: Abdomen is soft.      Tenderness: There is no abdominal tenderness.   Musculoskeletal:         General: No swelling or " tenderness. Normal range of motion.      Cervical back: Normal range of motion and neck supple.      Right lower leg: No edema.      Left lower leg: No edema.   Lymphadenopathy:      Cervical: No cervical adenopathy.   Skin:     General: Skin is warm and dry.   Neurological:      General: No focal deficit present.      Mental Status: She is alert and oriented to person, place, and time. Mental status is at baseline.      Gait: Gait abnormal (assisted by rollator).   Psychiatric:         Mood and Affect: Mood normal.         Behavior: Behavior normal.         Assessment:       1. Urinary tract infection without hematuria, site unspecified        Plan:       Urinary tract infection without hematuria, site unspecified        UTI resolved. Repeat UA + leukocytes but otherwise normal. No further treatment needed at this time.          [1]   Social History  Socioeconomic History    Marital status:    Tobacco Use    Smoking status: Never     Passive exposure: Never    Smokeless tobacco: Never   Substance and Sexual Activity    Alcohol use: Not Currently    Drug use: No    Sexual activity: Never     Social Drivers of Health     Financial Resource Strain: Medium Risk (2/26/2025)    Overall Financial Resource Strain (CARDIA)     Difficulty of Paying Living Expenses: Somewhat hard   Food Insecurity: No Food Insecurity (2/26/2025)    Hunger Vital Sign     Worried About Running Out of Food in the Last Year: Never true     Ran Out of Food in the Last Year: Never true   Transportation Needs: No Transportation Needs (8/26/2024)    TRANSPORTATION NEEDS     Transportation : No   Physical Activity: Unknown (7/15/2021)    Exercise Vital Sign     Days of Exercise per Week: 3 days   Stress: Stress Concern Present (2/26/2025)    [employer] - Occupational Stress Questionnaire     Feeling of Stress : To some extent   Housing Stability: Low Risk  (2/26/2025)    Housing Stability Vital Sign     Unable to  Pay for Housing in the Last Year: No     Homeless in the Last Year: No   [2]   Current Outpatient Medications on File Prior to Visit   Medication Sig Dispense Refill    acetaminophen (TYLENOL) 650 MG TbSR Take 650 mg by mouth every 8 (eight) hours as needed.      amiodarone (PACERONE) 100 MG Tab Take 1 tablet (100 mg total) by mouth once daily. 90 tablet 3    amLODIPine (NORVASC) 5 MG tablet TAKE ONE TABLET BY MOUTH EVERY MORNING 90 tablet 3    bisacodyL (DULCOLAX, BISACODYL,) 10 mg Supp Place 1 suppository (10 mg total) rectally daily as needed (use as directed). 1 suppository 0    CALCIUM 600 WITH VITAMIN D3 600 mg-12.5 mcg (500 unit) Cap TAKE ONE CAPSULE BY MOUTH ONCE DAILY IN THE EVENING 180 capsule 0    ELIQUIS 2.5 mg Tab Take 2.5 mg by mouth 2 (two) times daily.      fluticasone propionate (FLONASE) 50 mcg/actuation nasal spray 1 spray (50 mcg total) by Each Nostril route 2 (two) times a day. 16 g 5    hydrocortisone 2.5 % cream APPLY TO THE AFFECTED AREA(S) topically TWICE DAILY 84 g 3    levothyroxine (SYNTHROID) 112 MCG tablet Take 1 tablet (112 mcg total) by mouth before breakfast. 30 tablet 5    linaCLOtide (LINZESS) 290 mcg Cap capsule Take 1 capsule (290 mcg total) by mouth before breakfast. 90 capsule 3    metoprolol succinate (TOPROL-XL) 25 MG 24 hr tablet Take 1 tablet (25 mg total) by mouth once daily.      mupirocin (BACTROBAN) 2 % ointment Apply topically 2 (two) times daily. 30 g 3    nystatin (MYCOSTATIN) ointment Apply topically 4 (four) times daily. for 14 days 60 g 0    polyethylene glycol (GLYCOLAX) 17 gram/dose powder Take 17 g by mouth 2 (two) times daily as needed for Constipation. 20 each 0    senna-docusate 8.6-50 mg (PERICOLACE) 8.6-50 mg per tablet Take 1 tablet by mouth 2 (two) times daily.      torsemide (DEMADEX) 10 MG Tab TAKE ONE TABLET BY MOUTH EVERY MORNING 28 tablet 0    vit C,S-Ek-puiec-lutein-zeaxan (PRESERVISION AREDS-2) 250-90-40-1 mg Cap Take 1 capsule by mouth 2 (two)  times daily. 180 capsule 3     No current facility-administered medications on file prior to visit.

## 2025-06-16 ENCOUNTER — RESULTS FOLLOW-UP (OUTPATIENT)
Dept: CARDIOLOGY | Facility: CLINIC | Age: OVER 89
End: 2025-06-16

## 2025-06-16 LAB
AORTIC SIZE INDEX (SOV): 1.8 CM/M2
AORTIC SIZE INDEX: 1.9 CM/M2
ASCENDING AORTA: 3 CM
AV INDEX (PROSTH): 0.4
AV MEAN GRADIENT: 21 MMHG
AV PEAK GRADIENT: 34 MMHG
AV VALVE AREA BY VELOCITY RATIO: 1.2 CM²
AV VALVE AREA: 1.4 CM²
AV VELOCITY RATIO: 0.34
BSA FOR ECHO PROCEDURE: 1.56 M2
CV ECHO LV RWT: 0.49 CM
DOP CALC AO PEAK VEL: 2.9 M/S
DOP CALC AO VTI: 75.4 CM
DOP CALC LVOT AREA: 3.5 CM2
DOP CALC LVOT DIAMETER: 2.1 CM
DOP CALC LVOT PEAK VEL: 1 M/S
DOP CALC LVOT STROKE VOLUME: 103.2 CM3
DOP CALC MV VTI: 71.4 CM
DOP CALCLVOT PEAK VEL VTI: 29.8 CM
E WAVE DECELERATION TIME: 359 MSEC
E/A RATIO: 1.35
E/E' RATIO: 45 M/S
ECHO LV POSTERIOR WALL: 0.9 CM (ref 0.6–1.1)
FRACTIONAL SHORTENING: 40.5 % (ref 28–44)
HR MV ECHO: 67 BPM
INTERVENTRICULAR SEPTUM: 0.9 CM (ref 0.6–1.1)
LEFT ATRIUM AREA SYSTOLIC (APICAL 2 CHAMBER): 24.99 CM2
LEFT ATRIUM AREA SYSTOLIC (APICAL 4 CHAMBER): 25.16 CM2
LEFT ATRIUM SIZE: 4.5 CM
LEFT ATRIUM VOLUME INDEX MOD: 53 ML/M2
LEFT ATRIUM VOLUME MOD: 82 ML
LEFT INTERNAL DIMENSION IN SYSTOLE: 2.2 CM (ref 2.1–4)
LEFT VENTRICLE DIASTOLIC VOLUME INDEX: 37.42 ML/M2
LEFT VENTRICLE DIASTOLIC VOLUME: 58 ML
LEFT VENTRICLE END SYSTOLIC VOLUME APICAL 2 CHAMBER: 80.71 ML
LEFT VENTRICLE END SYSTOLIC VOLUME APICAL 4 CHAMBER: 82.74 ML
LEFT VENTRICLE MASS INDEX: 62.5 G/M2
LEFT VENTRICLE SYSTOLIC VOLUME INDEX: 11 ML/M2
LEFT VENTRICLE SYSTOLIC VOLUME: 17 ML
LEFT VENTRICULAR INTERNAL DIMENSION IN DIASTOLE: 3.7 CM (ref 3.5–6)
LEFT VENTRICULAR MASS: 96.9 G
LV LATERAL E/E' RATIO: 44.5 M/S
LV SEPTAL E/E' RATIO: 44.5 M/S
LVED V (TEICH): 57.59 ML
LVES V (TEICH): 16.72 ML
LVOT MG: 2.33 MMHG
LVOT MV: 0.73 CM/S
MV MEAN GRADIENT: 9 MMHG
MV PEAK A VEL: 1.32 M/S
MV PEAK E VEL: 1.78 M/S
MV PEAK GRADIENT: 20 MMHG
MV VALVE AREA BY CONTINUITY EQUATION: 1.44 CM2
OHS CV RV/LV RATIO: 1.08 CM
PISA TR MAX VEL: 3.8 M/S
RA PRESSURE ESTIMATED: 3 MMHG
RA VOL SYS: 52.11 ML
RIGHT ATRIAL AREA: 18.6 CM2
RIGHT ATRIUM END SYSTOLIC VOLUME APICAL 4 CHAMBER INDEX BSA: 31.66 ML/M2
RIGHT ATRIUM VOLUME AREA LENGTH APICAL 4 CHAMBER: 49.07 ML
RIGHT VENTRICLE DIASTOLIC BASEL DIMENSION: 4 CM
RIGHT VENTRICULAR END-DIASTOLIC DIMENSION: 4.03 CM
RV TB RVSP: 7 MMHG
SINUS: 2.82 CM
STJ: 2.9 CM
TDI LATERAL: 0.04 M/S
TDI SEPTAL: 0.04 M/S
TDI: 0.04 M/S
TR MAX PG: 56 MMHG
TV REST PULMONARY ARTERY PRESSURE: 61 MMHG
Z-SCORE OF LEFT VENTRICULAR DIMENSION IN END DIASTOLE: -1.93
Z-SCORE OF LEFT VENTRICULAR DIMENSION IN END SYSTOLE: -1.86

## 2025-06-20 DIAGNOSIS — I48.0 PAROXYSMAL ATRIAL FIBRILLATION: ICD-10-CM

## 2025-06-20 DIAGNOSIS — I10 PRIMARY HYPERTENSION: ICD-10-CM

## 2025-06-20 RX ORDER — TORSEMIDE 10 MG/1
10 TABLET ORAL EVERY MORNING
Qty: 28 TABLET | Refills: 0 | Status: SHIPPED | OUTPATIENT
Start: 2025-06-20

## 2025-06-30 ENCOUNTER — HOSPITAL ENCOUNTER (OUTPATIENT)
Dept: CARDIOLOGY | Facility: HOSPITAL | Age: OVER 89
Discharge: HOME | End: 2025-06-30
Attending: INTERNAL MEDICINE
Payer: MEDICARE

## 2025-06-30 ENCOUNTER — CLINICAL SUPPORT (OUTPATIENT)
Dept: CARDIOLOGY | Facility: HOSPITAL | Age: OVER 89
End: 2025-06-30
Payer: MEDICARE

## 2025-06-30 DIAGNOSIS — R00.1 BRADYCARDIA, UNSPECIFIED: ICD-10-CM

## 2025-06-30 PROCEDURE — 93296 REM INTERROG EVL PM/IDS: CPT | Mod: HCNC,PO | Performed by: INTERNAL MEDICINE

## 2025-06-30 PROCEDURE — 93294 REM INTERROG EVL PM/LDLS PM: CPT | Mod: HCNC,,, | Performed by: INTERNAL MEDICINE

## 2025-07-01 ENCOUNTER — EXTERNAL HOME HEALTH (OUTPATIENT)
Dept: HOME HEALTH SERVICES | Facility: HOSPITAL | Age: OVER 89
End: 2025-07-01
Payer: MEDICARE

## 2025-07-06 LAB
OHS CV AF BURDEN PERCENT: < 1
OHS CV DC REMOTE DEVICE TYPE: NORMAL
OHS CV RV PACING PERCENT: 3 %

## 2025-07-11 ENCOUNTER — LAB VISIT (OUTPATIENT)
Dept: LAB | Facility: HOSPITAL | Age: OVER 89
End: 2025-07-11
Payer: MEDICARE

## 2025-07-11 DIAGNOSIS — E03.9 HYPOTHYROIDISM, UNSPECIFIED TYPE: ICD-10-CM

## 2025-07-11 LAB
T4 FREE SERPL-MCNC: 1.32 NG/DL (ref 0.71–1.51)
TSH SERPL-ACNC: 8.54 UIU/ML (ref 0.4–4)

## 2025-07-11 PROCEDURE — 84443 ASSAY THYROID STIM HORMONE: CPT | Mod: HCNC

## 2025-07-11 PROCEDURE — 84439 ASSAY OF FREE THYROXINE: CPT | Mod: HCNC

## 2025-07-11 PROCEDURE — 36415 COLL VENOUS BLD VENIPUNCTURE: CPT | Mod: HCNC,PN

## 2025-07-18 ENCOUNTER — RESULTS FOLLOW-UP (OUTPATIENT)
Dept: FAMILY MEDICINE | Facility: CLINIC | Age: OVER 89
End: 2025-07-18
Payer: MEDICARE

## 2025-07-18 DIAGNOSIS — I48.0 PAROXYSMAL ATRIAL FIBRILLATION: ICD-10-CM

## 2025-07-18 DIAGNOSIS — I10 PRIMARY HYPERTENSION: ICD-10-CM

## 2025-07-20 NOTE — TELEPHONE ENCOUNTER
No care due was identified.  [facility] Embedded Care Due Messages. Reference number: [id number].   7/20/2025 6:04:42 PM CDT

## 2025-07-20 NOTE — TELEPHONE ENCOUNTER
Refill Routing Note   Medication(s) are not appropriate for processing by [facility] for the following reason(s):        New or recently adjusted medication  Required labs abnormal  ED/Hospital Visit since last OV with provider    [facility] action(s):  Defer             Appointments  past 12m or future 3m with PCP    Date Provider   Last Visit   10/31/2024 Enzo Nieto MD   Next Visit   Visit date not found Enzo Nieto MD   ED visits in past 90 days: 0        Note composed:6:10 PM 07/20/2025

## 2025-07-21 RX ORDER — TORSEMIDE 10 MG/1
10 TABLET ORAL EVERY MORNING
Qty: 28 TABLET | Refills: 0 | Status: SHIPPED | OUTPATIENT
Start: 2025-07-21

## 2025-07-31 ENCOUNTER — DOCUMENT SCAN (OUTPATIENT)
Dept: HOME HEALTH SERVICES | Facility: HOSPITAL | Age: OVER 89
End: 2025-07-31
Payer: MEDICARE

## 2025-08-15 DIAGNOSIS — H35.30 MACULAR DEGENERATION, UNSPECIFIED LATERALITY, UNSPECIFIED TYPE: ICD-10-CM

## 2025-08-15 DIAGNOSIS — I48.0 PAROXYSMAL ATRIAL FIBRILLATION: ICD-10-CM

## 2025-08-15 DIAGNOSIS — I10 PRIMARY HYPERTENSION: ICD-10-CM

## 2025-08-15 RX ORDER — VIT C/E/ZN/COPPR/LUTEIN/ZEAXAN 250MG-90MG
1 CAPSULE ORAL 2 TIMES DAILY
Qty: 180 CAPSULE | Refills: 3 | Status: SHIPPED | OUTPATIENT
Start: 2025-08-15

## 2025-08-15 RX ORDER — TORSEMIDE 10 MG/1
10 TABLET ORAL EVERY MORNING
Qty: 28 TABLET | Refills: 3 | Status: SHIPPED | OUTPATIENT
Start: 2025-08-15

## 2025-09-04 DIAGNOSIS — I48.0 PAROXYSMAL ATRIAL FIBRILLATION: ICD-10-CM

## 2025-09-05 RX ORDER — APIXABAN 2.5 MG/1
2.5 TABLET, FILM COATED ORAL 2 TIMES DAILY
Qty: 180 TABLET | Refills: 3 | Status: SHIPPED | OUTPATIENT
Start: 2025-09-05

## (undated) DEVICE — SYR GLASS 5CC LUER LOK

## (undated) DEVICE — CABLE MULTILEAD TRIAL

## (undated) DEVICE — SUT SILK 0 SH 30IN BLK BR

## (undated) DEVICE — APPLICATOR CHLORAPREP CLR 10.5

## (undated) DEVICE — SEE L#120831

## (undated) DEVICE — TRAY NERVE BLOCK

## (undated) DEVICE — DRAPE INCISE IOBAN 2 23X17IN

## (undated) DEVICE — DURAPREP SURG SCRUB 26ML

## (undated) DEVICE — GLOVE SURGICAL LATEX SZ 7

## (undated) DEVICE — Device

## (undated) DEVICE — MARKER SKIN STND TIP BLUE BARR

## (undated) DEVICE — SEE MEDLINE ITEM 157148

## (undated) DEVICE — SCALPEL #11 BLADE STRL DISP

## (undated) DEVICE — GAUZE SPONGE 4X4 12PLY

## (undated) DEVICE — DRAPE C ARM 42 X 120 10/BX

## (undated) DEVICE — SPONGE DERMACEA 4X4IN 12PLY

## (undated) DEVICE — NDL TUOHY EPIDURAL 20G X 3.5

## (undated) DEVICE — SEE MEDLINE ITEM 157131

## (undated) DEVICE — NDL SPINAL SPINOCAN 22GX3.5

## (undated) DEVICE — KIT HEADER ACC EPG 2 PORT

## (undated) DEVICE — REMOVER LOTION

## (undated) DEVICE — CHLORAPREP W TINT 26ML APPL